# Patient Record
Sex: MALE | Race: WHITE | Employment: OTHER | URBAN - METROPOLITAN AREA
[De-identification: names, ages, dates, MRNs, and addresses within clinical notes are randomized per-mention and may not be internally consistent; named-entity substitution may affect disease eponyms.]

---

## 2017-11-01 ENCOUNTER — HOSPITAL ENCOUNTER (OUTPATIENT)
Age: 57
Setting detail: SPECIMEN
Discharge: HOME | End: 2017-11-01
Payer: MEDICARE

## 2017-11-01 ENCOUNTER — OFFICE VISIT (OUTPATIENT)
Dept: FAMILY MEDICINE CLINIC | Age: 57
End: 2017-11-01
Payer: MEDICARE

## 2017-11-01 VITALS
DIASTOLIC BLOOD PRESSURE: 62 MMHG | OXYGEN SATURATION: 98 % | TEMPERATURE: 97.6 F | HEIGHT: 70 IN | HEART RATE: 65 BPM | RESPIRATION RATE: 20 BRPM | WEIGHT: 206 LBS | SYSTOLIC BLOOD PRESSURE: 112 MMHG | BODY MASS INDEX: 29.49 KG/M2

## 2017-11-01 DIAGNOSIS — R73.03 PRE-DIABETES: ICD-10-CM

## 2017-11-01 DIAGNOSIS — Z76.89 ENCOUNTER TO ESTABLISH CARE: Primary | ICD-10-CM

## 2017-11-01 DIAGNOSIS — Z00.00 HEALTHCARE MAINTENANCE: ICD-10-CM

## 2017-11-01 DIAGNOSIS — G62.9 NEUROPATHY: ICD-10-CM

## 2017-11-01 LAB
ALBUMIN SERPL-MCNC: 3.3 G/DL (ref 3.5–5.2)
ALBUMIN/GLOBULIN RATIO: 1.1 (ref 1–2.5)
ALP BLD-CCNC: 113 U/L (ref 40–129)
ALT SERPL-CCNC: 68 U/L (ref 5–41)
ANION GAP SERPL CALCULATED.3IONS-SCNC: 15 MMOL/L (ref 9–17)
AST SERPL-CCNC: 117 U/L
BILIRUB SERPL-MCNC: 0.86 MG/DL (ref 0.3–1.2)
BUN BLDV-MCNC: 14 MG/DL (ref 6–20)
BUN/CREAT BLD: ABNORMAL (ref 9–20)
CALCIUM SERPL-MCNC: 8.4 MG/DL (ref 8.6–10.4)
CHLORIDE BLD-SCNC: 105 MMOL/L (ref 98–107)
CHOLESTEROL/HDL RATIO: 4.5
CHOLESTEROL: 174 MG/DL
CO2: 22 MMOL/L (ref 20–31)
CREAT SERPL-MCNC: 0.76 MG/DL (ref 0.7–1.2)
GFR AFRICAN AMERICAN: >60 ML/MIN
GFR NON-AFRICAN AMERICAN: >60 ML/MIN
GFR SERPL CREATININE-BSD FRML MDRD: ABNORMAL ML/MIN/{1.73_M2}
GFR SERPL CREATININE-BSD FRML MDRD: ABNORMAL ML/MIN/{1.73_M2}
GLUCOSE BLD-MCNC: 130 MG/DL (ref 70–99)
HCT VFR BLD CALC: 44.9 % (ref 40.7–50.3)
HDLC SERPL-MCNC: 39 MG/DL
HEMOGLOBIN: 14.3 G/DL (ref 13–17)
LDL CHOLESTEROL: 95 MG/DL (ref 0–130)
MCH RBC QN AUTO: 30.4 PG (ref 25.2–33.5)
MCHC RBC AUTO-ENTMCNC: 31.8 G/DL (ref 29.9–34.7)
MCV RBC AUTO: 95.5 FL (ref 82.6–102.9)
PDW BLD-RTO: 14.5 % (ref 11.8–14.4)
PLATELET # BLD: 72 K/UL (ref 138–453)
PMV BLD AUTO: 12.8 FL (ref 8.1–13.5)
POTASSIUM SERPL-SCNC: 4 MMOL/L (ref 3.7–5.3)
RBC # BLD: 4.7 M/UL (ref 4.21–5.77)
SODIUM BLD-SCNC: 142 MMOL/L (ref 135–144)
TOTAL PROTEIN: 6.3 G/DL (ref 6.4–8.3)
TRIGL SERPL-MCNC: 198 MG/DL
VLDLC SERPL CALC-MCNC: ABNORMAL MG/DL (ref 1–30)
WBC # BLD: 5 K/UL (ref 3.5–11.3)

## 2017-11-01 PROCEDURE — 3017F COLORECTAL CA SCREEN DOC REV: CPT | Performed by: NURSE PRACTITIONER

## 2017-11-01 PROCEDURE — G8419 CALC BMI OUT NRM PARAM NOF/U: HCPCS | Performed by: NURSE PRACTITIONER

## 2017-11-01 PROCEDURE — 99203 OFFICE O/P NEW LOW 30 MIN: CPT | Performed by: NURSE PRACTITIONER

## 2017-11-01 PROCEDURE — 4004F PT TOBACCO SCREEN RCVD TLK: CPT | Performed by: NURSE PRACTITIONER

## 2017-11-01 PROCEDURE — G8427 DOCREV CUR MEDS BY ELIG CLIN: HCPCS | Performed by: NURSE PRACTITIONER

## 2017-11-01 PROCEDURE — G8484 FLU IMMUNIZE NO ADMIN: HCPCS | Performed by: NURSE PRACTITIONER

## 2017-11-01 RX ORDER — GABAPENTIN 300 MG/1
300 CAPSULE ORAL 3 TIMES DAILY
Qty: 90 CAPSULE | Refills: 0 | Status: SHIPPED | OUTPATIENT
Start: 2017-11-01 | End: 2017-12-11 | Stop reason: SDUPTHER

## 2017-11-01 ASSESSMENT — PATIENT HEALTH QUESTIONNAIRE - PHQ9
SUM OF ALL RESPONSES TO PHQ QUESTIONS 1-9: 0
SUM OF ALL RESPONSES TO PHQ9 QUESTIONS 1 & 2: 0
2. FEELING DOWN, DEPRESSED OR HOPELESS: 0
1. LITTLE INTEREST OR PLEASURE IN DOING THINGS: 0

## 2017-11-01 ASSESSMENT — ENCOUNTER SYMPTOMS
CONSTIPATION: 0
BLOOD IN STOOL: 0
WHEEZING: 0
DIARRHEA: 0
SHORTNESS OF BREATH: 0

## 2017-11-01 NOTE — PROGRESS NOTES
GASTROINTESTINAL ENDOSCOPY  10/31/2015     Family History   Problem Relation Age of Onset    High Blood Pressure Mother     Other Father      neuropathy    Stroke Father      Social History   Substance Use Topics    Smoking status: Never Smoker    Smokeless tobacco: Current User     Types: Chew    Alcohol use 3.6 oz/week     6 Cans of beer per week      Comment: daily- reports occassionally      No Known Allergies    Subjective:      Review of Systems   Constitutional: Negative for chills and fever. Respiratory: Negative for shortness of breath and wheezing. Cardiovascular: Negative for chest pain, palpitations and leg swelling. Gastrointestinal: Negative for blood in stool, constipation and diarrhea. Genitourinary: Negative for hematuria (microscopic). Neurological: Positive for tremors and numbness (bilateral feet, bottoms ball of foot). Negative for weakness. Other pertinent ROS in HPI  Objective:     /62 (Site: Left Arm, Position: Sitting, Cuff Size: Medium Adult)   Pulse 65   Temp 97.6 °F (36.4 °C) (Oral)   Resp 20   Ht 5' 10\" (1.778 m)   Wt 206 lb (93.4 kg)   SpO2 98%   BMI 29.56 kg/m²    Physical Exam   Constitutional: He is oriented to person, place, and time. He appears well-developed and well-nourished. HENT:   Head: Normocephalic and atraumatic. Right Ear: Hearing, tympanic membrane, external ear and ear canal normal.   Left Ear: External ear normal. A middle ear effusion is present. Nose: Nose normal.   Mouth/Throat: Oropharynx is clear and moist.   Eyes: Conjunctivae and EOM are normal. Pupils are equal, round, and reactive to light. Neck: Normal range of motion. Cardiovascular: Normal rate, regular rhythm and normal heart sounds. Pulmonary/Chest: Effort normal and breath sounds normal.   Abdominal: Soft. Bowel sounds are normal.   Musculoskeletal: Normal range of motion. Pain free ROM     Neurological: He is alert and oriented to person, place, and time. Reviewed prior labs and health maintenance  5. Continue current medications, diet and exercise.     Completed Refills   Requested Prescriptions     Signed Prescriptions Disp Refills    gabapentin (NEURONTIN) 300 MG capsule 90 capsule 0     Sig: Take 1 capsule by mouth 3 times daily         Electronically signed by Silas Holland CNP on 11/1/2017 at 10:05 AM

## 2017-11-01 NOTE — PROGRESS NOTES
Have you seen any other physician or provider since your last visit yes - Urologist    Have you had any other diagnostic tests since your last visit? no    Have you changed or stopped any medications since your last visit including any over-the-counter medicines, vitamins, or herbal medicines? no     Are you taking all your prescribed medications? No  If NO, why? -  N/A           Patient Self-Management Goal for this visit. What is your goal for your visit today?  - New patient   Barriers to success: none   Plan for overcoming my barriers: N/A      Confidence: 10/10   Date goal set: 11/1/17   Date expected to reach goal: 2days    Medical history Review  Past Medical, Family, and Social History reviewed and does not contribute to the patient presenting condition    Health Maintenance Due   Topic Date Due    DTaP/Tdap/Td vaccine (1 - Tdap) 10/24/1980    Pneumococcal med risk (1 of 1 - PPSV23) 10/24/1980    Lipid screen  10/24/2001    Diabetes screen  10/24/2001    Flu vaccine (1) 09/01/2017

## 2017-11-02 LAB
ESTIMATED AVERAGE GLUCOSE: 114 MG/DL
HBA1C MFR BLD: 5.6 % (ref 4–6)

## 2017-11-27 ENCOUNTER — HOSPITAL ENCOUNTER (OUTPATIENT)
Age: 57
Setting detail: SPECIMEN
Discharge: HOME | End: 2017-11-27
Payer: MEDICARE

## 2017-11-27 ENCOUNTER — OFFICE VISIT (OUTPATIENT)
Dept: FAMILY MEDICINE CLINIC | Age: 57
End: 2017-11-27
Payer: MEDICARE

## 2017-11-27 VITALS
BODY MASS INDEX: 29.49 KG/M2 | RESPIRATION RATE: 20 BRPM | DIASTOLIC BLOOD PRESSURE: 84 MMHG | OXYGEN SATURATION: 97 % | HEART RATE: 63 BPM | WEIGHT: 206 LBS | TEMPERATURE: 97.1 F | HEIGHT: 70 IN | SYSTOLIC BLOOD PRESSURE: 132 MMHG

## 2017-11-27 DIAGNOSIS — D69.6 THROMBOCYTOPENIA (HCC): ICD-10-CM

## 2017-11-27 DIAGNOSIS — G25.81 RLS (RESTLESS LEGS SYNDROME): ICD-10-CM

## 2017-11-27 DIAGNOSIS — B18.2 HEP C W/O COMA, CHRONIC (HCC): Primary | ICD-10-CM

## 2017-11-27 DIAGNOSIS — B18.2 HEP C W/O COMA, CHRONIC (HCC): ICD-10-CM

## 2017-11-27 DIAGNOSIS — R73.03 PRE-DIABETES: ICD-10-CM

## 2017-11-27 LAB
HCT VFR BLD CALC: 46.3 % (ref 40.7–50.3)
HEMOGLOBIN: 15 G/DL (ref 13–17)
MCH RBC QN AUTO: 30.7 PG (ref 25.2–33.5)
MCHC RBC AUTO-ENTMCNC: 32.4 G/DL (ref 28.4–34.8)
MCV RBC AUTO: 94.9 FL (ref 82.6–102.9)
PDW BLD-RTO: 14.8 % (ref 11.8–14.4)
PLATELET # BLD: 80 K/UL (ref 138–453)
PMV BLD AUTO: 12.9 FL (ref 8.1–13.5)
RBC # BLD: 4.88 M/UL (ref 4.21–5.77)
WBC # BLD: 6.3 K/UL (ref 3.5–11.3)

## 2017-11-27 PROCEDURE — G8427 DOCREV CUR MEDS BY ELIG CLIN: HCPCS | Performed by: NURSE PRACTITIONER

## 2017-11-27 PROCEDURE — G8419 CALC BMI OUT NRM PARAM NOF/U: HCPCS | Performed by: NURSE PRACTITIONER

## 2017-11-27 PROCEDURE — 99214 OFFICE O/P EST MOD 30 MIN: CPT | Performed by: NURSE PRACTITIONER

## 2017-11-27 PROCEDURE — G8484 FLU IMMUNIZE NO ADMIN: HCPCS | Performed by: NURSE PRACTITIONER

## 2017-11-27 PROCEDURE — 3017F COLORECTAL CA SCREEN DOC REV: CPT | Performed by: NURSE PRACTITIONER

## 2017-11-27 PROCEDURE — 4004F PT TOBACCO SCREEN RCVD TLK: CPT | Performed by: NURSE PRACTITIONER

## 2017-11-27 RX ORDER — ROPINIROLE 0.5 MG/1
0.5 TABLET, FILM COATED ORAL NIGHTLY
Qty: 30 TABLET | Refills: 0 | Status: SHIPPED | OUTPATIENT
Start: 2017-11-27 | End: 2017-12-25 | Stop reason: SDUPTHER

## 2017-11-27 ASSESSMENT — ENCOUNTER SYMPTOMS
CONSTIPATION: 0
BLOOD IN STOOL: 0
SHORTNESS OF BREATH: 0
DIARRHEA: 0
WHEEZING: 0

## 2017-11-27 NOTE — PROGRESS NOTES
 BACK SURGERY      lumbar discectomy    HERNIA REPAIR      lt inguinal    UPPER GASTROINTESTINAL ENDOSCOPY  10/31/2015     Family History   Problem Relation Age of Onset    High Blood Pressure Mother     Other Father      neuropathy    Stroke Father      Social History   Substance Use Topics    Smoking status: Never Smoker    Smokeless tobacco: Current User     Types: Chew    Alcohol use 3.6 oz/week     6 Cans of beer per week      Comment: daily- reports occassionally      No Known Allergies    Subjective:      Review of Systems   Constitutional: Negative for chills and fever. Respiratory: Negative for shortness of breath and wheezing. Cardiovascular: Negative for chest pain, palpitations and leg swelling. Gastrointestinal: Negative for blood in stool, constipation and diarrhea. Genitourinary: Negative for hematuria (microscopic). Neurological: Positive for tremors and numbness (bilateral feet, bottoms ball of foot). Negative for weakness. Other pertinent ROS in HPI  Objective:     /84 (Site: Right Arm, Position: Sitting, Cuff Size: Large Adult)   Pulse 63   Temp 97.1 °F (36.2 °C) (Oral)   Resp 20   Ht 5' 10\" (1.778 m)   Wt 206 lb (93.4 kg)   SpO2 97%   BMI 29.56 kg/m²    Physical Exam   Constitutional: He is oriented to person, place, and time. He appears well-developed and well-nourished. HENT:   Head: Normocephalic and atraumatic. Right Ear: External ear normal.   Left Ear: External ear normal.   Nose: Nose normal.   Mouth/Throat: Oropharynx is clear and moist.   Eyes: Conjunctivae and EOM are normal. Pupils are equal, round, and reactive to light. Neck: Normal range of motion. Cardiovascular: Normal rate, regular rhythm and normal heart sounds. Pulmonary/Chest: Effort normal and breath sounds normal.   Abdominal: Soft. Bowel sounds are normal.   Musculoskeletal: Normal range of motion.    Pain free ROM     Neurological: He is alert and oriented to person, place, and time. Gait normal     Skin: Skin is warm and dry. No rash noted. Psychiatric: He has a normal mood and affect. His behavior is normal. Judgment and thought content normal.       Data Review  CBC:   Lab Results   Component Value Date    WBC 5.0 11/01/2017    RBC 4.70 11/01/2017     BMP:   Lab Results   Component Value Date    GLUCOSE 130 11/01/2017    CO2 22 11/01/2017    BUN 14 11/01/2017    CREATININE 0.76 11/01/2017    CALCIUM 8.4 11/01/2017      Assessment/PLAN     1. Hep C w/o coma, chronic (HCC)  Likely gi referral after completed  - US Liver; Future  - Hepatitis C RNA, Quantitative, PCR; Future    2. Thrombocytopenia ([address]    - CBC; Future    3. RLS (restless legs syndrome)  If this does not help will consider stopping neurontin and trying lyrica if we can get covered. - rOPINIRole (REQUIP) 0.5 MG tablet; Take 1 tablet by mouth nightly  Dispense: 30 tablet; Refill: 0    4. Pre-dm  Is going to make some dietary changes. is going to make dietary changes    RTO if symptoms worsen or fail to improve  Pt agreeable with plan      Patient given educational materials - see patient instructions. Discussed use, benefit, and side effects of prescribed medications. All patient questions answered. Pt voiced understanding. Reviewed health maintenance. Instructed to continue current medications, diet and exercise. 1.  Yogi received counseling on the following healthy behaviors: nutrition, exercise and medication adherence  2. Patient given educational materials when available - see patient instructions when applicable  3. Discussed use, benefit, and side effects of prescribed medications. Barriers to medication compliance addressed. All patient questions answered. Pt voiced understanding. 4.  Reviewed prior labs and health maintenance  5. Continue current medications, diet and exercise.     Completed Refills   Requested Prescriptions     Signed Prescriptions Disp Refills    rOPINIRole (REQUIP) 0.5 MG tablet 30 tablet 0     Sig: Take 1 tablet by mouth nightly         Electronically signed by Lyndsey Turner CNP on 11/27/2017 at 9:13 AM

## 2017-11-27 NOTE — PROGRESS NOTES
Have you seen any other physician or provider since your last visit no    Have you had any other diagnostic tests since your last visit? no    Have you changed or stopped any medications since your last visit including any over-the-counter medicines, vitamins, or herbal medicines? no     Are you taking all your prescribed medications? Yes  If NO, why? -  N/A           Patient Self-Management Goal for this visit. What is your goal for your visit today? - discuss results   Barriers to success: none   Plan for overcoming my barriers: N/A      Confidence: 10/10   Date goal set: 11/27/17   Date expected to reach goal: 2days    Medical history Review  Past Medical, Family, and Social History reviewed and does not contribute to the patient presenting condition    There are no preventive care reminders to display for this patient.

## 2017-11-29 LAB
DIRECT EXAM: ABNORMAL
Lab: ABNORMAL
SPECIMEN DESCRIPTION: ABNORMAL
STATUS: ABNORMAL

## 2017-12-05 DIAGNOSIS — D69.1 ABNORMAL PLATELETS (HCC): ICD-10-CM

## 2017-12-05 DIAGNOSIS — R76.8 POSITIVE HEPATITIS C ANTIBODY TEST: Primary | ICD-10-CM

## 2017-12-07 ENCOUNTER — HOSPITAL ENCOUNTER (OUTPATIENT)
Dept: ULTRASOUND IMAGING | Age: 57
Discharge: HOME | End: 2017-12-07
Payer: MEDICARE

## 2017-12-07 DIAGNOSIS — B18.2 HEP C W/O COMA, CHRONIC (HCC): ICD-10-CM

## 2017-12-07 PROCEDURE — 76705 ECHO EXAM OF ABDOMEN: CPT

## 2017-12-14 ENCOUNTER — OFFICE VISIT (OUTPATIENT)
Dept: FAMILY MEDICINE CLINIC | Age: 57
End: 2017-12-14
Payer: MEDICARE

## 2017-12-14 VITALS
WEIGHT: 207 LBS | HEIGHT: 70 IN | HEART RATE: 81 BPM | TEMPERATURE: 97.2 F | SYSTOLIC BLOOD PRESSURE: 158 MMHG | RESPIRATION RATE: 20 BRPM | OXYGEN SATURATION: 98 % | BODY MASS INDEX: 29.63 KG/M2 | DIASTOLIC BLOOD PRESSURE: 88 MMHG

## 2017-12-14 DIAGNOSIS — Z23 NEED FOR PROPHYLACTIC VACCINATION AGAINST STREPTOCOCCUS PNEUMONIAE (PNEUMOCOCCUS): ICD-10-CM

## 2017-12-14 DIAGNOSIS — I10 ESSENTIAL HYPERTENSION: Primary | ICD-10-CM

## 2017-12-14 DIAGNOSIS — R73.03 PRE-DIABETES: ICD-10-CM

## 2017-12-14 PROCEDURE — 4004F PT TOBACCO SCREEN RCVD TLK: CPT | Performed by: NURSE PRACTITIONER

## 2017-12-14 PROCEDURE — G8419 CALC BMI OUT NRM PARAM NOF/U: HCPCS | Performed by: NURSE PRACTITIONER

## 2017-12-14 PROCEDURE — G8427 DOCREV CUR MEDS BY ELIG CLIN: HCPCS | Performed by: NURSE PRACTITIONER

## 2017-12-14 PROCEDURE — G8484 FLU IMMUNIZE NO ADMIN: HCPCS | Performed by: NURSE PRACTITIONER

## 2017-12-14 PROCEDURE — 90471 IMMUNIZATION ADMIN: CPT | Performed by: NURSE PRACTITIONER

## 2017-12-14 PROCEDURE — 99213 OFFICE O/P EST LOW 20 MIN: CPT | Performed by: NURSE PRACTITIONER

## 2017-12-14 PROCEDURE — 3017F COLORECTAL CA SCREEN DOC REV: CPT | Performed by: NURSE PRACTITIONER

## 2017-12-14 PROCEDURE — 90732 PPSV23 VACC 2 YRS+ SUBQ/IM: CPT | Performed by: NURSE PRACTITIONER

## 2017-12-14 RX ORDER — LISINOPRIL 10 MG/1
10 TABLET ORAL DAILY
Qty: 30 TABLET | Refills: 3 | Status: SHIPPED | OUTPATIENT
Start: 2017-12-14 | End: 2018-03-12 | Stop reason: SDUPTHER

## 2017-12-14 ASSESSMENT — ENCOUNTER SYMPTOMS
COUGH: 0
SHORTNESS OF BREATH: 0

## 2017-12-14 NOTE — PROGRESS NOTES
Have you seen any other physician or provider since your last visit no    Have you had any other diagnostic tests since your last visit? no    Have you changed or stopped any medications since your last visit including any over-the-counter medicines, vitamins, or herbal medicines? no     Are you taking all your prescribed medications? Yes  If NO, why? -  N/A           Patient Self-Management Goal for this visit.    What is your goal for your visit today? - test results   Barriers to success: none   Plan for overcoming my barriers: N/A      Confidence: 10/10   Date goal set: 12/14/17   Date expected to reach goal: 2days    Medical history Review  Past Medical, Family, and Social History reviewed and does not contribute to the patient presenting condition    Health Maintenance Due   Topic Date Due    DTaP/Tdap/Td vaccine (1 - Tdap) 10/24/1980    Pneumococcal med risk (1 of 1 - PPSV23) 10/24/1980    Flu vaccine (1) 09/01/2017
Allergies    Subjective:      Review of Systems   Constitutional: Negative for chills and fever. Respiratory: Negative for cough and shortness of breath. Cardiovascular: Negative for chest pain, palpitations and leg swelling. Other pertinent ROS in HPI  Objective:     BP (!) 158/88   Pulse 81   Temp 97.2 °F (36.2 °C) (Oral)   Resp 20   Ht 5' 10\" (1.778 m)   Wt 207 lb (93.9 kg)   SpO2 98%   BMI 29.70 kg/m²    Physical Exam   Constitutional: He is oriented to person, place, and time. He appears well-developed and well-nourished. HENT:   Head: Normocephalic and atraumatic. Right Ear: External ear normal.   Left Ear: External ear normal.   Nose: Nose normal.   Mouth/Throat: Oropharynx is clear and moist.   Eyes: Conjunctivae and EOM are normal. Pupils are equal, round, and reactive to light. Neck: Normal range of motion. Cardiovascular: Normal rate, regular rhythm and normal heart sounds. Pulmonary/Chest: Effort normal and breath sounds normal.   Abdominal: Soft. Bowel sounds are normal.   Musculoskeletal: Normal range of motion. Pain free ROM     Neurological: He is alert and oriented to person, place, and time. Gait normal     Skin: Skin is warm and dry. No rash noted. Psychiatric: He has a normal mood and affect. His behavior is normal. Judgment and thought content normal.     Data Review  CBC:   Lab Results   Component Value Date    WBC 6.3 11/27/2017    RBC 4.88 11/27/2017     BMP:   Lab Results   Component Value Date    GLUCOSE 130 11/01/2017    CO2 22 11/01/2017    BUN 14 11/01/2017    CREATININE 0.76 11/01/2017    CALCIUM 8.4 11/01/2017      Assessment/PLAN     1. Essential hypertension  Add lisinopril  Diet and exercise discussed  - lisinopril (PRINIVIL;ZESTRIL) 10 MG tablet; Take 1 tablet by mouth daily  Dispense: 30 tablet; Refill: 3    2.  Need for prophylactic vaccination against Streptococcus pneumoniae (pneumococcus)    - Pneumococcal polysaccharide vaccine 23-valent >= 3yo

## 2017-12-14 NOTE — PATIENT INSTRUCTIONS
third trimester. It is not known whether lisinopril passes into breast milk or if it could harm a nursing baby. You should not breast-feed while using this medicine. How should I take lisinopril? Follow all directions on your prescription label. Your doctor may occasionally change your dose to make sure you get the best results. Do not take this medicine in larger or smaller amounts or for longer than recommended. Drink plenty of water each day while you are taking this medicine. Lisinopril can be taken with or without food. Measure liquid medicine with the dosing syringe provided, or with a special dose-measuring spoon or medicine cup. If you do not have a dose-measuring device, ask your pharmacist for one. Your blood pressure will need to be checked often, and you may need frequent blood tests. Call your doctor if you have ongoing vomiting or diarrhea, or if you are sweating more than usual. You can easily become dehydrated while taking this medicine. This can lead to very low blood pressure, electrolyte disorders, or kidney failure while you are taking lisinopril. If you need surgery, tell the surgeon ahead of time that you are using lisinopril. You may need to stop using the medicine for a short time. If you are being treated for high blood pressure, keep using this medicine even if you feel well. High blood pressure often has no symptoms. You may need to use blood pressure medicine for the rest of your life. Store at room temperature away from moisture and heat. Do not freeze the oral liquid. What happens if I miss a dose? Take the missed dose as soon as you remember. Skip the missed dose if it is almost time for your next scheduled dose. Do not take extra medicine to make up the missed dose. What happens if I overdose? Seek emergency medical attention or call the Poison Help line at 1-886.506.8893. What should I avoid while taking lisinopril?   Drinking alcohol can further lower your blood pressure and may increase certain side effects of lisinopril. Avoid becoming overheated or dehydrated during exercise, in hot weather, or by not drinking enough fluids. Lisinopril can decrease sweating and you may be more prone to heat stroke. Do not use salt substitutes or potassium supplements while taking lisinopril, unless your doctor has told you to. Avoid getting up too fast from a sitting or lying position, or you may feel dizzy. Get up slowly and steady yourself to prevent a fall. What are the possible side effects of lisinopril? Get emergency medical help if you have signs of an allergic reaction: hives; severe stomach pain, difficult breathing; swelling of your face, lips, tongue, or throat. Call your doctor at once if you have:  · a light-headed feeling, like you might pass out;  · little or no urinating;  · fever, sore throat;  · high potassium --nausea, slow or unusual heart rate, weakness, loss of movement;  · kidney problems --little or no urinating, painful or difficult urination, swelling in your feet or ankles, feeling tired or short of breath; or  · liver problems --nausea, upper stomach pain, itching, tired feeling, loss of appetite, dark urine, varun-colored stools, jaundice (yellowing of the skin or eyes). Common side effects may include:  · headache, dizziness;  · cough; or  · chest pain. This is not a complete list of side effects and others may occur. Call your doctor for medical advice about side effects. You may report side effects to FDA at 8-786-FDA-0741. What other drugs will affect lisinopril?   Tell your doctor about all your current medicines and any you start or stop using, especially:  · lithium;  · a diuretic or \"water pill\";  · gold injections to treat arthritis;  · insulin or oral diabetes medicine;  · a potassium supplement;  · medicine to prevent organ transplant rejection --everolimus, sirolimus, tacrolimus, temsirolimus; or  · NSAIDs (nonsteroidal anti-inflammatory drugs) --aspirin, ibuprofen (Advil, Motrin), naproxen (Aleve), celecoxib, diclofenac, indomethacin, meloxicam, and others. This list is not complete. Other drugs may interact with lisinopril, including prescription and over-the-counter medicines, vitamins, and herbal products. Not all possible interactions are listed in this medication guide. Where can I get more information? Your pharmacist can provide more information about lisinopril. Remember, keep this and all other medicines out of the reach of children, never share your medicines with others, and use this medication only for the indication prescribed. Every effort has been made to ensure that the information provided by [facility] is accurate, up-to-date, and complete, but no guarantee is made to that effect. Drug information contained herein may be time sensitive. [facility] information has been compiled for use by healthcare practitioners and consumers in the [place] and therefore [facility] does not warrant that uses outside of the [place] are appropriate, unless specifically indicated otherwise. [facility]'s drug information does not endorse drugs, diagnose patients or recommend therapy. [facility] drug information is an informational resource designed to assist licensed healthcare practitioners in caring for their patients and/or to serve consumers viewing this service as a supplement to, and not a substitute for, the expertise, skill, knowledge and judgment of healthcare practitioners. The absence of a warning for a given drug or drug combination in no way should be construed to indicate that the drug or drug combination is safe, effective or appropriate for any given patient. [facility] does not assume any responsibility for any aspect of healthcare administered with the aid of information [facility] provides.  The information contained herein is not intended to cover all possible uses, directions, precautions, warnings, drug interactions, allergic reactions, or adverse effects. If you have questions about the drugs you are taking, check with your doctor, nurse or pharmacist.  Copyright 2360-1270 [address]: 13.04. Revision date: 9/19/2016. Care instructions adapted under license by [facility] ([facility]). If you have questions about a medical condition or this instruction, always ask your healthcare professional. [place] [address] any warranty or liability for your use of this information.

## 2017-12-25 DIAGNOSIS — G25.81 RLS (RESTLESS LEGS SYNDROME): ICD-10-CM

## 2017-12-26 NOTE — TELEPHONE ENCOUNTER
Last visit 17  Next visit NONE    Next Visit Date:  Future Appointments  Date Time Provider Marino Sandi   1/3/2018 2:15 PM MD newton [facility]   2018 2:40 PM MD Hua Winston Maintenance   Topic Date Due    DTaP/Tdap/Td vaccine (1 - Tdap) 10/24/1980    Flu vaccine (1) 10/01/2018 (Originally 2017)    Colon Cancer Screen FIT/FOBT  2018    Diabetes screen  2020    Lipid screen  2022    Pneumococcal med risk  Completed    Hepatitis C screen  Completed    HIV screen  Completed       Hemoglobin A1C (%)   Date Value   2017 5.6             ( goal A1C is < 7)   No results found for: LABMICR  LDL Cholesterol (mg/dL)   Date Value   2017 95       (goal LDL is <100)   AST (U/L)   Date Value   2017 117 (H)     ALT (U/L)   Date Value   2017 68 (H)     BUN (mg/dL)   Date Value   2017 14     BP Readings from Last 3 Encounters:   17 (!) 158/88   17 132/84   17 112/62          (goal 120/80)    All Future Testing planned in [facility]              Patient Active Problem List:     GI bleed     Gastrointestinal hemorrhage     Iron deficiency anemia due to chronic blood loss     Abdominal pain, generalized     Diarrhea     Melena     New onset seizure (HCC)     Altered mental status     Elevated liver enzymes     Alcohol dependence with uncomplicated withdrawal (HCC)     Alcohol withdrawal (HCC)     Benign essential HTN     Somnolence     Gastric ulcer

## 2017-12-28 RX ORDER — ROPINIROLE 0.5 MG/1
TABLET, FILM COATED ORAL
Qty: 90 TABLET | Refills: 1 | Status: SHIPPED | OUTPATIENT
Start: 2017-12-28 | End: 2018-03-12 | Stop reason: SDUPTHER

## 2018-01-03 ENCOUNTER — OFFICE VISIT (OUTPATIENT)
Dept: GASTROENTEROLOGY | Age: 58
End: 2018-01-03
Payer: MEDICARE

## 2018-01-03 VITALS
HEIGHT: 70 IN | HEART RATE: 90 BPM | RESPIRATION RATE: 14 BRPM | OXYGEN SATURATION: 100 % | DIASTOLIC BLOOD PRESSURE: 86 MMHG | WEIGHT: 203.6 LBS | BODY MASS INDEX: 29.15 KG/M2 | SYSTOLIC BLOOD PRESSURE: 153 MMHG

## 2018-01-03 DIAGNOSIS — K25.4 CHRONIC GASTRIC ULCER WITH HEMORRHAGE: Primary | ICD-10-CM

## 2018-01-03 DIAGNOSIS — B18.2 CHRONIC HEPATITIS C WITHOUT HEPATIC COMA (HCC): ICD-10-CM

## 2018-01-03 PROBLEM — B17.10 ACUTE HEPATITIS C VIRUS INFECTION WITHOUT HEPATIC COMA: Status: ACTIVE | Noted: 2018-01-03

## 2018-01-03 PROCEDURE — G8419 CALC BMI OUT NRM PARAM NOF/U: HCPCS | Performed by: INTERNAL MEDICINE

## 2018-01-03 PROCEDURE — 99244 OFF/OP CNSLTJ NEW/EST MOD 40: CPT | Performed by: INTERNAL MEDICINE

## 2018-01-03 PROCEDURE — G8484 FLU IMMUNIZE NO ADMIN: HCPCS | Performed by: INTERNAL MEDICINE

## 2018-01-03 PROCEDURE — 3017F COLORECTAL CA SCREEN DOC REV: CPT | Performed by: INTERNAL MEDICINE

## 2018-01-03 PROCEDURE — G8427 DOCREV CUR MEDS BY ELIG CLIN: HCPCS | Performed by: INTERNAL MEDICINE

## 2018-01-03 RX ORDER — POLYETHYLENE GLYCOL 3350 17 G/17G
POWDER, FOR SOLUTION ORAL
Qty: 255 G | Refills: 0 | Status: SHIPPED | OUTPATIENT
Start: 2018-01-03 | End: 2018-02-02

## 2018-01-03 ASSESSMENT — ENCOUNTER SYMPTOMS
COUGH: 1
BLOOD IN STOOL: 0
TROUBLE SWALLOWING: 0
ABDOMINAL PAIN: 0
EYES NEGATIVE: 1
RECTAL PAIN: 0
GASTROINTESTINAL NEGATIVE: 1
DIARRHEA: 0
VOMITING: 0
ABDOMINAL DISTENTION: 0
BACK PAIN: 1
NAUSEA: 0
ANAL BLEEDING: 0
CONSTIPATION: 0
ALLERGIC/IMMUNOLOGIC NEGATIVE: 1
SINUS PRESSURE: 1

## 2018-01-03 NOTE — PROGRESS NOTES
Subjective:      Patient ID: Vesna Gooden is a 65 y.o. male. HPI   Dr. Enrique Colunga, CNP has requested that I see Vesna Gooden for a consult for   1. Chronic gastric ulcer with hemorrhage    2. [facility]     . This patient is seen in my office for the first time  He has hx of IVDA and marijuana use   He has not used any drugs since 90s  He drinks every day he says  He has hx of GI bleeding in 2015 and had large gastric ulcer  He never had EGD since then  He never had colonoscopy in the past  No fam hx of colon cancer  Pt is clinically feeling well GI wise  Has No significant abdominal pains, bloating or cramping  Has no sig GERD symptoms  Has no Dysphagia, No Nausea or any vomiting  Denies any rectal bleeding or any melena  Denies any sig constipation or any diarrhea symptoms  Weight is stable and appetite is generally good. Past Medical, Family, and Social History reviewed and does contribute to the patient presenting condition. patient\"s PMH/PSH,SH,PSYCH hx, MEDs, ALLERGIES, and ROS was all reviewed and updated ion the appropriate sections      Review of Systems   Constitutional: Positive for fatigue. HENT: Positive for postnasal drip and sinus pressure. Negative for trouble swallowing. Eyes: Negative. Respiratory: Positive for cough. Cardiovascular: Negative. Gastrointestinal: Negative. Negative for abdominal distention, abdominal pain, anal bleeding, blood in stool, constipation, diarrhea, nausea, rectal pain and vomiting. Denies   Endocrine: Negative. Genitourinary: Negative. Musculoskeletal: Positive for back pain. Skin: Negative. Allergic/Immunologic: Negative. Neurological: Positive for dizziness and tremors. Negative for weakness and light-headedness. Hematological: Negative. Psychiatric/Behavioral: Negative for sleep disturbance. The patient is nervous/anxious.       Reviewed and

## 2018-01-04 ENCOUNTER — TELEPHONE (OUTPATIENT)
Dept: GASTROENTEROLOGY | Age: 58
End: 2018-01-04

## 2018-01-04 DIAGNOSIS — B18.2 CHRONIC HEPATITIS C WITHOUT HEPATIC COMA (HCC): ICD-10-CM

## 2018-01-04 DIAGNOSIS — B17.10 ACUTE HEPATITIS C VIRUS INFECTION WITHOUT HEPATIC COMA: ICD-10-CM

## 2018-01-04 DIAGNOSIS — K70.30 ALCOHOLIC CIRRHOSIS OF LIVER WITHOUT ASCITES (HCC): Primary | ICD-10-CM

## 2018-01-08 ENCOUNTER — INITIAL CONSULT (OUTPATIENT)
Dept: ONCOLOGY | Age: 58
End: 2018-01-08
Payer: MEDICARE

## 2018-01-08 VITALS
HEART RATE: 98 BPM | TEMPERATURE: 97.5 F | BODY MASS INDEX: 28.45 KG/M2 | WEIGHT: 198.7 LBS | DIASTOLIC BLOOD PRESSURE: 79 MMHG | HEIGHT: 70 IN | SYSTOLIC BLOOD PRESSURE: 132 MMHG

## 2018-01-08 DIAGNOSIS — F10.230 ALCOHOL DEPENDENCE WITH UNCOMPLICATED WITHDRAWAL (HCC): ICD-10-CM

## 2018-01-08 DIAGNOSIS — D69.6 THROMBOCYTOPENIA (HCC): Primary | ICD-10-CM

## 2018-01-08 DIAGNOSIS — B17.10 ACUTE HEPATITIS C VIRUS INFECTION WITHOUT HEPATIC COMA: ICD-10-CM

## 2018-01-08 PROCEDURE — 99201 HC NEW PT, E/M LEVEL 1: CPT

## 2018-01-08 PROCEDURE — G8484 FLU IMMUNIZE NO ADMIN: HCPCS | Performed by: INTERNAL MEDICINE

## 2018-01-08 PROCEDURE — 3017F COLORECTAL CA SCREEN DOC REV: CPT | Performed by: INTERNAL MEDICINE

## 2018-01-08 PROCEDURE — G8419 CALC BMI OUT NRM PARAM NOF/U: HCPCS | Performed by: INTERNAL MEDICINE

## 2018-01-08 PROCEDURE — G8427 DOCREV CUR MEDS BY ELIG CLIN: HCPCS | Performed by: INTERNAL MEDICINE

## 2018-01-08 PROCEDURE — 99244 OFF/OP CNSLTJ NEW/EST MOD 40: CPT | Performed by: INTERNAL MEDICINE

## 2018-01-08 RX ORDER — BISACODYL 5 MG
TABLET, DELAYED RELEASE (ENTERIC COATED) ORAL
COMMUNITY
Start: 2018-01-03 | End: 2018-04-11 | Stop reason: ALTCHOICE

## 2018-03-12 ENCOUNTER — OFFICE VISIT (OUTPATIENT)
Dept: FAMILY MEDICINE CLINIC | Age: 58
End: 2018-03-12
Payer: MEDICARE

## 2018-03-12 VITALS
DIASTOLIC BLOOD PRESSURE: 100 MMHG | SYSTOLIC BLOOD PRESSURE: 172 MMHG | HEIGHT: 70 IN | TEMPERATURE: 98.3 F | HEART RATE: 117 BPM | WEIGHT: 198 LBS | RESPIRATION RATE: 20 BRPM | OXYGEN SATURATION: 98 % | BODY MASS INDEX: 28.35 KG/M2

## 2018-03-12 DIAGNOSIS — G62.9 NEUROPATHY: ICD-10-CM

## 2018-03-12 DIAGNOSIS — I10 ESSENTIAL HYPERTENSION: ICD-10-CM

## 2018-03-12 DIAGNOSIS — G25.81 RLS (RESTLESS LEGS SYNDROME): ICD-10-CM

## 2018-03-12 PROCEDURE — G8419 CALC BMI OUT NRM PARAM NOF/U: HCPCS | Performed by: NURSE PRACTITIONER

## 2018-03-12 PROCEDURE — G8484 FLU IMMUNIZE NO ADMIN: HCPCS | Performed by: NURSE PRACTITIONER

## 2018-03-12 PROCEDURE — 99214 OFFICE O/P EST MOD 30 MIN: CPT | Performed by: NURSE PRACTITIONER

## 2018-03-12 PROCEDURE — 4004F PT TOBACCO SCREEN RCVD TLK: CPT | Performed by: NURSE PRACTITIONER

## 2018-03-12 PROCEDURE — 3017F COLORECTAL CA SCREEN DOC REV: CPT | Performed by: NURSE PRACTITIONER

## 2018-03-12 PROCEDURE — G8427 DOCREV CUR MEDS BY ELIG CLIN: HCPCS | Performed by: NURSE PRACTITIONER

## 2018-03-12 RX ORDER — CLONIDINE HYDROCHLORIDE 0.2 MG/1
0.2 TABLET ORAL 2 TIMES DAILY
Qty: 60 TABLET | Refills: 3 | Status: SHIPPED | OUTPATIENT
Start: 2018-03-12 | End: 2018-07-08 | Stop reason: SDUPTHER

## 2018-03-12 RX ORDER — LISINOPRIL 10 MG/1
10 TABLET ORAL DAILY
Qty: 30 TABLET | Refills: 3 | Status: SHIPPED | OUTPATIENT
Start: 2018-03-12 | End: 2018-04-16 | Stop reason: DRUGHIGH

## 2018-03-12 RX ORDER — GABAPENTIN 300 MG/1
300 CAPSULE ORAL 3 TIMES DAILY
Qty: 90 CAPSULE | Refills: 1 | Status: SHIPPED | OUTPATIENT
Start: 2018-03-12 | End: 2018-04-16 | Stop reason: SDUPTHER

## 2018-03-12 RX ORDER — ROPINIROLE 0.5 MG/1
TABLET, FILM COATED ORAL
Qty: 30 TABLET | Refills: 5 | Status: SHIPPED | OUTPATIENT
Start: 2018-03-12 | End: 2018-07-31 | Stop reason: SDUPTHER

## 2018-03-12 ASSESSMENT — ENCOUNTER SYMPTOMS
SHORTNESS OF BREATH: 0
COUGH: 0

## 2018-03-13 ENCOUNTER — ANESTHESIA EVENT (OUTPATIENT)
Dept: OPERATING ROOM | Age: 58
End: 2018-03-13
Payer: MEDICARE

## 2018-03-14 ENCOUNTER — HOSPITAL ENCOUNTER (OUTPATIENT)
Age: 58
Setting detail: OUTPATIENT SURGERY
Discharge: HOME | End: 2018-03-14
Attending: INTERNAL MEDICINE | Admitting: INTERNAL MEDICINE
Payer: MEDICARE

## 2018-03-14 ENCOUNTER — ANESTHESIA (OUTPATIENT)
Dept: OPERATING ROOM | Age: 58
End: 2018-03-14
Payer: MEDICARE

## 2018-03-14 VITALS
SYSTOLIC BLOOD PRESSURE: 122 MMHG | RESPIRATION RATE: 14 BRPM | WEIGHT: 198 LBS | TEMPERATURE: 97.5 F | DIASTOLIC BLOOD PRESSURE: 77 MMHG | HEART RATE: 62 BPM | BODY MASS INDEX: 28.35 KG/M2 | HEIGHT: 70 IN | OXYGEN SATURATION: 97 %

## 2018-03-14 VITALS — SYSTOLIC BLOOD PRESSURE: 114 MMHG | DIASTOLIC BLOOD PRESSURE: 69 MMHG | OXYGEN SATURATION: 96 %

## 2018-03-14 PROCEDURE — 2500000003 HC RX 250 WO HCPCS: Performed by: NURSE ANESTHETIST, CERTIFIED REGISTERED

## 2018-03-14 PROCEDURE — 3700000001 HC ADD 15 MINUTES (ANESTHESIA): Performed by: INTERNAL MEDICINE

## 2018-03-14 PROCEDURE — 3700000000 HC ANESTHESIA ATTENDED CARE: Performed by: INTERNAL MEDICINE

## 2018-03-14 PROCEDURE — 6360000002 HC RX W HCPCS: Performed by: NURSE ANESTHETIST, CERTIFIED REGISTERED

## 2018-03-14 PROCEDURE — 88305 TISSUE EXAM BY PATHOLOGIST: CPT

## 2018-03-14 PROCEDURE — 45378 DIAGNOSTIC COLONOSCOPY: CPT | Performed by: INTERNAL MEDICINE

## 2018-03-14 PROCEDURE — 7100000011 HC PHASE II RECOVERY - ADDTL 15 MIN: Performed by: INTERNAL MEDICINE

## 2018-03-14 PROCEDURE — 43239 EGD BIOPSY SINGLE/MULTIPLE: CPT | Performed by: INTERNAL MEDICINE

## 2018-03-14 PROCEDURE — 2580000003 HC RX 258: Performed by: ANESTHESIOLOGY

## 2018-03-14 PROCEDURE — 3609027000 HC COLONOSCOPY: Performed by: INTERNAL MEDICINE

## 2018-03-14 PROCEDURE — 7100000010 HC PHASE II RECOVERY - FIRST 15 MIN: Performed by: INTERNAL MEDICINE

## 2018-03-14 PROCEDURE — 3609012400 HC EGD TRANSORAL BIOPSY SINGLE/MULTIPLE: Performed by: INTERNAL MEDICINE

## 2018-03-14 RX ORDER — SODIUM CHLORIDE 0.9 % (FLUSH) 0.9 %
10 SYRINGE (ML) INJECTION EVERY 12 HOURS SCHEDULED
Status: DISCONTINUED | OUTPATIENT
Start: 2018-03-14 | End: 2018-03-14 | Stop reason: HOSPADM

## 2018-03-14 RX ORDER — SODIUM CHLORIDE 0.9 % (FLUSH) 0.9 %
10 SYRINGE (ML) INJECTION PRN
Status: DISCONTINUED | OUTPATIENT
Start: 2018-03-14 | End: 2018-03-14 | Stop reason: HOSPADM

## 2018-03-14 RX ORDER — LIDOCAINE HYDROCHLORIDE 10 MG/ML
1 INJECTION, SOLUTION EPIDURAL; INFILTRATION; INTRACAUDAL; PERINEURAL
Status: DISCONTINUED | OUTPATIENT
Start: 2018-03-15 | End: 2018-03-14 | Stop reason: HOSPADM

## 2018-03-14 RX ORDER — SODIUM CHLORIDE, SODIUM LACTATE, POTASSIUM CHLORIDE, CALCIUM CHLORIDE 600; 310; 30; 20 MG/100ML; MG/100ML; MG/100ML; MG/100ML
INJECTION, SOLUTION INTRAVENOUS CONTINUOUS
Status: DISCONTINUED | OUTPATIENT
Start: 2018-03-15 | End: 2018-03-14 | Stop reason: HOSPADM

## 2018-03-14 RX ORDER — LIDOCAINE HYDROCHLORIDE 20 MG/ML
INJECTION, SOLUTION EPIDURAL; INFILTRATION; INTRACAUDAL; PERINEURAL PRN
Status: DISCONTINUED | OUTPATIENT
Start: 2018-03-14 | End: 2018-03-14 | Stop reason: SDUPTHER

## 2018-03-14 RX ORDER — MIDAZOLAM HYDROCHLORIDE 1 MG/ML
INJECTION INTRAMUSCULAR; INTRAVENOUS PRN
Status: DISCONTINUED | OUTPATIENT
Start: 2018-03-14 | End: 2018-03-14 | Stop reason: SDUPTHER

## 2018-03-14 RX ORDER — SODIUM CHLORIDE 9 MG/ML
INJECTION, SOLUTION INTRAVENOUS CONTINUOUS
Status: DISCONTINUED | OUTPATIENT
Start: 2018-03-15 | End: 2018-03-14

## 2018-03-14 RX ORDER — PROPOFOL 10 MG/ML
INJECTION, EMULSION INTRAVENOUS PRN
Status: DISCONTINUED | OUTPATIENT
Start: 2018-03-14 | End: 2018-03-14 | Stop reason: SDUPTHER

## 2018-03-14 RX ORDER — FENTANYL CITRATE 50 UG/ML
INJECTION, SOLUTION INTRAMUSCULAR; INTRAVENOUS PRN
Status: DISCONTINUED | OUTPATIENT
Start: 2018-03-14 | End: 2018-03-14 | Stop reason: SDUPTHER

## 2018-03-14 RX ADMIN — PROPOFOL 40 MG: 10 INJECTION, EMULSION INTRAVENOUS at 09:25

## 2018-03-14 RX ADMIN — FENTANYL CITRATE 25 MCG: 50 INJECTION, SOLUTION INTRAMUSCULAR; INTRAVENOUS at 09:01

## 2018-03-14 RX ADMIN — PROPOFOL 20 MG: 10 INJECTION, EMULSION INTRAVENOUS at 09:17

## 2018-03-14 RX ADMIN — PROPOFOL 30 MG: 10 INJECTION, EMULSION INTRAVENOUS at 09:01

## 2018-03-14 RX ADMIN — PROPOFOL 30 MG: 10 INJECTION, EMULSION INTRAVENOUS at 09:04

## 2018-03-14 RX ADMIN — FENTANYL CITRATE 25 MCG: 50 INJECTION, SOLUTION INTRAMUSCULAR; INTRAVENOUS at 09:12

## 2018-03-14 RX ADMIN — PROPOFOL 30 MG: 10 INJECTION, EMULSION INTRAVENOUS at 09:03

## 2018-03-14 RX ADMIN — PROPOFOL 30 MG: 10 INJECTION, EMULSION INTRAVENOUS at 09:16

## 2018-03-14 RX ADMIN — FENTANYL CITRATE 25 MCG: 50 INJECTION, SOLUTION INTRAMUSCULAR; INTRAVENOUS at 09:03

## 2018-03-14 RX ADMIN — PROPOFOL 10 MG: 10 INJECTION, EMULSION INTRAVENOUS at 09:15

## 2018-03-14 RX ADMIN — FENTANYL CITRATE 25 MCG: 50 INJECTION, SOLUTION INTRAMUSCULAR; INTRAVENOUS at 09:15

## 2018-03-14 RX ADMIN — MIDAZOLAM HYDROCHLORIDE 2 MG: 1 INJECTION, SOLUTION INTRAMUSCULAR; INTRAVENOUS at 08:58

## 2018-03-14 RX ADMIN — PROPOFOL 30 MG: 10 INJECTION, EMULSION INTRAVENOUS at 09:13

## 2018-03-14 RX ADMIN — SODIUM CHLORIDE, POTASSIUM CHLORIDE, SODIUM LACTATE AND CALCIUM CHLORIDE: 600; 310; 30; 20 INJECTION, SOLUTION INTRAVENOUS at 08:58

## 2018-03-14 RX ADMIN — PROPOFOL 30 MG: 10 INJECTION, EMULSION INTRAVENOUS at 09:11

## 2018-03-14 RX ADMIN — SODIUM CHLORIDE, POTASSIUM CHLORIDE, SODIUM LACTATE AND CALCIUM CHLORIDE: 600; 310; 30; 20 INJECTION, SOLUTION INTRAVENOUS at 07:46

## 2018-03-14 RX ADMIN — PROPOFOL 20 MG: 10 INJECTION, EMULSION INTRAVENOUS at 09:19

## 2018-03-14 RX ADMIN — LIDOCAINE HYDROCHLORIDE 100 MG: 20 INJECTION, SOLUTION EPIDURAL; INFILTRATION; INTRACAUDAL; PERINEURAL at 09:01

## 2018-03-14 RX ADMIN — PROPOFOL 30 MG: 10 INJECTION, EMULSION INTRAVENOUS at 09:02

## 2018-03-14 ASSESSMENT — PULMONARY FUNCTION TESTS
PIF_VALUE: 1

## 2018-03-14 ASSESSMENT — PAIN SCALES - GENERAL: PAINLEVEL_OUTOF10: 0

## 2018-03-14 ASSESSMENT — PAIN - FUNCTIONAL ASSESSMENT: PAIN_FUNCTIONAL_ASSESSMENT: 0-10

## 2018-03-14 NOTE — ANESTHESIA PRE PROCEDURE
15.0 11/27/2017    HCT 46.3 11/27/2017    MCV 94.9 11/27/2017    RDW 14.8 11/27/2017    PLT 80 11/27/2017       CMP:   Lab Results   Component Value Date     11/01/2017    K 4.0 11/01/2017     11/01/2017    CO2 22 11/01/2017    BUN 14 11/01/2017    CREATININE 0.76 11/01/2017    GFRAA >60 11/01/2017    LABGLOM >60 11/01/2017    GLUCOSE 130 11/01/2017    PROT 6.3 11/01/2017    CALCIUM 8.4 11/01/2017    BILITOT 0.86 11/01/2017    ALKPHOS 113 11/01/2017     11/01/2017    ALT 68 11/01/2017       POC Tests: No results for input(s): POCGLU, POCNA, POCK, POCCL, POCBUN, POCHEMO, POCHCT in the last 72 hours. Coags:   Lab Results   Component Value Date    PROTIME 13.9 02/20/2017    INR 1.3 02/20/2017    APTT 27.6 10/30/2015       HCG (If Applicable): No results found for: PREGTESTUR, PREGSERUM, HCG, HCGQUANT     ABGs: No results found for: PHART, PO2ART, ICV3CWO, HEA0JNN, BEART, [id number]     Type & Screen (If Applicable):  No results found for: LABABO, [address]    Anesthesia Evaluation  Patient summary reviewed and Nursing notes reviewed  Airway: Mallampati: II  TM distance: >3 FB   Neck ROM: full  Mouth opening: > = 3 FB Dental: normal exam         Pulmonary:normal exam  breath sounds clear to auscultation                             Cardiovascular:  Exercise tolerance: good (>4 METS),           Rhythm: regular  Rate: normal                    Neuro/Psych:               GI/Hepatic/Renal:             Endo/Other:                     Abdominal:       Abdomen: soft. Vascular:                                        Anesthesia Plan      general     ASA 2       Induction: intravenous. Anesthetic plan and risks discussed with patient. Use of blood products discussed with patient whom consented to blood products. Plan discussed with surgical team, attending and CRNA.     Attending anesthesiologist reviewed and agrees with Reece Nogueira MD   3/14/2018

## 2018-03-14 NOTE — ANESTHESIA POSTPROCEDURE EVALUATION
Department of Anesthesiology  Postprocedure Note    Patient: Bridget Christina  MRN: [id number]  YOB: 1960  Date of evaluation: 3/14/2018  Time:  6:17 PM     Procedure Summary     Date:  03/14/18 Room / Location:  [facility]    Anesthesia Start:  3669 Anesthesia Stop:  0935    Procedures:       EGD BIOPSY (N/A )      COLONOSCOPY (N/A ) Diagnosis:  (CHRONIC GASTRIC ULCER WITH HEMORRHAGE, CHRONIC HEPATITIS C)    Surgeon:  Sarah Beth Richmond MD Responsible Provider:  Ghazala Herndon MD    Anesthesia Type:  general ASA Status:  2          Anesthesia Type: general    Randolph Phase I:      Randolph Phase II: Randolph Score: 10    Last vitals: Reviewed and per EMR flowsheets.        Anesthesia Post Evaluation    Patient location during evaluation: PACU  Patient participation: complete - patient participated  Level of consciousness: awake and alert  Pain score: 0  Airway patency: patent  Nausea & Vomiting: no nausea and no vomiting  Complications: no  Cardiovascular status: blood pressure returned to baseline  Respiratory status: acceptable  Hydration status: euvolemic

## 2018-03-14 NOTE — H&P
History and Physical Update    Pt Name: Jayy Lucio  MRN: [id number]  YOB: 1960  Date of evaluation: 3/14/2018      [x] I have reviewed the   which meets the criteria for an Interval History and Physical note and is attached below. [x] I have examined  Jayy Lucio  There are no changes to the patient who is scheduled for a screening colonoscopy and EGD. This is his first colonoscopy. The patient denies health changes, fever, chills, productive cough, SOB, skin changes or chest pain. Vital signs: /82   Pulse 59   Temp 98.1 °F (36.7 °C) (Oral)   Resp 16   Ht 5' 10\" (1.778 m)   Wt 198 lb (89.8 kg)   SpO2 97%   BMI 28.41 kg/m²     Allergies:  Patient has no known allergies. Medications:    Prior to Admission medications    Medication Sig Start Date End Date Taking? Authorizing Provider   gabapentin (NEURONTIN) 300 MG capsule Take 1 capsule by mouth 3 times daily for 60 days. 3/12/18 5/11/18 Yes Jolie Level, CNP   lisinopril (PRINIVIL;ZESTRIL) 10 MG tablet Take 1 tablet by mouth daily 3/12/18  Yes Jolie Level, CNP   rOPINIRole (REQUIP) 0.5 MG tablet TAKE ONE TABLET BY MOUTH ONCE NIGHTLY 3/12/18  Yes Jolie Level, CNP   cloNIDine (CATAPRES) 0.2 MG tablet Take 1 tablet by mouth 2 times daily 3/12/18  Yes Jolie Level, CNP   BISACODYL 5 MG EC tablet  1/3/18  Yes Historical Provider, MD       This is a 65 y.o. male who is pleasant, cooperative, alert and oriented x3, in no acute distress. Heart: Heart sounds are normal.  HR regular rate and rhythm without murmur, gallop or rub. Lungs: Normal respiratory effort with good air exchange and clear to auscultation without wheezes or rales bilaterally   Abdomen: soft, nontender, nondistended with bowel sounds .        Haylee NEWELL ANP-BC  Electronically signed 3/14/2018 at 7:58 AM   Jolie Tirado CNP Nurse Practitioner Signed   Progress Notes Encounter Date: 3/12/2018  History of ETOH abuse      Seizures (HCC)           Past Surgical History         Past Surgical History:   Procedure Laterality Date    BACK SURGERY         lumbar discectomy    HERNIA REPAIR         lt inguinal    UPPER GASTROINTESTINAL ENDOSCOPY   10/31/2015     large gastric ulcer         Family History          Family History   Problem Relation Age of Onset    High Blood Pressure Mother      Other Father         neuropathy    Stroke Father                   Social History   Substance Use Topics    Smoking status: Never Smoker    Smokeless tobacco: Current User       Types: Chew    Alcohol use 3.6 oz/week        6 Cans of beer per week          Comment: daily- reports occassionally      No Known Allergies     Subjective:      Review of Systems   Constitutional: Negative for chills and fever. Respiratory: Negative for cough and shortness of breath. Cardiovascular: Negative for chest pain, palpitations and leg swelling. Psychiatric/Behavioral: The patient is nervous/anxious.       Other pertinent ROS in HPI  Objective:      BP (!) 172/100   Pulse 117   Temp 98.3 °F (36.8 °C) (Oral)   Resp 20   Ht 5' 10\" (1.778 m)   Wt 198 lb (89.8 kg)   SpO2 98%   BMI 28.41 kg/m²    Physical Exam   Constitutional: He is oriented to person, place, and time. He appears well-developed and well-nourished. HENT:   Head: Normocephalic and atraumatic. Right Ear: External ear normal.   Left Ear: External ear normal.   Nose: Nose normal.   Mouth/Throat: Oropharynx is clear and moist.   Eyes: Conjunctivae and EOM are normal. Pupils are equal, round, and reactive to light. Neck: Normal range of motion. Cardiovascular: Normal rate, regular rhythm and normal heart sounds. Pulmonary/Chest: Effort normal and breath sounds normal.   Abdominal: Soft. Bowel sounds are normal.   Musculoskeletal: Normal range of motion. Pain free ROM     Neurological: He is alert and oriented to person, place, and time.    Gait

## 2018-03-15 LAB — SURGICAL PATHOLOGY REPORT: NORMAL

## 2018-04-03 NOTE — TELEPHONE ENCOUNTER
Labs ordered in January by Dr Lida Brown not completed yet. Did call pt and left vm for him that he does need to complete those at [facility] or Adina Boyce in the next couple of days or he can call office back to r/s his apt for after the labs are completed. Also ordered labs to verify if pt needs vaccinations for Hep A or Hep B.

## 2018-04-09 ENCOUNTER — HOSPITAL ENCOUNTER (OUTPATIENT)
Age: 58
Discharge: HOME | End: 2018-04-09
Payer: MEDICARE

## 2018-04-09 DIAGNOSIS — B18.2 CHRONIC HEPATITIS C WITHOUT HEPATIC COMA (HCC): ICD-10-CM

## 2018-04-09 DIAGNOSIS — B17.10 ACUTE HEPATITIS C VIRUS INFECTION WITHOUT HEPATIC COMA: ICD-10-CM

## 2018-04-09 DIAGNOSIS — K25.4 CHRONIC GASTRIC ULCER WITH HEMORRHAGE: ICD-10-CM

## 2018-04-09 LAB
HAV AB SERPL IA-ACNC: NONREACTIVE
HBV SURFACE AB TITR SER: <3.5 MIU/ML

## 2018-04-09 PROCEDURE — 86317 IMMUNOASSAY INFECTIOUS AGENT: CPT

## 2018-04-09 PROCEDURE — 36415 COLL VENOUS BLD VENIPUNCTURE: CPT

## 2018-04-09 PROCEDURE — 86708 HEPATITIS A ANTIBODY: CPT

## 2018-04-11 ENCOUNTER — OFFICE VISIT (OUTPATIENT)
Dept: GASTROENTEROLOGY | Age: 58
End: 2018-04-11
Payer: MEDICARE

## 2018-04-11 VITALS
DIASTOLIC BLOOD PRESSURE: 88 MMHG | WEIGHT: 198 LBS | SYSTOLIC BLOOD PRESSURE: 149 MMHG | OXYGEN SATURATION: 96 % | BODY MASS INDEX: 28.35 KG/M2 | HEIGHT: 70 IN | HEART RATE: 101 BPM

## 2018-04-11 DIAGNOSIS — K76.6 PORTAL HYPERTENSIVE GASTROPATHY (HCC): ICD-10-CM

## 2018-04-11 DIAGNOSIS — B17.10 ACUTE HEPATITIS C VIRUS INFECTION WITHOUT HEPATIC COMA: Primary | ICD-10-CM

## 2018-04-11 DIAGNOSIS — D50.0 IRON DEFICIENCY ANEMIA DUE TO CHRONIC BLOOD LOSS: ICD-10-CM

## 2018-04-11 DIAGNOSIS — K31.89 PORTAL HYPERTENSIVE GASTROPATHY (HCC): ICD-10-CM

## 2018-04-11 PROCEDURE — 4004F PT TOBACCO SCREEN RCVD TLK: CPT | Performed by: INTERNAL MEDICINE

## 2018-04-11 PROCEDURE — G8427 DOCREV CUR MEDS BY ELIG CLIN: HCPCS | Performed by: INTERNAL MEDICINE

## 2018-04-11 PROCEDURE — 3017F COLORECTAL CA SCREEN DOC REV: CPT | Performed by: INTERNAL MEDICINE

## 2018-04-11 PROCEDURE — G8419 CALC BMI OUT NRM PARAM NOF/U: HCPCS | Performed by: INTERNAL MEDICINE

## 2018-04-11 PROCEDURE — 99214 OFFICE O/P EST MOD 30 MIN: CPT | Performed by: INTERNAL MEDICINE

## 2018-04-11 ASSESSMENT — ENCOUNTER SYMPTOMS
BLOOD IN STOOL: 0
ABDOMINAL PAIN: 0
NAUSEA: 0
VOMITING: 0
COUGH: 1
BACK PAIN: 1
ANAL BLEEDING: 0
RECTAL PAIN: 0
ALLERGIC/IMMUNOLOGIC NEGATIVE: 1
ABDOMINAL DISTENTION: 0
GASTROINTESTINAL NEGATIVE: 1
CONSTIPATION: 0
TROUBLE SWALLOWING: 0
DIARRHEA: 0
SINUS PRESSURE: 1
EYES NEGATIVE: 1

## 2018-04-16 ENCOUNTER — OFFICE VISIT (OUTPATIENT)
Dept: FAMILY MEDICINE CLINIC | Age: 58
End: 2018-04-16
Payer: MEDICARE

## 2018-04-16 VITALS
BODY MASS INDEX: 28.92 KG/M2 | WEIGHT: 202 LBS | SYSTOLIC BLOOD PRESSURE: 124 MMHG | RESPIRATION RATE: 20 BRPM | OXYGEN SATURATION: 97 % | HEIGHT: 70 IN | HEART RATE: 66 BPM | DIASTOLIC BLOOD PRESSURE: 78 MMHG | TEMPERATURE: 97.2 F

## 2018-04-16 DIAGNOSIS — Z23 NEED FOR DIPHTHERIA-TETANUS-PERTUSSIS (TDAP) VACCINE: ICD-10-CM

## 2018-04-16 DIAGNOSIS — G62.9 NEUROPATHY: ICD-10-CM

## 2018-04-16 DIAGNOSIS — I10 ESSENTIAL HYPERTENSION: Primary | ICD-10-CM

## 2018-04-16 PROCEDURE — 3017F COLORECTAL CA SCREEN DOC REV: CPT | Performed by: NURSE PRACTITIONER

## 2018-04-16 PROCEDURE — 4004F PT TOBACCO SCREEN RCVD TLK: CPT | Performed by: NURSE PRACTITIONER

## 2018-04-16 PROCEDURE — 90715 TDAP VACCINE 7 YRS/> IM: CPT | Performed by: NURSE PRACTITIONER

## 2018-04-16 PROCEDURE — G8427 DOCREV CUR MEDS BY ELIG CLIN: HCPCS | Performed by: NURSE PRACTITIONER

## 2018-04-16 PROCEDURE — 90471 IMMUNIZATION ADMIN: CPT | Performed by: NURSE PRACTITIONER

## 2018-04-16 PROCEDURE — G8419 CALC BMI OUT NRM PARAM NOF/U: HCPCS | Performed by: NURSE PRACTITIONER

## 2018-04-16 PROCEDURE — 99213 OFFICE O/P EST LOW 20 MIN: CPT | Performed by: NURSE PRACTITIONER

## 2018-04-16 RX ORDER — LISINOPRIL 20 MG/1
20 TABLET ORAL DAILY
Qty: 30 TABLET | Refills: 5 | Status: SHIPPED | OUTPATIENT
Start: 2018-04-16 | End: 2018-07-31 | Stop reason: SDUPTHER

## 2018-04-16 RX ORDER — GABAPENTIN 300 MG/1
300 CAPSULE ORAL 3 TIMES DAILY
Qty: 90 CAPSULE | Refills: 1 | Status: SHIPPED | OUTPATIENT
Start: 2018-04-16 | End: 2018-07-31 | Stop reason: DRUGHIGH

## 2018-04-16 ASSESSMENT — ENCOUNTER SYMPTOMS
SHORTNESS OF BREATH: 0
COUGH: 0

## 2018-06-11 ENCOUNTER — TELEPHONE (OUTPATIENT)
Dept: GASTROENTEROLOGY | Age: 58
End: 2018-06-11

## 2018-07-02 ENCOUNTER — HOSPITAL ENCOUNTER (OUTPATIENT)
Age: 58
Discharge: HOME | End: 2018-07-02
Payer: MEDICARE

## 2018-07-02 DIAGNOSIS — K25.4 CHRONIC GASTRIC ULCER WITH HEMORRHAGE: ICD-10-CM

## 2018-07-02 DIAGNOSIS — B18.2 CHRONIC HEPATITIS C WITHOUT HEPATIC COMA (HCC): ICD-10-CM

## 2018-07-02 DIAGNOSIS — D69.6 THROMBOCYTOPENIA (HCC): ICD-10-CM

## 2018-07-02 LAB
ABSOLUTE EOS #: 0.2 K/UL (ref 0–0.4)
ABSOLUTE IMMATURE GRANULOCYTE: ABNORMAL K/UL (ref 0–0.3)
ABSOLUTE LYMPH #: 1.7 K/UL (ref 1–4.8)
ABSOLUTE MONO #: 0.4 K/UL (ref 0.2–0.8)
AFP: 36.6 UG/L
ALBUMIN SERPL-MCNC: 3.6 G/DL (ref 3.5–5.2)
ALBUMIN/GLOBULIN RATIO: ABNORMAL (ref 1–2.5)
ALP BLD-CCNC: 117 U/L (ref 40–129)
ALPHA-1 ANTITRYPSIN: 148 MG/DL (ref 90–200)
ALT SERPL-CCNC: 79 U/L (ref 5–41)
AMMONIA: 48 UMOL/L (ref 16–60)
AST SERPL-CCNC: 129 U/L
BASOPHILS # BLD: 1 % (ref 0–2)
BASOPHILS ABSOLUTE: 0 K/UL (ref 0–0.2)
BILIRUB SERPL-MCNC: 1.63 MG/DL (ref 0.3–1.2)
BILIRUBIN DIRECT: 0.79 MG/DL
BILIRUBIN, INDIRECT: 0.84 MG/DL (ref 0–1)
CERULOPLASMIN: 27 MG/DL (ref 15–30)
DIFFERENTIAL TYPE: ABNORMAL
EOSINOPHILS RELATIVE PERCENT: 4 % (ref 1–4)
FERRITIN: 236 UG/L (ref 30–400)
GLOBULIN: ABNORMAL G/DL (ref 1.5–3.8)
HAV IGM SER IA-ACNC: NONREACTIVE
HCT VFR BLD CALC: 44.7 % (ref 41–53)
HEMOGLOBIN: 15.3 G/DL (ref 13.5–17.5)
HEPATITIS B CORE IGM ANTIBODY: NONREACTIVE
HEPATITIS B SURFACE ANTIGEN: NONREACTIVE
HEPATITIS C ANTIBODY: REACTIVE
IMMATURE GRANULOCYTES: ABNORMAL %
LYMPHOCYTES # BLD: 31 % (ref 24–44)
MCH RBC QN AUTO: 32.6 PG (ref 26–34)
MCHC RBC AUTO-ENTMCNC: 34.1 G/DL (ref 31–37)
MCV RBC AUTO: 95.7 FL (ref 80–100)
MONOCYTES # BLD: 8 % (ref 1–7)
NRBC AUTOMATED: ABNORMAL PER 100 WBC
PDW BLD-RTO: 15 % (ref 11.5–14.5)
PLATELET # BLD: 70 K/UL (ref 130–400)
PLATELET ESTIMATE: ABNORMAL
PMV BLD AUTO: 10.2 FL (ref 6–12)
RBC # BLD: 4.67 M/UL (ref 4.5–5.9)
RBC # BLD: ABNORMAL 10*6/UL
SEG NEUTROPHILS: 56 % (ref 36–66)
SEGMENTED NEUTROPHILS ABSOLUTE COUNT: 3.1 K/UL (ref 1.8–7.7)
TOTAL PROTEIN: 6.5 G/DL (ref 6.4–8.3)
TRANSFERRIN: 254 MG/DL (ref 200–360)
WBC # BLD: 5.5 K/UL (ref 3.5–11)
WBC # BLD: ABNORMAL 10*3/UL

## 2018-07-02 PROCEDURE — 82103 ALPHA-1-ANTITRYPSIN TOTAL: CPT

## 2018-07-02 PROCEDURE — 83516 IMMUNOASSAY NONANTIBODY: CPT

## 2018-07-02 PROCEDURE — 36415 COLL VENOUS BLD VENIPUNCTURE: CPT

## 2018-07-02 PROCEDURE — 80074 ACUTE HEPATITIS PANEL: CPT

## 2018-07-02 PROCEDURE — 80076 HEPATIC FUNCTION PANEL: CPT

## 2018-07-02 PROCEDURE — 82105 ALPHA-FETOPROTEIN SERUM: CPT

## 2018-07-02 PROCEDURE — 86256 FLUORESCENT ANTIBODY TITER: CPT

## 2018-07-02 PROCEDURE — 86038 ANTINUCLEAR ANTIBODIES: CPT

## 2018-07-02 PROCEDURE — 82390 ASSAY OF CERULOPLASMIN: CPT

## 2018-07-02 PROCEDURE — 82140 ASSAY OF AMMONIA: CPT

## 2018-07-02 PROCEDURE — 86255 FLUORESCENT ANTIBODY SCREEN: CPT

## 2018-07-02 PROCEDURE — 85025 COMPLETE CBC W/AUTO DIFF WBC: CPT

## 2018-07-02 PROCEDURE — 87522 HEPATITIS C REVRS TRNSCRPJ: CPT

## 2018-07-02 PROCEDURE — 82728 ASSAY OF FERRITIN: CPT

## 2018-07-02 PROCEDURE — 87902 NFCT AGT GNTYP ALYS HEP C: CPT

## 2018-07-02 PROCEDURE — 84466 ASSAY OF TRANSFERRIN: CPT

## 2018-07-03 LAB
ANTI-NUCLEAR ANTIBODY (ANA): POSITIVE
DIRECT EXAM: ABNORMAL
Lab: ABNORMAL
SPECIMEN DESCRIPTION: ABNORMAL
STATUS: ABNORMAL

## 2018-07-04 LAB — MITOCHONDRIAL ANTIBODY: 9.9 UNITS (ref 0–20)

## 2018-07-05 LAB — SMOOTH MUSCLE ANTIBODY: NORMAL

## 2018-07-07 LAB
HCV QUANTITATIVE: NORMAL
HEPATITIS C GENOTYPE: NORMAL

## 2018-07-08 DIAGNOSIS — I10 ESSENTIAL HYPERTENSION: ICD-10-CM

## 2018-07-09 RX ORDER — CLONIDINE HYDROCHLORIDE 0.2 MG/1
TABLET ORAL
Qty: 180 TABLET | Refills: 1 | Status: SHIPPED | OUTPATIENT
Start: 2018-07-09 | End: 2019-01-10 | Stop reason: SDUPTHER

## 2018-07-17 ENCOUNTER — HOSPITAL ENCOUNTER (EMERGENCY)
Age: 58
Discharge: HOME | End: 2018-07-17
Attending: EMERGENCY MEDICINE
Payer: MEDICARE

## 2018-07-17 VITALS
WEIGHT: 205 LBS | RESPIRATION RATE: 18 BRPM | SYSTOLIC BLOOD PRESSURE: 142 MMHG | TEMPERATURE: 98.3 F | DIASTOLIC BLOOD PRESSURE: 72 MMHG | HEIGHT: 70 IN | OXYGEN SATURATION: 97 % | HEART RATE: 78 BPM | BODY MASS INDEX: 29.35 KG/M2

## 2018-07-17 DIAGNOSIS — Z77.098 CHEMICAL EXPOSURE OF EYE: Primary | ICD-10-CM

## 2018-07-17 PROCEDURE — 99282 EMERGENCY DEPT VISIT SF MDM: CPT

## 2018-07-17 PROCEDURE — 6370000000 HC RX 637 (ALT 250 FOR IP): Performed by: EMERGENCY MEDICINE

## 2018-07-17 RX ORDER — TETRACAINE HYDROCHLORIDE 5 MG/ML
1 SOLUTION OPHTHALMIC ONCE
Status: COMPLETED | OUTPATIENT
Start: 2018-07-17 | End: 2018-07-17

## 2018-07-17 RX ADMIN — TETRACAINE HYDROCHLORIDE 1 DROP: 5 SOLUTION OPHTHALMIC at 16:15

## 2018-07-17 ASSESSMENT — PAIN DESCRIPTION - PAIN TYPE: TYPE: ACUTE PAIN

## 2018-07-17 ASSESSMENT — VISUAL ACUITY
OU: 20/40
OS: 20/70
OD: 20/70

## 2018-07-17 ASSESSMENT — PAIN DESCRIPTION - DESCRIPTORS: DESCRIPTORS: BURNING

## 2018-07-17 ASSESSMENT — PAIN DESCRIPTION - ORIENTATION: ORIENTATION: RIGHT

## 2018-07-17 ASSESSMENT — PAIN DESCRIPTION - LOCATION: LOCATION: EYE

## 2018-07-17 ASSESSMENT — PAIN SCALES - GENERAL: PAINLEVEL_OUTOF10: 10

## 2018-07-17 NOTE — ED PROVIDER NOTES
adenopathy. Neurological: He is alert and oriented to person, place, and time. Skin: Skin is warm and dry. No rash noted. He is not diaphoretic. No erythema. Psychiatric: He has a normal mood and affect. His behavior is normal.   Vitals reviewed. DIAGNOSTIC RESULTS     EKG: All EKG's are interpreted by the Emergency Department Physician who either signs or Co-signs this chart in the absence of a cardiologist.    Not indicated    RADIOLOGY:   Non-plain film images such as CT, Ultrasound and MRI are read by the radiologist. Plain radiographic images are visualized and preliminarily interpreted by the emergency physician with the below findings:    Not indicated    Interpretation per the Radiologist below, if available at the time of this note:        LABS:  Labs Reviewed - No data to display    All other labs were within normal range or not returned as of this dictation. EMERGENCY DEPARTMENT COURSE and DIFFERENTIAL DIAGNOSIS/MDM:   Vitals:    Vitals:    07/17/18 1525   BP: (!) 142/72   Pulse: 78   Resp: 18   Temp: 98.3 °F (36.8 °C)   TempSrc: Oral   SpO2: 97%   Weight: 205 lb (93 kg)   Height: 5' 10\" (1.778 m)       Orders Placed This Encounter   Medications    tetracaine (TETRAVISC) 0.5 % ophthalmic solution 1 drop       Medical Decision Making: My clinical impression is that he had a chemical ocular exposure. He feels much better after [facility] lens irrigation. Treatment diagnosis and follow-up were discussed with the patient     CONSULTS:  None    PROCEDURES:  None    FINAL IMPRESSION      1.  Chemical exposure of eye          DISPOSITION/PLAN   DISPOSITION        PATIENT REFERRED TO:   REECE Bearden - CNP  [id number] [facility] [address] [phone]      As needed    [facility]  [address] [facility]  [phone]    If symptoms worsen      DISCHARGE MEDICATIONS:     New Prescriptions    No medications on file         (Please note that portions of this

## 2018-07-31 ENCOUNTER — OFFICE VISIT (OUTPATIENT)
Dept: FAMILY MEDICINE CLINIC | Age: 58
End: 2018-07-31
Payer: MEDICARE

## 2018-07-31 VITALS
SYSTOLIC BLOOD PRESSURE: 110 MMHG | BODY MASS INDEX: 30.65 KG/M2 | RESPIRATION RATE: 16 BRPM | WEIGHT: 213.6 LBS | HEART RATE: 70 BPM | DIASTOLIC BLOOD PRESSURE: 80 MMHG | OXYGEN SATURATION: 97 % | TEMPERATURE: 97.5 F

## 2018-07-31 DIAGNOSIS — M79.671 CHRONIC FOOT PAIN, RIGHT: ICD-10-CM

## 2018-07-31 DIAGNOSIS — G89.29 CHRONIC FOOT PAIN, RIGHT: ICD-10-CM

## 2018-07-31 DIAGNOSIS — G62.9 NEUROPATHY: ICD-10-CM

## 2018-07-31 DIAGNOSIS — I10 ESSENTIAL HYPERTENSION: ICD-10-CM

## 2018-07-31 DIAGNOSIS — G25.81 RLS (RESTLESS LEGS SYNDROME): ICD-10-CM

## 2018-07-31 DIAGNOSIS — G89.29 CHRONIC PAIN IN LEFT FOOT: Primary | ICD-10-CM

## 2018-07-31 DIAGNOSIS — M79.672 CHRONIC PAIN IN LEFT FOOT: Primary | ICD-10-CM

## 2018-07-31 PROCEDURE — G8417 CALC BMI ABV UP PARAM F/U: HCPCS | Performed by: NURSE PRACTITIONER

## 2018-07-31 PROCEDURE — 3017F COLORECTAL CA SCREEN DOC REV: CPT | Performed by: NURSE PRACTITIONER

## 2018-07-31 PROCEDURE — G8427 DOCREV CUR MEDS BY ELIG CLIN: HCPCS | Performed by: NURSE PRACTITIONER

## 2018-07-31 PROCEDURE — 4004F PT TOBACCO SCREEN RCVD TLK: CPT | Performed by: NURSE PRACTITIONER

## 2018-07-31 PROCEDURE — 99213 OFFICE O/P EST LOW 20 MIN: CPT | Performed by: NURSE PRACTITIONER

## 2018-07-31 RX ORDER — GABAPENTIN 400 MG/1
400 CAPSULE ORAL 3 TIMES DAILY
Qty: 90 CAPSULE | Refills: 1 | Status: SHIPPED | OUTPATIENT
Start: 2018-07-31 | End: 2018-09-27 | Stop reason: SDUPTHER

## 2018-07-31 RX ORDER — ROPINIROLE 0.5 MG/1
TABLET, FILM COATED ORAL
Qty: 30 TABLET | Refills: 4 | Status: SHIPPED | OUTPATIENT
Start: 2018-07-31 | End: 2019-01-30 | Stop reason: SDUPTHER

## 2018-07-31 RX ORDER — LISINOPRIL 20 MG/1
20 TABLET ORAL DAILY
Qty: 30 TABLET | Refills: 4 | Status: SHIPPED | OUTPATIENT
Start: 2018-07-31 | End: 2018-10-17 | Stop reason: SDUPTHER

## 2018-07-31 RX ORDER — GABAPENTIN 400 MG/1
400 CAPSULE ORAL 3 TIMES DAILY
Qty: 90 CAPSULE | Refills: 1 | Status: SHIPPED | OUTPATIENT
Start: 2018-07-31 | End: 2018-07-31 | Stop reason: SDUPTHER

## 2018-07-31 ASSESSMENT — ENCOUNTER SYMPTOMS
COUGH: 0
SHORTNESS OF BREATH: 0

## 2018-07-31 NOTE — PROGRESS NOTES
[address] [place]  [phone]    Gabo Bledsoe is a 65 y.o. male who presents today for his  medical conditions/complaints as noted below. Gabo Bledsoe is c/o of Medication Refill  . HPI:     HPI   Pt having increased pain in feet would like an increase in gabapentin. Pt reports that if he can just \"laze around all day\" his feet are ok. If he is on his feet for 3-4 hrs they are so painful that he can barley walk anymore. He has difficulty with sitting all day also due to his back problems. I gave him a referral back in march, he did not go, he was concerned that they would want to give him a narcotic and that is not what he wants. He reports he has two different feelings with his feet, one is the constant feeling of his socks being balled under his toes, the other is pain. He is using requip for rls, this does help the rls but not the feet complaints. bp lower today in office. He just took his clonidine this am.  He denies dizziness, light headedness, cp, sob. He is taking his bp pills later in the day. He is finding that his numbers int he afternoons are better. No problems with meds. Nursing note reviewed and discussed with patient. Patient's medications, allergies, past medical, surgical, social and family histories were reviewed and updated as appropriate. Current Outpatient Prescriptions on File Prior to Visit   Medication Sig Dispense Refill    cloNIDine (CATAPRES) 0.2 MG tablet TAKE ONE TABLET BY MOUTH TWICE A  tablet 1     No current facility-administered medications on file prior to visit.       Past Medical History:   Diagnosis Date    Chronic back pain     present pain mgmnt    Gastric ulcer 10/31/2015    large 2-3 cm    Hematuria     Hep C w/o coma, chronic (HCC)     History of ETOH abuse     Hypertension     Portal hypertensive gastropathy     Seizures (HCC)       Past

## 2018-07-31 NOTE — PROGRESS NOTES
Patient is present for a refill on gabapentin. He states he is still having issues with his feet. Pt would like an increase on dose. Pt will be treated for hep C  No other concerns at this time. Visit Information    Have you changed or started any medications since your last visit including any over-the-counter medicines, vitamins, or herbal medicines? no   Have you stopped taking any of your medications? Is so, why? -  no  Are you having any side effects from any of your medications? - no    Have you seen any other physician or provider since your last visit? Yes Hilario Vega   Have you had any other diagnostic tests since your last visit? Yes blood work   Have you been seen in the emergency room and/or had an admission in a hospital since we last saw you? Yes 7/17/18  Have you had your routine dental cleaning in the past 6 months?  no     Do you have an active MyChart account? If no, what is the barrier?   No: declined     Patient Care Team:  REECE Alvarez CNP as PCP - General (Certified Nurse Practitioner)  Bernadette Roberts MD as Consulting Physician (Gastroenterology)    Medical History Review  Past Medical, Family, and Social History reviewed and does not contribute to the patient presenting condition    Health Maintenance   Topic Date Due    Flu vaccine (1) 10/01/2018 (Originally 9/1/2018)    Shingles Vaccine (1 of 2 - 2 Dose Series) 12/01/2018 (Originally 10/24/2011)    Potassium monitoring  11/01/2018    Creatinine monitoring  11/01/2018    Diabetes screen  11/01/2020    Lipid screen  11/01/2022    Colon cancer screen colonoscopy  03/14/2023    DTaP/Tdap/Td vaccine (2 - Td) 04/16/2028    Pneumococcal med risk  Completed    HIV screen  Completed

## 2018-08-09 ENCOUNTER — OFFICE VISIT (OUTPATIENT)
Dept: PODIATRY | Age: 58
End: 2018-08-09
Payer: MEDICARE

## 2018-08-09 VITALS — BODY MASS INDEX: 33.43 KG/M2 | HEIGHT: 67 IN | RESPIRATION RATE: 16 BRPM | WEIGHT: 213 LBS

## 2018-08-09 DIAGNOSIS — M72.2 PLANTAR FASCIAL FIBROMATOSIS: Primary | ICD-10-CM

## 2018-08-09 DIAGNOSIS — M79.671 PAIN IN BOTH FEET: ICD-10-CM

## 2018-08-09 DIAGNOSIS — M79.672 PAIN IN BOTH FEET: ICD-10-CM

## 2018-08-09 PROCEDURE — G8417 CALC BMI ABV UP PARAM F/U: HCPCS | Performed by: PODIATRIST

## 2018-08-09 PROCEDURE — 3017F COLORECTAL CA SCREEN DOC REV: CPT | Performed by: PODIATRIST

## 2018-08-09 PROCEDURE — 99203 OFFICE O/P NEW LOW 30 MIN: CPT | Performed by: PODIATRIST

## 2018-08-09 PROCEDURE — L3020 FOOT LONGITUD/METATARSAL SUP: HCPCS | Performed by: PODIATRIST

## 2018-08-09 PROCEDURE — G8427 DOCREV CUR MEDS BY ELIG CLIN: HCPCS | Performed by: PODIATRIST

## 2018-08-09 PROCEDURE — 4004F PT TOBACCO SCREEN RCVD TLK: CPT | Performed by: PODIATRIST

## 2018-08-09 NOTE — PROGRESS NOTES
 COLONOSCOPY  03/14/2018    mod diverticulosis; internal hemorrhoids; retained stools    ENDOSCOPY, COLON, DIAGNOSTIC      HERNIA REPAIR      lt inguinal    AL COLON CA SCRN NOT HI RSK IND N/A 3/14/2018    COLONOSCOPY performed by Primo Wallace MD at [address] [facility]  10/31/2015    large gastric ulcer    UPPER GASTROINTESTINAL ENDOSCOPY  03/14/2018    mod portal hypertensive gastrophathy    UPPER GASTROINTESTINAL ENDOSCOPY N/A 3/14/2018    EGD BIOPSY performed by Primo Wallace MD at [address] History   Problem Relation Age of Onset    High Blood Pressure Mother     Other Father         neuropathy    Stroke Father        Social History   Substance Use Topics    Smoking status: Never Smoker    Smokeless tobacco: Current User     Types: Chew    Alcohol use No      Comment: quit Jan 3rd       Review of Systems  Review of Systems - History obtained from chart review and the patient  General ROS: negative for - chills, fatigue, fever, night sweats or weight gain  Constitutional: Negative for chills, diaphoresis, fatigue, fever and unexpected weight change. Musculoskeletal: Positive for arthralgias, gait problem and joint swelling. Neurological ROS: negative for - behavioral changes, confusion, headaches or seizures. Negative for weakness and numbness. Dermatological ROS: negative for - mole changes, rash  Cardiovascular: Negative for leg swelling. Gastrointestinal: Negative for constipation, diarrhea, nausea and vomiting. Lower Extremity Physical Examination:     Vitals:   Vitals:    08/09/18 0837   Resp: 16     General: AAO x 3 in NAD. Dermatologic Exam:  Skin lesion/ulceration Absent . Skin No rashes or nodules noted. .       Musculoskeletal:     1st MPJ ROM decreased, Bilateral.  Muscle strength 5/5, Bilateral.  POP of the right and left plantar medial calcaneal tuberosity.   Pain increased with Dorsiflexion of the right and left lesser toes. Negative pain on compression of the calcaneus bilaterally  Medial longitudinal arch, Bilateral WNL. Ankle ROM WNL,Bilateral.    Dorsally contracted digits absent digits 1-5 Bilateral.     Vascular: DP and PT pulses palpable 2/4, Bilateral.  CFT <3 seconds, Bilateral.  Hair growth present to the level of the digits, Bilateral.  Edema absent, Bilateral.  Varicosities absent, Bilateral. Erythema absent, Bilateral    Neurological: Sensation intact to light touch to level of digits, Bilateral.  Protective sensation intact 10/10 sites via 5.07/10g [place]-Dimitri Monofilament, Bilateral.  negative Tinel's, Bilateral.  negative Valleix sign, Bilateral.      Integument: Warm, dry, supple, Bilateral.  Open lesion absent, Bilateral.  Interdigital maceration absent to web spaces 1-4, Bilateral.  Nails are normal in length, thickness and color 1-5 bilateral.  Fissures absent, Bilateral.     Asessment: Patient is a 65 y.o. male with:    Diagnosis Orders   1. Plantar fascial fibromatosis  [place] FOOT LONGITUD/METATARSAL SUP   2. Pain in both feet  [place] FOOT LONGITUD/METATARSAL SUP       Plan: Patient examined and evaluated. Current condition and treatment options discussed in detail. Discussed conservative and surgical options with the patient. Advised pt to his condtion. Patient was casted for custom molded orthotics today. I feel that these appliances are medically necessary for my patients well being and in my opinion are both reasonable and necessary to the accepted medical practice in the treatment of the above conditions. Custom molded orthotics prevent the over pronation which is leading to excessive tension of the plantar fascia. Abnormal foot/leg functions demands mechanical, functional protections and control. Without custom molded orthotics, the patient may develop chronic pain in her feet. Later on in life, the patient may develop ankle, shin, knee and hip pain as well.   In trial usage of felt pads with Lo-Dye ankle strapping to mimic the effects of the orthotics, the patient responded with reduced symptoms and better foot function. Description of the devices: These devices are Root biomechanical orthotic devices made of a plastic polymer with a leather or Spenco-type top cover. These appliances control biomechanical aberrations of the patients feet and accommodate painful areas. Orthotics are not intended to be worn in lieu of shoes, but are removable devices formed from casts of the patients feet and then sent to an independent laboratory for fabrication. Due to the nature of my patients condition, I feel that this therapy is necessary indefinitely, as this is a form of continuous therapy. This is NOT a convenience item. It is my opinion that these devices will prevent the need for costly hospital surgery, further pain and discomfort. The total fee has been itemized to include biomechanical examination, casting of the feet and actual fabrication by the outside laboratory. Patient Instructions: Plantar Fasciitis    Plantar Fasciitis is inflammation of the long fibrous band on the bottom of the foot (plantar fascia), especially in the area of its attachment to the heel bone (calcaneus). The plantar fascia is also intimately related to the Achilles tendon and therefore stretching of the calf muscles is also important for treatment. 1. Stretching: Perform 3-4 times daily, 2-3 minutes per side      -Before getting out of bed, place a towel around the ball of your foot and       pull back, holding for 30 seconds. Repeat with other foot.      -Place a tennis ball on the floor. Roll the tennis ball under your foot for      10-15 minutes. Repeat with other foot. -Perform calf stretches: stand facing a wall with your hands on the wall,      place one leg a step behind the other.   Keeping your back heel on the      floor, bend your front knee (lean into the wall), holding for

## 2018-08-30 ENCOUNTER — OFFICE VISIT (OUTPATIENT)
Dept: PODIATRY | Age: 58
End: 2018-08-30

## 2018-08-30 VITALS — HEIGHT: 67 IN | BODY MASS INDEX: 33.43 KG/M2 | WEIGHT: 213 LBS | RESPIRATION RATE: 16 BRPM

## 2018-08-30 DIAGNOSIS — M72.2 PLANTAR FASCIAL FIBROMATOSIS: Primary | ICD-10-CM

## 2018-08-30 DIAGNOSIS — M79.671 PAIN IN BOTH FEET: ICD-10-CM

## 2018-08-30 DIAGNOSIS — M79.672 PAIN IN BOTH FEET: ICD-10-CM

## 2018-08-30 PROCEDURE — 999111 MISCELLANEOUS CHARGE: Performed by: PODIATRIST

## 2018-09-04 NOTE — PROGRESS NOTES
Custom molded orthotics were dispensed today. Patient was given specific instructions, verbally and written, on the proper care and wear of the custom orthotics. The patient was instructed on the proper shoes in which to were the orthotics and will slowly increase the time in which they wear the orthotics. We discussed that wearing the orthotics can change their walking pattern and change their posture. The orthotics can cause knee, hip, or back pain during the break in process and will resolve over time. Patient will follow up in 2-4 weeks for an orthotic follow up.

## 2018-09-05 ENCOUNTER — OFFICE VISIT (OUTPATIENT)
Dept: GASTROENTEROLOGY | Age: 58
End: 2018-09-05
Payer: MEDICARE

## 2018-09-05 VITALS
WEIGHT: 217.4 LBS | BODY MASS INDEX: 31.12 KG/M2 | HEART RATE: 84 BPM | HEIGHT: 70 IN | SYSTOLIC BLOOD PRESSURE: 115 MMHG | DIASTOLIC BLOOD PRESSURE: 75 MMHG

## 2018-09-05 DIAGNOSIS — B17.10 ACUTE HEPATITIS C VIRUS INFECTION WITHOUT HEPATIC COMA: Primary | ICD-10-CM

## 2018-09-05 DIAGNOSIS — K62.5 RECTAL BLEEDING: ICD-10-CM

## 2018-09-05 PROCEDURE — 99214 OFFICE O/P EST MOD 30 MIN: CPT | Performed by: INTERNAL MEDICINE

## 2018-09-05 PROCEDURE — 3017F COLORECTAL CA SCREEN DOC REV: CPT | Performed by: INTERNAL MEDICINE

## 2018-09-05 PROCEDURE — 4004F PT TOBACCO SCREEN RCVD TLK: CPT | Performed by: INTERNAL MEDICINE

## 2018-09-05 PROCEDURE — G8417 CALC BMI ABV UP PARAM F/U: HCPCS | Performed by: INTERNAL MEDICINE

## 2018-09-05 PROCEDURE — G8427 DOCREV CUR MEDS BY ELIG CLIN: HCPCS | Performed by: INTERNAL MEDICINE

## 2018-09-05 ASSESSMENT — ENCOUNTER SYMPTOMS
CHEST TIGHTNESS: 0
EYE PAIN: 0
CONSTIPATION: 0
EYE ITCHING: 0
RECTAL PAIN: 0
COUGH: 0
NAUSEA: 0
BLOOD IN STOOL: 0
BACK PAIN: 0
SHORTNESS OF BREATH: 0
ABDOMINAL PAIN: 0
CHOKING: 0
ABDOMINAL DISTENTION: 0
TROUBLE SWALLOWING: 0
VOMITING: 0
WHEEZING: 0
ANAL BLEEDING: 1
COLOR CHANGE: 0
SINUS PAIN: 0
DIARRHEA: 0
SINUS PRESSURE: 0
SORE THROAT: 0

## 2018-09-05 NOTE — PROGRESS NOTES
Physical Exam   Constitutional: He is oriented to person, place, and time. He appears well-developed and well-nourished. HENT:   Head: Normocephalic and atraumatic. Eyes: Pupils are equal, round, and reactive to light. Conjunctivae and EOM are normal.   Neck: Normal range of motion. Neck supple. Cardiovascular: Normal rate and regular rhythm. Pulmonary/Chest: Effort normal and breath sounds normal.   Abdominal: Soft. Bowel sounds are normal.   NON TENDER, NON DISTENTED  LIVER SPLEEN AND HERNIAS ARE NOT  PALPABLE  BOWEL SOUNDS ARE POSITIVE      Genitourinary: Rectum normal.   Musculoskeletal: Normal range of motion. Neurological: He is alert and oriented to person, place, and time. He has normal reflexes. Skin: Skin is warm. Assessment:      As above      Plan:      Start Prior authorization    . Pt was advised in detail about some life style and dietary modifications. He was advised about avoidance of caffeine, nicotine and chocolate. Pt was also told to stay away from any kind of fast foods, soda pops. He was also advised to avoid lots of spices, grease and fried food etc.     Instructions were also given about trying to arrange the timing, quality and quantity of food. Instructions were given about using ample amount of fiber including dietary and supplemental fiber either metamucil, bennafiber or citrucell etc.  Pt was advised about drinking ample amount of water without any colors or chemicals. Stress was given about regular exercise. Pt has verbalized understanding and agreement to these modifications.     F/u labs           Roman Weldon MD

## 2018-09-27 DIAGNOSIS — G62.9 NEUROPATHY: ICD-10-CM

## 2018-09-27 NOTE — TELEPHONE ENCOUNTER
Last visit: 7/31/18  Last Med refill: 7/31/18 X1RF    Next Visit Date:  Future Appointments  Date Time Provider Marino Junior   10/24/2018 2:45 PM Cesar Garland MD grtlk exc [facility]   1/30/2019 8:15 AM Javanmynor Pratt, APRN - CNP [facility] [address] Maintenance   Topic Date Due    Flu vaccine (1) 10/01/2018 (Originally 9/1/2018)    Shingles Vaccine (1 of 2 - 2 Dose Series) 12/01/2018 (Originally 10/24/2011)    Potassium monitoring  11/01/2018    Creatinine monitoring  11/01/2018    Diabetes screen  11/01/2020    Lipid screen  11/01/2022    Colon cancer screen colonoscopy  03/14/2023    DTaP/Tdap/Td vaccine (2 - Td) 04/16/2028    Pneumococcal med risk  Completed    HIV screen  Completed       Hemoglobin A1C (%)   Date Value   11/01/2017 5.6             ( goal A1C is < 7)   No results found for: LABMICR  LDL Cholesterol (mg/dL)   Date Value   11/01/2017 95       (goal LDL is <100)   AST (U/L)   Date Value   07/02/2018 129 (H)     ALT (U/L)   Date Value   07/02/2018 79 (H)     BUN (mg/dL)   Date Value   11/01/2017 14     BP Readings from Last 3 Encounters:   09/05/18 115/75   07/31/18 110/80   07/17/18 (!) 142/72          (goal 120/80)    All Future Testing planned in [facility]  Lab Frequency Next Occurrence   Hepatic Function Panel Once 12/06/2018   AFP Tumor Marker Once 12/06/2018   Hepatitis C RNA, quantitative, PCR Once 09/19/2018   Liver Fibrosis, Chronic Viral Hepatitis Once 09/19/2018   CBC Auto Differential Once 09/19/2018   Comprehensive Metabolic Panel Once 28/18/9231               Patient Active Problem List:     GI bleed     Gastrointestinal hemorrhage     Iron deficiency anemia due to chronic blood loss     Abdominal pain, generalized     Diarrhea     Melena     New onset seizure (HCC)     Altered mental status     Elevated liver enzymes     Alcohol dependence with uncomplicated withdrawal (HCC)     Alcohol withdrawal (HCC)     Benign essential HTN     Somnolence

## 2018-09-28 RX ORDER — GABAPENTIN 400 MG/1
CAPSULE ORAL
Qty: 90 CAPSULE | Refills: 0 | Status: SHIPPED | OUTPATIENT
Start: 2018-09-28 | End: 2018-10-28 | Stop reason: SDUPTHER

## 2018-10-23 ENCOUNTER — TELEPHONE (OUTPATIENT)
Dept: GASTROENTEROLOGY | Age: 58
End: 2018-10-23

## 2018-10-26 NOTE — TELEPHONE ENCOUNTER
Called pt and and wife's number and left vm for both that pt does NOT need to come to office on 10/31/18 and instead, still needs to complete labs as discussed prior so that writer can send in [address] for medication treatment. Writer did let both pt and wife know this on vm and did cancel pt's upcoming office apt.

## 2018-10-27 ENCOUNTER — HOSPITAL ENCOUNTER (OUTPATIENT)
Age: 58
Discharge: HOME | End: 2018-10-27
Payer: MEDICARE

## 2018-10-27 DIAGNOSIS — K62.5 RECTAL BLEEDING: ICD-10-CM

## 2018-10-27 DIAGNOSIS — B18.2 CHRONIC HEPATITIS C WITHOUT HEPATIC COMA (HCC): ICD-10-CM

## 2018-10-27 DIAGNOSIS — B17.10 ACUTE HEPATITIS C VIRUS INFECTION WITHOUT HEPATIC COMA: ICD-10-CM

## 2018-10-27 LAB
ABSOLUTE EOS #: 0.2 K/UL (ref 0–0.4)
ABSOLUTE IMMATURE GRANULOCYTE: ABNORMAL K/UL (ref 0–0.3)
ABSOLUTE LYMPH #: 1.5 K/UL (ref 1–4.8)
ABSOLUTE MONO #: 0.6 K/UL (ref 0.2–0.8)
AFP: 36.1 UG/L
ALBUMIN SERPL-MCNC: 3.2 G/DL (ref 3.5–5.2)
ALBUMIN/GLOBULIN RATIO: 1 (ref 1–2.5)
ALP BLD-CCNC: 120 U/L (ref 40–129)
ALT SERPL-CCNC: 75 U/L (ref 5–41)
ANION GAP SERPL CALCULATED.3IONS-SCNC: 14 MMOL/L (ref 9–17)
AST SERPL-CCNC: 150 U/L
BASOPHILS # BLD: 1 % (ref 0–2)
BASOPHILS ABSOLUTE: 0 K/UL (ref 0–0.2)
BILIRUB SERPL-MCNC: 1.74 MG/DL (ref 0.3–1.2)
BUN BLDV-MCNC: 17 MG/DL (ref 6–20)
BUN/CREAT BLD: ABNORMAL (ref 9–20)
CALCIUM SERPL-MCNC: 8.4 MG/DL (ref 8.6–10.4)
CHLORIDE BLD-SCNC: 106 MMOL/L (ref 98–107)
CO2: 21 MMOL/L (ref 20–31)
CREAT SERPL-MCNC: 0.87 MG/DL (ref 0.7–1.2)
DIFFERENTIAL TYPE: ABNORMAL
EOSINOPHILS RELATIVE PERCENT: 5 % (ref 1–4)
GFR AFRICAN AMERICAN: >60 ML/MIN
GFR NON-AFRICAN AMERICAN: >60 ML/MIN
GFR SERPL CREATININE-BSD FRML MDRD: ABNORMAL ML/MIN/{1.73_M2}
GFR SERPL CREATININE-BSD FRML MDRD: ABNORMAL ML/MIN/{1.73_M2}
GLUCOSE BLD-MCNC: 111 MG/DL (ref 70–99)
HCT VFR BLD CALC: 41.2 % (ref 40.7–50.3)
HEMOGLOBIN: 13.7 G/DL (ref 13–17)
IMMATURE GRANULOCYTES: ABNORMAL %
LYMPHOCYTES # BLD: 32 % (ref 24–44)
MCH RBC QN AUTO: 32.5 PG (ref 25.2–33.5)
MCHC RBC AUTO-ENTMCNC: 33.3 G/DL (ref 28.4–34.8)
MCV RBC AUTO: 97.9 FL (ref 82.6–102.9)
MONOCYTES # BLD: 13 % (ref 1–7)
NRBC AUTOMATED: 0 PER 100 WBC
PDW BLD-RTO: 14.6 % (ref 11.8–14.4)
PLATELET # BLD: ABNORMAL K/UL (ref 138–453)
PLATELET ESTIMATE: ABNORMAL
PLATELET, FLUORESCENCE: 54 K/UL (ref 138–453)
PLATELET, IMMATURE FRACTION: 10.3 % (ref 1.1–10.3)
PMV BLD AUTO: ABNORMAL FL (ref 8.1–13.5)
POTASSIUM SERPL-SCNC: 4.2 MMOL/L (ref 3.7–5.3)
RBC # BLD: 4.21 M/UL (ref 4.21–5.77)
RBC # BLD: ABNORMAL 10*6/UL
SEG NEUTROPHILS: 49 % (ref 36–66)
SEGMENTED NEUTROPHILS ABSOLUTE COUNT: 2.3 K/UL (ref 1.8–7.7)
SODIUM BLD-SCNC: 141 MMOL/L (ref 135–144)
TOTAL PROTEIN: 6.3 G/DL (ref 6.4–8.3)
WBC # BLD: 4.3 K/UL (ref 3.5–11.3)
WBC # BLD: ABNORMAL 10*3/UL

## 2018-10-27 PROCEDURE — 85055 RETICULATED PLATELET ASSAY: CPT

## 2018-10-27 PROCEDURE — 80053 COMPREHEN METABOLIC PANEL: CPT

## 2018-10-27 PROCEDURE — 82105 ALPHA-FETOPROTEIN SERUM: CPT

## 2018-10-27 PROCEDURE — 85025 COMPLETE CBC W/AUTO DIFF WBC: CPT

## 2018-10-27 PROCEDURE — 84520 ASSAY OF UREA NITROGEN: CPT

## 2018-10-27 PROCEDURE — 87522 HEPATITIS C REVRS TRNSCRPJ: CPT

## 2018-10-27 PROCEDURE — 36415 COLL VENOUS BLD VENIPUNCTURE: CPT

## 2018-10-27 PROCEDURE — 84450 TRANSFERASE (AST) (SGOT): CPT

## 2018-10-27 PROCEDURE — 82977 ASSAY OF GGT: CPT

## 2018-10-27 PROCEDURE — 84460 ALANINE AMINO (ALT) (SGPT): CPT

## 2018-10-27 PROCEDURE — 83883 ASSAY NEPHELOMETRY NOT SPEC: CPT

## 2018-10-28 DIAGNOSIS — G62.9 NEUROPATHY: ICD-10-CM

## 2018-10-29 RX ORDER — GABAPENTIN 400 MG/1
CAPSULE ORAL
Qty: 90 CAPSULE | Refills: 0 | Status: SHIPPED | OUTPATIENT
Start: 2018-10-29 | End: 2018-11-27 | Stop reason: SDUPTHER

## 2018-10-30 LAB
ALANINE AMINOTRANSFERASE, FIBROMETER: 88 U/L (ref 5–50)
ALPHA-2-MACROGLOBULIN, FIBROMETER: 339 MG/DL (ref 131–293)
ASPARTATE AMINOTRANSFERASE, FIBROMETER: 153 U/L (ref 9–50)
CIRRHOMETER PATIENT SCORE: 0.94
EER FIBROMETER REPORT: ABNORMAL
FIBROMETER INTERPRETATION: ABNORMAL
FIBROMETER PATIENT SCORE: 0.99
FIBROMETER PLATELET COUNT: 53
FIBROMETER PROTHROMBIN INDEX: 65 % (ref 90–120)
FIBROSIS METAVIR CLASSIFICATION: ABNORMAL
GAMMA GLUTAMYL TRANSFERASE, FIBROMETER: 194 U/L (ref 7–51)
INFLAMETER METAVIR CLASSIFICATION: ABNORMAL
INFLAMETER PATIENT SCORE: 0.88
UREA NITROGEN, FIBROMETER: 17 MG/DL (ref 7–20)

## 2018-10-31 LAB
DIRECT EXAM: ABNORMAL
Lab: ABNORMAL
SPECIMEN DESCRIPTION: ABNORMAL
STATUS: ABNORMAL

## 2018-11-01 NOTE — TELEPHONE ENCOUNTER
Spoke to pt. All labs completed and PA faxed to [employer] for approval.  Did speak to pt and let him know. Also, pt is scheduled for Hep A and B vaccinations for Nov 8th.

## 2018-11-08 ENCOUNTER — NURSE ONLY (OUTPATIENT)
Dept: GASTROENTEROLOGY | Age: 58
End: 2018-11-08
Payer: MEDICARE

## 2018-11-08 VITALS
WEIGHT: 217.5 LBS | HEART RATE: 73 BPM | TEMPERATURE: 98.1 F | BODY MASS INDEX: 31.21 KG/M2 | DIASTOLIC BLOOD PRESSURE: 93 MMHG | SYSTOLIC BLOOD PRESSURE: 146 MMHG

## 2018-11-08 DIAGNOSIS — B17.10 ACUTE HEPATITIS C VIRUS INFECTION WITHOUT HEPATIC COMA: Primary | ICD-10-CM

## 2018-11-08 PROCEDURE — 99212 OFFICE O/P EST SF 10 MIN: CPT | Performed by: INTERNAL MEDICINE

## 2018-11-08 PROCEDURE — 90472 IMMUNIZATION ADMIN EACH ADD: CPT | Performed by: INTERNAL MEDICINE

## 2018-11-08 PROCEDURE — 90471 IMMUNIZATION ADMIN: CPT | Performed by: INTERNAL MEDICINE

## 2018-11-08 PROCEDURE — 90632 HEPA VACCINE ADULT IM: CPT | Performed by: INTERNAL MEDICINE

## 2018-11-08 PROCEDURE — 90746 HEPB VACCINE 3 DOSE ADULT IM: CPT | Performed by: INTERNAL MEDICINE

## 2018-11-27 DIAGNOSIS — G62.9 NEUROPATHY: ICD-10-CM

## 2018-11-29 RX ORDER — GABAPENTIN 400 MG/1
CAPSULE ORAL
Qty: 90 CAPSULE | Refills: 0 | Status: SHIPPED | OUTPATIENT
Start: 2018-11-29 | End: 2019-01-15 | Stop reason: SDUPTHER

## 2018-12-26 ENCOUNTER — HOSPITAL ENCOUNTER (OUTPATIENT)
Dept: GENERAL RADIOLOGY | Age: 58
Discharge: HOME | End: 2018-12-28
Payer: MEDICARE

## 2018-12-26 ENCOUNTER — OFFICE VISIT (OUTPATIENT)
Dept: FAMILY MEDICINE CLINIC | Age: 58
End: 2018-12-26
Payer: MEDICARE

## 2018-12-26 ENCOUNTER — HOSPITAL ENCOUNTER (OUTPATIENT)
Age: 58
Discharge: HOME | End: 2018-12-28
Payer: MEDICARE

## 2018-12-26 VITALS
DIASTOLIC BLOOD PRESSURE: 74 MMHG | SYSTOLIC BLOOD PRESSURE: 132 MMHG | WEIGHT: 232 LBS | HEIGHT: 70 IN | BODY MASS INDEX: 33.21 KG/M2 | TEMPERATURE: 97.8 F | HEART RATE: 91 BPM | OXYGEN SATURATION: 95 %

## 2018-12-26 DIAGNOSIS — J06.0 SORE THROAT AND LARYNGITIS: ICD-10-CM

## 2018-12-26 DIAGNOSIS — S80.01XA CONTUSION OF RIGHT KNEE, INITIAL ENCOUNTER: ICD-10-CM

## 2018-12-26 DIAGNOSIS — S80.01XA CONTUSION OF RIGHT KNEE, INITIAL ENCOUNTER: Primary | ICD-10-CM

## 2018-12-26 PROCEDURE — 99214 OFFICE O/P EST MOD 30 MIN: CPT | Performed by: FAMILY MEDICINE

## 2018-12-26 PROCEDURE — G8484 FLU IMMUNIZE NO ADMIN: HCPCS | Performed by: FAMILY MEDICINE

## 2018-12-26 PROCEDURE — G8417 CALC BMI ABV UP PARAM F/U: HCPCS | Performed by: FAMILY MEDICINE

## 2018-12-26 PROCEDURE — 3017F COLORECTAL CA SCREEN DOC REV: CPT | Performed by: FAMILY MEDICINE

## 2018-12-26 PROCEDURE — 4004F PT TOBACCO SCREEN RCVD TLK: CPT | Performed by: FAMILY MEDICINE

## 2018-12-26 PROCEDURE — G8427 DOCREV CUR MEDS BY ELIG CLIN: HCPCS | Performed by: FAMILY MEDICINE

## 2018-12-26 PROCEDURE — 73562 X-RAY EXAM OF KNEE 3: CPT

## 2018-12-26 RX ORDER — IBUPROFEN 800 MG/1
800 TABLET ORAL EVERY 6 HOURS PRN
Qty: 90 TABLET | Refills: 3 | Status: SHIPPED | OUTPATIENT
Start: 2018-12-26 | End: 2019-01-21

## 2018-12-26 RX ORDER — AMOXICILLIN AND CLAVULANATE POTASSIUM 875; 125 MG/1; MG/1
1 TABLET, FILM COATED ORAL 2 TIMES DAILY
Qty: 20 TABLET | Refills: 0 | Status: SHIPPED | OUTPATIENT
Start: 2018-12-26 | End: 2019-01-05

## 2018-12-26 RX ORDER — METHYLPREDNISOLONE 4 MG/1
TABLET ORAL
Qty: 1 KIT | Refills: 0 | Status: SHIPPED | OUTPATIENT
Start: 2018-12-26 | End: 2019-01-15 | Stop reason: ALTCHOICE

## 2018-12-26 RX ORDER — FLUTICASONE PROPIONATE 50 MCG
1 SPRAY, SUSPENSION (ML) NASAL 2 TIMES DAILY
Qty: 1 BOTTLE | Refills: 2 | Status: SHIPPED | OUTPATIENT
Start: 2018-12-26 | End: 2019-01-21 | Stop reason: ALTCHOICE

## 2018-12-26 RX ORDER — GUAIFENESIN 600 MG/1
600 TABLET, EXTENDED RELEASE ORAL 2 TIMES DAILY
Qty: 14 TABLET | Refills: 1 | Status: SHIPPED | OUTPATIENT
Start: 2018-12-26 | End: 2019-01-02

## 2018-12-26 ASSESSMENT — ENCOUNTER SYMPTOMS
RHINORRHEA: 1
GASTROINTESTINAL NEGATIVE: 1
SINUS PRESSURE: 1
SINUS PAIN: 1
COUGH: 1
SWOLLEN GLANDS: 1
EYES NEGATIVE: 1
ALLERGIC/IMMUNOLOGIC NEGATIVE: 1
SORE THROAT: 1

## 2018-12-26 ASSESSMENT — PATIENT HEALTH QUESTIONNAIRE - PHQ9
SUM OF ALL RESPONSES TO PHQ9 QUESTIONS 1 & 2: 0
2. FEELING DOWN, DEPRESSED OR HOPELESS: 0
SUM OF ALL RESPONSES TO PHQ QUESTIONS 1-9: 0
SUM OF ALL RESPONSES TO PHQ QUESTIONS 1-9: 0
1. LITTLE INTEREST OR PLEASURE IN DOING THINGS: 0
DEPRESSION UNABLE TO ASSESS: PT REFUSES

## 2018-12-27 NOTE — PROGRESS NOTES
[address] [facility]   [address] [facility]  [address] [facility] [address]  Dept: [phone]  Dept Fax: [phone]    Severa James is a 63 y.o. male who presents today for his medical conditions/complaintsas noted below. Severa James is c/o of   Chief Complaint   Patient presents with    Knee Injury     rt knee d/t jumping fence    Pharyngitis         HPI:     Patient is a 56-year-old white male with a past medical history significant for chronic back pain, hematuria, and hypertension. Patient presents today with cough, cold, and upper respiratory tract symptoms. Patient also presents with painful right left knee after jumping a fence because of his dog. Since with a slight limping gait 7 out of 10 achy pain. We will evaluate and treat. Pharyngitis   This is a recurrent problem. The current episode started in the past 7 days. The problem has been gradually worsening. Associated symptoms include congestion, coughing, joint swelling, a sore throat and swollen glands. The symptoms are aggravated by drinking and eating. He has tried nothing for the symptoms.        Hemoglobin A1C (%)   Date Value   11/01/2017 5.6             ( goal A1Cis < 7)   No results found for: LABMICR  LDL Cholesterol (mg/dL)   Date Value   11/01/2017 95       (goal LDL is <100)   AST (U/L)   Date Value   10/27/2018 150 (H)     ALT (U/L)   Date Value   10/27/2018 75 (H)     BUN (mg/dL)   Date Value   10/27/2018 17     BP Readings from Last 3 Encounters:   12/26/18 132/74   11/08/18 (!) 146/93   09/05/18 115/75          (goal 120/80)    Past Medical History:   Diagnosis Date    Chronic back pain     present pain mgmnt    Gastric ulcer 10/31/2015    large 2-3 cm    Hematuria     Hep C w/o coma, chronic (HCC)     History of ETOH abuse     Hypertension     Portal hypertensive gastropathy (HCC)     Seizures (HCC)       Past Surgical History:   Procedure Laterality Date   

## 2019-01-10 DIAGNOSIS — I10 ESSENTIAL HYPERTENSION: ICD-10-CM

## 2019-01-11 RX ORDER — CLONIDINE HYDROCHLORIDE 0.2 MG/1
TABLET ORAL
Qty: 60 TABLET | Refills: 0 | Status: SHIPPED | OUTPATIENT
Start: 2019-01-11 | End: 2019-02-11 | Stop reason: SDUPTHER

## 2019-01-14 DIAGNOSIS — G62.9 NEUROPATHY: ICD-10-CM

## 2019-01-14 RX ORDER — GABAPENTIN 400 MG/1
CAPSULE ORAL
Qty: 90 CAPSULE | Refills: 0 | OUTPATIENT
Start: 2019-01-14 | End: 2019-02-13

## 2019-01-15 ENCOUNTER — OFFICE VISIT (OUTPATIENT)
Dept: FAMILY MEDICINE CLINIC | Age: 59
End: 2019-01-15
Payer: MEDICARE

## 2019-01-15 VITALS
SYSTOLIC BLOOD PRESSURE: 116 MMHG | HEIGHT: 70 IN | WEIGHT: 231.2 LBS | TEMPERATURE: 97.1 F | OXYGEN SATURATION: 99 % | HEART RATE: 64 BPM | BODY MASS INDEX: 33.1 KG/M2 | DIASTOLIC BLOOD PRESSURE: 74 MMHG

## 2019-01-15 DIAGNOSIS — K76.6 PORTAL HYPERTENSIVE GASTROPATHY (HCC): ICD-10-CM

## 2019-01-15 DIAGNOSIS — G62.9 NEUROPATHY: ICD-10-CM

## 2019-01-15 DIAGNOSIS — K31.89 PORTAL HYPERTENSIVE GASTROPATHY (HCC): ICD-10-CM

## 2019-01-15 DIAGNOSIS — J06.9 VIRAL URI: Primary | ICD-10-CM

## 2019-01-15 PROCEDURE — G8417 CALC BMI ABV UP PARAM F/U: HCPCS | Performed by: INTERNAL MEDICINE

## 2019-01-15 PROCEDURE — G8484 FLU IMMUNIZE NO ADMIN: HCPCS | Performed by: INTERNAL MEDICINE

## 2019-01-15 PROCEDURE — 4004F PT TOBACCO SCREEN RCVD TLK: CPT | Performed by: INTERNAL MEDICINE

## 2019-01-15 PROCEDURE — G8427 DOCREV CUR MEDS BY ELIG CLIN: HCPCS | Performed by: INTERNAL MEDICINE

## 2019-01-15 PROCEDURE — 3017F COLORECTAL CA SCREEN DOC REV: CPT | Performed by: INTERNAL MEDICINE

## 2019-01-15 PROCEDURE — 99213 OFFICE O/P EST LOW 20 MIN: CPT | Performed by: INTERNAL MEDICINE

## 2019-01-15 RX ORDER — GUAIFENESIN 600 MG/1
600 TABLET, EXTENDED RELEASE ORAL 2 TIMES DAILY
Qty: 14 TABLET | Refills: 0 | Status: SHIPPED | OUTPATIENT
Start: 2019-01-15 | End: 2019-01-21

## 2019-01-15 RX ORDER — DEXTROMETHORPHAN POLISTIREX 30 MG/5ML
60 SUSPENSION ORAL 2 TIMES DAILY PRN
Qty: 200 ML | Refills: 0 | Status: SHIPPED | OUTPATIENT
Start: 2019-01-15 | End: 2019-01-21 | Stop reason: ALTCHOICE

## 2019-01-15 RX ORDER — GABAPENTIN 400 MG/1
CAPSULE ORAL
Qty: 90 CAPSULE | Refills: 0 | Status: SHIPPED | OUTPATIENT
Start: 2019-01-15 | End: 2019-01-30 | Stop reason: SDUPTHER

## 2019-01-15 ASSESSMENT — ENCOUNTER SYMPTOMS
SINUS PRESSURE: 1
SWOLLEN GLANDS: 0
HOARSE VOICE: 0
COUGH: 1
SINUS COMPLAINT: 1
SHORTNESS OF BREATH: 0
SORE THROAT: 1

## 2019-01-21 ENCOUNTER — HOSPITAL ENCOUNTER (INPATIENT)
Age: 59
LOS: 3 days | Discharge: HOME | DRG: 241 | End: 2019-01-24
Attending: EMERGENCY MEDICINE | Admitting: INTERNAL MEDICINE
Payer: MEDICARE

## 2019-01-21 DIAGNOSIS — D62 ACUTE BLOOD LOSS ANEMIA: ICD-10-CM

## 2019-01-21 DIAGNOSIS — K92.2 UGI BLEED: Primary | ICD-10-CM

## 2019-01-21 PROBLEM — B17.10 ACUTE HEPATITIS C VIRUS INFECTION WITHOUT HEPATIC COMA: Chronic | Status: ACTIVE | Noted: 2018-01-03

## 2019-01-21 LAB
ABO/RH: NORMAL
ABSOLUTE EOS #: 0.1 K/UL (ref 0–0.4)
ABSOLUTE IMMATURE GRANULOCYTE: ABNORMAL K/UL (ref 0–0.3)
ABSOLUTE LYMPH #: 2.1 K/UL (ref 1–4.8)
ABSOLUTE MONO #: 0.9 K/UL (ref 0.1–1.3)
ALBUMIN SERPL-MCNC: 2.7 G/DL (ref 3.5–5.2)
ALBUMIN/GLOBULIN RATIO: ABNORMAL (ref 1–2.5)
ALP BLD-CCNC: 88 U/L (ref 40–129)
ALT SERPL-CCNC: 55 U/L (ref 5–41)
ANION GAP SERPL CALCULATED.3IONS-SCNC: 12 MMOL/L (ref 9–17)
ANTIBODY SCREEN: NEGATIVE
ARM BAND NUMBER: NORMAL
AST SERPL-CCNC: 112 U/L
BASOPHILS # BLD: 1 % (ref 0–2)
BASOPHILS ABSOLUTE: 0.1 K/UL (ref 0–0.2)
BILIRUB SERPL-MCNC: 3.98 MG/DL (ref 0.3–1.2)
BLOOD BANK COMMENT: NORMAL
BUN BLDV-MCNC: 50 MG/DL (ref 6–20)
BUN/CREAT BLD: ABNORMAL (ref 9–20)
CALCIUM SERPL-MCNC: 9 MG/DL (ref 8.6–10.4)
CHLORIDE BLD-SCNC: 104 MMOL/L (ref 98–107)
CO2: 24 MMOL/L (ref 20–31)
CREAT SERPL-MCNC: 1.15 MG/DL (ref 0.7–1.2)
DIFFERENTIAL TYPE: ABNORMAL
EKG ATRIAL RATE: 94 BPM
EKG P AXIS: 61 DEGREES
EKG P-R INTERVAL: 118 MS
EKG Q-T INTERVAL: 406 MS
EKG QRS DURATION: 96 MS
EKG QTC CALCULATION (BAZETT): 507 MS
EKG R AXIS: 66 DEGREES
EKG T AXIS: 52 DEGREES
EKG VENTRICULAR RATE: 94 BPM
EOSINOPHILS RELATIVE PERCENT: 1 % (ref 0–4)
EXPIRATION DATE: NORMAL
FERRITIN: 172 UG/L (ref 30–400)
GFR AFRICAN AMERICAN: >60 ML/MIN
GFR NON-AFRICAN AMERICAN: >60 ML/MIN
GFR SERPL CREATININE-BSD FRML MDRD: ABNORMAL ML/MIN/{1.73_M2}
GFR SERPL CREATININE-BSD FRML MDRD: ABNORMAL ML/MIN/{1.73_M2}
GLUCOSE BLD-MCNC: 115 MG/DL (ref 70–99)
HCT VFR BLD CALC: 27.9 % (ref 41–53)
HCT VFR BLD CALC: 30.7 % (ref 41–53)
HCT VFR BLD CALC: 31.3 % (ref 41–53)
HEMOGLOBIN: 10.1 G/DL (ref 13.5–17.5)
HEMOGLOBIN: 9.3 G/DL (ref 13.5–17.5)
HEMOGLOBIN: 9.9 G/DL (ref 13.5–17.5)
IMMATURE GRANULOCYTES: ABNORMAL %
INR BLD: 1.7
IRON SATURATION: ABNORMAL % (ref 20–55)
IRON: 243 UG/DL (ref 59–158)
LYMPHOCYTES # BLD: 20 % (ref 24–44)
MCH RBC QN AUTO: 31.5 PG (ref 26–34)
MCHC RBC AUTO-ENTMCNC: 33 G/DL (ref 31–37)
MCV RBC AUTO: 95.6 FL (ref 80–100)
MONOCYTES # BLD: 8 % (ref 1–7)
NRBC AUTOMATED: ABNORMAL PER 100 WBC
PARTIAL THROMBOPLASTIN TIME: 29.5 SEC (ref 23–31)
PDW BLD-RTO: 19.2 % (ref 11.5–14.9)
PLATELET # BLD: 101 K/UL (ref 150–450)
PLATELET ESTIMATE: ABNORMAL
PMV BLD AUTO: 10.5 FL (ref 6–12)
POTASSIUM SERPL-SCNC: 3.9 MMOL/L (ref 3.7–5.3)
PROTHROMBIN TIME: 17.7 SEC (ref 9.7–12)
RBC # BLD: 3.21 M/UL (ref 4.5–5.9)
RBC # BLD: ABNORMAL 10*6/UL
SEG NEUTROPHILS: 70 % (ref 36–66)
SEGMENTED NEUTROPHILS ABSOLUTE COUNT: 7.4 K/UL (ref 1.3–9.1)
SODIUM BLD-SCNC: 140 MMOL/L (ref 135–144)
TOTAL IRON BINDING CAPACITY: ABNORMAL UG/DL (ref 250–450)
TOTAL PROTEIN: 5.5 G/DL (ref 6.4–8.3)
UNSATURATED IRON BINDING CAPACITY: <17 UG/DL (ref 112–347)
WBC # BLD: 10.6 K/UL (ref 3.5–11)
WBC # BLD: ABNORMAL 10*3/UL

## 2019-01-21 PROCEDURE — 85025 COMPLETE CBC W/AUTO DIFF WBC: CPT

## 2019-01-21 PROCEDURE — 86850 RBC ANTIBODY SCREEN: CPT

## 2019-01-21 PROCEDURE — 83540 ASSAY OF IRON: CPT

## 2019-01-21 PROCEDURE — 2580000003 HC RX 258: Performed by: EMERGENCY MEDICINE

## 2019-01-21 PROCEDURE — 2060000000 HC ICU INTERMEDIATE R&B

## 2019-01-21 PROCEDURE — 85610 PROTHROMBIN TIME: CPT

## 2019-01-21 PROCEDURE — 85018 HEMOGLOBIN: CPT

## 2019-01-21 PROCEDURE — 99254 IP/OBS CNSLTJ NEW/EST MOD 60: CPT | Performed by: INTERNAL MEDICINE

## 2019-01-21 PROCEDURE — 83550 IRON BINDING TEST: CPT

## 2019-01-21 PROCEDURE — 36415 COLL VENOUS BLD VENIPUNCTURE: CPT

## 2019-01-21 PROCEDURE — 82728 ASSAY OF FERRITIN: CPT

## 2019-01-21 PROCEDURE — 87324 CLOSTRIDIUM AG IA: CPT

## 2019-01-21 PROCEDURE — 6360000002 HC RX W HCPCS: Performed by: STUDENT IN AN ORGANIZED HEALTH CARE EDUCATION/TRAINING PROGRAM

## 2019-01-21 PROCEDURE — 96374 THER/PROPH/DIAG INJ IV PUSH: CPT

## 2019-01-21 PROCEDURE — 86900 BLOOD TYPING SEROLOGIC ABO: CPT

## 2019-01-21 PROCEDURE — 99223 1ST HOSP IP/OBS HIGH 75: CPT | Performed by: INTERNAL MEDICINE

## 2019-01-21 PROCEDURE — 2580000003 HC RX 258: Performed by: STUDENT IN AN ORGANIZED HEALTH CARE EDUCATION/TRAINING PROGRAM

## 2019-01-21 PROCEDURE — C9113 INJ PANTOPRAZOLE SODIUM, VIA: HCPCS | Performed by: STUDENT IN AN ORGANIZED HEALTH CARE EDUCATION/TRAINING PROGRAM

## 2019-01-21 PROCEDURE — 99285 EMERGENCY DEPT VISIT HI MDM: CPT

## 2019-01-21 PROCEDURE — 6370000000 HC RX 637 (ALT 250 FOR IP): Performed by: INTERNAL MEDICINE

## 2019-01-21 PROCEDURE — 80053 COMPREHEN METABOLIC PANEL: CPT

## 2019-01-21 PROCEDURE — 86901 BLOOD TYPING SEROLOGIC RH(D): CPT

## 2019-01-21 PROCEDURE — 6360000002 HC RX W HCPCS: Performed by: EMERGENCY MEDICINE

## 2019-01-21 PROCEDURE — 85730 THROMBOPLASTIN TIME PARTIAL: CPT

## 2019-01-21 PROCEDURE — 85014 HEMATOCRIT: CPT

## 2019-01-21 PROCEDURE — 87449 NOS EACH ORGANISM AG IA: CPT

## 2019-01-21 RX ORDER — SODIUM CHLORIDE 0.9 % (FLUSH) 0.9 %
10 SYRINGE (ML) INJECTION EVERY 12 HOURS SCHEDULED
Status: DISCONTINUED | OUTPATIENT
Start: 2019-01-21 | End: 2019-01-24 | Stop reason: HOSPADM

## 2019-01-21 RX ORDER — ACETAMINOPHEN 325 MG/1
650 TABLET ORAL EVERY 4 HOURS PRN
Status: DISCONTINUED | OUTPATIENT
Start: 2019-01-21 | End: 2019-01-24 | Stop reason: HOSPADM

## 2019-01-21 RX ORDER — ONDANSETRON 2 MG/ML
4 INJECTION INTRAMUSCULAR; INTRAVENOUS ONCE
Status: COMPLETED | OUTPATIENT
Start: 2019-01-21 | End: 2019-01-21

## 2019-01-21 RX ORDER — 0.9 % SODIUM CHLORIDE 0.9 %
1000 INTRAVENOUS SOLUTION INTRAVENOUS ONCE
Status: COMPLETED | OUTPATIENT
Start: 2019-01-21 | End: 2019-01-21

## 2019-01-21 RX ORDER — CLONIDINE HYDROCHLORIDE 0.1 MG/1
0.2 TABLET ORAL 2 TIMES DAILY
Status: DISCONTINUED | OUTPATIENT
Start: 2019-01-21 | End: 2019-01-24 | Stop reason: HOSPADM

## 2019-01-21 RX ORDER — M-VIT,TX,IRON,MINS/CALC/FOLIC 27MG-0.4MG
1 TABLET ORAL DAILY
COMMUNITY

## 2019-01-21 RX ORDER — SODIUM CHLORIDE 0.9 % (FLUSH) 0.9 %
10 SYRINGE (ML) INJECTION PRN
Status: DISCONTINUED | OUTPATIENT
Start: 2019-01-21 | End: 2019-01-24 | Stop reason: HOSPADM

## 2019-01-21 RX ORDER — ROPINIROLE 0.5 MG/1
0.5 TABLET, FILM COATED ORAL NIGHTLY
Status: DISCONTINUED | OUTPATIENT
Start: 2019-01-21 | End: 2019-01-24 | Stop reason: HOSPADM

## 2019-01-21 RX ORDER — ONDANSETRON 2 MG/ML
4 INJECTION INTRAMUSCULAR; INTRAVENOUS EVERY 6 HOURS PRN
Status: DISCONTINUED | OUTPATIENT
Start: 2019-01-21 | End: 2019-01-24 | Stop reason: HOSPADM

## 2019-01-21 RX ORDER — SODIUM CHLORIDE 9 MG/ML
INJECTION, SOLUTION INTRAVENOUS CONTINUOUS
Status: DISCONTINUED | OUTPATIENT
Start: 2019-01-21 | End: 2019-01-23

## 2019-01-21 RX ADMIN — SODIUM CHLORIDE: 9 INJECTION, SOLUTION INTRAVENOUS at 16:23

## 2019-01-21 RX ADMIN — ONDANSETRON 4 MG: 2 INJECTION INTRAMUSCULAR; INTRAVENOUS at 16:23

## 2019-01-21 RX ADMIN — SODIUM CHLORIDE 1000 ML: 9 INJECTION, SOLUTION INTRAVENOUS at 11:58

## 2019-01-21 RX ADMIN — SODIUM CHLORIDE: 9 INJECTION, SOLUTION INTRAVENOUS at 23:01

## 2019-01-21 RX ADMIN — SODIUM CHLORIDE 8 MG/HR: 9 INJECTION, SOLUTION INTRAVENOUS at 17:15

## 2019-01-21 RX ADMIN — ONDANSETRON 4 MG: 2 INJECTION INTRAMUSCULAR; INTRAVENOUS at 11:22

## 2019-01-21 RX ADMIN — SODIUM CHLORIDE 80 MG: 9 INJECTION, SOLUTION INTRAVENOUS at 16:49

## 2019-01-21 RX ADMIN — ROPINIROLE HYDROCHLORIDE 0.5 MG: 0.5 TABLET, FILM COATED ORAL at 20:57

## 2019-01-21 RX ADMIN — SODIUM CHLORIDE 8 MG/HR: 9 INJECTION, SOLUTION INTRAVENOUS at 20:48

## 2019-01-21 RX ADMIN — PROMETHAZINE HYDROCHLORIDE 12.5 MG: 25 INJECTION INTRAMUSCULAR; INTRAVENOUS at 13:18

## 2019-01-21 RX ADMIN — CLONIDINE HYDROCHLORIDE 0.2 MG: 0.1 TABLET ORAL at 20:57

## 2019-01-21 ASSESSMENT — ENCOUNTER SYMPTOMS
CONSTIPATION: 0
VOICE CHANGE: 0
SHORTNESS OF BREATH: 0
COUGH: 0
BLOOD IN STOOL: 1
NAUSEA: 1
DIARRHEA: 0
CHOKING: 0
WHEEZING: 0
TROUBLE SWALLOWING: 0
SORE THROAT: 0
CHEST TIGHTNESS: 0
NAUSEA: 0
BACK PAIN: 1
ABDOMINAL PAIN: 0
ABDOMINAL DISTENTION: 0
VOMITING: 0
APNEA: 0

## 2019-01-21 ASSESSMENT — PAIN DESCRIPTION - PAIN TYPE: TYPE: ACUTE PAIN

## 2019-01-21 ASSESSMENT — PAIN DESCRIPTION - LOCATION: LOCATION: ABDOMEN

## 2019-01-21 ASSESSMENT — PAIN DESCRIPTION - DESCRIPTORS: DESCRIPTORS: SHARP

## 2019-01-21 ASSESSMENT — PAIN SCALES - GENERAL
PAINLEVEL_OUTOF10: 0
PAINLEVEL_OUTOF10: 8
PAINLEVEL_OUTOF10: 0

## 2019-01-21 ASSESSMENT — PAIN DESCRIPTION - ORIENTATION: ORIENTATION: LOWER

## 2019-01-21 ASSESSMENT — PAIN DESCRIPTION - FREQUENCY: FREQUENCY: INTERMITTENT

## 2019-01-21 ASSESSMENT — PAIN DESCRIPTION - ONSET: ONSET: GRADUAL

## 2019-01-22 ENCOUNTER — ANESTHESIA (OUTPATIENT)
Dept: OPERATING ROOM | Age: 59
DRG: 241 | End: 2019-01-22
Payer: MEDICARE

## 2019-01-22 ENCOUNTER — ANESTHESIA EVENT (OUTPATIENT)
Dept: OPERATING ROOM | Age: 59
DRG: 241 | End: 2019-01-22
Payer: MEDICARE

## 2019-01-22 VITALS
DIASTOLIC BLOOD PRESSURE: 98 MMHG | OXYGEN SATURATION: 99 % | SYSTOLIC BLOOD PRESSURE: 129 MMHG | RESPIRATION RATE: 1 BRPM

## 2019-01-22 LAB
ANION GAP SERPL CALCULATED.3IONS-SCNC: 10 MMOL/L (ref 9–17)
BUN BLDV-MCNC: 32 MG/DL (ref 6–20)
BUN/CREAT BLD: ABNORMAL (ref 9–20)
C DIFF AG + TOXIN: NORMAL
CALCIUM SERPL-MCNC: 8.4 MG/DL (ref 8.6–10.4)
CHLORIDE BLD-SCNC: 110 MMOL/L (ref 98–107)
CO2: 24 MMOL/L (ref 20–31)
CREAT SERPL-MCNC: 0.86 MG/DL (ref 0.7–1.2)
GFR AFRICAN AMERICAN: >60 ML/MIN
GFR NON-AFRICAN AMERICAN: >60 ML/MIN
GFR SERPL CREATININE-BSD FRML MDRD: ABNORMAL ML/MIN/{1.73_M2}
GFR SERPL CREATININE-BSD FRML MDRD: ABNORMAL ML/MIN/{1.73_M2}
GLUCOSE BLD-MCNC: 135 MG/DL (ref 70–99)
HCT VFR BLD CALC: 25.6 % (ref 41–53)
HCT VFR BLD CALC: 27.7 % (ref 41–53)
HCT VFR BLD CALC: 30.1 % (ref 41–53)
HEMOGLOBIN: 8.3 G/DL (ref 13.5–17.5)
HEMOGLOBIN: 9 G/DL (ref 13.5–17.5)
HEMOGLOBIN: 9.6 G/DL (ref 13.5–17.5)
INR BLD: 1.6
MCH RBC QN AUTO: 31.1 PG (ref 26–34)
MCHC RBC AUTO-ENTMCNC: 31.8 G/DL (ref 31–37)
MCV RBC AUTO: 97.7 FL (ref 80–100)
NRBC AUTOMATED: ABNORMAL PER 100 WBC
PDW BLD-RTO: 19 % (ref 11.5–14.9)
PLATELET # BLD: 115 K/UL (ref 150–450)
PMV BLD AUTO: 10.1 FL (ref 6–12)
POTASSIUM SERPL-SCNC: 4.3 MMOL/L (ref 3.7–5.3)
PROTHROMBIN TIME: 16 SEC (ref 9.7–12)
RBC # BLD: 3.08 M/UL (ref 4.5–5.9)
SODIUM BLD-SCNC: 144 MMOL/L (ref 135–144)
SPECIMEN DESCRIPTION: NORMAL
WBC # BLD: 12.9 K/UL (ref 3.5–11)

## 2019-01-22 PROCEDURE — 2580000003 HC RX 258: Performed by: STUDENT IN AN ORGANIZED HEALTH CARE EDUCATION/TRAINING PROGRAM

## 2019-01-22 PROCEDURE — 85014 HEMATOCRIT: CPT

## 2019-01-22 PROCEDURE — 99233 SBSQ HOSP IP/OBS HIGH 50: CPT | Performed by: INTERNAL MEDICINE

## 2019-01-22 PROCEDURE — 2500000003 HC RX 250 WO HCPCS: Performed by: STUDENT IN AN ORGANIZED HEALTH CARE EDUCATION/TRAINING PROGRAM

## 2019-01-22 PROCEDURE — 36415 COLL VENOUS BLD VENIPUNCTURE: CPT

## 2019-01-22 PROCEDURE — 3700000000 HC ANESTHESIA ATTENDED CARE: Performed by: INTERNAL MEDICINE

## 2019-01-22 PROCEDURE — 6370000000 HC RX 637 (ALT 250 FOR IP): Performed by: INTERNAL MEDICINE

## 2019-01-22 PROCEDURE — 2500000003 HC RX 250 WO HCPCS: Performed by: NURSE ANESTHETIST, CERTIFIED REGISTERED

## 2019-01-22 PROCEDURE — 6370000000 HC RX 637 (ALT 250 FOR IP): Performed by: HOSPITALIST

## 2019-01-22 PROCEDURE — 85018 HEMOGLOBIN: CPT

## 2019-01-22 PROCEDURE — 87077 CULTURE AEROBIC IDENTIFY: CPT

## 2019-01-22 PROCEDURE — 88342 IMHCHEM/IMCYTCHM 1ST ANTB: CPT

## 2019-01-22 PROCEDURE — 3609012400 HC EGD TRANSORAL BIOPSY SINGLE/MULTIPLE: Performed by: INTERNAL MEDICINE

## 2019-01-22 PROCEDURE — 2060000000 HC ICU INTERMEDIATE R&B

## 2019-01-22 PROCEDURE — 80048 BASIC METABOLIC PNL TOTAL CA: CPT

## 2019-01-22 PROCEDURE — 6360000002 HC RX W HCPCS: Performed by: STUDENT IN AN ORGANIZED HEALTH CARE EDUCATION/TRAINING PROGRAM

## 2019-01-22 PROCEDURE — 7100000000 HC PACU RECOVERY - FIRST 15 MIN: Performed by: INTERNAL MEDICINE

## 2019-01-22 PROCEDURE — 85027 COMPLETE CBC AUTOMATED: CPT

## 2019-01-22 PROCEDURE — C9113 INJ PANTOPRAZOLE SODIUM, VIA: HCPCS | Performed by: STUDENT IN AN ORGANIZED HEALTH CARE EDUCATION/TRAINING PROGRAM

## 2019-01-22 PROCEDURE — 0DB78ZX EXCISION OF STOMACH, PYLORUS, VIA NATURAL OR ARTIFICIAL OPENING ENDOSCOPIC, DIAGNOSTIC: ICD-10-PCS | Performed by: INTERNAL MEDICINE

## 2019-01-22 PROCEDURE — 3700000001 HC ADD 15 MINUTES (ANESTHESIA): Performed by: INTERNAL MEDICINE

## 2019-01-22 PROCEDURE — 7100000001 HC PACU RECOVERY - ADDTL 15 MIN: Performed by: INTERNAL MEDICINE

## 2019-01-22 PROCEDURE — 2709999900 HC NON-CHARGEABLE SUPPLY: Performed by: INTERNAL MEDICINE

## 2019-01-22 PROCEDURE — 43239 EGD BIOPSY SINGLE/MULTIPLE: CPT | Performed by: INTERNAL MEDICINE

## 2019-01-22 PROCEDURE — 88305 TISSUE EXAM BY PATHOLOGIST: CPT

## 2019-01-22 PROCEDURE — 6360000002 HC RX W HCPCS: Performed by: NURSE ANESTHETIST, CERTIFIED REGISTERED

## 2019-01-22 PROCEDURE — 85610 PROTHROMBIN TIME: CPT

## 2019-01-22 RX ORDER — DIPHENHYDRAMINE HYDROCHLORIDE 50 MG/ML
12.5 INJECTION INTRAMUSCULAR; INTRAVENOUS
Status: DISCONTINUED | OUTPATIENT
Start: 2019-01-22 | End: 2019-01-22 | Stop reason: HOSPADM

## 2019-01-22 RX ORDER — OXYCODONE HYDROCHLORIDE AND ACETAMINOPHEN 5; 325 MG/1; MG/1
1 TABLET ORAL PRN
Status: DISCONTINUED | OUTPATIENT
Start: 2019-01-22 | End: 2019-01-22 | Stop reason: HOSPADM

## 2019-01-22 RX ORDER — MEPERIDINE HYDROCHLORIDE 50 MG/ML
12.5 INJECTION INTRAMUSCULAR; INTRAVENOUS; SUBCUTANEOUS EVERY 5 MIN PRN
Status: DISCONTINUED | OUTPATIENT
Start: 2019-01-22 | End: 2019-01-22 | Stop reason: HOSPADM

## 2019-01-22 RX ORDER — LISINOPRIL 20 MG/1
20 TABLET ORAL DAILY
Status: DISCONTINUED | OUTPATIENT
Start: 2019-01-22 | End: 2019-01-24 | Stop reason: HOSPADM

## 2019-01-22 RX ORDER — METOPROLOL TARTRATE 5 MG/5ML
2.5 INJECTION INTRAVENOUS ONCE
Status: COMPLETED | OUTPATIENT
Start: 2019-01-22 | End: 2019-01-22

## 2019-01-22 RX ORDER — METOPROLOL TARTRATE 5 MG/5ML
INJECTION INTRAVENOUS
Status: DISPENSED
Start: 2019-01-22 | End: 2019-01-22

## 2019-01-22 RX ORDER — OXYCODONE HYDROCHLORIDE AND ACETAMINOPHEN 5; 325 MG/1; MG/1
2 TABLET ORAL PRN
Status: DISCONTINUED | OUTPATIENT
Start: 2019-01-22 | End: 2019-01-22 | Stop reason: HOSPADM

## 2019-01-22 RX ORDER — FENTANYL CITRATE 50 UG/ML
25 INJECTION, SOLUTION INTRAMUSCULAR; INTRAVENOUS EVERY 5 MIN PRN
Status: DISCONTINUED | OUTPATIENT
Start: 2019-01-22 | End: 2019-01-22 | Stop reason: HOSPADM

## 2019-01-22 RX ORDER — ONDANSETRON 2 MG/ML
4 INJECTION INTRAMUSCULAR; INTRAVENOUS
Status: DISCONTINUED | OUTPATIENT
Start: 2019-01-22 | End: 2019-01-22 | Stop reason: HOSPADM

## 2019-01-22 RX ORDER — METOPROLOL TARTRATE 5 MG/5ML
2.5 INJECTION INTRAVENOUS EVERY 6 HOURS
Status: DISCONTINUED | OUTPATIENT
Start: 2019-01-22 | End: 2019-01-23 | Stop reason: DRUGHIGH

## 2019-01-22 RX ORDER — MORPHINE SULFATE 2 MG/ML
1 INJECTION, SOLUTION INTRAMUSCULAR; INTRAVENOUS EVERY 5 MIN PRN
Status: DISCONTINUED | OUTPATIENT
Start: 2019-01-22 | End: 2019-01-22 | Stop reason: HOSPADM

## 2019-01-22 RX ORDER — PROPOFOL 10 MG/ML
INJECTION, EMULSION INTRAVENOUS PRN
Status: DISCONTINUED | OUTPATIENT
Start: 2019-01-22 | End: 2019-01-22 | Stop reason: SDUPTHER

## 2019-01-22 RX ORDER — LIDOCAINE HYDROCHLORIDE 10 MG/ML
INJECTION, SOLUTION EPIDURAL; INFILTRATION; INTRACAUDAL; PERINEURAL PRN
Status: DISCONTINUED | OUTPATIENT
Start: 2019-01-22 | End: 2019-01-22 | Stop reason: SDUPTHER

## 2019-01-22 RX ADMIN — PROPOFOL 40 MG: 10 INJECTION, EMULSION INTRAVENOUS at 13:45

## 2019-01-22 RX ADMIN — PROPOFOL 40 MG: 10 INJECTION, EMULSION INTRAVENOUS at 13:47

## 2019-01-22 RX ADMIN — ROPINIROLE HYDROCHLORIDE 0.5 MG: 0.5 TABLET, FILM COATED ORAL at 20:19

## 2019-01-22 RX ADMIN — CLONIDINE HYDROCHLORIDE 0.2 MG: 0.1 TABLET ORAL at 07:57

## 2019-01-22 RX ADMIN — PROPOFOL 40 MG: 10 INJECTION, EMULSION INTRAVENOUS at 13:52

## 2019-01-22 RX ADMIN — PROPOFOL 40 MG: 10 INJECTION, EMULSION INTRAVENOUS at 13:55

## 2019-01-22 RX ADMIN — SODIUM CHLORIDE 8 MG/HR: 9 INJECTION, SOLUTION INTRAVENOUS at 09:06

## 2019-01-22 RX ADMIN — LIDOCAINE HYDROCHLORIDE 100 MG: 10 INJECTION, SOLUTION EPIDURAL; INFILTRATION; INTRACAUDAL; PERINEURAL at 13:43

## 2019-01-22 RX ADMIN — PROPOFOL 100 MG: 10 INJECTION, EMULSION INTRAVENOUS at 13:43

## 2019-01-22 RX ADMIN — METOPROLOL TARTRATE 2.5 MG: 1 INJECTION, SOLUTION INTRAVENOUS at 20:20

## 2019-01-22 RX ADMIN — SODIUM CHLORIDE: 9 INJECTION, SOLUTION INTRAVENOUS at 15:45

## 2019-01-22 RX ADMIN — SODIUM CHLORIDE 8 MG/HR: 9 INJECTION, SOLUTION INTRAVENOUS at 19:15

## 2019-01-22 RX ADMIN — METOPROLOL TARTRATE 2.5 MG: 1 INJECTION, SOLUTION INTRAVENOUS at 15:00

## 2019-01-22 RX ADMIN — METOPROLOL TARTRATE 2.5 MG: 1 INJECTION, SOLUTION INTRAVENOUS at 16:09

## 2019-01-22 RX ADMIN — Medication 10 ML: at 20:20

## 2019-01-22 RX ADMIN — CLONIDINE HYDROCHLORIDE 0.2 MG: 0.1 TABLET ORAL at 20:19

## 2019-01-22 RX ADMIN — SODIUM CHLORIDE: 9 INJECTION, SOLUTION INTRAVENOUS at 05:53

## 2019-01-22 RX ADMIN — LISINOPRIL 20 MG: 20 TABLET ORAL at 16:12

## 2019-01-22 RX ADMIN — SODIUM CHLORIDE: 9 INJECTION, SOLUTION INTRAVENOUS at 12:31

## 2019-01-22 RX ADMIN — METOPROLOL TARTRATE 2.5 MG: 1 INJECTION, SOLUTION INTRAVENOUS at 08:29

## 2019-01-22 RX ADMIN — SODIUM CHLORIDE: 9 INJECTION, SOLUTION INTRAVENOUS at 22:02

## 2019-01-22 RX ADMIN — PROPOFOL 40 MG: 10 INJECTION, EMULSION INTRAVENOUS at 13:49

## 2019-01-22 ASSESSMENT — PULMONARY FUNCTION TESTS
PIF_VALUE: 0
PIF_VALUE: 0
PIF_VALUE: 1
PIF_VALUE: 0
PIF_VALUE: 1
PIF_VALUE: 1
PIF_VALUE: 0
PIF_VALUE: 1
PIF_VALUE: 0
PIF_VALUE: 1

## 2019-01-22 ASSESSMENT — ENCOUNTER SYMPTOMS
VOMITING: 0
SORE THROAT: 0
CHEST TIGHTNESS: 0
NAUSEA: 0
BLOOD IN STOOL: 1
CONSTIPATION: 0
ABDOMINAL PAIN: 0
SHORTNESS OF BREATH: 0
COUGH: 0
DIARRHEA: 0

## 2019-01-22 ASSESSMENT — PAIN SCALES - GENERAL: PAINLEVEL_OUTOF10: 0

## 2019-01-23 ENCOUNTER — TELEPHONE (OUTPATIENT)
Dept: ONCOLOGY | Age: 59
End: 2019-01-23

## 2019-01-23 LAB
ANION GAP SERPL CALCULATED.3IONS-SCNC: 7 MMOL/L (ref 9–17)
BUN BLDV-MCNC: 17 MG/DL (ref 6–20)
BUN/CREAT BLD: ABNORMAL (ref 9–20)
CALCIUM SERPL-MCNC: 7.5 MG/DL (ref 8.6–10.4)
CHLORIDE BLD-SCNC: 108 MMOL/L (ref 98–107)
CO2: 24 MMOL/L (ref 20–31)
CREAT SERPL-MCNC: 0.66 MG/DL (ref 0.7–1.2)
GFR AFRICAN AMERICAN: >60 ML/MIN
GFR NON-AFRICAN AMERICAN: >60 ML/MIN
GFR SERPL CREATININE-BSD FRML MDRD: ABNORMAL ML/MIN/{1.73_M2}
GFR SERPL CREATININE-BSD FRML MDRD: ABNORMAL ML/MIN/{1.73_M2}
GLUCOSE BLD-MCNC: 143 MG/DL (ref 70–99)
HCT VFR BLD CALC: 23.7 % (ref 41–53)
HCT VFR BLD CALC: 24 % (ref 41–53)
HCT VFR BLD CALC: 24.3 % (ref 41–53)
HCT VFR BLD CALC: 24.6 % (ref 41–53)
HEMOGLOBIN: 7.7 G/DL (ref 13.5–17.5)
HEMOGLOBIN: 7.8 G/DL (ref 13.5–17.5)
HEMOGLOBIN: 7.9 G/DL (ref 13.5–17.5)
HEMOGLOBIN: 8.1 G/DL (ref 13.5–17.5)
MCH RBC QN AUTO: 31 PG (ref 26–34)
MCHC RBC AUTO-ENTMCNC: 32 G/DL (ref 31–37)
MCV RBC AUTO: 97 FL (ref 80–100)
NRBC AUTOMATED: ABNORMAL PER 100 WBC
PDW BLD-RTO: 19.4 % (ref 11.5–14.9)
PLATELET # BLD: 91 K/UL (ref 150–450)
PMV BLD AUTO: 9.7 FL (ref 6–12)
POTASSIUM SERPL-SCNC: 3.6 MMOL/L (ref 3.7–5.3)
RBC # BLD: 2.47 M/UL (ref 4.5–5.9)
SODIUM BLD-SCNC: 139 MMOL/L (ref 135–144)
WBC # BLD: 8 K/UL (ref 3.5–11)

## 2019-01-23 PROCEDURE — 93005 ELECTROCARDIOGRAM TRACING: CPT

## 2019-01-23 PROCEDURE — 36415 COLL VENOUS BLD VENIPUNCTURE: CPT

## 2019-01-23 PROCEDURE — 6370000000 HC RX 637 (ALT 250 FOR IP): Performed by: HOSPITALIST

## 2019-01-23 PROCEDURE — C9113 INJ PANTOPRAZOLE SODIUM, VIA: HCPCS | Performed by: STUDENT IN AN ORGANIZED HEALTH CARE EDUCATION/TRAINING PROGRAM

## 2019-01-23 PROCEDURE — 85027 COMPLETE CBC AUTOMATED: CPT

## 2019-01-23 PROCEDURE — 2500000003 HC RX 250 WO HCPCS: Performed by: STUDENT IN AN ORGANIZED HEALTH CARE EDUCATION/TRAINING PROGRAM

## 2019-01-23 PROCEDURE — 2060000000 HC ICU INTERMEDIATE R&B

## 2019-01-23 PROCEDURE — 85018 HEMOGLOBIN: CPT

## 2019-01-23 PROCEDURE — 82941 ASSAY OF GASTRIN: CPT

## 2019-01-23 PROCEDURE — 80048 BASIC METABOLIC PNL TOTAL CA: CPT

## 2019-01-23 PROCEDURE — 2580000003 HC RX 258: Performed by: STUDENT IN AN ORGANIZED HEALTH CARE EDUCATION/TRAINING PROGRAM

## 2019-01-23 PROCEDURE — 6370000000 HC RX 637 (ALT 250 FOR IP): Performed by: INTERNAL MEDICINE

## 2019-01-23 PROCEDURE — 6360000002 HC RX W HCPCS: Performed by: STUDENT IN AN ORGANIZED HEALTH CARE EDUCATION/TRAINING PROGRAM

## 2019-01-23 PROCEDURE — 6370000000 HC RX 637 (ALT 250 FOR IP): Performed by: STUDENT IN AN ORGANIZED HEALTH CARE EDUCATION/TRAINING PROGRAM

## 2019-01-23 PROCEDURE — 99232 SBSQ HOSP IP/OBS MODERATE 35: CPT | Performed by: INTERNAL MEDICINE

## 2019-01-23 PROCEDURE — 2500000003 HC RX 250 WO HCPCS: Performed by: INTERNAL MEDICINE

## 2019-01-23 PROCEDURE — 85014 HEMATOCRIT: CPT

## 2019-01-23 RX ORDER — METOPROLOL TARTRATE 5 MG/5ML
2.5 INJECTION INTRAVENOUS EVERY 6 HOURS
Status: DISCONTINUED | OUTPATIENT
Start: 2019-01-23 | End: 2019-01-24 | Stop reason: HOSPADM

## 2019-01-23 RX ORDER — HYDROCORTISONE 0.5 %
CREAM (GRAM) TOPICAL 3 TIMES DAILY PRN
Status: DISCONTINUED | OUTPATIENT
Start: 2019-01-23 | End: 2019-01-24 | Stop reason: HOSPADM

## 2019-01-23 RX ADMIN — METOPROLOL TARTRATE 2.5 MG: 1 INJECTION, SOLUTION INTRAVENOUS at 01:36

## 2019-01-23 RX ADMIN — METOPROLOL TARTRATE 2.5 MG: 1 INJECTION, SOLUTION INTRAVENOUS at 13:49

## 2019-01-23 RX ADMIN — ROPINIROLE HYDROCHLORIDE 0.5 MG: 0.5 TABLET, FILM COATED ORAL at 20:30

## 2019-01-23 RX ADMIN — SODIUM CHLORIDE: 9 INJECTION, SOLUTION INTRAVENOUS at 04:30

## 2019-01-23 RX ADMIN — CLONIDINE HYDROCHLORIDE 0.2 MG: 0.1 TABLET ORAL at 20:30

## 2019-01-23 RX ADMIN — CLONIDINE HYDROCHLORIDE 0.2 MG: 0.1 TABLET ORAL at 08:08

## 2019-01-23 RX ADMIN — SODIUM CHLORIDE 8 MG/HR: 9 INJECTION, SOLUTION INTRAVENOUS at 15:54

## 2019-01-23 RX ADMIN — LISINOPRIL 20 MG: 20 TABLET ORAL at 08:08

## 2019-01-23 RX ADMIN — SODIUM CHLORIDE 8 MG/HR: 9 INJECTION, SOLUTION INTRAVENOUS at 04:30

## 2019-01-23 RX ADMIN — METOPROLOL TARTRATE 2.5 MG: 1 INJECTION, SOLUTION INTRAVENOUS at 20:43

## 2019-01-23 RX ADMIN — Medication 10 ML: at 20:31

## 2019-01-23 RX ADMIN — ACETAMINOPHEN 650 MG: 325 TABLET, FILM COATED ORAL at 20:30

## 2019-01-23 ASSESSMENT — ENCOUNTER SYMPTOMS
CHEST TIGHTNESS: 0
SORE THROAT: 0
COUGH: 0
SHORTNESS OF BREATH: 0
CONSTIPATION: 0
ABDOMINAL PAIN: 0
NAUSEA: 0
DIARRHEA: 0
VOMITING: 0
BLOOD IN STOOL: 1

## 2019-01-23 ASSESSMENT — PAIN SCALES - GENERAL
PAINLEVEL_OUTOF10: 0
PAINLEVEL_OUTOF10: 8

## 2019-01-24 VITALS
DIASTOLIC BLOOD PRESSURE: 79 MMHG | RESPIRATION RATE: 16 BRPM | HEART RATE: 105 BPM | BODY MASS INDEX: 31.53 KG/M2 | WEIGHT: 220.24 LBS | OXYGEN SATURATION: 97 % | SYSTOLIC BLOOD PRESSURE: 143 MMHG | TEMPERATURE: 98 F | HEIGHT: 70 IN

## 2019-01-24 LAB
ANION GAP SERPL CALCULATED.3IONS-SCNC: 7 MMOL/L (ref 9–17)
BUN BLDV-MCNC: 17 MG/DL (ref 6–20)
BUN/CREAT BLD: ABNORMAL (ref 9–20)
CALCIUM SERPL-MCNC: 7.4 MG/DL (ref 8.6–10.4)
CHLORIDE BLD-SCNC: 108 MMOL/L (ref 98–107)
CO2: 24 MMOL/L (ref 20–31)
CREAT SERPL-MCNC: 0.62 MG/DL (ref 0.7–1.2)
GFR AFRICAN AMERICAN: >60 ML/MIN
GFR NON-AFRICAN AMERICAN: >60 ML/MIN
GFR SERPL CREATININE-BSD FRML MDRD: ABNORMAL ML/MIN/{1.73_M2}
GFR SERPL CREATININE-BSD FRML MDRD: ABNORMAL ML/MIN/{1.73_M2}
GLUCOSE BLD-MCNC: 122 MG/DL (ref 70–99)
HCT VFR BLD CALC: 22.5 % (ref 41–53)
HCT VFR BLD CALC: 22.9 % (ref 41–53)
HEMOGLOBIN: 7.3 G/DL (ref 13.5–17.5)
HEMOGLOBIN: 7.5 G/DL (ref 13.5–17.5)
MCH RBC QN AUTO: 32.2 PG (ref 26–34)
MCHC RBC AUTO-ENTMCNC: 32.7 G/DL (ref 31–37)
MCV RBC AUTO: 98.5 FL (ref 80–100)
NRBC AUTOMATED: ABNORMAL PER 100 WBC
PDW BLD-RTO: 19.5 % (ref 11.5–14.9)
PLATELET # BLD: 99 K/UL (ref 150–450)
PMV BLD AUTO: 10.3 FL (ref 6–12)
POTASSIUM SERPL-SCNC: 3.7 MMOL/L (ref 3.7–5.3)
RBC # BLD: 2.32 M/UL (ref 4.5–5.9)
SODIUM BLD-SCNC: 139 MMOL/L (ref 135–144)
SURGICAL PATHOLOGY REPORT: NORMAL
WBC # BLD: 13.3 K/UL (ref 3.5–11)

## 2019-01-24 PROCEDURE — 2500000003 HC RX 250 WO HCPCS: Performed by: INTERNAL MEDICINE

## 2019-01-24 PROCEDURE — 2580000003 HC RX 258: Performed by: STUDENT IN AN ORGANIZED HEALTH CARE EDUCATION/TRAINING PROGRAM

## 2019-01-24 PROCEDURE — 85027 COMPLETE CBC AUTOMATED: CPT

## 2019-01-24 PROCEDURE — C9113 INJ PANTOPRAZOLE SODIUM, VIA: HCPCS | Performed by: STUDENT IN AN ORGANIZED HEALTH CARE EDUCATION/TRAINING PROGRAM

## 2019-01-24 PROCEDURE — 99232 SBSQ HOSP IP/OBS MODERATE 35: CPT | Performed by: INTERNAL MEDICINE

## 2019-01-24 PROCEDURE — 6360000002 HC RX W HCPCS: Performed by: STUDENT IN AN ORGANIZED HEALTH CARE EDUCATION/TRAINING PROGRAM

## 2019-01-24 PROCEDURE — 36415 COLL VENOUS BLD VENIPUNCTURE: CPT

## 2019-01-24 PROCEDURE — 6370000000 HC RX 637 (ALT 250 FOR IP): Performed by: STUDENT IN AN ORGANIZED HEALTH CARE EDUCATION/TRAINING PROGRAM

## 2019-01-24 PROCEDURE — 80048 BASIC METABOLIC PNL TOTAL CA: CPT

## 2019-01-24 PROCEDURE — 6370000000 HC RX 637 (ALT 250 FOR IP): Performed by: INTERNAL MEDICINE

## 2019-01-24 PROCEDURE — 6370000000 HC RX 637 (ALT 250 FOR IP): Performed by: HOSPITALIST

## 2019-01-24 PROCEDURE — 99239 HOSP IP/OBS DSCHRG MGMT >30: CPT | Performed by: INTERNAL MEDICINE

## 2019-01-24 RX ORDER — PANTOPRAZOLE SODIUM 40 MG/1
40 TABLET, DELAYED RELEASE ORAL
Qty: 112 TABLET | Refills: 0 | Status: SHIPPED | OUTPATIENT
Start: 2019-01-24 | End: 2019-03-04 | Stop reason: ALTCHOICE

## 2019-01-24 RX ADMIN — LISINOPRIL 20 MG: 20 TABLET ORAL at 08:39

## 2019-01-24 RX ADMIN — METOPROLOL TARTRATE 2.5 MG: 1 INJECTION, SOLUTION INTRAVENOUS at 02:34

## 2019-01-24 RX ADMIN — CLONIDINE HYDROCHLORIDE 0.2 MG: 0.1 TABLET ORAL at 08:39

## 2019-01-24 RX ADMIN — METOPROLOL TARTRATE 2.5 MG: 1 INJECTION, SOLUTION INTRAVENOUS at 08:39

## 2019-01-24 RX ADMIN — SODIUM CHLORIDE 8 MG/HR: 9 INJECTION, SOLUTION INTRAVENOUS at 06:33

## 2019-01-24 RX ADMIN — ACETAMINOPHEN 650 MG: 325 TABLET, FILM COATED ORAL at 08:41

## 2019-01-24 RX ADMIN — Medication 10 ML: at 08:40

## 2019-01-24 RX ADMIN — SODIUM CHLORIDE 8 MG/HR: 9 INJECTION, SOLUTION INTRAVENOUS at 00:13

## 2019-01-24 ASSESSMENT — ENCOUNTER SYMPTOMS
DIARRHEA: 0
BLOOD IN STOOL: 0
SORE THROAT: 0
VOMITING: 0
CHEST TIGHTNESS: 0
SHORTNESS OF BREATH: 0
CONSTIPATION: 0
COUGH: 0
NAUSEA: 0
ABDOMINAL PAIN: 0

## 2019-01-24 ASSESSMENT — PAIN SCALES - GENERAL
PAINLEVEL_OUTOF10: 8
PAINLEVEL_OUTOF10: 0

## 2019-01-25 LAB — GASTRIN: 63 PG/ML (ref 0–100)

## 2019-01-28 ENCOUNTER — TELEPHONE (OUTPATIENT)
Dept: FAMILY MEDICINE CLINIC | Age: 59
End: 2019-01-28

## 2019-01-30 ENCOUNTER — HOSPITAL ENCOUNTER (INPATIENT)
Age: 59
LOS: 3 days | Discharge: HOME | DRG: 253 | End: 2019-02-02
Attending: EMERGENCY MEDICINE | Admitting: INTERNAL MEDICINE
Payer: MEDICARE

## 2019-01-30 ENCOUNTER — TELEPHONE (OUTPATIENT)
Dept: FAMILY MEDICINE CLINIC | Age: 59
End: 2019-01-30

## 2019-01-30 ENCOUNTER — OFFICE VISIT (OUTPATIENT)
Dept: FAMILY MEDICINE CLINIC | Age: 59
End: 2019-01-30
Payer: MEDICARE

## 2019-01-30 ENCOUNTER — HOSPITAL ENCOUNTER (OUTPATIENT)
Age: 59
Setting detail: SPECIMEN
Discharge: HOME | End: 2019-01-30
Payer: MEDICARE

## 2019-01-30 VITALS
HEART RATE: 74 BPM | SYSTOLIC BLOOD PRESSURE: 110 MMHG | BODY MASS INDEX: 31.57 KG/M2 | TEMPERATURE: 97.4 F | DIASTOLIC BLOOD PRESSURE: 60 MMHG | OXYGEN SATURATION: 98 % | WEIGHT: 220 LBS

## 2019-01-30 DIAGNOSIS — Z09 HOSPITAL DISCHARGE FOLLOW-UP: Primary | ICD-10-CM

## 2019-01-30 DIAGNOSIS — H61.22 EXCESSIVE CERUMEN IN LEFT EAR CANAL: ICD-10-CM

## 2019-01-30 DIAGNOSIS — D50.0 IRON DEFICIENCY ANEMIA DUE TO CHRONIC BLOOD LOSS: ICD-10-CM

## 2019-01-30 DIAGNOSIS — G25.81 RLS (RESTLESS LEGS SYNDROME): ICD-10-CM

## 2019-01-30 DIAGNOSIS — G62.9 NEUROPATHY: ICD-10-CM

## 2019-01-30 DIAGNOSIS — K26.4 GASTROINTESTINAL HEMORRHAGE ASSOCIATED WITH DUODENAL ULCER: Chronic | ICD-10-CM

## 2019-01-30 DIAGNOSIS — D64.9 ANEMIA, UNSPECIFIED TYPE: Primary | ICD-10-CM

## 2019-01-30 DIAGNOSIS — R42 DIZZINESS ON STANDING: ICD-10-CM

## 2019-01-30 DIAGNOSIS — J01.11 ACUTE RECURRENT FRONTAL SINUSITIS: ICD-10-CM

## 2019-01-30 DIAGNOSIS — R55 NEAR SYNCOPE: ICD-10-CM

## 2019-01-30 PROBLEM — R74.8 ELEVATED LIVER ENZYMES: Status: ACTIVE | Noted: 2019-01-30

## 2019-01-30 PROBLEM — I10 ESSENTIAL HYPERTENSION: Status: ACTIVE | Noted: 2019-01-30

## 2019-01-30 PROBLEM — B19.20 UNSPECIFIED VIRAL HEPATITIS C WITHOUT HEPATIC COMA: Status: ACTIVE | Noted: 2019-01-30

## 2019-01-30 LAB
ALBUMIN SERPL-MCNC: 2.4 G/DL (ref 3.5–5.2)
ALBUMIN/GLOBULIN RATIO: ABNORMAL (ref 1–2.5)
ALP BLD-CCNC: 122 U/L (ref 40–129)
ALT SERPL-CCNC: 55 U/L (ref 5–41)
ANION GAP SERPL CALCULATED.3IONS-SCNC: 9 MMOL/L (ref 9–17)
AST SERPL-CCNC: 110 U/L
BILIRUB SERPL-MCNC: 2.56 MG/DL (ref 0.3–1.2)
BILIRUBIN DIRECT: 1.69 MG/DL
BILIRUBIN, INDIRECT: 0.87 MG/DL (ref 0–1)
BUN BLDV-MCNC: 26 MG/DL (ref 6–20)
BUN/CREAT BLD: 23 (ref 9–20)
CALCIUM SERPL-MCNC: 7.7 MG/DL (ref 8.6–10.4)
CHLORIDE BLD-SCNC: 99 MMOL/L (ref 98–107)
CO2: 22 MMOL/L (ref 20–31)
CREAT SERPL-MCNC: 1.14 MG/DL (ref 0.7–1.2)
DATE, STOOL #1: NORMAL
DATE, STOOL #2: NORMAL
DATE, STOOL #3: NORMAL
GFR AFRICAN AMERICAN: >60 ML/MIN
GFR NON-AFRICAN AMERICAN: >60 ML/MIN
GFR SERPL CREATININE-BSD FRML MDRD: ABNORMAL ML/MIN/{1.73_M2}
GFR SERPL CREATININE-BSD FRML MDRD: ABNORMAL ML/MIN/{1.73_M2}
GLOBULIN: ABNORMAL G/DL (ref 1.5–3.8)
GLUCOSE BLD-MCNC: 113 MG/DL (ref 70–99)
HCT VFR BLD CALC: 20.5 % (ref 40.7–50.3)
HCT VFR BLD CALC: 21.5 % (ref 41–53)
HEMOCCULT SP1 STL QL: NEGATIVE
HEMOCCULT SP2 STL QL: NORMAL
HEMOCCULT SP3 STL QL: NORMAL
HEMOGLOBIN: 6.3 G/DL (ref 13–17)
HEMOGLOBIN: 6.9 G/DL (ref 13.5–17.5)
INR BLD: 1.5
LACTIC ACID: 2.2 MMOL/L (ref 0.5–2.2)
MCH RBC QN AUTO: 30.7 PG (ref 25.2–33.5)
MCH RBC QN AUTO: 30.9 PG (ref 26–34)
MCHC RBC AUTO-ENTMCNC: 30.7 G/DL (ref 28.4–34.8)
MCHC RBC AUTO-ENTMCNC: 32.3 G/DL (ref 31–37)
MCV RBC AUTO: 100 FL (ref 82.6–102.9)
MCV RBC AUTO: 95.7 FL (ref 80–100)
NRBC AUTOMATED: 0.3 PER 100 WBC
NRBC AUTOMATED: ABNORMAL PER 100 WBC
PARTIAL THROMBOPLASTIN TIME: 31.1 SEC (ref 23–31)
PDW BLD-RTO: 19.5 % (ref 11.5–14.5)
PDW BLD-RTO: 20 % (ref 11.8–14.4)
PLATELET # BLD: 133 K/UL (ref 138–453)
PLATELET # BLD: 170 K/UL (ref 130–400)
PMV BLD AUTO: 12.4 FL (ref 8.1–13.5)
PMV BLD AUTO: ABNORMAL FL (ref 6–12)
POTASSIUM SERPL-SCNC: 4.4 MMOL/L (ref 3.7–5.3)
PROTHROMBIN TIME: 15.2 SEC (ref 9.7–11.6)
RBC # BLD: 2.05 M/UL (ref 4.21–5.77)
RBC # BLD: 2.25 M/UL (ref 4.5–5.9)
SODIUM BLD-SCNC: 130 MMOL/L (ref 135–144)
TIME, STOOL #1: 1730
TIME, STOOL #2: NORMAL
TIME, STOOL #3: NORMAL
TOTAL PROTEIN: 5 G/DL (ref 6.4–8.3)
WBC # BLD: 10 K/UL (ref 3.5–11.3)
WBC # BLD: 12.1 K/UL (ref 3.5–11)

## 2019-01-30 PROCEDURE — 6370000000 HC RX 637 (ALT 250 FOR IP): Performed by: INTERNAL MEDICINE

## 2019-01-30 PROCEDURE — 80048 BASIC METABOLIC PNL TOTAL CA: CPT

## 2019-01-30 PROCEDURE — 85730 THROMBOPLASTIN TIME PARTIAL: CPT

## 2019-01-30 PROCEDURE — 99285 EMERGENCY DEPT VISIT HI MDM: CPT

## 2019-01-30 PROCEDURE — 86850 RBC ANTIBODY SCREEN: CPT

## 2019-01-30 PROCEDURE — 2580000003 HC RX 258: Performed by: INTERNAL MEDICINE

## 2019-01-30 PROCEDURE — 85610 PROTHROMBIN TIME: CPT

## 2019-01-30 PROCEDURE — 86901 BLOOD TYPING SEROLOGIC RH(D): CPT

## 2019-01-30 PROCEDURE — 85027 COMPLETE CBC AUTOMATED: CPT

## 2019-01-30 PROCEDURE — 99222 1ST HOSP IP/OBS MODERATE 55: CPT | Performed by: NURSE PRACTITIONER

## 2019-01-30 PROCEDURE — 80076 HEPATIC FUNCTION PANEL: CPT

## 2019-01-30 PROCEDURE — 86920 COMPATIBILITY TEST SPIN: CPT

## 2019-01-30 PROCEDURE — 99215 OFFICE O/P EST HI 40 MIN: CPT | Performed by: NURSE PRACTITIONER

## 2019-01-30 PROCEDURE — P9016 RBC LEUKOCYTES REDUCED: HCPCS

## 2019-01-30 PROCEDURE — 36430 TRANSFUSION BLD/BLD COMPNT: CPT

## 2019-01-30 PROCEDURE — 83605 ASSAY OF LACTIC ACID: CPT

## 2019-01-30 PROCEDURE — 86900 BLOOD TYPING SEROLOGIC ABO: CPT

## 2019-01-30 PROCEDURE — G0328 FECAL BLOOD SCRN IMMUNOASSAY: HCPCS

## 2019-01-30 PROCEDURE — 93005 ELECTROCARDIOGRAM TRACING: CPT

## 2019-01-30 PROCEDURE — 2580000003 HC RX 258: Performed by: NURSE PRACTITIONER

## 2019-01-30 PROCEDURE — 1200000000 HC SEMI PRIVATE

## 2019-01-30 RX ORDER — FLUTICASONE PROPIONATE 50 MCG
2 SPRAY, SUSPENSION (ML) NASAL DAILY
Qty: 1 BOTTLE | Refills: 11 | Status: SHIPPED | OUTPATIENT
Start: 2019-01-30 | End: 2019-02-14

## 2019-01-30 RX ORDER — ONDANSETRON 2 MG/ML
4 INJECTION INTRAMUSCULAR; INTRAVENOUS EVERY 6 HOURS PRN
Status: DISCONTINUED | OUTPATIENT
Start: 2019-01-30 | End: 2019-01-30

## 2019-01-30 RX ORDER — ONDANSETRON 2 MG/ML
4 INJECTION INTRAMUSCULAR; INTRAVENOUS EVERY 6 HOURS PRN
Status: DISCONTINUED | OUTPATIENT
Start: 2019-01-30 | End: 2019-02-02 | Stop reason: HOSPADM

## 2019-01-30 RX ORDER — MORPHINE SULFATE 4 MG/ML
4 INJECTION, SOLUTION INTRAMUSCULAR; INTRAVENOUS
Status: DISCONTINUED | OUTPATIENT
Start: 2019-01-30 | End: 2019-02-02 | Stop reason: HOSPADM

## 2019-01-30 RX ORDER — MORPHINE SULFATE 2 MG/ML
2 INJECTION, SOLUTION INTRAMUSCULAR; INTRAVENOUS
Status: DISCONTINUED | OUTPATIENT
Start: 2019-01-30 | End: 2019-02-02 | Stop reason: HOSPADM

## 2019-01-30 RX ORDER — GABAPENTIN 400 MG/1
CAPSULE ORAL
Qty: 90 CAPSULE | Refills: 2 | Status: SHIPPED | OUTPATIENT
Start: 2019-01-30 | End: 2019-05-11 | Stop reason: SDUPTHER

## 2019-01-30 RX ORDER — SODIUM CHLORIDE 0.9 % (FLUSH) 0.9 %
10 SYRINGE (ML) INJECTION PRN
Status: DISCONTINUED | OUTPATIENT
Start: 2019-01-30 | End: 2019-02-02 | Stop reason: HOSPADM

## 2019-01-30 RX ORDER — ROPINIROLE 0.5 MG/1
TABLET, FILM COATED ORAL
Qty: 30 TABLET | Refills: 5 | Status: SHIPPED | OUTPATIENT
Start: 2019-01-30 | End: 2019-09-13 | Stop reason: SDUPTHER

## 2019-01-30 RX ORDER — SODIUM CHLORIDE 0.9 % (FLUSH) 0.9 %
10 SYRINGE (ML) INJECTION PRN
Status: DISCONTINUED | OUTPATIENT
Start: 2019-01-30 | End: 2019-01-30

## 2019-01-30 RX ORDER — LEVOFLOXACIN 500 MG/1
500 TABLET, FILM COATED ORAL DAILY
Qty: 7 TABLET | Refills: 0 | Status: ON HOLD | OUTPATIENT
Start: 2019-01-30 | End: 2019-02-02 | Stop reason: HOSPADM

## 2019-01-30 RX ORDER — SODIUM CHLORIDE 0.9 % (FLUSH) 0.9 %
10 SYRINGE (ML) INJECTION EVERY 12 HOURS SCHEDULED
Status: DISCONTINUED | OUTPATIENT
Start: 2019-01-30 | End: 2019-01-30

## 2019-01-30 RX ORDER — ROPINIROLE 1 MG/1
0.5 TABLET, FILM COATED ORAL 3 TIMES DAILY
Status: DISCONTINUED | OUTPATIENT
Start: 2019-01-30 | End: 2019-02-02 | Stop reason: HOSPADM

## 2019-01-30 RX ORDER — M-VIT,TX,IRON,MINS/CALC/FOLIC 27MG-0.4MG
1 TABLET ORAL DAILY
Status: DISCONTINUED | OUTPATIENT
Start: 2019-01-30 | End: 2019-02-02 | Stop reason: HOSPADM

## 2019-01-30 RX ORDER — SODIUM CHLORIDE 0.9 % (FLUSH) 0.9 %
10 SYRINGE (ML) INJECTION EVERY 12 HOURS
Status: DISCONTINUED | OUTPATIENT
Start: 2019-01-30 | End: 2019-02-02 | Stop reason: HOSPADM

## 2019-01-30 RX ORDER — FLUTICASONE PROPIONATE 50 MCG
2 SPRAY, SUSPENSION (ML) NASAL DAILY
Status: DISCONTINUED | OUTPATIENT
Start: 2019-01-30 | End: 2019-02-02 | Stop reason: HOSPADM

## 2019-01-30 RX ORDER — CLONIDINE HYDROCHLORIDE 0.2 MG/1
0.2 TABLET ORAL 2 TIMES DAILY
Status: DISCONTINUED | OUTPATIENT
Start: 2019-01-30 | End: 2019-02-02 | Stop reason: HOSPADM

## 2019-01-30 RX ORDER — HYDROCODONE BITARTRATE AND ACETAMINOPHEN 5; 325 MG/1; MG/1
2 TABLET ORAL EVERY 4 HOURS PRN
Status: DISCONTINUED | OUTPATIENT
Start: 2019-01-30 | End: 2019-02-02 | Stop reason: HOSPADM

## 2019-01-30 RX ORDER — LISINOPRIL 20 MG/1
20 TABLET ORAL DAILY
Status: DISCONTINUED | OUTPATIENT
Start: 2019-01-30 | End: 2019-02-02 | Stop reason: HOSPADM

## 2019-01-30 RX ORDER — LEVOFLOXACIN 500 MG/1
500 TABLET, FILM COATED ORAL DAILY
Status: DISCONTINUED | OUTPATIENT
Start: 2019-01-30 | End: 2019-02-02 | Stop reason: HOSPADM

## 2019-01-30 RX ORDER — GABAPENTIN 400 MG/1
400 CAPSULE ORAL 3 TIMES DAILY
Status: DISCONTINUED | OUTPATIENT
Start: 2019-01-30 | End: 2019-02-02 | Stop reason: HOSPADM

## 2019-01-30 RX ORDER — 0.9 % SODIUM CHLORIDE 0.9 %
250 INTRAVENOUS SOLUTION INTRAVENOUS ONCE
Status: DISCONTINUED | OUTPATIENT
Start: 2019-01-30 | End: 2019-02-02 | Stop reason: HOSPADM

## 2019-01-30 RX ORDER — HYDROCODONE BITARTRATE AND ACETAMINOPHEN 5; 325 MG/1; MG/1
1 TABLET ORAL EVERY 4 HOURS PRN
Status: DISCONTINUED | OUTPATIENT
Start: 2019-01-30 | End: 2019-02-02 | Stop reason: HOSPADM

## 2019-01-30 RX ORDER — SODIUM CHLORIDE 9 MG/ML
INJECTION, SOLUTION INTRAVENOUS CONTINUOUS
Status: DISCONTINUED | OUTPATIENT
Start: 2019-01-30 | End: 2019-02-02

## 2019-01-30 RX ORDER — ACETAMINOPHEN 325 MG/1
650 TABLET ORAL EVERY 4 HOURS PRN
Status: DISCONTINUED | OUTPATIENT
Start: 2019-01-30 | End: 2019-02-02 | Stop reason: HOSPADM

## 2019-01-30 RX ORDER — ONDANSETRON 4 MG/1
4 TABLET, ORALLY DISINTEGRATING ORAL EVERY 6 HOURS PRN
Status: DISCONTINUED | OUTPATIENT
Start: 2019-01-30 | End: 2019-02-02 | Stop reason: HOSPADM

## 2019-01-30 RX ORDER — 0.9 % SODIUM CHLORIDE 0.9 %
500 INTRAVENOUS SOLUTION INTRAVENOUS ONCE
Status: COMPLETED | OUTPATIENT
Start: 2019-01-30 | End: 2019-01-30

## 2019-01-30 RX ADMIN — MULTIPLE VITAMINS W/ MINERALS TAB 1 TABLET: TAB at 22:56

## 2019-01-30 RX ADMIN — LEVOFLOXACIN 500 MG: 500 TABLET, FILM COATED ORAL at 22:56

## 2019-01-30 RX ADMIN — ROPINIROLE HYDROCHLORIDE 0.5 MG: 1 TABLET, FILM COATED ORAL at 22:55

## 2019-01-30 RX ADMIN — GABAPENTIN 400 MG: 400 CAPSULE ORAL at 22:55

## 2019-01-30 RX ADMIN — SODIUM CHLORIDE 500 ML: 0.9 INJECTION, SOLUTION INTRAVENOUS at 17:31

## 2019-01-30 RX ADMIN — LISINOPRIL 20 MG: 20 TABLET ORAL at 22:55

## 2019-01-30 RX ADMIN — SODIUM CHLORIDE: 9 INJECTION, SOLUTION INTRAVENOUS at 20:08

## 2019-01-30 RX ADMIN — FLUTICASONE PROPIONATE 2 SPRAY: 50 SPRAY, METERED NASAL at 22:56

## 2019-01-30 RX ADMIN — CLONIDINE HYDROCHLORIDE 0.2 MG: 0.2 TABLET ORAL at 22:55

## 2019-01-30 ASSESSMENT — ENCOUNTER SYMPTOMS
NAUSEA: 0
CONSTIPATION: 0
COUGH: 0
DIARRHEA: 0
ABDOMINAL PAIN: 0
CHEST TIGHTNESS: 0
SHORTNESS OF BREATH: 0

## 2019-01-31 PROBLEM — K62.5 RECTAL BLEEDING: Status: RESOLVED | Noted: 2018-09-05 | Resolved: 2019-01-31

## 2019-01-31 LAB
ABO/RH: NORMAL
ANION GAP SERPL CALCULATED.3IONS-SCNC: 9 MMOL/L (ref 9–17)
ANTIBODY SCREEN: NEGATIVE
ARM BAND NUMBER: NORMAL
BLD PROD TYP BPU: NORMAL
BLD PROD TYP BPU: NORMAL
BUN BLDV-MCNC: 20 MG/DL (ref 6–20)
BUN/CREAT BLD: 27 (ref 9–20)
CALCIUM SERPL-MCNC: 7.2 MG/DL (ref 8.6–10.4)
CHLORIDE BLD-SCNC: 102 MMOL/L (ref 98–107)
CO2: 22 MMOL/L (ref 20–31)
CREAT SERPL-MCNC: 0.73 MG/DL (ref 0.7–1.2)
CROSSMATCH RESULT: NORMAL
CROSSMATCH RESULT: NORMAL
DISPENSE STATUS BLOOD BANK: NORMAL
DISPENSE STATUS BLOOD BANK: NORMAL
EKG ATRIAL RATE: 83 BPM
EKG P AXIS: 60 DEGREES
EKG P-R INTERVAL: 134 MS
EKG Q-T INTERVAL: 408 MS
EKG QRS DURATION: 106 MS
EKG QTC CALCULATION (BAZETT): 479 MS
EKG R AXIS: 59 DEGREES
EKG T AXIS: 32 DEGREES
EKG VENTRICULAR RATE: 83 BPM
EXPIRATION DATE: NORMAL
GFR AFRICAN AMERICAN: >60 ML/MIN
GFR NON-AFRICAN AMERICAN: >60 ML/MIN
GFR SERPL CREATININE-BSD FRML MDRD: ABNORMAL ML/MIN/{1.73_M2}
GFR SERPL CREATININE-BSD FRML MDRD: ABNORMAL ML/MIN/{1.73_M2}
GLUCOSE BLD-MCNC: 99 MG/DL (ref 70–99)
HCT VFR BLD CALC: 24.1 % (ref 41–53)
HCT VFR BLD CALC: 24.6 % (ref 41–53)
HCT VFR BLD CALC: 25.1 % (ref 41–53)
HCT VFR BLD CALC: 25.2 % (ref 41–53)
HEMOGLOBIN: 8.1 G/DL (ref 13.5–17.5)
HEMOGLOBIN: 8.1 G/DL (ref 13.5–17.5)
HEMOGLOBIN: 8.3 G/DL (ref 13.5–17.5)
HEMOGLOBIN: 8.4 G/DL (ref 13.5–17.5)
INR BLD: 1.5
MCH RBC QN AUTO: 30.9 PG (ref 26–34)
MCHC RBC AUTO-ENTMCNC: 33.5 G/DL (ref 31–37)
MCV RBC AUTO: 92.4 FL (ref 80–100)
NRBC AUTOMATED: ABNORMAL PER 100 WBC
PDW BLD-RTO: 20.2 % (ref 11.5–14.5)
PLATELET # BLD: 134 K/UL (ref 130–400)
PMV BLD AUTO: 8.9 FL (ref 6–12)
POTASSIUM SERPL-SCNC: 4.5 MMOL/L (ref 3.7–5.3)
PROTHROMBIN TIME: 15.1 SEC (ref 9.7–11.6)
RBC # BLD: 2.73 M/UL (ref 4.5–5.9)
SODIUM BLD-SCNC: 133 MMOL/L (ref 135–144)
TRANSFUSION STATUS: NORMAL
TRANSFUSION STATUS: NORMAL
UNIT DIVISION: 0
UNIT DIVISION: 0
UNIT NUMBER: NORMAL
UNIT NUMBER: NORMAL
WBC # BLD: 8.6 K/UL (ref 3.5–11)

## 2019-01-31 PROCEDURE — 6370000000 HC RX 637 (ALT 250 FOR IP): Performed by: INTERNAL MEDICINE

## 2019-01-31 PROCEDURE — 85014 HEMATOCRIT: CPT

## 2019-01-31 PROCEDURE — 36415 COLL VENOUS BLD VENIPUNCTURE: CPT

## 2019-01-31 PROCEDURE — 6360000002 HC RX W HCPCS: Performed by: INTERNAL MEDICINE

## 2019-01-31 PROCEDURE — 99254 IP/OBS CNSLTJ NEW/EST MOD 60: CPT | Performed by: INTERNAL MEDICINE

## 2019-01-31 PROCEDURE — 6360000002 HC RX W HCPCS: Performed by: NURSE PRACTITIONER

## 2019-01-31 PROCEDURE — 85018 HEMOGLOBIN: CPT

## 2019-01-31 PROCEDURE — C9113 INJ PANTOPRAZOLE SODIUM, VIA: HCPCS | Performed by: INTERNAL MEDICINE

## 2019-01-31 PROCEDURE — 85027 COMPLETE CBC AUTOMATED: CPT

## 2019-01-31 PROCEDURE — 1200000000 HC SEMI PRIVATE

## 2019-01-31 PROCEDURE — 99232 SBSQ HOSP IP/OBS MODERATE 35: CPT | Performed by: INTERNAL MEDICINE

## 2019-01-31 PROCEDURE — 2580000003 HC RX 258: Performed by: INTERNAL MEDICINE

## 2019-01-31 PROCEDURE — 85610 PROTHROMBIN TIME: CPT

## 2019-01-31 PROCEDURE — 2580000003 HC RX 258: Performed by: NURSE PRACTITIONER

## 2019-01-31 PROCEDURE — 80048 BASIC METABOLIC PNL TOTAL CA: CPT

## 2019-01-31 PROCEDURE — APPNB30 APP NON BILLABLE TIME 0-30 MINS: Performed by: NURSE PRACTITIONER

## 2019-01-31 RX ORDER — NICOTINE 21 MG/24HR
1 PATCH, TRANSDERMAL 24 HOURS TRANSDERMAL DAILY
Status: DISCONTINUED | OUTPATIENT
Start: 2019-01-31 | End: 2019-02-02 | Stop reason: HOSPADM

## 2019-01-31 RX ADMIN — SODIUM CHLORIDE: 9 INJECTION, SOLUTION INTRAVENOUS at 14:27

## 2019-01-31 RX ADMIN — GABAPENTIN 400 MG: 400 CAPSULE ORAL at 10:11

## 2019-01-31 RX ADMIN — SODIUM CHLORIDE 8 MG/HR: 9 INJECTION, SOLUTION INTRAVENOUS at 10:49

## 2019-01-31 RX ADMIN — LEVOFLOXACIN 500 MG: 500 TABLET, FILM COATED ORAL at 10:11

## 2019-01-31 RX ADMIN — MULTIPLE VITAMINS W/ MINERALS TAB 1 TABLET: TAB at 10:11

## 2019-01-31 RX ADMIN — CEFTRIAXONE SODIUM 1 G: 1 INJECTION, POWDER, FOR SOLUTION INTRAMUSCULAR; INTRAVENOUS at 17:52

## 2019-01-31 RX ADMIN — ROPINIROLE HYDROCHLORIDE 0.5 MG: 1 TABLET, FILM COATED ORAL at 21:39

## 2019-01-31 RX ADMIN — OCTREOTIDE ACETATE 50 MCG/HR: 200 INJECTION, SOLUTION INTRAVENOUS; SUBCUTANEOUS at 18:20

## 2019-01-31 RX ADMIN — SODIUM CHLORIDE 8 MG/HR: 9 INJECTION, SOLUTION INTRAVENOUS at 22:06

## 2019-01-31 RX ADMIN — ROPINIROLE HYDROCHLORIDE 0.5 MG: 1 TABLET, FILM COATED ORAL at 14:25

## 2019-01-31 RX ADMIN — CLONIDINE HYDROCHLORIDE 0.2 MG: 0.2 TABLET ORAL at 21:40

## 2019-01-31 RX ADMIN — ROPINIROLE HYDROCHLORIDE 0.5 MG: 1 TABLET, FILM COATED ORAL at 10:10

## 2019-01-31 RX ADMIN — CLONIDINE HYDROCHLORIDE 0.2 MG: 0.2 TABLET ORAL at 10:11

## 2019-01-31 RX ADMIN — SODIUM CHLORIDE 8 MG/HR: 9 INJECTION, SOLUTION INTRAVENOUS at 02:55

## 2019-01-31 RX ADMIN — GABAPENTIN 400 MG: 400 CAPSULE ORAL at 21:40

## 2019-01-31 RX ADMIN — GABAPENTIN 400 MG: 400 CAPSULE ORAL at 14:25

## 2019-01-31 RX ADMIN — FLUTICASONE PROPIONATE 2 SPRAY: 50 SPRAY, METERED NASAL at 10:12

## 2019-01-31 RX ADMIN — SODIUM CHLORIDE: 9 INJECTION, SOLUTION INTRAVENOUS at 10:15

## 2019-01-31 RX ADMIN — LISINOPRIL 20 MG: 20 TABLET ORAL at 10:12

## 2019-01-31 ASSESSMENT — PAIN SCALES - GENERAL: PAINLEVEL_OUTOF10: 0

## 2019-02-01 ENCOUNTER — ANESTHESIA EVENT (OUTPATIENT)
Dept: OPERATING ROOM | Age: 59
DRG: 253 | End: 2019-02-01
Payer: MEDICARE

## 2019-02-01 ENCOUNTER — ANESTHESIA (OUTPATIENT)
Dept: OPERATING ROOM | Age: 59
DRG: 253 | End: 2019-02-01
Payer: MEDICARE

## 2019-02-01 VITALS
SYSTOLIC BLOOD PRESSURE: 159 MMHG | RESPIRATION RATE: 12 BRPM | DIASTOLIC BLOOD PRESSURE: 75 MMHG | OXYGEN SATURATION: 98 %

## 2019-02-01 LAB
HCT VFR BLD CALC: 26.1 % (ref 41–53)
HEMOGLOBIN: 8.6 G/DL (ref 13.5–17.5)

## 2019-02-01 PROCEDURE — 99232 SBSQ HOSP IP/OBS MODERATE 35: CPT | Performed by: INTERNAL MEDICINE

## 2019-02-01 PROCEDURE — 85014 HEMATOCRIT: CPT

## 2019-02-01 PROCEDURE — 88305 TISSUE EXAM BY PATHOLOGIST: CPT

## 2019-02-01 PROCEDURE — 1200000000 HC SEMI PRIVATE

## 2019-02-01 PROCEDURE — C9113 INJ PANTOPRAZOLE SODIUM, VIA: HCPCS | Performed by: INTERNAL MEDICINE

## 2019-02-01 PROCEDURE — 36415 COLL VENOUS BLD VENIPUNCTURE: CPT

## 2019-02-01 PROCEDURE — 6370000000 HC RX 637 (ALT 250 FOR IP): Performed by: INTERNAL MEDICINE

## 2019-02-01 PROCEDURE — 7100000010 HC PHASE II RECOVERY - FIRST 15 MIN: Performed by: INTERNAL MEDICINE

## 2019-02-01 PROCEDURE — 6360000002 HC RX W HCPCS: Performed by: ANESTHESIOLOGY

## 2019-02-01 PROCEDURE — 6360000002 HC RX W HCPCS: Performed by: NURSE PRACTITIONER

## 2019-02-01 PROCEDURE — 7100000011 HC PHASE II RECOVERY - ADDTL 15 MIN: Performed by: INTERNAL MEDICINE

## 2019-02-01 PROCEDURE — 2580000003 HC RX 258: Performed by: NURSE PRACTITIONER

## 2019-02-01 PROCEDURE — 2709999900 HC NON-CHARGEABLE SUPPLY: Performed by: INTERNAL MEDICINE

## 2019-02-01 PROCEDURE — 2580000003 HC RX 258: Performed by: INTERNAL MEDICINE

## 2019-02-01 PROCEDURE — 43239 EGD BIOPSY SINGLE/MULTIPLE: CPT | Performed by: INTERNAL MEDICINE

## 2019-02-01 PROCEDURE — 2500000003 HC RX 250 WO HCPCS: Performed by: ANESTHESIOLOGY

## 2019-02-01 PROCEDURE — 6360000002 HC RX W HCPCS: Performed by: INTERNAL MEDICINE

## 2019-02-01 PROCEDURE — 3700000000 HC ANESTHESIA ATTENDED CARE: Performed by: INTERNAL MEDICINE

## 2019-02-01 PROCEDURE — 3700000001 HC ADD 15 MINUTES (ANESTHESIA): Performed by: INTERNAL MEDICINE

## 2019-02-01 PROCEDURE — 0DB78ZX EXCISION OF STOMACH, PYLORUS, VIA NATURAL OR ARTIFICIAL OPENING ENDOSCOPIC, DIAGNOSTIC: ICD-10-PCS | Performed by: INTERNAL MEDICINE

## 2019-02-01 PROCEDURE — 3609012400 HC EGD TRANSORAL BIOPSY SINGLE/MULTIPLE: Performed by: INTERNAL MEDICINE

## 2019-02-01 PROCEDURE — 85018 HEMOGLOBIN: CPT

## 2019-02-01 PROCEDURE — 2580000003 HC RX 258: Performed by: ANESTHESIOLOGY

## 2019-02-01 RX ORDER — PROPOFOL 10 MG/ML
INJECTION, EMULSION INTRAVENOUS PRN
Status: DISCONTINUED | OUTPATIENT
Start: 2019-02-01 | End: 2019-02-01 | Stop reason: SDUPTHER

## 2019-02-01 RX ORDER — SODIUM CHLORIDE 9 MG/ML
INJECTION, SOLUTION INTRAVENOUS CONTINUOUS PRN
Status: DISCONTINUED | OUTPATIENT
Start: 2019-02-01 | End: 2019-02-01 | Stop reason: SDUPTHER

## 2019-02-01 RX ORDER — LIDOCAINE HYDROCHLORIDE 20 MG/ML
INJECTION, SOLUTION EPIDURAL; INFILTRATION; INTRACAUDAL; PERINEURAL PRN
Status: DISCONTINUED | OUTPATIENT
Start: 2019-02-01 | End: 2019-02-01 | Stop reason: SDUPTHER

## 2019-02-01 RX ADMIN — GABAPENTIN 400 MG: 400 CAPSULE ORAL at 20:31

## 2019-02-01 RX ADMIN — PROPOFOL 30 MG: 10 INJECTION, EMULSION INTRAVENOUS at 14:42

## 2019-02-01 RX ADMIN — FLUTICASONE PROPIONATE 2 SPRAY: 50 SPRAY, METERED NASAL at 10:02

## 2019-02-01 RX ADMIN — MULTIPLE VITAMINS W/ MINERALS TAB 1 TABLET: TAB at 10:00

## 2019-02-01 RX ADMIN — LIDOCAINE HYDROCHLORIDE 80 MG: 20 INJECTION, SOLUTION EPIDURAL; INFILTRATION; INTRACAUDAL; PERINEURAL at 14:40

## 2019-02-01 RX ADMIN — SODIUM CHLORIDE: 9 INJECTION, SOLUTION INTRAVENOUS at 14:36

## 2019-02-01 RX ADMIN — CLONIDINE HYDROCHLORIDE 0.2 MG: 0.2 TABLET ORAL at 20:31

## 2019-02-01 RX ADMIN — LISINOPRIL 20 MG: 20 TABLET ORAL at 10:01

## 2019-02-01 RX ADMIN — LEVOFLOXACIN 500 MG: 500 TABLET, FILM COATED ORAL at 10:01

## 2019-02-01 RX ADMIN — SODIUM CHLORIDE: 9 INJECTION, SOLUTION INTRAVENOUS at 10:27

## 2019-02-01 RX ADMIN — PROPOFOL 30 MG: 10 INJECTION, EMULSION INTRAVENOUS at 14:44

## 2019-02-01 RX ADMIN — SODIUM CHLORIDE 8 MG/HR: 9 INJECTION, SOLUTION INTRAVENOUS at 10:26

## 2019-02-01 RX ADMIN — OCTREOTIDE ACETATE 50 MCG/HR: 200 INJECTION, SOLUTION INTRAVENOUS; SUBCUTANEOUS at 14:26

## 2019-02-01 RX ADMIN — ROPINIROLE HYDROCHLORIDE 0.5 MG: 1 TABLET, FILM COATED ORAL at 10:01

## 2019-02-01 RX ADMIN — PROPOFOL 30 MG: 10 INJECTION, EMULSION INTRAVENOUS at 14:43

## 2019-02-01 RX ADMIN — GABAPENTIN 400 MG: 400 CAPSULE ORAL at 10:01

## 2019-02-01 RX ADMIN — PROPOFOL 20 MG: 10 INJECTION, EMULSION INTRAVENOUS at 14:46

## 2019-02-01 RX ADMIN — SODIUM CHLORIDE 8 MG/HR: 9 INJECTION, SOLUTION INTRAVENOUS at 20:31

## 2019-02-01 RX ADMIN — CEFTRIAXONE SODIUM 1 G: 1 INJECTION, POWDER, FOR SOLUTION INTRAMUSCULAR; INTRAVENOUS at 16:56

## 2019-02-01 RX ADMIN — OCTREOTIDE ACETATE 50 MCG/HR: 200 INJECTION, SOLUTION INTRAVENOUS; SUBCUTANEOUS at 03:44

## 2019-02-01 RX ADMIN — PROPOFOL 20 MG: 10 INJECTION, EMULSION INTRAVENOUS at 14:41

## 2019-02-01 RX ADMIN — ROPINIROLE HYDROCHLORIDE 0.5 MG: 1 TABLET, FILM COATED ORAL at 20:31

## 2019-02-01 RX ADMIN — PROPOFOL 50 MG: 10 INJECTION, EMULSION INTRAVENOUS at 14:40

## 2019-02-01 RX ADMIN — SODIUM CHLORIDE: 9 INJECTION, SOLUTION INTRAVENOUS at 20:31

## 2019-02-01 RX ADMIN — PROPOFOL 40 MG: 10 INJECTION, EMULSION INTRAVENOUS at 14:45

## 2019-02-01 RX ADMIN — CLONIDINE HYDROCHLORIDE 0.2 MG: 0.2 TABLET ORAL at 10:01

## 2019-02-01 ASSESSMENT — PULMONARY FUNCTION TESTS
PIF_VALUE: 1

## 2019-02-01 ASSESSMENT — PAIN SCALES - GENERAL
PAINLEVEL_OUTOF10: 0

## 2019-02-02 VITALS
OXYGEN SATURATION: 94 % | SYSTOLIC BLOOD PRESSURE: 144 MMHG | WEIGHT: 254.5 LBS | HEIGHT: 70 IN | TEMPERATURE: 98.6 F | BODY MASS INDEX: 36.43 KG/M2 | RESPIRATION RATE: 16 BRPM | DIASTOLIC BLOOD PRESSURE: 80 MMHG | HEART RATE: 81 BPM

## 2019-02-02 PROBLEM — K26.9 DUODENAL ULCER: Status: ACTIVE | Noted: 2019-02-02

## 2019-02-02 LAB
ABSOLUTE EOS #: 0.34 K/UL (ref 0–0.4)
ABSOLUTE IMMATURE GRANULOCYTE: ABNORMAL K/UL (ref 0–0.3)
ABSOLUTE LYMPH #: 1.22 K/UL (ref 1–4.8)
ABSOLUTE MONO #: 0.75 K/UL (ref 0.2–0.8)
ANION GAP SERPL CALCULATED.3IONS-SCNC: 9 MMOL/L (ref 9–17)
BASOPHILS # BLD: 1 % (ref 0–2)
BASOPHILS ABSOLUTE: 0.07 K/UL (ref 0–0.2)
BUN BLDV-MCNC: 6 MG/DL (ref 6–20)
BUN/CREAT BLD: 8 (ref 9–20)
CALCIUM SERPL-MCNC: 7.4 MG/DL (ref 8.6–10.4)
CHLORIDE BLD-SCNC: 105 MMOL/L (ref 98–107)
CO2: 23 MMOL/L (ref 20–31)
CREAT SERPL-MCNC: 0.75 MG/DL (ref 0.7–1.2)
DIFFERENTIAL TYPE: ABNORMAL
EOSINOPHILS RELATIVE PERCENT: 5 % (ref 1–4)
GFR AFRICAN AMERICAN: >60 ML/MIN
GFR NON-AFRICAN AMERICAN: >60 ML/MIN
GFR SERPL CREATININE-BSD FRML MDRD: ABNORMAL ML/MIN/{1.73_M2}
GFR SERPL CREATININE-BSD FRML MDRD: ABNORMAL ML/MIN/{1.73_M2}
GLUCOSE BLD-MCNC: 101 MG/DL (ref 70–99)
HCT VFR BLD CALC: 25.5 % (ref 41–53)
HEMOGLOBIN: 8.3 G/DL (ref 13.5–17.5)
IMMATURE GRANULOCYTES: ABNORMAL %
LYMPHOCYTES # BLD: 18 % (ref 24–44)
MAGNESIUM: 1.5 MG/DL (ref 1.6–2.6)
MCH RBC QN AUTO: 30.3 PG (ref 26–34)
MCHC RBC AUTO-ENTMCNC: 32.6 G/DL (ref 31–37)
MCV RBC AUTO: 93.1 FL (ref 80–100)
MONOCYTES # BLD: 11 % (ref 1–7)
MORPHOLOGY: ABNORMAL
NRBC AUTOMATED: ABNORMAL PER 100 WBC
PDW BLD-RTO: 20 % (ref 11.5–14.5)
PLATELET # BLD: 116 K/UL (ref 130–400)
PLATELET ESTIMATE: ABNORMAL
PMV BLD AUTO: 8.5 FL (ref 6–12)
POTASSIUM SERPL-SCNC: 4.3 MMOL/L (ref 3.7–5.3)
RBC # BLD: 2.74 M/UL (ref 4.5–5.9)
RBC # BLD: ABNORMAL 10*6/UL
SEG NEUTROPHILS: 65 % (ref 36–66)
SEGMENTED NEUTROPHILS ABSOLUTE COUNT: 4.42 K/UL (ref 1.8–7.7)
SODIUM BLD-SCNC: 137 MMOL/L (ref 135–144)
WBC # BLD: 6.8 K/UL (ref 3.5–11)
WBC # BLD: ABNORMAL 10*3/UL

## 2019-02-02 PROCEDURE — 83735 ASSAY OF MAGNESIUM: CPT

## 2019-02-02 PROCEDURE — 85025 COMPLETE CBC W/AUTO DIFF WBC: CPT

## 2019-02-02 PROCEDURE — 6370000000 HC RX 637 (ALT 250 FOR IP): Performed by: INTERNAL MEDICINE

## 2019-02-02 PROCEDURE — 99232 SBSQ HOSP IP/OBS MODERATE 35: CPT | Performed by: INTERNAL MEDICINE

## 2019-02-02 PROCEDURE — 6360000002 HC RX W HCPCS: Performed by: INTERNAL MEDICINE

## 2019-02-02 PROCEDURE — 80048 BASIC METABOLIC PNL TOTAL CA: CPT

## 2019-02-02 PROCEDURE — C9113 INJ PANTOPRAZOLE SODIUM, VIA: HCPCS | Performed by: INTERNAL MEDICINE

## 2019-02-02 PROCEDURE — 99239 HOSP IP/OBS DSCHRG MGMT >30: CPT | Performed by: INTERNAL MEDICINE

## 2019-02-02 PROCEDURE — 2580000003 HC RX 258: Performed by: INTERNAL MEDICINE

## 2019-02-02 PROCEDURE — 36415 COLL VENOUS BLD VENIPUNCTURE: CPT

## 2019-02-02 RX ORDER — PANTOPRAZOLE SODIUM 40 MG/1
40 TABLET, DELAYED RELEASE ORAL
Status: DISCONTINUED | OUTPATIENT
Start: 2019-02-02 | End: 2019-02-02 | Stop reason: HOSPADM

## 2019-02-02 RX ADMIN — PANTOPRAZOLE SODIUM 40 MG: 40 TABLET, DELAYED RELEASE ORAL at 14:00

## 2019-02-02 RX ADMIN — LISINOPRIL 20 MG: 20 TABLET ORAL at 09:11

## 2019-02-02 RX ADMIN — GABAPENTIN 400 MG: 400 CAPSULE ORAL at 09:11

## 2019-02-02 RX ADMIN — ROPINIROLE HYDROCHLORIDE 0.5 MG: 1 TABLET, FILM COATED ORAL at 14:00

## 2019-02-02 RX ADMIN — LEVOFLOXACIN 500 MG: 500 TABLET, FILM COATED ORAL at 09:11

## 2019-02-02 RX ADMIN — FLUTICASONE PROPIONATE 2 SPRAY: 50 SPRAY, METERED NASAL at 09:10

## 2019-02-02 RX ADMIN — MULTIPLE VITAMINS W/ MINERALS TAB 1 TABLET: TAB at 09:11

## 2019-02-02 RX ADMIN — GABAPENTIN 400 MG: 400 CAPSULE ORAL at 14:00

## 2019-02-02 RX ADMIN — CLONIDINE HYDROCHLORIDE 0.2 MG: 0.2 TABLET ORAL at 09:11

## 2019-02-02 RX ADMIN — ROPINIROLE HYDROCHLORIDE 0.5 MG: 1 TABLET, FILM COATED ORAL at 09:10

## 2019-02-02 RX ADMIN — SODIUM CHLORIDE 8 MG/HR: 9 INJECTION, SOLUTION INTRAVENOUS at 02:53

## 2019-02-02 ASSESSMENT — PAIN SCALES - GENERAL: PAINLEVEL_OUTOF10: 0

## 2019-02-04 LAB — COMMENT: NORMAL

## 2019-02-05 LAB — SURGICAL PATHOLOGY REPORT: NORMAL

## 2019-02-12 ENCOUNTER — HOSPITAL ENCOUNTER (OUTPATIENT)
Age: 59
Setting detail: SPECIMEN
Discharge: HOME | End: 2019-02-12
Payer: MEDICARE

## 2019-02-12 ENCOUNTER — OFFICE VISIT (OUTPATIENT)
Dept: FAMILY MEDICINE CLINIC | Age: 59
End: 2019-02-12
Payer: MEDICARE

## 2019-02-12 VITALS
HEART RATE: 84 BPM | TEMPERATURE: 98.7 F | WEIGHT: 235 LBS | SYSTOLIC BLOOD PRESSURE: 98 MMHG | OXYGEN SATURATION: 97 % | DIASTOLIC BLOOD PRESSURE: 58 MMHG | BODY MASS INDEX: 33.72 KG/M2

## 2019-02-12 DIAGNOSIS — D50.0 IRON DEFICIENCY ANEMIA DUE TO CHRONIC BLOOD LOSS: ICD-10-CM

## 2019-02-12 DIAGNOSIS — I95.2 HYPOTENSION DUE TO DRUGS: ICD-10-CM

## 2019-02-12 DIAGNOSIS — M25.561 ACUTE PAIN OF RIGHT KNEE: ICD-10-CM

## 2019-02-12 DIAGNOSIS — Z09 HOSPITAL DISCHARGE FOLLOW-UP: Primary | ICD-10-CM

## 2019-02-12 LAB
HCT VFR BLD CALC: 27.6 % (ref 40.7–50.3)
HEMOGLOBIN: 8.5 G/DL (ref 13–17)
IRON SATURATION: 7 % (ref 20–55)
IRON: 20 UG/DL (ref 59–158)
MCH RBC QN AUTO: 29 PG (ref 25.2–33.5)
MCHC RBC AUTO-ENTMCNC: 30.8 G/DL (ref 28.4–34.8)
MCV RBC AUTO: 94.2 FL (ref 82.6–102.9)
NRBC AUTOMATED: 0 PER 100 WBC
PDW BLD-RTO: 17.2 % (ref 11.8–14.4)
PLATELET # BLD: ABNORMAL K/UL (ref 138–453)
PLATELET, FLUORESCENCE: 66 K/UL (ref 138–453)
PLATELET, IMMATURE FRACTION: 10.5 % (ref 1.1–10.3)
PMV BLD AUTO: ABNORMAL FL (ref 8.1–13.5)
RBC # BLD: 2.93 M/UL (ref 4.21–5.77)
TOTAL IRON BINDING CAPACITY: 299 UG/DL (ref 250–450)
UNSATURATED IRON BINDING CAPACITY: 279 UG/DL (ref 112–347)
WBC # BLD: 5.5 K/UL (ref 3.5–11.3)

## 2019-02-12 PROCEDURE — 99214 OFFICE O/P EST MOD 30 MIN: CPT | Performed by: NURSE PRACTITIONER

## 2019-02-12 PROCEDURE — 3017F COLORECTAL CA SCREEN DOC REV: CPT | Performed by: NURSE PRACTITIONER

## 2019-02-12 PROCEDURE — G8417 CALC BMI ABV UP PARAM F/U: HCPCS | Performed by: NURSE PRACTITIONER

## 2019-02-12 PROCEDURE — 4004F PT TOBACCO SCREEN RCVD TLK: CPT | Performed by: NURSE PRACTITIONER

## 2019-02-12 PROCEDURE — G8484 FLU IMMUNIZE NO ADMIN: HCPCS | Performed by: NURSE PRACTITIONER

## 2019-02-12 PROCEDURE — G8427 DOCREV CUR MEDS BY ELIG CLIN: HCPCS | Performed by: NURSE PRACTITIONER

## 2019-02-12 ASSESSMENT — ENCOUNTER SYMPTOMS
ANAL BLEEDING: 0
CONSTIPATION: 0
CHEST TIGHTNESS: 0
COUGH: 0
DIARRHEA: 0
BLOOD IN STOOL: 0
ABDOMINAL PAIN: 0
NAUSEA: 0
SHORTNESS OF BREATH: 0

## 2019-02-12 ASSESSMENT — PATIENT HEALTH QUESTIONNAIRE - PHQ9
SUM OF ALL RESPONSES TO PHQ9 QUESTIONS 1 & 2: 0
SUM OF ALL RESPONSES TO PHQ QUESTIONS 1-9: 0
1. LITTLE INTEREST OR PLEASURE IN DOING THINGS: 0
SUM OF ALL RESPONSES TO PHQ QUESTIONS 1-9: 0
2. FEELING DOWN, DEPRESSED OR HOPELESS: 0

## 2019-02-14 ENCOUNTER — OFFICE VISIT (OUTPATIENT)
Dept: GASTROENTEROLOGY | Age: 59
End: 2019-02-14
Payer: MEDICARE

## 2019-02-14 VITALS
DIASTOLIC BLOOD PRESSURE: 67 MMHG | SYSTOLIC BLOOD PRESSURE: 112 MMHG | HEART RATE: 89 BPM | WEIGHT: 226.2 LBS | BODY MASS INDEX: 32.46 KG/M2

## 2019-02-14 DIAGNOSIS — K70.30 ALCOHOLIC CIRRHOSIS OF LIVER WITHOUT ASCITES (HCC): ICD-10-CM

## 2019-02-14 DIAGNOSIS — K25.4 CHRONIC GASTRIC ULCER WITH HEMORRHAGE: Primary | Chronic | ICD-10-CM

## 2019-02-14 DIAGNOSIS — K27.9 PUD (PEPTIC ULCER DISEASE): ICD-10-CM

## 2019-02-14 PROCEDURE — 3017F COLORECTAL CA SCREEN DOC REV: CPT | Performed by: INTERNAL MEDICINE

## 2019-02-14 PROCEDURE — 4004F PT TOBACCO SCREEN RCVD TLK: CPT | Performed by: INTERNAL MEDICINE

## 2019-02-14 PROCEDURE — G8417 CALC BMI ABV UP PARAM F/U: HCPCS | Performed by: INTERNAL MEDICINE

## 2019-02-14 PROCEDURE — G8484 FLU IMMUNIZE NO ADMIN: HCPCS | Performed by: INTERNAL MEDICINE

## 2019-02-14 PROCEDURE — G8427 DOCREV CUR MEDS BY ELIG CLIN: HCPCS | Performed by: INTERNAL MEDICINE

## 2019-02-14 PROCEDURE — 99214 OFFICE O/P EST MOD 30 MIN: CPT | Performed by: INTERNAL MEDICINE

## 2019-02-14 ASSESSMENT — ENCOUNTER SYMPTOMS
SINUS PRESSURE: 0
ANAL BLEEDING: 0
DIARRHEA: 0
COUGH: 0
ABDOMINAL DISTENTION: 0
NAUSEA: 0
ALLERGIC/IMMUNOLOGIC NEGATIVE: 1
WHEEZING: 0
SINUS PAIN: 0
VOMITING: 0
SORE THROAT: 0
CONSTIPATION: 0
SHORTNESS OF BREATH: 0
BLOOD IN STOOL: 0
GASTROINTESTINAL NEGATIVE: 1
BACK PAIN: 0
TROUBLE SWALLOWING: 0
RESPIRATORY NEGATIVE: 1
CHEST TIGHTNESS: 0
ABDOMINAL PAIN: 0
EYE ITCHING: 0
EYE PAIN: 0
RECTAL PAIN: 0
CHOKING: 0
COLOR CHANGE: 0

## 2019-02-21 DIAGNOSIS — D50.9 IRON DEFICIENCY ANEMIA, UNSPECIFIED IRON DEFICIENCY ANEMIA TYPE: Primary | ICD-10-CM

## 2019-02-21 RX ORDER — FERROUS SULFATE 325(65) MG
325 TABLET ORAL
Qty: 90 TABLET | Refills: 1 | Status: SHIPPED | OUTPATIENT
Start: 2019-02-21 | End: 2019-08-14 | Stop reason: SDUPTHER

## 2019-02-25 ENCOUNTER — TELEPHONE (OUTPATIENT)
Dept: GASTROENTEROLOGY | Age: 59
End: 2019-02-25

## 2019-02-27 PROBLEM — D62 ACUTE BLOOD LOSS ANEMIA: Status: ACTIVE | Noted: 2019-02-27

## 2019-03-04 ENCOUNTER — HOSPITAL ENCOUNTER (OUTPATIENT)
Age: 59
Setting detail: SPECIMEN
Discharge: HOME | End: 2019-03-04
Payer: MEDICARE

## 2019-03-04 ENCOUNTER — OFFICE VISIT (OUTPATIENT)
Dept: FAMILY MEDICINE CLINIC | Age: 59
End: 2019-03-04
Payer: MEDICARE

## 2019-03-04 VITALS
OXYGEN SATURATION: 98 % | TEMPERATURE: 98.3 F | HEART RATE: 74 BPM | WEIGHT: 218.6 LBS | SYSTOLIC BLOOD PRESSURE: 114 MMHG | DIASTOLIC BLOOD PRESSURE: 78 MMHG | BODY MASS INDEX: 31.37 KG/M2

## 2019-03-04 DIAGNOSIS — K25.4 CHRONIC GASTRIC ULCER WITH HEMORRHAGE: Chronic | ICD-10-CM

## 2019-03-04 DIAGNOSIS — D50.0 IRON DEFICIENCY ANEMIA DUE TO CHRONIC BLOOD LOSS: Primary | ICD-10-CM

## 2019-03-04 DIAGNOSIS — K70.30 ALCOHOLIC CIRRHOSIS OF LIVER WITHOUT ASCITES (HCC): ICD-10-CM

## 2019-03-04 DIAGNOSIS — I10 ESSENTIAL HYPERTENSION: ICD-10-CM

## 2019-03-04 LAB
ABSOLUTE EOS #: 0.29 K/UL (ref 0–0.44)
ABSOLUTE IMMATURE GRANULOCYTE: <0.03 K/UL (ref 0–0.3)
ABSOLUTE LYMPH #: 1.89 K/UL (ref 1.1–3.7)
ABSOLUTE MONO #: 0.59 K/UL (ref 0.1–1.2)
ALBUMIN SERPL-MCNC: 2.8 G/DL (ref 3.5–5.2)
ALBUMIN/GLOBULIN RATIO: 0.8 (ref 1–2.5)
ALP BLD-CCNC: 103 U/L (ref 40–129)
ALT SERPL-CCNC: 86 U/L (ref 5–41)
AST SERPL-CCNC: 203 U/L
BASOPHILS # BLD: 1 % (ref 0–2)
BASOPHILS ABSOLUTE: 0.08 K/UL (ref 0–0.2)
BILIRUB SERPL-MCNC: 1.46 MG/DL (ref 0.3–1.2)
BILIRUBIN DIRECT: 0.92 MG/DL
BILIRUBIN, INDIRECT: 0.54 MG/DL (ref 0–1)
DIFFERENTIAL TYPE: ABNORMAL
EOSINOPHILS RELATIVE PERCENT: 4 % (ref 1–4)
GLOBULIN: ABNORMAL G/DL (ref 1.5–3.8)
HCT VFR BLD CALC: 32 % (ref 40.7–50.3)
HEMOGLOBIN: 9.6 G/DL (ref 13–17)
IMMATURE GRANULOCYTES: 0 %
LYMPHOCYTES # BLD: 29 % (ref 24–43)
MCH RBC QN AUTO: 26.7 PG (ref 25.2–33.5)
MCHC RBC AUTO-ENTMCNC: 30 G/DL (ref 28.4–34.8)
MCV RBC AUTO: 88.9 FL (ref 82.6–102.9)
MONOCYTES # BLD: 9 % (ref 3–12)
NRBC AUTOMATED: 0 PER 100 WBC
PDW BLD-RTO: 18 % (ref 11.8–14.4)
PLATELET # BLD: ABNORMAL K/UL (ref 138–453)
PLATELET ESTIMATE: ABNORMAL
PLATELET, FLUORESCENCE: 69 K/UL (ref 138–453)
PLATELET, IMMATURE FRACTION: 10 % (ref 1.1–10.3)
PMV BLD AUTO: ABNORMAL FL (ref 8.1–13.5)
RBC # BLD: 3.6 M/UL (ref 4.21–5.77)
RBC # BLD: ABNORMAL 10*6/UL
SEG NEUTROPHILS: 56 % (ref 36–65)
SEGMENTED NEUTROPHILS ABSOLUTE COUNT: 3.7 K/UL (ref 1.5–8.1)
TOTAL PROTEIN: 6.1 G/DL (ref 6.4–8.3)
WBC # BLD: 6.6 K/UL (ref 3.5–11.3)
WBC # BLD: ABNORMAL 10*3/UL

## 2019-03-04 PROCEDURE — G8417 CALC BMI ABV UP PARAM F/U: HCPCS | Performed by: NURSE PRACTITIONER

## 2019-03-04 PROCEDURE — 3017F COLORECTAL CA SCREEN DOC REV: CPT | Performed by: NURSE PRACTITIONER

## 2019-03-04 PROCEDURE — G8484 FLU IMMUNIZE NO ADMIN: HCPCS | Performed by: NURSE PRACTITIONER

## 2019-03-04 PROCEDURE — G8427 DOCREV CUR MEDS BY ELIG CLIN: HCPCS | Performed by: NURSE PRACTITIONER

## 2019-03-04 PROCEDURE — 4004F PT TOBACCO SCREEN RCVD TLK: CPT | Performed by: NURSE PRACTITIONER

## 2019-03-04 PROCEDURE — 99213 OFFICE O/P EST LOW 20 MIN: CPT | Performed by: NURSE PRACTITIONER

## 2019-03-04 RX ORDER — LISINOPRIL 20 MG/1
TABLET ORAL
Qty: 30 TABLET | Refills: 5 | Status: SHIPPED | OUTPATIENT
Start: 2019-03-04 | End: 2019-09-25 | Stop reason: SDUPTHER

## 2019-03-04 ASSESSMENT — ENCOUNTER SYMPTOMS
COUGH: 0
SHORTNESS OF BREATH: 0

## 2019-03-19 ENCOUNTER — TELEPHONE (OUTPATIENT)
Dept: FAMILY MEDICINE CLINIC | Age: 59
End: 2019-03-19

## 2019-03-29 ENCOUNTER — OFFICE VISIT (OUTPATIENT)
Dept: FAMILY MEDICINE CLINIC | Age: 59
End: 2019-03-29
Payer: MEDICARE

## 2019-03-29 ENCOUNTER — HOSPITAL ENCOUNTER (OUTPATIENT)
Age: 59
Setting detail: SPECIMEN
Discharge: HOME | End: 2019-03-29
Payer: MEDICARE

## 2019-03-29 VITALS
HEART RATE: 66 BPM | BODY MASS INDEX: 33.23 KG/M2 | SYSTOLIC BLOOD PRESSURE: 126 MMHG | DIASTOLIC BLOOD PRESSURE: 80 MMHG | WEIGHT: 231.6 LBS | OXYGEN SATURATION: 96 % | TEMPERATURE: 98 F

## 2019-03-29 DIAGNOSIS — K70.30 ALCOHOLIC CIRRHOSIS OF LIVER WITHOUT ASCITES (HCC): ICD-10-CM

## 2019-03-29 DIAGNOSIS — I10 ESSENTIAL HYPERTENSION: ICD-10-CM

## 2019-03-29 DIAGNOSIS — D69.6 THROMBOCYTOPENIA (HCC): ICD-10-CM

## 2019-03-29 DIAGNOSIS — I10 ESSENTIAL HYPERTENSION: Primary | ICD-10-CM

## 2019-03-29 DIAGNOSIS — R60.9 PERIPHERAL EDEMA: ICD-10-CM

## 2019-03-29 DIAGNOSIS — M54.50 CHRONIC MIDLINE LOW BACK PAIN WITHOUT SCIATICA: ICD-10-CM

## 2019-03-29 DIAGNOSIS — G89.29 CHRONIC MIDLINE LOW BACK PAIN WITHOUT SCIATICA: ICD-10-CM

## 2019-03-29 LAB
ABSOLUTE EOS #: 0.26 K/UL (ref 0–0.4)
ABSOLUTE IMMATURE GRANULOCYTE: 0 K/UL (ref 0–0.3)
ABSOLUTE LYMPH #: 1.46 K/UL (ref 1–4.8)
ABSOLUTE MONO #: 0.52 K/UL (ref 0.1–0.8)
ALBUMIN SERPL-MCNC: 2.9 G/DL (ref 3.5–5.2)
ALBUMIN/GLOBULIN RATIO: 0.8 (ref 1–2.5)
ALP BLD-CCNC: 134 U/L (ref 40–129)
ALT SERPL-CCNC: 100 U/L (ref 5–41)
ANION GAP SERPL CALCULATED.3IONS-SCNC: 14 MMOL/L (ref 9–17)
AST SERPL-CCNC: 249 U/L
BASOPHILS # BLD: 1 % (ref 0–2)
BASOPHILS ABSOLUTE: 0.04 K/UL (ref 0–0.2)
BILIRUB SERPL-MCNC: 1.86 MG/DL (ref 0.3–1.2)
BUN BLDV-MCNC: 8 MG/DL (ref 6–20)
BUN/CREAT BLD: ABNORMAL (ref 9–20)
CALCIUM SERPL-MCNC: 8.4 MG/DL (ref 8.6–10.4)
CHLORIDE BLD-SCNC: 110 MMOL/L (ref 98–107)
CO2: 20 MMOL/L (ref 20–31)
CREAT SERPL-MCNC: 0.64 MG/DL (ref 0.7–1.2)
DIFFERENTIAL TYPE: ABNORMAL
EOSINOPHILS RELATIVE PERCENT: 6 % (ref 1–4)
GFR AFRICAN AMERICAN: >60 ML/MIN
GFR NON-AFRICAN AMERICAN: >60 ML/MIN
GFR SERPL CREATININE-BSD FRML MDRD: ABNORMAL ML/MIN/{1.73_M2}
GFR SERPL CREATININE-BSD FRML MDRD: ABNORMAL ML/MIN/{1.73_M2}
GLUCOSE BLD-MCNC: 117 MG/DL (ref 70–99)
HCT VFR BLD CALC: 34.3 % (ref 40.7–50.3)
HEMOGLOBIN: 10.7 G/DL (ref 13–17)
IMMATURE GRANULOCYTES: 0 %
LYMPHOCYTES # BLD: 34 % (ref 24–44)
MCH RBC QN AUTO: 28.2 PG (ref 25.2–33.5)
MCHC RBC AUTO-ENTMCNC: 31.2 G/DL (ref 28.4–34.8)
MCV RBC AUTO: 90.3 FL (ref 82.6–102.9)
MONOCYTES # BLD: 12 % (ref 1–7)
MORPHOLOGY: ABNORMAL
MORPHOLOGY: ABNORMAL
NRBC AUTOMATED: 0 PER 100 WBC
PDW BLD-RTO: 24.6 % (ref 11.8–14.4)
PLATELET # BLD: ABNORMAL K/UL (ref 138–453)
PLATELET ESTIMATE: ABNORMAL
PLATELET, FLUORESCENCE: 61 K/UL (ref 138–453)
PLATELET, IMMATURE FRACTION: 7 % (ref 1.1–10.3)
PMV BLD AUTO: ABNORMAL FL (ref 8.1–13.5)
POTASSIUM SERPL-SCNC: 4 MMOL/L (ref 3.7–5.3)
RBC # BLD: 3.8 M/UL (ref 4.21–5.77)
RBC # BLD: ABNORMAL 10*6/UL
SEG NEUTROPHILS: 47 % (ref 36–66)
SEGMENTED NEUTROPHILS ABSOLUTE COUNT: 2.02 K/UL (ref 1.8–7.7)
SODIUM BLD-SCNC: 144 MMOL/L (ref 135–144)
TOTAL PROTEIN: 6.4 G/DL (ref 6.4–8.3)
WBC # BLD: 4.3 K/UL (ref 3.5–11.3)
WBC # BLD: ABNORMAL 10*3/UL

## 2019-03-29 PROCEDURE — 4004F PT TOBACCO SCREEN RCVD TLK: CPT | Performed by: INTERNAL MEDICINE

## 2019-03-29 PROCEDURE — G8417 CALC BMI ABV UP PARAM F/U: HCPCS | Performed by: INTERNAL MEDICINE

## 2019-03-29 PROCEDURE — G8427 DOCREV CUR MEDS BY ELIG CLIN: HCPCS | Performed by: INTERNAL MEDICINE

## 2019-03-29 PROCEDURE — G8484 FLU IMMUNIZE NO ADMIN: HCPCS | Performed by: INTERNAL MEDICINE

## 2019-03-29 PROCEDURE — 3017F COLORECTAL CA SCREEN DOC REV: CPT | Performed by: INTERNAL MEDICINE

## 2019-03-29 PROCEDURE — 99214 OFFICE O/P EST MOD 30 MIN: CPT | Performed by: INTERNAL MEDICINE

## 2019-03-29 RX ORDER — CLONIDINE HYDROCHLORIDE 0.2 MG/1
0.2 TABLET ORAL 2 TIMES DAILY
COMMUNITY
End: 2019-04-29 | Stop reason: ALTCHOICE

## 2019-03-29 RX ORDER — HYDROCHLOROTHIAZIDE 25 MG/1
25 TABLET ORAL EVERY MORNING
Qty: 30 TABLET | Refills: 0 | Status: SHIPPED | OUTPATIENT
Start: 2019-03-29 | End: 2019-05-17

## 2019-04-09 ENCOUNTER — OFFICE VISIT (OUTPATIENT)
Dept: FAMILY MEDICINE CLINIC | Age: 59
End: 2019-04-09
Payer: MEDICARE

## 2019-04-09 VITALS
HEART RATE: 96 BPM | TEMPERATURE: 98 F | WEIGHT: 224.2 LBS | OXYGEN SATURATION: 97 % | DIASTOLIC BLOOD PRESSURE: 70 MMHG | SYSTOLIC BLOOD PRESSURE: 150 MMHG | BODY MASS INDEX: 32.17 KG/M2

## 2019-04-09 DIAGNOSIS — G25.0 ESSENTIAL TREMOR: Primary | ICD-10-CM

## 2019-04-09 PROCEDURE — 4004F PT TOBACCO SCREEN RCVD TLK: CPT | Performed by: NURSE PRACTITIONER

## 2019-04-09 PROCEDURE — 3017F COLORECTAL CA SCREEN DOC REV: CPT | Performed by: NURSE PRACTITIONER

## 2019-04-09 PROCEDURE — G8427 DOCREV CUR MEDS BY ELIG CLIN: HCPCS | Performed by: NURSE PRACTITIONER

## 2019-04-09 PROCEDURE — 99213 OFFICE O/P EST LOW 20 MIN: CPT | Performed by: NURSE PRACTITIONER

## 2019-04-09 PROCEDURE — G8417 CALC BMI ABV UP PARAM F/U: HCPCS | Performed by: NURSE PRACTITIONER

## 2019-04-09 RX ORDER — PROPRANOLOL HCL 60 MG
60 CAPSULE, EXTENDED RELEASE 24HR ORAL DAILY
Qty: 30 CAPSULE | Refills: 3 | Status: SHIPPED | OUTPATIENT
Start: 2019-04-09 | End: 2019-04-29 | Stop reason: SDUPTHER

## 2019-04-09 ASSESSMENT — ENCOUNTER SYMPTOMS
SHORTNESS OF BREATH: 0
COUGH: 0

## 2019-04-09 NOTE — PROGRESS NOTES
Patient is present to discuss referral  Patient would like a new referral to neurologist  Patient states he has been shakey  Patient states he was on methadone in the patient and states since he stopped taking it 2 years ago he has been having shakes  Patient states it is hard to do his job with this    Pharmacy and medication reviewed with patient    Visit Information    Have you changed or started any medications since your last visit including any over-the-counter medicines, vitamins, or herbal medicines? no   Have you stopped taking any of your medications? Is so, why? -  no  Are you having any side effects from any of your medications? - no    Have you seen any other physician or provider since your last visit?  no   Have you had any other diagnostic tests since your last visit?  no   Have you been seen in the emergency room and/or had an admission in a hospital since we last saw you?  no   Have you had your routine dental cleaning in the past 6 months?  no     Do you have an active MyChart account? If no, what is the barrier?   Yes    Patient Care Team:  REECE Lozano CNP as PCP - General (Certified Nurse Practitioner)  REECE Lozano CNP as PCP - S Attributed Provider  Geri Alvarez MD as Consulting Physician (Gastroenterology)  Marcelo Wang DPM as Physician (Podiatry)    Medical History Review  Past Medical, Family, and Social History reviewed and does contribute to the patient presenting condition    Health Maintenance   Topic Date Due    Shingles Vaccine (1 of 2) 10/24/2011    Hepatitis B Vaccine (2 of 3 - Risk 3-dose series) 12/06/2018    Hepatitis A vaccine (2 of 2 - Risk 2-dose series) 05/08/2019    Potassium monitoring  03/29/2020    Creatinine monitoring  03/29/2020    Diabetes screen  11/01/2020    Lipid screen  11/01/2022    Colon cancer screen colonoscopy  03/14/2023    DTaP/Tdap/Td vaccine (2 - Td) 04/16/2028    Flu vaccine  Completed    Pneumococcal 0-64 years Vaccine  Completed    HIV screen  Completed

## 2019-04-09 NOTE — PROGRESS NOTES
[address] [place]  [phone]    Mackenzie Tena is a 63 y.o. male who presents today for his  medical conditions/complaints as noted below. Mackenzie Tena is c/o of Shaking (discuss referral to neuro)    HPI:     HPI   Pt would like a new referral to neuro. He was previously given a referral for neuropathy, he did not go. Pt reports being on methadone but off for two years- he feels his tremor started after his stop of methadone. He is working now and he is shaky, this makes it difficult to do his job. Pt does report that the shakiness has not improved in two years. He states the shakiness comes and goes. Shakiness is bilateral in arms. He does not notice shakiness in feet or legs. He does not have a head tremor. He denies that he is drinking any etoh. He did not take his bp med yet today- bp is high. 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 Nursing note reviewed and discussed with patient. Patient's medications, allergies, past medical, surgical, social and family histories were reviewed and updated asappropriate.     Current Outpatient Medications   Medication Sig Dispense Refill    propranolol (INDERAL LA) 60 MG extended release capsule Take 1 capsule by mouth daily 30 capsule 3    cloNIDine (CATAPRES) 0.2 MG tablet Take 0.2 mg by mouth 2 times daily Indications: Patient is taking one tablet a day      Elastic Bandages & Supports (LUMBAR BACK BRACE/SUPPORT PAD) MISC 1 each by Does not apply route daily as needed (back pain) 1 each 0    hydrochlorothiazide (HYDRODIURIL) 25 MG tablet Take 1 tablet by mouth every morning 30 tablet 0    lisinopril (PRINIVIL;ZESTRIL) 20 MG tablet TAKE ONE TABLET BY MOUTH DAILY 30 tablet 5    ferrous sulfate 325 (65 Fe) MG tablet Take 1 tablet by mouth daily (with breakfast) 90 tablet 1    rOPINIRole (REQUIP) 0.5 MG tablet TAKE ONE TABLET BY MOUTH ONCE NIGHTLY 30 tablet 5    gabapentin (NEURONTIN) 400 MG capsule TAKE ONE CAPSULE BY MOUTH THREE TIMES A DAY. 90 capsule 2    Multiple Vitamins-Minerals (THERAPEUTIC MULTIVITAMIN-MINERALS) tablet Take 1 tablet by mouth daily       No current facility-administered medications for this visit. Past Medical History:   Diagnosis Date    Chronic back pain     present pain mgmnt    Gastric ulcer 10/31/2015    large 2-3 cm    Hematuria     Hep C w/o coma, chronic (HCC)     History of ETOH abuse     Hypertension     Portal hypertensive gastropathy (HCC)     Seizures (HCC)       Past Surgical History:   Procedure Laterality Date    BACK SURGERY      lumbar discectomy    COLONOSCOPY  03/14/2018    mod diverticulosis; internal hemorrhoids; retained stools    ENDOSCOPY, COLON, DIAGNOSTIC      HERNIA REPAIR      lt inguinal    AZ COLON CA SCRN NOT HI RSK IND N/A 3/14/2018    COLONOSCOPY performed by Alexis Lopez MD at [facility]  10/31/2015    large gastric ulcer    UPPER GASTROINTESTINAL ENDOSCOPY  03/14/2018    mod portal hypertensive gastrophathy    UPPER GASTROINTESTINAL ENDOSCOPY N/A 3/14/2018    EGD BIOPSY performed by Alexis Lopez MD at [address] [place] [address] N/A 1/22/2019    EGD BIOPSY performed by Alex Pina MD at [address] [place] [address]  02/01/2019    BIOPSY    UPPER GASTROINTESTINAL ENDOSCOPY N/A 2/1/2019    EGD BIOPSY performed by Dennys Ohara MD at [address] [facility] History   Problem Relation Age of Onset    High Blood Pressure Mother     Other Father         neuropathy    Stroke Father      Social History     Tobacco Use    Smoking status: Never Smoker    Smokeless tobacco: Current User     Types: Chew   Substance Use Topics    Alcohol use: No     Comment: quit Jan 3rd      No Known Allergies    Subjective:      Review of Systems   Constitutional: Negative for chills and fever. Respiratory: Negative for cough and shortness of breath. Cardiovascular: Negative for chest pain and leg swelling. Neurological: Positive for tremors. Other pertinent ROS in HPI  Objective:     BP (!) 150/70   Pulse 96   Temp 98 °F (36.7 °C) (Oral)   Wt 224 lb 3.2 oz (101.7 kg)   SpO2 97%   BMI 32.17 kg/m²    Physical Exam   Constitutional: He is oriented to person, place, and time. He appears well-developed and well-nourished. No distress. HENT:   Head: Normocephalic. Right Ear: External ear normal.   Left Ear: External ear normal.   Eyes: EOM are normal.   Cardiovascular: Normal rate, regular rhythm and normal heart sounds. Pulmonary/Chest: Effort normal and breath sounds normal.   Musculoskeletal: Normal range of motion. Neurological: He is alert and oriented to person, place, and time. II: Normal tracking, no evidence of hemianopia or other visual field defect. III, IV, & VI: EOM intact, no ptosis, no nystagmus seen. Pupils are equal and reactive to light. V: Facial sensation is intact to light touch/temperature. VII: no facial asymmetry, facial movement intact. VIII: hearing is normal to finger rub. IX-X: Palate elevates in the midline. XI: Normal trapezius strength and/or movement. XII: Tongue movement is normal, position is midline and no fasciculations are observed. Skin: Skin is warm and dry. Psychiatric: He has a normal mood and affect. Assessment/PLAN     1. Essential tremor  Pt agreeable with trying propanolol for tremor, if no improvement will give referral to neuro. rto in 1 month. - propranolol (INDERAL LA) 60 MG extended release capsule; Take 1 capsule by mouth daily  Dispense: 30 capsule; Refill: 3      RTO if symptoms worsen or fail to improve  Pt agreeable with plan      Patient given educational materials - see patientinstructions. Discussed use, benefit, and side effects of prescribed medications. All patient questions answered.   Pt voiced understanding. Reviewed health maintenance. Instructed to continue current medications, diet andexercise. 1.  Yogi received counseling on the following healthy behaviors: nutrition, exercise and medication adherence  2. Patient given educationalmaterials when available - see patient instructions when applicable  3. Discussed use, benefit, and side effects of prescribed medications. Barriersto medication compliance addressed. All patient questions answered. Pt voiced understanding. 4.  Reviewed prior labs and health maintenance  5. Continue current medications, diet and exercise.     CompletedRefills   Requested Prescriptions     Signed Prescriptions Disp Refills    propranolol (INDERAL LA) 60 MG extended release capsule 30 capsule 3     Sig: Take 1 capsule by mouth daily         Electronically signed by Alivia Terrazas CNP on 4/10/2019 at 8:06 AM

## 2019-04-29 ENCOUNTER — OFFICE VISIT (OUTPATIENT)
Dept: FAMILY MEDICINE CLINIC | Age: 59
End: 2019-04-29
Payer: MEDICARE

## 2019-04-29 VITALS
HEART RATE: 72 BPM | TEMPERATURE: 97.8 F | WEIGHT: 220.8 LBS | BODY MASS INDEX: 31.68 KG/M2 | DIASTOLIC BLOOD PRESSURE: 86 MMHG | OXYGEN SATURATION: 96 % | SYSTOLIC BLOOD PRESSURE: 122 MMHG

## 2019-04-29 DIAGNOSIS — G25.0 ESSENTIAL TREMOR: ICD-10-CM

## 2019-04-29 DIAGNOSIS — K70.30 ALCOHOLIC CIRRHOSIS OF LIVER WITHOUT ASCITES (HCC): Primary | ICD-10-CM

## 2019-04-29 PROCEDURE — G8427 DOCREV CUR MEDS BY ELIG CLIN: HCPCS | Performed by: NURSE PRACTITIONER

## 2019-04-29 PROCEDURE — 4004F PT TOBACCO SCREEN RCVD TLK: CPT | Performed by: NURSE PRACTITIONER

## 2019-04-29 PROCEDURE — G8417 CALC BMI ABV UP PARAM F/U: HCPCS | Performed by: NURSE PRACTITIONER

## 2019-04-29 PROCEDURE — 99214 OFFICE O/P EST MOD 30 MIN: CPT | Performed by: NURSE PRACTITIONER

## 2019-04-29 PROCEDURE — 3017F COLORECTAL CA SCREEN DOC REV: CPT | Performed by: NURSE PRACTITIONER

## 2019-04-29 RX ORDER — PROPRANOLOL HCL 60 MG
60 CAPSULE, EXTENDED RELEASE 24HR ORAL 2 TIMES DAILY
Qty: 60 CAPSULE | Refills: 3 | Status: ON HOLD | OUTPATIENT
Start: 2019-04-29 | End: 2019-07-31 | Stop reason: ALTCHOICE

## 2019-04-29 ASSESSMENT — ENCOUNTER SYMPTOMS
COUGH: 0
SHORTNESS OF BREATH: 0

## 2019-04-29 NOTE — PROGRESS NOTES
Patient Is here for 2 f/u   Patient states that tremors get better when he first takes medication, says it wears off quickly and they come back quickly. Pharmacy and medication reviewed. Visit Information    Have you changed or started any medications since your last visit including any over-the-counter medicines, vitamins, or herbal medicines? no   Have you stopped taking any of your medications? Is so, why? -  no  Are you having any side effects from any of your medications? - no    Have you seen any other physician or provider since your last visit?  no   Have you had any other diagnostic tests since your last visit?  no   Have you been seen in the emergency room and/or had an admission in a hospital since we last saw you?  no   Have you had your routine dental cleaning in the past 6 months?  no     Do you have an active MyChart account? If no, what is the barrier?   Yes    Patient Care Team:  REECE Bahena CNP as PCP - General (Certified Nurse Practitioner)  REECE Bahena CNP as PCP - S Attributed Provider  Houston Brasher MD as Consulting Physician (Gastroenterology)  Alex Acosta DPM as Physician (Podiatry)    Medical History Review  Past Medical, Family, and Social History reviewed and does contribute to the patient presenting condition    Health Maintenance   Topic Date Due    Hepatitis B Vaccine (2 of 3 - Risk 3-dose series) 12/06/2018    Shingles Vaccine (1 of 2) 04/09/2020 (Originally 10/24/2011)    Hepatitis A vaccine (2 of 2 - Risk 2-dose series) 05/08/2019    Potassium monitoring  03/29/2020    Creatinine monitoring  03/29/2020    Diabetes screen  11/01/2020    Lipid screen  11/01/2022    Colon cancer screen colonoscopy  03/14/2023    DTaP/Tdap/Td vaccine (2 - Td) 04/16/2028    Flu vaccine  Completed    Pneumococcal 0-64 years Vaccine  Completed    HIV screen  Completed

## 2019-04-29 NOTE — PROGRESS NOTES
[address], [address] [place]  [phone]    Talya Levin is a 63 y.o. male who presents today for his  medical conditions/complaints as noted below. Talya Levin is c/o of Medication Check (tremors)    HPI:     HPI   Here for a recheck of his tremors. He has been taking the propanolol bid, this has kept the tremors away. His feet are still swelling. He is not wearing the compression stockings at work. He feels that they squeeze his toes and this makes his neuropathy worse. He would be more inclined to wear them if they did not hurt his feet. He is working and on his feet now. His weight is down 4 lbs. Wt Readings from Last 3 Encounters:   04/29/19 220 lb 12.8 oz (100.2 kg)   04/09/19 224 lb 3.2 oz (101.7 kg)   03/29/19 231 lb 9.6 oz (105.1 kg)       Nursing note reviewed and discussed with patient. Patient's medications, allergies, past medical, surgical, social and family histories were reviewed and updated asappropriate. Current Outpatient Medications   Medication Sig Dispense Refill    propranolol (INDERAL LA) 60 MG extended release capsule Take 1 capsule by mouth 2 times daily 60 capsule 3    Elastic Bandages & Supports (LUMBAR BACK BRACE/SUPPORT PAD) MISC 1 each by Does not apply route daily as needed (back pain) 1 each 0    hydrochlorothiazide (HYDRODIURIL) 25 MG tablet Take 1 tablet by mouth every morning 30 tablet 0    lisinopril (PRINIVIL;ZESTRIL) 20 MG tablet TAKE ONE TABLET BY MOUTH DAILY 30 tablet 5    ferrous sulfate 325 (65 Fe) MG tablet Take 1 tablet by mouth daily (with breakfast) 90 tablet 1    rOPINIRole (REQUIP) 0.5 MG tablet TAKE ONE TABLET BY MOUTH ONCE NIGHTLY 30 tablet 5    gabapentin (NEURONTIN) 400 MG capsule TAKE ONE CAPSULE BY MOUTH THREE TIMES A DAY.  90 capsule 2    Multiple Vitamins-Minerals (THERAPEUTIC MULTIVITAMIN-MINERALS) tablet Take 1 tablet by mouth daily       No current Position: Sitting, Cuff Size: Medium Adult)   Pulse 72   Temp 97.8 °F (36.6 °C) (Oral)   Wt 220 lb 12.8 oz (100.2 kg)   SpO2 96%   BMI 31.68 kg/m²    Physical Exam   Constitutional: He is oriented to person, place, and time. He appears well-developed and well-nourished. No distress. HENT:   Head: Normocephalic. Right Ear: External ear normal.   Left Ear: External ear normal.   Eyes: EOM are normal.   Cardiovascular: Normal rate, regular rhythm and normal heart sounds. Pulmonary/Chest: Effort normal and breath sounds normal.   Musculoskeletal: Normal range of motion. Neurological: He is alert and oriented to person, place, and time. Skin: Skin is warm and dry. Psychiatric: He has a normal mood and affect. Assessment/PLAN     1. Essential tremor  Continue med   - propranolol (INDERAL LA) 60 MG extended release capsule; Take 1 capsule by mouth 2 times daily  Dispense: 60 capsule; Refill: 3    2. Alcoholic cirrhosis of liver without ascites (HCC)  Ordered open toe stockings. Pt will notify me of change or no improvement. - Compression Stocking      RTO if symptoms worsen or fail to improve  Pt agreeable with plan      Patient given educational materials - see patientinstructions. Discussed use, benefit, and side effects of prescribed medications. All patient questions answered. Pt voiced understanding. Reviewed health maintenance. Instructed to continue current medications, diet andexercise. 1.  Yogi received counseling on the following healthy behaviors: nutrition, exercise and medication adherence  2. Patient given educationalmaterials when available - see patient instructions when applicable  3. Discussed use, benefit, and side effects of prescribed medications. Barriersto medication compliance addressed. All patient questions answered. Pt voiced understanding. 4.  Reviewed prior labs and health maintenance  5.   Continue current medications, diet and exercise.     CompletedRefills   Requested Prescriptions     Signed Prescriptions Disp Refills    propranolol (INDERAL LA) 60 MG extended release capsule 60 capsule 3     Sig: Take 1 capsule by mouth 2 times daily         Electronically signed by Shonda Baker CNP on 4/29/2019 at 1:56 PM

## 2019-05-02 ENCOUNTER — HOSPITAL ENCOUNTER (OUTPATIENT)
Dept: ULTRASOUND IMAGING | Age: 59
Discharge: HOME | End: 2019-05-04
Payer: MEDICARE

## 2019-05-02 DIAGNOSIS — K70.30 ALCOHOLIC CIRRHOSIS OF LIVER WITHOUT ASCITES (HCC): ICD-10-CM

## 2019-05-02 DIAGNOSIS — K25.4 CHRONIC GASTRIC ULCER WITH HEMORRHAGE: Chronic | ICD-10-CM

## 2019-05-02 PROCEDURE — 76705 ECHO EXAM OF ABDOMEN: CPT

## 2019-05-07 ENCOUNTER — TELEPHONE (OUTPATIENT)
Dept: FAMILY MEDICINE CLINIC | Age: 59
End: 2019-05-07

## 2019-05-07 DIAGNOSIS — R76.8 POSITIVE HEPATITIS C ANTIBODY TEST: Primary | ICD-10-CM

## 2019-05-07 NOTE — TELEPHONE ENCOUNTER
pt is asking for new referral to a different GI; he is not happy with the current one    He looked in his insurance book and would like to go see Dr. Gaurav Luna

## 2019-05-11 DIAGNOSIS — G62.9 NEUROPATHY: ICD-10-CM

## 2019-05-13 NOTE — TELEPHONE ENCOUNTER
Last visit: 4/29/19  Last Med refill: 1/30/19  Does patient have enough medication for 72 hours: No:     Next Visit Date:  Future Appointments   Date Time Provider Marino Junior   5/17/2019 11:45 AM Dipika Ahuja MD [facility]   7/29/2019  1:00 PM Spencer Nunez, APRN - CNP [facility] [address] Maintenance   Topic Date Due    Hepatitis B Vaccine (2 of 3 - Risk 3-dose series) 12/06/2018    Hepatitis A vaccine (2 of 2 - Risk 2-dose series) 05/08/2019    Shingles Vaccine (1 of 2) 04/09/2020 (Originally 10/24/2011)    Potassium monitoring  03/29/2020    Creatinine monitoring  03/29/2020    Diabetes screen  11/01/2020    Lipid screen  11/01/2022    Colon cancer screen colonoscopy  03/14/2023    DTaP/Tdap/Td vaccine (2 - Td) 04/16/2028    Flu vaccine  Completed    Pneumococcal 0-64 years Vaccine  Completed    HIV screen  Completed       Hemoglobin A1C (%)   Date Value   11/01/2017 5.6             ( goal A1C is < 7)   No results found for: LABMICR  LDL Cholesterol (mg/dL)   Date Value   11/01/2017 95       (goal LDL is <100)   AST (U/L)   Date Value   03/29/2019 249 (H)     ALT (U/L)   Date Value   03/29/2019 100 (H)     BUN (mg/dL)   Date Value   03/29/2019 8     BP Readings from Last 3 Encounters:   04/29/19 122/86   04/09/19 (!) 150/70   03/29/19 126/80          (goal 120/80)    All Future Testing planned in [facility]  Lab Frequency Next Occurrence   CBC Auto Differential Once 02/13/2019               Patient Active Problem List:     GI bleed     Gastrointestinal hemorrhage     Iron deficiency anemia due to chronic blood loss     Diarrhea     New onset seizure (HCC)     Elevated liver enzymes     Essential hypertension     Gastric ulcer     Unspecified viral hepatitis C without hepatic coma     Portal hypertensive gastropathy (HCC)     Anemia     Cirrhosis of liver without ascites (HCC)     Duodenal ulcer     Acute blood loss anemia     Thrombocytopenia (HCC)     Alcoholic cirrhosis of liver without ascites (HCC)     Chronic midline low back pain without sciatica

## 2019-05-14 RX ORDER — GABAPENTIN 400 MG/1
CAPSULE ORAL
Qty: 90 CAPSULE | Refills: 2 | Status: SHIPPED | OUTPATIENT
Start: 2019-05-14 | End: 2019-09-03 | Stop reason: SDUPTHER

## 2019-05-17 ENCOUNTER — OFFICE VISIT (OUTPATIENT)
Dept: GASTROENTEROLOGY | Age: 59
End: 2019-05-17
Payer: MEDICARE

## 2019-05-17 VITALS
WEIGHT: 219.5 LBS | HEART RATE: 57 BPM | SYSTOLIC BLOOD PRESSURE: 126 MMHG | DIASTOLIC BLOOD PRESSURE: 70 MMHG | BODY MASS INDEX: 31.49 KG/M2

## 2019-05-17 DIAGNOSIS — K70.30 ALCOHOLIC CIRRHOSIS OF LIVER WITHOUT ASCITES (HCC): Primary | ICD-10-CM

## 2019-05-17 DIAGNOSIS — B18.2 CHRONIC HEPATITIS C WITHOUT HEPATIC COMA (HCC): ICD-10-CM

## 2019-05-17 PROCEDURE — 3017F COLORECTAL CA SCREEN DOC REV: CPT | Performed by: INTERNAL MEDICINE

## 2019-05-17 PROCEDURE — G8427 DOCREV CUR MEDS BY ELIG CLIN: HCPCS | Performed by: INTERNAL MEDICINE

## 2019-05-17 PROCEDURE — 99214 OFFICE O/P EST MOD 30 MIN: CPT | Performed by: INTERNAL MEDICINE

## 2019-05-17 PROCEDURE — 4004F PT TOBACCO SCREEN RCVD TLK: CPT | Performed by: INTERNAL MEDICINE

## 2019-05-17 PROCEDURE — G8417 CALC BMI ABV UP PARAM F/U: HCPCS | Performed by: INTERNAL MEDICINE

## 2019-05-17 ASSESSMENT — ENCOUNTER SYMPTOMS
VOMITING: 0
RECTAL PAIN: 0
SINUS PRESSURE: 0
EYE PAIN: 0
TROUBLE SWALLOWING: 0
ALLERGIC/IMMUNOLOGIC NEGATIVE: 1
RESPIRATORY NEGATIVE: 1
SHORTNESS OF BREATH: 0
ANAL BLEEDING: 0
NAUSEA: 0
COLOR CHANGE: 0
ABDOMINAL DISTENTION: 0
CHEST TIGHTNESS: 0
BACK PAIN: 0
CONSTIPATION: 0
SORE THROAT: 0
ABDOMINAL PAIN: 0
WHEEZING: 0
SINUS PAIN: 0
GASTROINTESTINAL NEGATIVE: 1
COUGH: 0
BLOOD IN STOOL: 0
DIARRHEA: 0
CHOKING: 0
EYE ITCHING: 0

## 2019-05-17 NOTE — PROGRESS NOTES
Subjective:      Patient ID: Jaja Pope is a 63 y.o. male. HPI    Dr. Tammy Verdugo, APRN - CNP our mutual patient Jaja Pope was seen  for   1. Alcoholic cirrhosis of liver without ascites ([facility] [address]    2. Chronic hepatitis C without hepatic coma ([facility] [address]     . Here for f/u   Being w.u for hep C  Appealing the insurance for approval  No bleeding  Has quit ETOH   Still has elevated LFTs  US shows Cirrhosis but no focal lesions  No melena  No sig nausea or any vomiting  No smoking  No fam hx of colon cancer  Past Medical, Family, and Social History reviewed and does contribute to the patient presenting condition. patient\"s PMH/PSH,SH,PSYCH hx, MEDs, ALLERGIES, and ROS was all reviewed and updated ion the appropriate sections    Review of Systems   Constitutional: Positive for fatigue. Negative for activity change and unexpected weight change. HENT: Negative. Negative for congestion, sinus pressure, sinus pain, sore throat and trouble swallowing. Eyes: Positive for visual disturbance. Negative for pain and itching. Respiratory: Negative. Negative for cough, choking, chest tightness, shortness of breath and wheezing. Cardiovascular: Negative. Negative for chest pain, palpitations and leg swelling. Gastrointestinal: Negative. Negative for abdominal distention, abdominal pain, anal bleeding, blood in stool, constipation, diarrhea, nausea, rectal pain and vomiting. Endocrine: Negative. Negative for cold intolerance and heat intolerance. Genitourinary: Negative. Negative for decreased urine volume, difficulty urinating, frequency and urgency. Musculoskeletal: Negative. Negative for back pain, neck pain and neck stiffness. Skin: Negative. Negative for color change and pallor. Allergic/Immunologic: Negative. Neurological: Positive for numbness (bilateral feet ). Negative for dizziness, weakness, light-headedness and headaches.    Hematological: Bruises/bleeds were given about using ample amount of fiber including dietary and supplemental fiber either metamucil, bennafiber or citrucell etc.  Pt was advised about drinking ample amount of water without any colors or chemicals. Stress was given about regular exercise. Pt has verbalized understanding and agreement to these modifications.       F/u labs    More than half of patient's clinic visit time was spent in counseling about lifestyle and dietary modifications  Patient's  questions were answered in this regard as well  The patient has verbalized understanding and agreement

## 2019-05-17 NOTE — TELEPHONE ENCOUNTER
Pt at office today for appointment. Did request that [employer] send paperwork to Monse Bravo now for pt to fill out while here.

## 2019-05-30 ENCOUNTER — OFFICE VISIT (OUTPATIENT)
Dept: FAMILY MEDICINE CLINIC | Age: 59
End: 2019-05-30
Payer: MEDICARE

## 2019-05-30 VITALS
BODY MASS INDEX: 30.94 KG/M2 | SYSTOLIC BLOOD PRESSURE: 120 MMHG | OXYGEN SATURATION: 96 % | HEART RATE: 61 BPM | WEIGHT: 215.6 LBS | TEMPERATURE: 98.3 F | DIASTOLIC BLOOD PRESSURE: 60 MMHG

## 2019-05-30 DIAGNOSIS — G25.0 ESSENTIAL TREMOR: ICD-10-CM

## 2019-05-30 DIAGNOSIS — M25.561 ACUTE PAIN OF RIGHT KNEE: Primary | ICD-10-CM

## 2019-05-30 PROCEDURE — G8427 DOCREV CUR MEDS BY ELIG CLIN: HCPCS | Performed by: NURSE PRACTITIONER

## 2019-05-30 PROCEDURE — 4004F PT TOBACCO SCREEN RCVD TLK: CPT | Performed by: NURSE PRACTITIONER

## 2019-05-30 PROCEDURE — G8417 CALC BMI ABV UP PARAM F/U: HCPCS | Performed by: NURSE PRACTITIONER

## 2019-05-30 PROCEDURE — 3017F COLORECTAL CA SCREEN DOC REV: CPT | Performed by: NURSE PRACTITIONER

## 2019-05-30 PROCEDURE — 99214 OFFICE O/P EST MOD 30 MIN: CPT | Performed by: NURSE PRACTITIONER

## 2019-05-30 ASSESSMENT — ENCOUNTER SYMPTOMS
SHORTNESS OF BREATH: 0
COUGH: 0

## 2019-05-30 NOTE — PROGRESS NOTES
Patient is present to discuss getting MRI for right knee  Patient states he has been doing PT but states it made the pain worse    Patient would like a referral to neurologist for tremors    Pharmacy and medication reviewed with patient    Visit Information    Have you changed or started any medications since your last visit including any over-the-counter medicines, vitamins, or herbal medicines? no   Have you stopped taking any of your medications? Is so, why? -  no  Are you having any side effects from any of your medications? - no    Have you seen any other physician or provider since your last visit?  no   Have you had any other diagnostic tests since your last visit?  no   Have you been seen in the emergency room and/or had an admission in a hospital since we last saw you?  no   Have you had your routine dental cleaning in the past 6 months?  no     Do you have an active MyChart account? If no, what is the barrier?   Yes    Patient Care Team:  REECE Yang CNP as PCP - General (Certified Nurse Practitioner)  REECE Yang CNP as PCP - S Attributed Provider  Ludwin Meneses MD as Consulting Physician (Gastroenterology)  Meena Ty DPM as Physician (Podiatry)    Medical History Review  Past Medical, Family, and Social History reviewed and does contribute to the patient presenting condition    Health Maintenance   Topic Date Due    Hepatitis B Vaccine (2 of 3 - Risk 3-dose series) 12/06/2018    Hepatitis A vaccine (2 of 2 - Risk 2-dose series) 05/08/2019    Shingles Vaccine (1 of 2) 04/09/2020 (Originally 10/24/2011)    Potassium monitoring  03/29/2020    Creatinine monitoring  03/29/2020    Diabetes screen  11/01/2020    Lipid screen  11/01/2022    Colon cancer screen colonoscopy  03/14/2023    DTaP/Tdap/Td vaccine (2 - Td) 04/16/2028    Flu vaccine  Completed    Pneumococcal 0-64 years Vaccine  Completed    HIV screen  Completed
person, place, and time. He appears well-developed and well-nourished. No distress. HENT:   Head: Normocephalic. Right Ear: External ear normal.   Left Ear: External ear normal.   Eyes: EOM are normal.   Cardiovascular: Normal rate, regular rhythm and normal heart sounds. Pulmonary/Chest: Effort normal and breath sounds normal.   Musculoskeletal: Normal range of motion. Neurological: He is alert and oriented to person, place, and time. Skin: Skin is warm and dry. Psychiatric: He has a normal mood and affect. Assessment/PLAN     1. Acute pain of right knee    - MRI Knee Right WO Contrast; Future    2. Essential tremor    - Jessi Easton MD, Neurology, [place]      RTO if symptoms worsen or fail to improve  Pt agreeable with plan      Patient given educational materials - see patientinstructions. Discussed use, benefit, and side effects of prescribed medications. All patient questions answered. Pt voiced understanding. Reviewed health maintenance. Instructed to continue current medications, diet andexercise. 1.  Yogi received counseling on the following healthy behaviors: nutrition, exercise and medication adherence  2. Patient given educationalmaterials when available - see patient instructions when applicable  3. Discussed use, benefit, and side effects of prescribed medications. Barriersto medication compliance addressed. All patient questions answered. Pt voiced understanding. 4.  Reviewed prior labs and health maintenance  5. Continue current medications, diet and exercise.     CompletedRefills   Requested Prescriptions      No prescriptions requested or ordered in this encounter         Electronically signed by Tammy Verdugo CNP on 5/30/2019 at 3:53 PM

## 2019-06-19 ENCOUNTER — OFFICE VISIT (OUTPATIENT)
Dept: NEUROLOGY | Age: 59
End: 2019-06-19
Payer: MEDICARE

## 2019-06-19 VITALS
SYSTOLIC BLOOD PRESSURE: 115 MMHG | BODY MASS INDEX: 30.78 KG/M2 | HEIGHT: 70 IN | DIASTOLIC BLOOD PRESSURE: 75 MMHG | HEART RATE: 54 BPM | WEIGHT: 215 LBS

## 2019-06-19 DIAGNOSIS — G25.0 ESSENTIAL TREMOR: Primary | ICD-10-CM

## 2019-06-19 DIAGNOSIS — G62.9 SENSORY NEUROPATHY: ICD-10-CM

## 2019-06-19 PROCEDURE — 3017F COLORECTAL CA SCREEN DOC REV: CPT | Performed by: PSYCHIATRY & NEUROLOGY

## 2019-06-19 PROCEDURE — 99204 OFFICE O/P NEW MOD 45 MIN: CPT | Performed by: PSYCHIATRY & NEUROLOGY

## 2019-06-19 PROCEDURE — 4004F PT TOBACCO SCREEN RCVD TLK: CPT | Performed by: PSYCHIATRY & NEUROLOGY

## 2019-06-19 PROCEDURE — G8427 DOCREV CUR MEDS BY ELIG CLIN: HCPCS | Performed by: PSYCHIATRY & NEUROLOGY

## 2019-06-19 PROCEDURE — G8417 CALC BMI ABV UP PARAM F/U: HCPCS | Performed by: PSYCHIATRY & NEUROLOGY

## 2019-06-19 RX ORDER — PRIMIDONE 50 MG/1
TABLET ORAL
Qty: 30 TABLET | Refills: 3 | Status: SHIPPED | OUTPATIENT
Start: 2019-06-19 | End: 2019-10-12 | Stop reason: SDUPTHER

## 2019-06-19 ASSESSMENT — ENCOUNTER SYMPTOMS
EYES NEGATIVE: 1
ALLERGIC/IMMUNOLOGIC NEGATIVE: 1
RESPIRATORY NEGATIVE: 1
GASTROINTESTINAL NEGATIVE: 1

## 2019-06-19 NOTE — PROGRESS NOTES
Subjective:      Patient ID: Ernie Mayr is a 63 y.o. male. HPI  The condition is he has been having tremor for the past 2 years . This will affect his arms and head also feeling an inner body tremor . He has history of low back pain having had lumbar disc disease having been on methadone 5 mg po qid in he past reporting that tremor occured after methadone wean . Low back pain has improved . Tremor will be action or postural in type when he hold his hands out . 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 This will also affect eating dropping thingsn along with writing . Legs will not be affected . Tremor will get worse when anxious or tired  . There is no history of tremor in his family . He was tried on propnanolol LA 60 mg po bid which was not effective . There is numbness from base of foot going forward to toes affecting all toes . Numbness has kendrick there for the past 10 years. There is pain in with weight bearing getting orthotics that help pain having seen podiatrist . Orthotics ai helped foot pain by 75 % percent with foot pain being moreso on weight bearing although distal foot numbness is no diferetnt . His father has a neuropathy. He reports that he has had alcohol up to 2 years ago on daily basis up to 3 beers per day. He does not have diabetes  . There is no back pain at this time . There is no bowel or bladder  disturbance  .  Testing MRI of Head normal , February 2017      Past Medical History:   Diagnosis Date    Chronic back pain     present pain mgmnt    Gastric ulcer 10/31/2015    large 2-3 cm    Hematuria     Hep C w/o coma, chronic (HCC)     History of ETOH abuse     Hypertension     Portal hypertensive gastropathy (HCC)     Seizures (HCC)        Past Surgical History:   Procedure Laterality Date    BACK SURGERY      lumbar discectomy    COLONOSCOPY  03/14/2018    mod diverticulosis; internal hemorrhoids; retained stools    ENDOSCOPY, COLON, DIAGNOSTIC      HERNIA REPAIR      lt inguinal    ME COLON CA SCRN NOT HI RSK IND N/A 3/14/2018    COLONOSCOPY performed by Omar Longoria MD at [address] [facility]  10/31/2015    large gastric ulcer    UPPER GASTROINTESTINAL ENDOSCOPY  03/14/2018    mod portal hypertensive gastrophathy    UPPER GASTROINTESTINAL ENDOSCOPY N/A 3/14/2018    EGD BIOPSY performed by Omar Longoria MD at [facility] [facility] N/A 1/22/2019    EGD BIOPSY performed by Rosaura Sanchez MD at [address] [place] [address]  02/01/2019    BIOPSY    UPPER GASTROINTESTINAL ENDOSCOPY N/A 2/1/2019    EGD BIOPSY performed by Alexandra Loya MD at [address]  History   Problem Relation Age of Onset    High Blood Pressure Mother     Other Father         neuropathy    Stroke Father        Social History     Socioeconomic History    Marital status:      Spouse name: None    Number of children: 3    Years of education: None    Highest education level: None   Occupational History    None   Social Needs    Financial resource strain: None    Food insecurity:     Worry: None     Inability: None    Transportation needs:     Medical: None     Non-medical: None   Tobacco Use    Smoking status: Never Smoker    Smokeless tobacco: Current User     Types: Chew   Substance and Sexual Activity    Alcohol use: Yes     Comment: ocassionally    Drug use: No    Sexual activity: None   Lifestyle    Physical activity:     Days per week: None     Minutes per session: None    Stress: None   Relationships    Social connections:     Talks on phone: None     Gets together: None     Attends Latter day service: None     Active member of club or organization: None     Attends meetings of clubs or organizations: None     Relationship status: None    Intimate partner violence:     Fear of current or ex partner: None     Emotionally abused: None     Physically abused: None     Forced sexual activity: None   Other Topics Concern    None   Social History Narrative    None       Current Outpatient Medications   Medication Sig Dispense Refill    gabapentin (NEURONTIN) 400 MG capsule TAKE ONE CAPSULE BY MOUTH THREE TIMES A DAY 90 capsule 2    propranolol (INDERAL LA) 60 MG extended release capsule Take 1 capsule by mouth 2 times daily 60 capsule 3    Elastic Bandages & Supports (LUMBAR BACK BRACE/SUPPORT PAD) MISC 1 each by Does not apply route daily as needed (back pain) 1 each 0    lisinopril (PRINIVIL;ZESTRIL) 20 MG tablet TAKE ONE TABLET BY MOUTH DAILY 30 tablet 5    ferrous sulfate 325 (65 Fe) MG tablet Take 1 tablet by mouth daily (with breakfast) 90 tablet 1    rOPINIRole (REQUIP) 0.5 MG tablet TAKE ONE TABLET BY MOUTH ONCE NIGHTLY 30 tablet 5    Multiple Vitamins-Minerals (THERAPEUTIC MULTIVITAMIN-MINERALS) tablet Take 1 tablet by mouth daily       No current facility-administered medications for this visit. No Known Allergies    Review of Systems   Constitutional: Negative. HENT: Positive for tinnitus. Eyes: Negative. Respiratory: Negative. Cardiovascular: Negative. Gastrointestinal: Negative. Endocrine: Negative. Musculoskeletal: Negative. Allergic/Immunologic: Negative. Neurological: Positive for tremors. Hematological: Negative. Psychiatric/Behavioral: Negative. Objective:   Physical Exam  Vitals:    06/19/19 1554   BP: 115/75   Pulse: 54     weight: 215 lb (97.5 kg)    Neurological Examination  Constitutional .General exam well groomed   Head/Ears /Nose/Throat: external ear . Normal exam  Neck and thyroid . Normal size. No bruits  Respiratory . Breathsounds clear bilaterally  Cardiovascular:  Auscultation of heart with regular rate and rhythm  Musculoskeletal. Muscle bulk and tone normal                                                           Muscle Standing Status:   Future     Standing Expiration Date:   6/18/2020    TSH without Reflex     Standing Status:   Future     Standing Expiration Date:   6/18/2020    T4, Free     Standing Status:   Future     Standing Expiration Date:   6/18/2020    T3, Free     Standing Status:   Future     Standing Expiration Date:   6/18/2020    Electrophoresis Protein, Serum without Reflex to Immunofixation     Standing Status:   Future     Standing Expiration Date:   6/18/2020    SRIRAM     Standing Status:   Future     Standing Expiration Date:   6/18/2020    Primidone level     Standing Status:   Future     Standing Expiration Date:   6/18/2020    AST     Standing Status:   Future     Standing Expiration Date:   6/18/2020    ALT     Standing Status:   Future     Standing Expiration Date:   6/18/2020    CBC With Auto Differential     Standing Status:   Future     Standing Expiration Date:   6/19/2020    EMG     Standing Status:   Future     Standing Expiration Date:   8/18/2019     Order Specific Question:   Which body part?      Answer:   lower extremities           Ina Ram MD

## 2019-06-19 NOTE — LETTER
[facility]  [place] [address] [address] [address]  Phone: [phone]  Fax: [phone]    Marshal Noah        June 21, 2019       Patient: Brandt Camacho   MR Number: [id number]   YOB: 1960   Date of Visit: 6/19/2019       Dear Dr. Liz Barragan:    Thank you for the request for consultation for Brandt Camacho. Below are the relevant portions of my assessment and plan of care. Subjective:      Patient ID: Brandt Camacho is a 63 y.o. male. HPI  The condition is he has been having tremor for the past 2 years . This will affect his arms and head also feeling an inner body tremor . He has history of low back pain having had lumbar disc disease having been on methadone 5 mg po qid in he past reporting that tremor occured after methadone wean . Low back pain has improved . Tremor will be action or postural in type when he hold his hands out . 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 This will also affect eating dropping thingsn along with writing . Legs will not be affected . Tremor will get worse when anxious or tired  . There is no history of tremor in his family . He was tried on propnanolol LA 60 mg po bid which was not effective . There is numbness from base of foot going forward to toes affecting all toes . Numbness has kendrick there for the past 10 years. There is pain in with weight bearing getting orthotics that help pain having seen podiatrist . Orthotics ai helped foot pain by 75 % percent with foot pain being moreso on weight bearing although distal foot numbness is no diferetnt . His father has a neuropathy. He reports that he has had alcohol up to 2 years ago on daily basis up to 3 beers per day. He does not have diabetes  . There is no back pain at this time .  There is no bowel or bladder disturbance  .  Testing MRI of Head normal , February 2017      Past Medical History:   Diagnosis Date    Chronic back pain     present pain mgmnt    Gastric ulcer 10/31/2015    large 2-3 cm    Hematuria     Hep C w/o coma, chronic (HCC)     History of ETOH abuse     Hypertension     Portal hypertensive gastropathy (HCC)     Seizures (HCC)        Past Surgical History:   Procedure Laterality Date    BACK SURGERY      lumbar discectomy    COLONOSCOPY  03/14/2018    mod diverticulosis; internal hemorrhoids; retained stools    ENDOSCOPY, COLON, DIAGNOSTIC      HERNIA REPAIR      lt inguinal    HI COLON CA SCRN NOT HI RSK IND N/A 3/14/2018    COLONOSCOPY performed by Manolo Atkinson MD at [address] [facility]  10/31/2015    large gastric ulcer    UPPER GASTROINTESTINAL ENDOSCOPY  03/14/2018    mod portal hypertensive gastrophathy    UPPER GASTROINTESTINAL ENDOSCOPY N/A 3/14/2018    EGD BIOPSY performed by Manolo Atkinson MD at [address] [place] [address] N/A 1/22/2019    EGD BIOPSY performed by Harry Leung MD at [address] [place] [address]  02/01/2019    BIOPSY    UPPER GASTROINTESTINAL ENDOSCOPY N/A 2/1/2019    EGD BIOPSY performed by Tayo Rashid MD at [address] History   Problem Relation Age of Onset    High Blood Pressure Mother     Other Father         neuropathy    Stroke Father        Social History     Socioeconomic History    Marital status:      Spouse name: None    Number of children: 3    Years of education: None    Highest education level: None   Occupational History    None   Social Needs    Financial resource strain: None    Food insecurity:     Worry: None     Inability: None    Transportation needs:     Medical: None     Non-medical: None   Tobacco Use    Smoking status: Never Smoker    Smokeless tobacco: Current User     Types: Chew   Substance and Sexual Activity Psychiatric/Behavioral: Negative. Objective:   Physical Exam  Vitals:    06/19/19 1554   BP: 115/75   Pulse: 54     weight: 215 lb (97.5 kg)    Neurological Examination  Constitutional .General exam well groomed   Head/Ears /Nose/Throat: external ear . Normal exam  Neck and thyroid . Normal size. No bruits  Respiratory . Breathsounds clear bilaterally  Cardiovascular: Auscultation of heart with regular rate and rhythm  Musculoskeletal. Muscle bulk and tone normal                                                           Muscle strength 5/5 strength throughout                                                                                No dysmetria or dysdiadokinesis  Postural tremor with hands outstretched   Normal fine motor  Gait normal   Orientation Alert and oriented x 3   Attention and concentration normal  Short term memory normal  Language process and speech normal . No aphasia   Cranial nerve 2 normal acuety and visual fields  Cranial nerve 3, 4 and 6 . Extraocular muscles are intact . Pupils are equal and reactive   Cranial nerve 5 . Normal strength of masseter and temporalis . Intact corneal reflex. Normal facial sensation  Cranial nerve 7 normal exam   Cranial nerve 8. Grossly intact hearing   Cranial nerve 9 and 10. Symmetric palate elevation   Cranial nerve 11 , 5 out of 5 strength   Cranial Nerve 12 midline tongue . No atrophy  Sensation . Decreased distal DIP to pin prick and light touch   Deep Tendon Reflexes hypoactive anjke jerk   Plantar response flexor bilaterally    Assessment:       Diagnosis Orders   1. Essential tremor  CT Head WO Contrast    TSH without Reflex    T4, Free    T3, Free   2.  Sensory neuropathy  EMG    Vitamin B12    Sedimentation rate, automated    Folate    Hemoglobin A1C    Electrophoresis Protein, Serum without Reflex to Immunofixation    SRIRAM    Primidone level    AST    ALT    CBC With Auto Differential Patient has essential tremor to go on regimen of mysoline 50 mg po qhs . There is sensory neuropathy to undergo EMG along with neuropathic bloodwork       Plan:      Orders Placed This Encounter   Procedures    CT Head WO Contrast     Standing Status:   Future     Standing Expiration Date:   6/18/2020    Vitamin B12     Standing Status:   Future     Standing Expiration Date:   6/18/2020    Sedimentation rate, automated     Standing Status:   Future     Standing Expiration Date:   6/18/2020    Folate     Standing Status:   Future     Standing Expiration Date:   6/18/2020    Hemoglobin A1C     Standing Status:   Future     Standing Expiration Date:   6/18/2020    TSH without Reflex     Standing Status:   Future     Standing Expiration Date:   6/18/2020    T4, Free     Standing Status:   Future     Standing Expiration Date:   6/18/2020    T3, Free     Standing Status:   Future     Standing Expiration Date:   6/18/2020    Electrophoresis Protein, Serum without Reflex to Immunofixation     Standing Status:   Future     Standing Expiration Date:   6/18/2020    SRIRAM     Standing Status:   Future     Standing Expiration Date:   6/18/2020    Primidone level     Standing Status:   Future     Standing Expiration Date:   6/18/2020    AST     Standing Status:   Future     Standing Expiration Date:   6/18/2020    ALT     Standing Status:   Future     Standing Expiration Date:   6/18/2020    CBC With Auto Differential     Standing Status:   Future     Standing Expiration Date:   6/19/2020    EMG     Standing Status:   Future     Standing Expiration Date:   8/18/2019     Order Specific Question:   Which body part? Answer:   lower extremities           Arelis Roldan MD    If you have questions, please do not hesitate to call me. I look forward to following Vince Mixon along with you.     Sincerely,        Arelis Roldan MD    CC providers:  REECE Gerard - CNP  [address]  [address] [place] [id number]  VIA In Basket

## 2019-06-21 NOTE — COMMUNICATION BODY
Subjective:      Patient ID: Deanna Patrick is a 63 y.o. male. HPI  The condition is he has been having tremor for the past 2 years . This will affect his arms and head also feeling an inner body tremor . He has history of low back pain having had lumbar disc disease having been on methadone 5 mg po qid in he past reporting that tremor occured after methadone wean . Low back pain has improved . Tremor will be action or postural in type when he hold his hands out . 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 This will also affect eating dropping thingsn along with writing . Legs will not be affected . Tremor will get worse when anxious or tired  . There is no history of tremor in his family . He was tried on propnanolol LA 60 mg po bid which was not effective . There is numbness from base of foot going forward to toes affecting all toes . Numbness has kendrick there for the past 10 years. There is pain in with weight bearing getting orthotics that help pain having seen podiatrist . Orthotics ai helped foot pain by 75 % percent with foot pain being moreso on weight bearing although distal foot numbness is no diferetnt . His father has a neuropathy. He reports that he has had alcohol up to 2 years ago on daily basis up to 3 beers per day. He does not have diabetes  . There is no back pain at this time . There is no bowel or bladder  disturbance  .  Testing MRI of Head normal , February 2017      Past Medical History:   Diagnosis Date    Chronic back pain     present pain mgmnt    Gastric ulcer 10/31/2015    large 2-3 cm    Hematuria     Hep C w/o coma, chronic (HCC)     History of ETOH abuse     Hypertension     Portal hypertensive gastropathy (HCC)     Seizures (HCC)        Past Surgical History:   Procedure Laterality Date    BACK SURGERY      lumbar discectomy    COLONOSCOPY  03/14/2018    mod diverticulosis; internal hemorrhoids; retained stools    ENDOSCOPY, COLON, DIAGNOSTIC      HERNIA REPAIR      lt inguinal    MS COLON CA SCRN NOT HI RSK IND N/A 3/14/2018    COLONOSCOPY performed [facility] Neil [facility]illo MD at [facility]  10/31/2015    large gastric ulcer    UPPER GASTROINTESTINAL ENDOSCOPY  03/14/2018    mod portal hypertensive gastrophathy    UPPER GASTROINTESTINAL ENDOSCOPY N/A 3/14/2018    EGD BIOPSY performed by Neil Castillo MD at [facility] N/A 1/22/2019    EGD BIOPSY performed by Maria Del Rosario Pena MD at [facility]  02/01/2019    BIOPSY    UPPER GASTROINTESTINAL ENDOSCOPY N/A 2/1/2019    EGD BIOPSY performed by Shaggy Ceballos MD at [address] History   Problem Relation Age of Onset    High Blood Pressure Mother     Other Father         neuropathy    Stroke Father        Social History     Socioeconomic History    Marital status:      Spouse name: None    Number of children: 3    Years of education: None    Highest education level: None   Occupational History    None   Social Needs    Financial resource strain: None    Food insecurity:     Worry: None     Inability: None    Transportation needs:     Medical: None     Non-medical: None   Tobacco Use    Smoking status: Never Smoker    Smokeless tobacco: Current User     Types: Chew   Substance and Sexual Activity    Alcohol use: Yes     Comment: ocassionally    Drug use: No    Sexual activity: None   Lifestyle    Physical activity:     Days per week: None     Minutes per session: None    Stress: None   Relationships    Social connections:     Talks on phone: None     Gets together: None     Attends Zoroastrian service: None     Active member of club or organization: None     Attends meetings of clubs or organizations: None     Relationship status: None    Intimate partner violence:     Fear of current or ex partner: None     Emotionally abused: None     Physically abused: None     Forced sexual activity: None   Other Topics Concern    None   Social History Narrative    None       Current Outpatient Medications   Medication Sig Dispense Refill    gabapentin (NEURONTIN) 400 MG capsule TAKE ONE CAPSULE BY MOUTH THREE TIMES A DAY 90 capsule 2    propranolol (INDERAL LA) 60 MG extended release capsule Take 1 capsule by mouth 2 times daily 60 capsule 3    Elastic Bandages & Supports (LUMBAR BACK BRACE/SUPPORT PAD) MISC 1 each by Does not apply route daily as needed (back pain) 1 each 0    lisinopril (PRINIVIL;ZESTRIL) 20 MG tablet TAKE ONE TABLET BY MOUTH DAILY 30 tablet 5    ferrous sulfate 325 (65 Fe) MG tablet Take 1 tablet by mouth daily (with breakfast) 90 tablet 1    rOPINIRole (REQUIP) 0.5 MG tablet TAKE ONE TABLET BY MOUTH ONCE NIGHTLY 30 tablet 5    Multiple Vitamins-Minerals (THERAPEUTIC MULTIVITAMIN-MINERALS) tablet Take 1 tablet by mouth daily       No current facility-administered medications for this visit. No Known Allergies    Review of Systems   Constitutional: Negative. HENT: Positive for tinnitus. Eyes: Negative. Respiratory: Negative. Cardiovascular: Negative. Gastrointestinal: Negative. Endocrine: Negative. Musculoskeletal: Negative. Allergic/Immunologic: Negative. Neurological: Positive for tremors. Hematological: Negative. Psychiatric/Behavioral: Negative. Objective:   Physical Exam  Vitals:    06/19/19 1554   BP: 115/75   Pulse: 54     weight: 215 lb (97.5 kg)    Neurological Examination  Constitutional .General exam well groomed   Head/Ears /Nose/Throat: external ear . Normal exam  Neck and thyroid . Normal size. No bruits  Respiratory . Breathsounds clear bilaterally  Cardiovascular:  Auscultation of heart with regular rate and rhythm  Musculoskeletal. Muscle bulk and tone normal                                                           Muscle

## 2019-06-26 NOTE — TELEPHONE ENCOUNTER
Per [facility], pt needs updated Viral Load, urine/drug screen and Hep A antibody lab. Orders placed. Did call pt to discuss need for additional labs and to complete vaccinations. Left vm on pt's line to return call to writer.

## 2019-07-16 ENCOUNTER — HOSPITAL ENCOUNTER (OUTPATIENT)
Dept: MRI IMAGING | Age: 59
Discharge: HOME | End: 2019-07-18
Payer: MEDICARE

## 2019-07-16 ENCOUNTER — HOSPITAL ENCOUNTER (OUTPATIENT)
Dept: CT IMAGING | Age: 59
Discharge: HOME | End: 2019-07-18
Payer: MEDICARE

## 2019-07-16 DIAGNOSIS — M25.561 ACUTE PAIN OF RIGHT KNEE: ICD-10-CM

## 2019-07-16 DIAGNOSIS — G25.0 ESSENTIAL TREMOR: ICD-10-CM

## 2019-07-16 PROCEDURE — 70450 CT HEAD/BRAIN W/O DYE: CPT

## 2019-07-16 PROCEDURE — 73721 MRI JNT OF LWR EXTRE W/O DYE: CPT

## 2019-07-17 ENCOUNTER — TELEPHONE (OUTPATIENT)
Dept: FAMILY MEDICINE CLINIC | Age: 59
End: 2019-07-17

## 2019-07-17 DIAGNOSIS — M25.561 ACUTE PAIN OF RIGHT KNEE: Primary | ICD-10-CM

## 2019-07-30 ENCOUNTER — HOSPITAL ENCOUNTER (INPATIENT)
Age: 59
LOS: 4 days | Discharge: HOME | DRG: 241 | End: 2019-08-03
Attending: EMERGENCY MEDICINE | Admitting: INTERNAL MEDICINE
Payer: MEDICARE

## 2019-07-30 DIAGNOSIS — K92.2 GASTROINTESTINAL HEMORRHAGE, UNSPECIFIED GASTROINTESTINAL HEMORRHAGE TYPE: ICD-10-CM

## 2019-07-30 DIAGNOSIS — R10.9 ABDOMINAL PAIN, UNSPECIFIED ABDOMINAL LOCATION: Primary | ICD-10-CM

## 2019-07-30 LAB
ABSOLUTE EOS #: <0.03 K/UL (ref 0–0.44)
ABSOLUTE IMMATURE GRANULOCYTE: 0.03 K/UL (ref 0–0.3)
ABSOLUTE LYMPH #: 1.05 K/UL (ref 1.1–3.7)
ABSOLUTE MONO #: 0.46 K/UL (ref 0.1–1.2)
ALBUMIN SERPL-MCNC: 3.4 G/DL (ref 3.5–5.2)
ALBUMIN/GLOBULIN RATIO: ABNORMAL (ref 1–2.5)
ALP BLD-CCNC: 101 U/L (ref 40–129)
ALT SERPL-CCNC: 107 U/L (ref 5–41)
AMMONIA: 55 UMOL/L (ref 16–60)
ANION GAP SERPL CALCULATED.3IONS-SCNC: 13 MMOL/L (ref 9–17)
AST SERPL-CCNC: 250 U/L
BASOPHILS # BLD: 1 % (ref 0–2)
BASOPHILS ABSOLUTE: 0.04 K/UL (ref 0–0.2)
BILIRUB SERPL-MCNC: 4.55 MG/DL (ref 0.3–1.2)
BILIRUBIN DIRECT: 2.41 MG/DL
BILIRUBIN, INDIRECT: 2.14 MG/DL (ref 0–1)
BUN BLDV-MCNC: 10 MG/DL (ref 6–20)
BUN/CREAT BLD: 18 (ref 9–20)
CALCIUM SERPL-MCNC: 8.6 MG/DL (ref 8.6–10.4)
CHLORIDE BLD-SCNC: 98 MMOL/L (ref 98–107)
CO2: 24 MMOL/L (ref 20–31)
CREAT SERPL-MCNC: 0.55 MG/DL (ref 0.7–1.2)
DATE, STOOL #1: ABNORMAL
DATE, STOOL #2: ABNORMAL
DATE, STOOL #3: ABNORMAL
DIFFERENTIAL TYPE: ABNORMAL
EOSINOPHILS RELATIVE PERCENT: 0 % (ref 1–4)
GFR AFRICAN AMERICAN: >60 ML/MIN
GFR NON-AFRICAN AMERICAN: >60 ML/MIN
GFR SERPL CREATININE-BSD FRML MDRD: ABNORMAL ML/MIN/{1.73_M2}
GFR SERPL CREATININE-BSD FRML MDRD: ABNORMAL ML/MIN/{1.73_M2}
GLOBULIN: ABNORMAL G/DL (ref 1.5–3.8)
GLUCOSE BLD-MCNC: 148 MG/DL (ref 70–99)
HCT VFR BLD CALC: 41.3 % (ref 40.7–50.3)
HEMOCCULT SP1 STL QL: POSITIVE
HEMOCCULT SP2 STL QL: ABNORMAL
HEMOCCULT SP3 STL QL: ABNORMAL
HEMOGLOBIN: 13.9 G/DL (ref 13–17)
IMMATURE GRANULOCYTES: 1 %
LYMPHOCYTES # BLD: 16 % (ref 24–43)
MCH RBC QN AUTO: 32.3 PG (ref 25.2–33.5)
MCHC RBC AUTO-ENTMCNC: 33.7 G/DL (ref 28.4–34.8)
MCV RBC AUTO: 95.8 FL (ref 82.6–102.9)
MONOCYTES # BLD: 7 % (ref 3–12)
NRBC AUTOMATED: 0 PER 100 WBC
PDW BLD-RTO: 14.9 % (ref 11.8–14.4)
PLATELET # BLD: 54 K/UL (ref 138–453)
PLATELET ESTIMATE: ABNORMAL
PMV BLD AUTO: 10.7 FL (ref 8.1–13.5)
POTASSIUM SERPL-SCNC: 3.8 MMOL/L (ref 3.7–5.3)
RBC # BLD: 4.31 M/UL (ref 4.21–5.77)
RBC # BLD: ABNORMAL 10*6/UL
SEG NEUTROPHILS: 75 % (ref 36–65)
SEGMENTED NEUTROPHILS ABSOLUTE COUNT: 5.04 K/UL (ref 1.5–8.1)
SODIUM BLD-SCNC: 135 MMOL/L (ref 135–144)
TIME, STOOL #1: 2110
TIME, STOOL #2: ABNORMAL
TIME, STOOL #3: ABNORMAL
TOTAL PROTEIN: 6.5 G/DL (ref 6.4–8.3)
WBC # BLD: 6.6 K/UL (ref 3.5–11.3)
WBC # BLD: ABNORMAL 10*3/UL

## 2019-07-30 PROCEDURE — 96361 HYDRATE IV INFUSION ADD-ON: CPT

## 2019-07-30 PROCEDURE — 6360000002 HC RX W HCPCS: Performed by: NURSE PRACTITIONER

## 2019-07-30 PROCEDURE — 80048 BASIC METABOLIC PNL TOTAL CA: CPT

## 2019-07-30 PROCEDURE — 82140 ASSAY OF AMMONIA: CPT

## 2019-07-30 PROCEDURE — 99284 EMERGENCY DEPT VISIT MOD MDM: CPT

## 2019-07-30 PROCEDURE — 80076 HEPATIC FUNCTION PANEL: CPT

## 2019-07-30 PROCEDURE — G0328 FECAL BLOOD SCRN IMMUNOASSAY: HCPCS

## 2019-07-30 PROCEDURE — 1200000000 HC SEMI PRIVATE

## 2019-07-30 PROCEDURE — C9113 INJ PANTOPRAZOLE SODIUM, VIA: HCPCS | Performed by: NURSE PRACTITIONER

## 2019-07-30 PROCEDURE — 96375 TX/PRO/DX INJ NEW DRUG ADDON: CPT

## 2019-07-30 PROCEDURE — 2580000003 HC RX 258: Performed by: NURSE PRACTITIONER

## 2019-07-30 PROCEDURE — 85025 COMPLETE CBC W/AUTO DIFF WBC: CPT

## 2019-07-30 PROCEDURE — 96365 THER/PROPH/DIAG IV INF INIT: CPT

## 2019-07-30 RX ORDER — MORPHINE SULFATE 2 MG/ML
2 INJECTION, SOLUTION INTRAMUSCULAR; INTRAVENOUS
Status: DISCONTINUED | OUTPATIENT
Start: 2019-07-30 | End: 2019-08-03 | Stop reason: HOSPADM

## 2019-07-30 RX ORDER — 0.9 % SODIUM CHLORIDE 0.9 %
1000 INTRAVENOUS SOLUTION INTRAVENOUS ONCE
Status: COMPLETED | OUTPATIENT
Start: 2019-07-30 | End: 2019-07-30

## 2019-07-30 RX ORDER — PROMETHAZINE HYDROCHLORIDE 25 MG/ML
12.5 INJECTION, SOLUTION INTRAMUSCULAR; INTRAVENOUS ONCE
Status: COMPLETED | OUTPATIENT
Start: 2019-07-30 | End: 2019-07-30

## 2019-07-30 RX ORDER — ACETAMINOPHEN 325 MG/1
650 TABLET ORAL EVERY 4 HOURS PRN
Status: DISCONTINUED | OUTPATIENT
Start: 2019-07-30 | End: 2019-08-03 | Stop reason: HOSPADM

## 2019-07-30 RX ORDER — ONDANSETRON 2 MG/ML
4 INJECTION INTRAMUSCULAR; INTRAVENOUS ONCE
Status: COMPLETED | OUTPATIENT
Start: 2019-07-30 | End: 2019-07-30

## 2019-07-30 RX ORDER — SODIUM CHLORIDE 9 MG/ML
INJECTION, SOLUTION INTRAVENOUS CONTINUOUS
Status: DISCONTINUED | OUTPATIENT
Start: 2019-07-31 | End: 2019-08-03 | Stop reason: HOSPADM

## 2019-07-30 RX ORDER — HYDROCODONE BITARTRATE AND ACETAMINOPHEN 5; 325 MG/1; MG/1
1 TABLET ORAL EVERY 4 HOURS PRN
Status: DISCONTINUED | OUTPATIENT
Start: 2019-07-30 | End: 2019-08-03 | Stop reason: HOSPADM

## 2019-07-30 RX ORDER — PANTOPRAZOLE SODIUM 40 MG/10ML
40 INJECTION, POWDER, LYOPHILIZED, FOR SOLUTION INTRAVENOUS DAILY
Status: DISCONTINUED | OUTPATIENT
Start: 2019-08-03 | End: 2019-08-02

## 2019-07-30 RX ORDER — PRIMIDONE 50 MG/1
50 TABLET ORAL NIGHTLY
Status: DISCONTINUED | OUTPATIENT
Start: 2019-07-31 | End: 2019-08-03 | Stop reason: HOSPADM

## 2019-07-30 RX ORDER — SODIUM CHLORIDE 0.9 % (FLUSH) 0.9 %
10 SYRINGE (ML) INJECTION EVERY 12 HOURS SCHEDULED
Status: DISCONTINUED | OUTPATIENT
Start: 2019-07-31 | End: 2019-08-03 | Stop reason: HOSPADM

## 2019-07-30 RX ORDER — GABAPENTIN 400 MG/1
400 CAPSULE ORAL 3 TIMES DAILY
Status: DISCONTINUED | OUTPATIENT
Start: 2019-07-31 | End: 2019-08-03 | Stop reason: HOSPADM

## 2019-07-30 RX ORDER — HYDROCODONE BITARTRATE AND ACETAMINOPHEN 5; 325 MG/1; MG/1
2 TABLET ORAL EVERY 4 HOURS PRN
Status: DISCONTINUED | OUTPATIENT
Start: 2019-07-30 | End: 2019-08-03 | Stop reason: HOSPADM

## 2019-07-30 RX ORDER — ROPINIROLE 0.25 MG/1
0.5 TABLET, FILM COATED ORAL NIGHTLY
Status: DISCONTINUED | OUTPATIENT
Start: 2019-07-31 | End: 2019-08-03 | Stop reason: HOSPADM

## 2019-07-30 RX ORDER — LANOLIN ALCOHOL/MO/W.PET/CERES
325 CREAM (GRAM) TOPICAL
Status: DISCONTINUED | OUTPATIENT
Start: 2019-07-31 | End: 2019-08-03 | Stop reason: HOSPADM

## 2019-07-30 RX ORDER — 0.9 % SODIUM CHLORIDE 0.9 %
10 VIAL (ML) INJECTION DAILY
Status: DISCONTINUED | OUTPATIENT
Start: 2019-08-03 | End: 2019-08-02

## 2019-07-30 RX ORDER — ONDANSETRON 2 MG/ML
4 INJECTION INTRAMUSCULAR; INTRAVENOUS EVERY 6 HOURS PRN
Status: DISCONTINUED | OUTPATIENT
Start: 2019-07-30 | End: 2019-07-31 | Stop reason: SDUPTHER

## 2019-07-30 RX ORDER — LISINOPRIL 20 MG/1
20 TABLET ORAL DAILY
Status: DISCONTINUED | OUTPATIENT
Start: 2019-07-31 | End: 2019-08-03 | Stop reason: HOSPADM

## 2019-07-30 RX ORDER — SODIUM CHLORIDE 0.9 % (FLUSH) 0.9 %
10 SYRINGE (ML) INJECTION PRN
Status: DISCONTINUED | OUTPATIENT
Start: 2019-07-30 | End: 2019-08-03 | Stop reason: HOSPADM

## 2019-07-30 RX ORDER — MORPHINE SULFATE 4 MG/ML
4 INJECTION, SOLUTION INTRAMUSCULAR; INTRAVENOUS
Status: DISCONTINUED | OUTPATIENT
Start: 2019-07-30 | End: 2019-08-03 | Stop reason: HOSPADM

## 2019-07-30 RX ADMIN — SODIUM CHLORIDE 80 MG: 9 INJECTION, SOLUTION INTRAVENOUS at 22:42

## 2019-07-30 RX ADMIN — ONDANSETRON 4 MG: 2 INJECTION INTRAMUSCULAR; INTRAVENOUS at 21:01

## 2019-07-30 RX ADMIN — PROMETHAZINE HYDROCHLORIDE 12.5 MG: 25 INJECTION INTRAMUSCULAR; INTRAVENOUS at 21:32

## 2019-07-30 RX ADMIN — SODIUM CHLORIDE 1000 ML: 9 INJECTION, SOLUTION INTRAVENOUS at 21:01

## 2019-07-30 ASSESSMENT — ENCOUNTER SYMPTOMS
BLOOD IN STOOL: 1
ABDOMINAL PAIN: 1
DIARRHEA: 0
COUGH: 0
BACK PAIN: 0
CONSTIPATION: 0
SHORTNESS OF BREATH: 0
NAUSEA: 1
VOMITING: 0

## 2019-07-30 ASSESSMENT — PAIN SCALES - GENERAL: PAINLEVEL_OUTOF10: 0

## 2019-07-31 ENCOUNTER — ANESTHESIA EVENT (OUTPATIENT)
Dept: OPERATING ROOM | Age: 59
DRG: 241 | End: 2019-07-31
Payer: MEDICARE

## 2019-07-31 ENCOUNTER — APPOINTMENT (OUTPATIENT)
Dept: MRI IMAGING | Age: 59
DRG: 241 | End: 2019-07-31
Payer: MEDICARE

## 2019-07-31 LAB
AFP: 14.2 UG/L
ANION GAP SERPL CALCULATED.3IONS-SCNC: 11 MMOL/L (ref 9–17)
BUN BLDV-MCNC: 9 MG/DL (ref 6–20)
BUN/CREAT BLD: 16 (ref 9–20)
CALCIUM SERPL-MCNC: 8.2 MG/DL (ref 8.6–10.4)
CHLORIDE BLD-SCNC: 107 MMOL/L (ref 98–107)
CO2: 23 MMOL/L (ref 20–31)
CREAT SERPL-MCNC: 0.58 MG/DL (ref 0.7–1.2)
GFR AFRICAN AMERICAN: >60 ML/MIN
GFR NON-AFRICAN AMERICAN: >60 ML/MIN
GFR SERPL CREATININE-BSD FRML MDRD: ABNORMAL ML/MIN/{1.73_M2}
GFR SERPL CREATININE-BSD FRML MDRD: ABNORMAL ML/MIN/{1.73_M2}
GLUCOSE BLD-MCNC: 115 MG/DL (ref 70–99)
HCT VFR BLD CALC: 38.3 % (ref 40.7–50.3)
HEMOGLOBIN: 12.9 G/DL (ref 13–17)
HEMOGLOBIN: 12.9 G/DL (ref 13–17)
HEMOGLOBIN: 13.2 G/DL (ref 13–17)
HEMOGLOBIN: 13.7 G/DL (ref 13–17)
INR BLD: 1.5
MCH RBC QN AUTO: 32.5 PG (ref 25.2–33.5)
MCHC RBC AUTO-ENTMCNC: 33.7 G/DL (ref 28.4–34.8)
MCV RBC AUTO: 96.5 FL (ref 82.6–102.9)
NRBC AUTOMATED: 0 PER 100 WBC
PDW BLD-RTO: 15 % (ref 11.8–14.4)
PLATELET # BLD: ABNORMAL K/UL (ref 138–453)
PLATELET, FLUORESCENCE: 45 K/UL (ref 138–453)
PLATELET, IMMATURE FRACTION: 13.5 % (ref 1.1–10.3)
PMV BLD AUTO: ABNORMAL FL (ref 8.1–13.5)
POTASSIUM SERPL-SCNC: 3.8 MMOL/L (ref 3.7–5.3)
PROTHROMBIN TIME: 15.2 SEC (ref 9.7–11.6)
RBC # BLD: 3.97 M/UL (ref 4.21–5.77)
SODIUM BLD-SCNC: 141 MMOL/L (ref 135–144)
WBC # BLD: 5.8 K/UL (ref 3.5–11.3)

## 2019-07-31 PROCEDURE — 6360000002 HC RX W HCPCS: Performed by: NURSE PRACTITIONER

## 2019-07-31 PROCEDURE — 2580000003 HC RX 258: Performed by: NURSE PRACTITIONER

## 2019-07-31 PROCEDURE — 36415 COLL VENOUS BLD VENIPUNCTURE: CPT

## 2019-07-31 PROCEDURE — 82105 ALPHA-FETOPROTEIN SERUM: CPT

## 2019-07-31 PROCEDURE — 99223 1ST HOSP IP/OBS HIGH 75: CPT | Performed by: INTERNAL MEDICINE

## 2019-07-31 PROCEDURE — 85610 PROTHROMBIN TIME: CPT

## 2019-07-31 PROCEDURE — APPNB30 APP NON BILLABLE TIME 0-30 MINS: Performed by: NURSE PRACTITIONER

## 2019-07-31 PROCEDURE — 1200000000 HC SEMI PRIVATE

## 2019-07-31 PROCEDURE — C9113 INJ PANTOPRAZOLE SODIUM, VIA: HCPCS | Performed by: NURSE PRACTITIONER

## 2019-07-31 PROCEDURE — 6370000000 HC RX 637 (ALT 250 FOR IP): Performed by: NURSE PRACTITIONER

## 2019-07-31 PROCEDURE — 99255 IP/OBS CONSLTJ NEW/EST HI 80: CPT | Performed by: INTERNAL MEDICINE

## 2019-07-31 PROCEDURE — 80048 BASIC METABOLIC PNL TOTAL CA: CPT

## 2019-07-31 PROCEDURE — 85055 RETICULATED PLATELET ASSAY: CPT

## 2019-07-31 PROCEDURE — 85018 HEMOGLOBIN: CPT

## 2019-07-31 PROCEDURE — 85027 COMPLETE CBC AUTOMATED: CPT

## 2019-07-31 PROCEDURE — APPSS45 APP SPLIT SHARED TIME 31-45 MINUTES: Performed by: NURSE PRACTITIONER

## 2019-07-31 RX ORDER — ONDANSETRON 2 MG/ML
4 INJECTION INTRAMUSCULAR; INTRAVENOUS EVERY 6 HOURS PRN
Status: DISCONTINUED | OUTPATIENT
Start: 2019-07-31 | End: 2019-08-03 | Stop reason: HOSPADM

## 2019-07-31 RX ORDER — ONDANSETRON 4 MG/1
4 TABLET, ORALLY DISINTEGRATING ORAL EVERY 6 HOURS PRN
Status: DISCONTINUED | OUTPATIENT
Start: 2019-07-31 | End: 2019-08-03 | Stop reason: HOSPADM

## 2019-07-31 RX ADMIN — GABAPENTIN 400 MG: 400 CAPSULE ORAL at 12:38

## 2019-07-31 RX ADMIN — SODIUM CHLORIDE 8 MG/HR: 9 INJECTION, SOLUTION INTRAVENOUS at 18:05

## 2019-07-31 RX ADMIN — SODIUM CHLORIDE 8 MG/HR: 9 INJECTION, SOLUTION INTRAVENOUS at 00:34

## 2019-07-31 RX ADMIN — ONDANSETRON 4 MG: 2 INJECTION INTRAMUSCULAR; INTRAVENOUS at 11:22

## 2019-07-31 RX ADMIN — LISINOPRIL 20 MG: 20 TABLET ORAL at 09:32

## 2019-07-31 RX ADMIN — SODIUM CHLORIDE 8 MG/HR: 9 INJECTION, SOLUTION INTRAVENOUS at 08:54

## 2019-07-31 RX ADMIN — GABAPENTIN 400 MG: 400 CAPSULE ORAL at 09:32

## 2019-07-31 RX ADMIN — GABAPENTIN 400 MG: 400 CAPSULE ORAL at 22:11

## 2019-07-31 RX ADMIN — ROPINIROLE HYDROCHLORIDE 0.5 MG: 0.25 TABLET, FILM COATED ORAL at 22:12

## 2019-07-31 RX ADMIN — FERROUS SULFATE TAB EC 325 MG (65 MG FE EQUIVALENT) 325 MG: 325 (65 FE) TABLET DELAYED RESPONSE at 09:32

## 2019-07-31 RX ADMIN — ACETAMINOPHEN 650 MG: 325 TABLET ORAL at 12:38

## 2019-07-31 RX ADMIN — PRIMIDONE 50 MG: 50 TABLET ORAL at 22:11

## 2019-07-31 RX ADMIN — CEFTRIAXONE SODIUM 1 G: 1 INJECTION, POWDER, FOR SOLUTION INTRAMUSCULAR; INTRAVENOUS at 18:08

## 2019-07-31 RX ADMIN — SODIUM CHLORIDE: 9 INJECTION, SOLUTION INTRAVENOUS at 00:34

## 2019-07-31 RX ADMIN — PRIMIDONE 50 MG: 50 TABLET ORAL at 00:34

## 2019-07-31 RX ADMIN — OCTREOTIDE ACETATE 50 MCG/HR: 200 INJECTION, SOLUTION INTRAVENOUS; SUBCUTANEOUS at 13:15

## 2019-07-31 RX ADMIN — SODIUM CHLORIDE: 9 INJECTION, SOLUTION INTRAVENOUS at 06:42

## 2019-07-31 RX ADMIN — ROPINIROLE HYDROCHLORIDE 0.5 MG: 0.25 TABLET, FILM COATED ORAL at 00:34

## 2019-07-31 ASSESSMENT — PAIN SCALES - GENERAL
PAINLEVEL_OUTOF10: 6
PAINLEVEL_OUTOF10: 0

## 2019-07-31 NOTE — H&P
managed with conservative measures and patient's primary complaint at this time is nausea. Gastroenterology has been consulted and we will await their direction on management. H&H is stable we will continue to monitor. Past Medical History:     Past Medical History:   Diagnosis Date    Chronic back pain     present pain mgmnt    Gastric ulcer 10/31/2015    large 2-3 cm    Hematuria     Hep C w/o coma, chronic (HCC)     History of blood transfusion     History of ETOH abuse     Hypertension     Portal hypertensive gastropathy (HCC)     Seizures ([facility] [address]     only one in 2016        Past Surgical History:     Past Surgical History:   Procedure Laterality Date    BACK SURGERY      lumbar discectomy    COLONOSCOPY  03/14/2018    mod diverticulosis; internal hemorrhoids; retained stools    ENDOSCOPY, COLON, DIAGNOSTIC      HERNIA REPAIR      lt inguinal    CA COLON CA SCRN NOT HI RSK IND N/A 3/14/2018    COLONOSCOPY performed by Virginia Hernandez MD at [address] [facility]  10/31/2015    large gastric ulcer    UPPER GASTROINTESTINAL ENDOSCOPY  03/14/2018    mod portal hypertensive gastrophathy    UPPER GASTROINTESTINAL ENDOSCOPY N/A 3/14/2018    EGD BIOPSY performed by Virginia Hernandez MD at [facility] 1/22/2019    EGD BIOPSY performed by Art Borrego MD at [facility]  02/01/2019    BIOPSY    UPPER GASTROINTESTINAL ENDOSCOPY N/A 2/1/2019    EGD BIOPSY performed by Sal Kelsey MD at [facility]        Medications Prior to Admission:     Prior to Admission medications    Medication Sig Start Date End Date Taking?  Authorizing Provider   primidone (MYSOLINE) 50 MG tablet Take 1 po qhs 6/19/19  Yes Stuart Ortiz MD   gabapentin (NEURONTIN) 400 MG capsule TAKE ONE CAPSULE BY MOUTH THREE TIMES A DAY 5/14/19 8/14/19 Yes REECE Alcaraz - ALAN   lisinopril Imaging/Diagnostics:    No results found. Assessment :      Primary Problem  GI bleed    Active Hospital Problems    Diagnosis Date Noted    Hypertension [I10]     Hep C w/o coma, chronic (HCC) [B18.2]     Chronic back pain [M54.9, G89.29]     History of ETOH abuse [F05.514]     Alcoholic cirrhosis of liver without ascites ([facility] [address] [K70.30] 03/29/2019    Acute blood loss anemia [D62] 02/27/2019    Anemia [D64.9]     GI bleed [K92.2] 10/30/2015    Gastrointestinal hemorrhage [K92.2]        Plan:     Patient status Admit as inpatient in the  Med/Surge    1-IV hydration  2-gastroenterology consultation  3-IV Protonix  4-n.p.o.  5-monitor H&H  6-pain and nausea control  7-mechanical DVT prophylaxis and encourage ambulation. Withhold chemical prophylaxis due to known bleeding  8-await direction on further treatment from gastroenterology    Consultations:   IP CONSULT TO INTERNAL MEDICINE  IP CONSULT TO GI  IP CONSULT TO GI    Patient is admitted as inpatient status because of co-morbidities listed above, severity of signs and symptoms as outlined, requirement for current medical therapies and most importantly because of direct risk to patient if care not provided in a hospital setting.     REECE Huber NP  7/31/2019  10:03 AM    Copy sent to REECE Guzman - ALAN

## 2019-07-31 NOTE — ED PROVIDER NOTES
[address] [place] [address] ED  EMERGENCY DEPARTMENT ENCOUNTER      Pt Name: Nini Montez  MRN: [id number]  [place] 1960  Date of evaluation: 7/30/2019  Provider: REECE Arias CNP    CHIEF COMPLAINT       Chief Complaint   Patient presents with    Abdominal Pain    Melena     hx of ulcer         HISTORY OF PRESENT ILLNESS  (Location/Symptom, Timing/Onset, Context/Setting, Quality, Duration, Modifying Factors, Severity.)   Niin Montez is a 63 y.o. male who presents to the emergency department with concern for GI bleed. One bowel movement today that was black in color and tarry in consistency. Similar stool in the past and was positive for GI bleed. He has diffuse abdominal cramping at times which he currently denies. He has nausea but no vomiting. Only one bowel movement today. History includes alcoholic cirrhosis and hepatitis C. Not take any blood thinners. No hematuria or abnormal bruising. He had colonoscopy in March 2018 and EGDs in March 2018, January 2019 and February 2019. Nursing Notes were reviewed. ALLERGIES     Patient has no known allergies.     CURRENT MEDICATIONS       Previous Medications    ELASTIC BANDAGES & SUPPORTS (LUMBAR BACK BRACE/SUPPORT PAD) MISC    1 each by Does not apply route daily as needed (back pain)    FERROUS SULFATE 325 (65 FE) MG TABLET    Take 1 tablet by mouth daily (with breakfast)    GABAPENTIN (NEURONTIN) 400 MG CAPSULE    TAKE ONE CAPSULE BY MOUTH THREE TIMES A DAY    LISINOPRIL (PRINIVIL;ZESTRIL) 20 MG TABLET    TAKE ONE TABLET BY MOUTH DAILY    MULTIPLE VITAMINS-MINERALS (THERAPEUTIC MULTIVITAMIN-MINERALS) TABLET    Take 1 tablet by mouth daily    PRIMIDONE (MYSOLINE) 50 MG TABLET    Take 1 po qhs    PROPRANOLOL (INDERAL LA) 60 MG EXTENDED RELEASE CAPSULE    Take 1 capsule by mouth 2 times daily    ROPINIROLE (REQUIP) 0.5 MG TABLET    TAKE ONE TABLET BY MOUTH ONCE NIGHTLY       PAST MEDICAL HISTORY         Diagnosis Date    Chronic back orders to display         LABS:  Labs Reviewed   CBC WITH AUTO DIFFERENTIAL - Abnormal; Notable for the following components:       Result Value    RDW 14.9 (*)     Platelets 54 (*)     Seg Neutrophils 75 (*)     Lymphocytes 16 (*)     Eosinophils % 0 (*)     Immature Granulocytes 1 (*)     Absolute Lymph # 1.05 (*)     All other components within normal limits   BASIC METABOLIC PANEL - Abnormal; Notable for the following components:    Glucose 148 (*)     CREATININE 0.55 (*)     All other components within normal limits   HEPATIC FUNCTION PANEL - Abnormal; Notable for the following components:    Alb 3.4 (*)      (*)      (*)     Total Bilirubin 4.55 (*)     Bilirubin, Direct 2.41 (*)     Bilirubin, Indirect 2.14 (*)     All other components within normal limits   OCCULT BLOOD SCREEN - Abnormal; Notable for the following components:    Occult Blood, Stool #1 POSITIVE (*)     All other components within normal limits   AMMONIA       All other labs were within normal range or not returned as of this dictation. EMERGENCY DEPARTMENT COURSE and DIFFERENTIAL DIAGNOSIS/MDM:   Vitals:    Vitals:    19   BP: (!) 121/56   Pulse: 86   Resp: 16   Temp: 98 °F (36.7 °C)   TempSrc: Oral   SpO2: 98%   Weight: 215 lb (97.5 kg)   Height: 5' 10\" (1.778 m)       Medical Decision Makin-year-old male with black stools at home and history of GI bleed. He had dark green stool which was guaiac positive here today. Hemoglobin and hematocrit are stable at 13.9 and 41.3. He has abnormalities in his LFTs as above. He received IV hydration, Zofran, Phenergan and Protonix. He will be admitted for further evaluation and treatment. CONSULTS:  IP CONSULT TO INTERNAL MEDICINE  IP CONSULT TO GI  IP CONSULT TO GI    PROCEDURES:  None    FINAL IMPRESSION      1. Abdominal pain, unspecified abdominal location    2.  Gastrointestinal hemorrhage, unspecified gastrointestinal hemorrhage type          DISPOSITION/PLAN DISPOSITION Decision To Admit 07/30/2019 11:13:43 PM      PATIENT REFERRED TO:   REECE Beltran CNP  [address] [place] [address]  [phone]            DISCHARGE MEDICATIONS:     New Prescriptions    No medications on file       (Please note that portions of this note were completed with a voice recognition program.  Efforts were made to edit the dictations but occasionally words are mis-transcribed. )    REECE OVALLE CNP, APRN - CNP  07/30/19 0022

## 2019-07-31 NOTE — PLAN OF CARE
Problem: Pain:  Goal: Pain level will decrease  Description  Pain level will decrease  7/31/2019 0859 by Beatrice Barbour RN  Outcome: Ongoing  7/31/2019 0322 by Minna Cabrales RN  Outcome: Ongoing  Goal: Control of acute pain  Description  Control of acute pain  7/31/2019 0859 by Beatrice Barbour RN  Outcome: Ongoing  7/31/2019 0322 by Minna Cabrales RN  Outcome: Ongoing  Goal: Control of chronic pain  Description  Control of chronic pain  7/31/2019 0859 by Beatrice Barbour RN  Outcome: Ongoing  7/31/2019 0322 by Minna Cabrales RN  Outcome: Ongoing     Problem: Discharge Planning:  Goal: Discharged to appropriate level of care  Description  Discharged to appropriate level of care  Outcome: Ongoing     Problem:  Bowel Function - Altered:  Goal: Bowel elimination is within specified parameters  Description  Bowel elimination is within specified parameters  Outcome: Ongoing     Problem: Fluid Volume - Imbalance:  Goal: Will show no signs and symptoms of excessive bleeding  Description  Will show no signs and symptoms of excessive bleeding  Outcome: Ongoing  Goal: Absence of imbalanced fluid volume signs and symptoms  Description  Absence of imbalanced fluid volume signs and symptoms  Outcome: Ongoing     Problem: Nausea/Vomiting:  Goal: Absence of nausea/vomiting  Description  Absence of nausea/vomiting  Outcome: Ongoing  Goal: Able to drink  Description  Able to drink  Outcome: Ongoing  Goal: Able to eat  Description  Able to eat  Outcome: Ongoing  Goal: Ability to achieve adequate nutritional intake will improve  Description  Ability to achieve adequate nutritional intake will improve  Outcome: Ongoing     Problem: SAFETY  Goal: LTG - patient will adhere to hip precautions during ADL's and transfers  Outcome: Ongoing  Goal: LTG - Patient will demonstrate safety requirements appropriate to situation/environment  Outcome: Ongoing  Goal: LTG - patient will utilize safety techniques  Outcome: Ongoing  Goal: STG - patient locks brakes on wheelchair  Outcome: Ongoing  Goal: STG - Patient uses call light consistently to request assistance with transfers  Outcome: Ongoing  Goal: STG - patient uses gait belt during all transfers  Outcome: Ongoing     Problem: Falls - Risk of:  Goal: Will remain free from falls  Description  Will remain free from falls  Outcome: Ongoing  Goal: Absence of physical injury  Description  Absence of physical injury  Outcome: Ongoing

## 2019-07-31 NOTE — CONSULTS
 Other Father         neuropathy    Stroke Father     Prostate Cancer Father       No family history of colon cancer, Crohn's disease, or ulcerative colitis. Review of Systems  Constitutional: negative  Eyes: negative  Ears, nose, mouth, throat, and face: negative  Respiratory: negative  Cardiovascular: negative  Gastrointestinal: negative  Genitourinary:negative  Integument/breast: negative  Hematologic/lymphatic: negative  Musculoskeletal:negative  Endocrine: negative           Physical Exam  Blood pressure (!) 141/70, pulse 52, temperature 99.1 °F (37.3 °C), temperature source Oral, resp. rate 17, height 5' 10\" (1.778 m), weight 210 lb 8 oz (95.5 kg), SpO2 98 %.          General Appearance: alert and oriented to person, place and time, well-developed and well-nourished, in no acute distress  Skin: warm and dry, no rash or erythema gross jaundice  Head: normocephalic and atraumatic  Eyes: pupils equal, round, and reactive to light, extraocular eye movements intact, conjunctivae icteric  ENT: hearing grossly normal bilaterally  Neck: neck supple and non tender without mass, no thyromegaly or thyroid nodules, no cervical lymphadenopathy   Pulmonary/Chest: clear to auscultation bilaterally- no wheezes, rales or rhonchi, normal air movement, no respiratory distress  Cardiovascular: normal rate, regular rhythm, normal S1 and S2, no murmurs, rubs, clicks or gallops, distal pulses intact, no carotid bruits  Abdomen: soft, non-tender, non-distended, normal bowel sounds, no masses or organomegaly  Extremities: no cyanosis, clubbing or edema  Musculoskeletal: normal range of motion, no joint swelling, deformity or tenderness  Neurologic: no cranial nerve deficit and muscle strength normal    Data Review:    Recent Labs     07/30/19 2056 07/31/19  0018 07/31/19  0502 07/31/19  1145   WBC 6.6  --  5.8  --    HGB 13.9 12.9* 12.9* 13.2   HCT 41.3  --  38.3*  --    MCV 95.8  --  96.5  --    PLT 54*  --  See Reflexed IPF

## 2019-07-31 NOTE — ED PROVIDER NOTES
eMERGENCY dEPARTMENT eNCOUnter   Attending Attestation     Pt Name: Atul Cohn  MRN: [id number]  [place] 1960  Date of evaluation: 7/30/19   Atul Cohn is a 63 y.o. male with CC: Abdominal Pain and Melena (hx of ulcer)      RADIOLOGY:All plain film, CT, MRI, and formal ultrasound images (except ED bedside ultrasound) are read by the radiologist, see reports below, unless otherwise noted in MDM or here. MRI ABDOMEN W WO CONTRAST MRCP    (Results Pending)     LABS: All lab results were reviewed by myself, and all abnormals are listed below.   Labs Reviewed   CBC WITH AUTO DIFFERENTIAL - Abnormal; Notable for the following components:       Result Value    RDW 14.9 (*)     Platelets 54 (*)     Seg Neutrophils 75 (*)     Lymphocytes 16 (*)     Eosinophils % 0 (*)     Immature Granulocytes 1 (*)     Absolute Lymph # 1.05 (*)     All other components within normal limits   BASIC METABOLIC PANEL - Abnormal; Notable for the following components:    Glucose 148 (*)     CREATININE 0.55 (*)     All other components within normal limits   HEPATIC FUNCTION PANEL - Abnormal; Notable for the following components:    Alb 3.4 (*)      (*)      (*)     Total Bilirubin 4.55 (*)     Bilirubin, Direct 2.41 (*)     Bilirubin, Indirect 2.14 (*)     All other components within normal limits   OCCULT BLOOD SCREEN - Abnormal; Notable for the following components:    Occult Blood, Stool #1 POSITIVE (*)     All other components within normal limits   BASIC METABOLIC PANEL W/ REFLEX TO MG FOR LOW K - Abnormal; Notable for the following components:    Glucose 115 (*)     CREATININE 0.58 (*)     Calcium 8.2 (*)     All other components within normal limits   CBC - Abnormal; Notable for the following components:    RBC 3.97 (*)     Hemoglobin 12.9 (*)     Hematocrit 38.3 (*)     RDW 15.0 (*)     All other components within normal limits   PROTIME-INR - Abnormal; Notable for the following components: Protime 15.2 (*)     All other components within normal limits   HEMOGLOBIN - Abnormal; Notable for the following components:    Hemoglobin 12.9 (*)     All other components within normal limits   IMMATURE PLATELET FRACTION - Abnormal; Notable for the following components:    Platelet, Immature Fraction 13.5 (*)     Platelet, Fluorescence 45 (*)     All other components within normal limits   AFP TUMOR MARKER - Abnormal; Notable for the following components:    AFP (Alpha Fetoprotein) 14.2 (*)     All other components within normal limits   AMMONIA   HEMOGLOBIN   HEMOGLOBIN   HEMOGLOBIN   BASIC METABOLIC PANEL   HEPATIC FUNCTION PANEL   PROTIME-INR           I personally evaluated and examined the patient in conjunction with the APC and agree with the assessment, treatment plan, and disposition of the patient as recorded by the APC.    Carmelita Krishnamurthy MD  Attending Emergency Physician          Gerald Wood MD  08/01/19 1277

## 2019-08-01 ENCOUNTER — ANESTHESIA (OUTPATIENT)
Dept: OPERATING ROOM | Age: 59
DRG: 241 | End: 2019-08-01
Payer: MEDICARE

## 2019-08-01 ENCOUNTER — APPOINTMENT (OUTPATIENT)
Dept: MRI IMAGING | Age: 59
DRG: 241 | End: 2019-08-01
Payer: MEDICARE

## 2019-08-01 VITALS
OXYGEN SATURATION: 94 % | DIASTOLIC BLOOD PRESSURE: 69 MMHG | RESPIRATION RATE: 18 BRPM | SYSTOLIC BLOOD PRESSURE: 151 MMHG

## 2019-08-01 LAB
ALBUMIN SERPL-MCNC: 3.2 G/DL (ref 3.5–5.2)
ALBUMIN/GLOBULIN RATIO: ABNORMAL (ref 1–2.5)
ALP BLD-CCNC: 101 U/L (ref 40–129)
ALT SERPL-CCNC: 88 U/L (ref 5–41)
ANION GAP SERPL CALCULATED.3IONS-SCNC: 11 MMOL/L (ref 9–17)
AST SERPL-CCNC: 158 U/L
BILIRUB SERPL-MCNC: 3.68 MG/DL (ref 0.3–1.2)
BILIRUBIN DIRECT: 2.01 MG/DL
BILIRUBIN, INDIRECT: 1.67 MG/DL (ref 0–1)
BUN BLDV-MCNC: 8 MG/DL (ref 6–20)
BUN/CREAT BLD: 11 (ref 9–20)
CALCIUM SERPL-MCNC: 8.3 MG/DL (ref 8.6–10.4)
CHLORIDE BLD-SCNC: 103 MMOL/L (ref 98–107)
CO2: 24 MMOL/L (ref 20–31)
CREAT SERPL-MCNC: 0.76 MG/DL (ref 0.7–1.2)
GFR AFRICAN AMERICAN: >60 ML/MIN
GFR NON-AFRICAN AMERICAN: >60 ML/MIN
GFR SERPL CREATININE-BSD FRML MDRD: ABNORMAL ML/MIN/{1.73_M2}
GFR SERPL CREATININE-BSD FRML MDRD: ABNORMAL ML/MIN/{1.73_M2}
GLOBULIN: ABNORMAL G/DL (ref 1.5–3.8)
GLUCOSE BLD-MCNC: 111 MG/DL (ref 75–110)
GLUCOSE BLD-MCNC: 99 MG/DL (ref 70–99)
HEMOGLOBIN: 14.1 G/DL (ref 13–17)
HEMOGLOBIN: 14.6 G/DL (ref 13–17)
INR BLD: 1.5
POTASSIUM SERPL-SCNC: 4.1 MMOL/L (ref 3.7–5.3)
PROTHROMBIN TIME: 14.8 SEC (ref 9.7–11.6)
SODIUM BLD-SCNC: 138 MMOL/L (ref 135–144)
TOTAL PROTEIN: 6.3 G/DL (ref 6.4–8.3)

## 2019-08-01 PROCEDURE — 85018 HEMOGLOBIN: CPT

## 2019-08-01 PROCEDURE — 85610 PROTHROMBIN TIME: CPT

## 2019-08-01 PROCEDURE — 80048 BASIC METABOLIC PNL TOTAL CA: CPT

## 2019-08-01 PROCEDURE — 6360000002 HC RX W HCPCS: Performed by: ANESTHESIOLOGY

## 2019-08-01 PROCEDURE — A9579 GAD-BASE MR CONTRAST NOS,1ML: HCPCS | Performed by: INTERNAL MEDICINE

## 2019-08-01 PROCEDURE — 6360000004 HC RX CONTRAST MEDICATION: Performed by: INTERNAL MEDICINE

## 2019-08-01 PROCEDURE — 2580000003 HC RX 258: Performed by: NURSE PRACTITIONER

## 2019-08-01 PROCEDURE — 43235 EGD DIAGNOSTIC BRUSH WASH: CPT | Performed by: INTERNAL MEDICINE

## 2019-08-01 PROCEDURE — 3609017100 HC EGD: Performed by: INTERNAL MEDICINE

## 2019-08-01 PROCEDURE — 7100000010 HC PHASE II RECOVERY - FIRST 15 MIN: Performed by: INTERNAL MEDICINE

## 2019-08-01 PROCEDURE — 80076 HEPATIC FUNCTION PANEL: CPT

## 2019-08-01 PROCEDURE — 6360000002 HC RX W HCPCS: Performed by: NURSE PRACTITIONER

## 2019-08-01 PROCEDURE — 6370000000 HC RX 637 (ALT 250 FOR IP): Performed by: INTERNAL MEDICINE

## 2019-08-01 PROCEDURE — 2700000000 HC OXYGEN THERAPY PER DAY

## 2019-08-01 PROCEDURE — 36415 COLL VENOUS BLD VENIPUNCTURE: CPT

## 2019-08-01 PROCEDURE — 2580000003 HC RX 258: Performed by: INTERNAL MEDICINE

## 2019-08-01 PROCEDURE — 3700000000 HC ANESTHESIA ATTENDED CARE: Performed by: INTERNAL MEDICINE

## 2019-08-01 PROCEDURE — 6370000000 HC RX 637 (ALT 250 FOR IP): Performed by: NURSE PRACTITIONER

## 2019-08-01 PROCEDURE — 99232 SBSQ HOSP IP/OBS MODERATE 35: CPT | Performed by: INTERNAL MEDICINE

## 2019-08-01 PROCEDURE — 7100000011 HC PHASE II RECOVERY - ADDTL 15 MIN: Performed by: INTERNAL MEDICINE

## 2019-08-01 PROCEDURE — 74183 MRI ABD W/O CNTR FLWD CNTR: CPT

## 2019-08-01 PROCEDURE — 0DD68ZX EXTRACTION OF STOMACH, VIA NATURAL OR ARTIFICIAL OPENING ENDOSCOPIC, DIAGNOSTIC: ICD-10-PCS | Performed by: INTERNAL MEDICINE

## 2019-08-01 PROCEDURE — 82947 ASSAY GLUCOSE BLOOD QUANT: CPT

## 2019-08-01 PROCEDURE — C9113 INJ PANTOPRAZOLE SODIUM, VIA: HCPCS | Performed by: NURSE PRACTITIONER

## 2019-08-01 PROCEDURE — 1200000000 HC SEMI PRIVATE

## 2019-08-01 RX ORDER — HYDRALAZINE HYDROCHLORIDE 20 MG/ML
20 INJECTION INTRAMUSCULAR; INTRAVENOUS EVERY 6 HOURS PRN
Status: DISCONTINUED | OUTPATIENT
Start: 2019-08-01 | End: 2019-08-03 | Stop reason: HOSPADM

## 2019-08-01 RX ORDER — 0.9 % SODIUM CHLORIDE 0.9 %
20 INTRAVENOUS SOLUTION INTRAVENOUS
Status: DISCONTINUED | OUTPATIENT
Start: 2019-08-01 | End: 2019-08-03 | Stop reason: HOSPADM

## 2019-08-01 RX ORDER — ONDANSETRON 2 MG/ML
4 INJECTION INTRAMUSCULAR; INTRAVENOUS
Status: DISCONTINUED | OUTPATIENT
Start: 2019-08-01 | End: 2019-08-01 | Stop reason: HOSPADM

## 2019-08-01 RX ORDER — FENTANYL CITRATE 50 UG/ML
50 INJECTION, SOLUTION INTRAMUSCULAR; INTRAVENOUS EVERY 5 MIN PRN
Status: DISCONTINUED | OUTPATIENT
Start: 2019-08-01 | End: 2019-08-01 | Stop reason: HOSPADM

## 2019-08-01 RX ORDER — HYDRALAZINE HYDROCHLORIDE 20 MG/ML
10 INJECTION INTRAMUSCULAR; INTRAVENOUS EVERY 6 HOURS PRN
Status: DISCONTINUED | OUTPATIENT
Start: 2019-08-01 | End: 2019-08-03 | Stop reason: HOSPADM

## 2019-08-01 RX ORDER — SODIUM CHLORIDE 0.9 % (FLUSH) 0.9 %
10 SYRINGE (ML) INJECTION
Status: COMPLETED | OUTPATIENT
Start: 2019-08-01 | End: 2019-08-01

## 2019-08-01 RX ORDER — PROPOFOL 10 MG/ML
INJECTION, EMULSION INTRAVENOUS PRN
Status: DISCONTINUED | OUTPATIENT
Start: 2019-08-01 | End: 2019-08-01 | Stop reason: SDUPTHER

## 2019-08-01 RX ORDER — FENTANYL CITRATE 50 UG/ML
25 INJECTION, SOLUTION INTRAMUSCULAR; INTRAVENOUS EVERY 5 MIN PRN
Status: DISCONTINUED | OUTPATIENT
Start: 2019-08-01 | End: 2019-08-01 | Stop reason: HOSPADM

## 2019-08-01 RX ADMIN — GABAPENTIN 400 MG: 400 CAPSULE ORAL at 14:48

## 2019-08-01 RX ADMIN — PROPOFOL 100 MG: 10 INJECTION, EMULSION INTRAVENOUS at 18:24

## 2019-08-01 RX ADMIN — SODIUM CHLORIDE 8 MG/HR: 9 INJECTION, SOLUTION INTRAVENOUS at 04:05

## 2019-08-01 RX ADMIN — SODIUM CHLORIDE, PRESERVATIVE FREE 10 ML: 5 INJECTION INTRAVENOUS at 07:40

## 2019-08-01 RX ADMIN — GABAPENTIN 400 MG: 400 CAPSULE ORAL at 21:32

## 2019-08-01 RX ADMIN — ROPINIROLE HYDROCHLORIDE 0.5 MG: 0.25 TABLET, FILM COATED ORAL at 21:32

## 2019-08-01 RX ADMIN — FERROUS SULFATE TAB EC 325 MG (65 MG FE EQUIVALENT) 325 MG: 325 (65 FE) TABLET DELAYED RESPONSE at 08:21

## 2019-08-01 RX ADMIN — SODIUM CHLORIDE 8 MG/HR: 9 INJECTION, SOLUTION INTRAVENOUS at 15:02

## 2019-08-01 RX ADMIN — PROPOFOL 80 MG: 10 INJECTION, EMULSION INTRAVENOUS at 18:26

## 2019-08-01 RX ADMIN — GADOTERIDOL 20 ML: 279.3 INJECTION, SOLUTION INTRAVENOUS at 07:40

## 2019-08-01 RX ADMIN — ONDANSETRON 4 MG: 2 INJECTION INTRAMUSCULAR; INTRAVENOUS at 09:12

## 2019-08-01 RX ADMIN — Medication 10 ML: at 20:46

## 2019-08-01 RX ADMIN — GABAPENTIN 400 MG: 400 CAPSULE ORAL at 08:21

## 2019-08-01 RX ADMIN — PRIMIDONE 50 MG: 50 TABLET ORAL at 21:32

## 2019-08-01 RX ADMIN — ACETAMINOPHEN 650 MG: 325 TABLET ORAL at 11:26

## 2019-08-01 RX ADMIN — LISINOPRIL 20 MG: 20 TABLET ORAL at 08:21

## 2019-08-01 RX ADMIN — CEFTRIAXONE SODIUM 1 G: 1 INJECTION, POWDER, FOR SOLUTION INTRAMUSCULAR; INTRAVENOUS at 14:48

## 2019-08-01 RX ADMIN — OCTREOTIDE ACETATE 50 MCG/HR: 200 INJECTION, SOLUTION INTRAVENOUS; SUBCUTANEOUS at 00:43

## 2019-08-01 RX ADMIN — HYDRALAZINE HYDROCHLORIDE 10 MG: 20 INJECTION INTRAMUSCULAR; INTRAVENOUS at 20:45

## 2019-08-01 RX ADMIN — OCTREOTIDE ACETATE 50 MCG/HR: 200 INJECTION, SOLUTION INTRAVENOUS; SUBCUTANEOUS at 15:02

## 2019-08-01 ASSESSMENT — PULMONARY FUNCTION TESTS
PIF_VALUE: 1

## 2019-08-01 ASSESSMENT — PAIN SCALES - GENERAL
PAINLEVEL_OUTOF10: 9
PAINLEVEL_OUTOF10: 8

## 2019-08-01 ASSESSMENT — PAIN DESCRIPTION - LOCATION: LOCATION: ABDOMEN

## 2019-08-01 ASSESSMENT — PAIN DESCRIPTION - PAIN TYPE: TYPE: CHRONIC PAIN

## 2019-08-01 NOTE — PROGRESS NOTES
dyspnea. He denies any dysuria or frequency. He denies any significant myalgias or arthralgias. He denies any blurred vision, double vision or difficulty with hearing. The remainder the review of systems is unremarkable    Medications:      Allergies: No Known Allergies    Current Meds:    cefTRIAXone (ROCEPHIN) IV  1 g Intravenous Q24H    ferrous sulfate  325 mg Oral Daily with breakfast    gabapentin  400 mg Oral TID    lisinopril  20 mg Oral Daily    primidone  50 mg Oral Nightly    rOPINIRole  0.5 mg Oral Nightly    sodium chloride flush  10 mL Intravenous 2 times per day    pantoprazole (PROTONIX) bolus  80 mg Intravenous Once    [START ON 8/3/2019] pantoprazole  40 mg Intravenous Daily    And    [START ON 8/3/2019] sodium chloride (PF)  10 mL Intravenous Daily     PRN Meds:   sodium chloride 20 mL Intravenous As Directed RT PRN   ondansetron 4 mg Intravenous Once PRN   ondansetron 4 mg Oral Q6H PRN   Or      ondansetron 4 mg Intravenous Q6H PRN   sodium chloride flush 10 mL Intravenous PRN   acetaminophen 650 mg Oral Q4H PRN   HYDROcodone 5 mg - acetaminophen 1 tablet Oral Q4H PRN   Or      HYDROcodone 5 mg - acetaminophen 2 tablet Oral Q4H PRN   morphine 2 mg Intravenous Q2H PRN   Or      morphine 4 mg Intravenous Q2H PRN       Data:     Code Status:  Full Code    Labs:    Hematology:  Recent Labs     07/30/19 2056  07/31/19  0502 07/31/19  1145 07/31/19  1924 08/01/19  0808   WBC 6.6  --  5.8  --   --   --    RBC 4.31  --  3.97*  --   --   --    HGB 13.9   < > 12.9* 13.2 13.7 14.6   HCT 41.3  --  38.3*  --   --   --    MCV 95.8  --  96.5  --   --   --    MCH 32.3  --  32.5  --   --   --    MCHC 33.7  --  33.7  --   --   --    RDW 14.9*  --  15.0*  --   --   --    PLT 54*  --  See Reflexed IPF Result  --   --   --    PLTIMMFRAC  --   --  13.5*  --   --   --    PLTFLUORE  --   --  45*  --   --   --    MPV 10.7  --  NOT REPORTED  --   --   --    SEGS 75*  --   --   --   --   --    LYMPHOPCT 16* --   --   --   --   --    MONOPCT 7  --   --   --   --   --    EOSRELPCT 0*  --   --   --   --   --    BASOPCT 1  --   --   --   --   --    PROTIME  --   --  15.2*  --   --  14.8*   INR  --   --  1.5  --   --  1.5    < > = values in this interval not displayed.      Chemistry:  Recent Labs     07/30/19 2056 07/31/19  0502 08/01/19  0808    141 138   K 3.8 3.8 4.1   CL 98 107 103   CO2 24 23 24   GLUCOSE 148* 115* 99   BUN 10 9 8   CREATININE 0.55* 0.58* 0.76   ANIONGAP 13 11 11   LABGLOM >60 >60 >60   GFRAA >60 >60 >60   CALCIUM 8.6 8.2* 8.3*     Recent Labs     07/30/19 2056 08/01/19  0808   PROT 6.5 6.3*   LABALBU 3.4* 3.2*   * 158*   * 88*   ALKPHOS 101 101   BILITOT 4.55* 3.68*   BILIDIR 2.41* 2.01*   AMMONIA 55  --      Glucose:  Recent Labs     08/01/19  1615   POCGLU 111*       Physical Examination:    BP (!) 152/89   Pulse (!) 49   Temp 99.3 °F (37.4 °C) (Oral)   Resp 16   Ht 5' 10\" (1.778 m)   Wt 206 lb 6.4 oz (93.6 kg)   SpO2 97%   BMI 29.62 kg/m²     Intake/Output Summary (Last 24 hours) at 8/1/2019 1730  Last data filed at 8/1/2019 2799  Gross per 24 hour   Intake 1982 ml   Output --   Net 1982 ml       General Appearance:    Alert, cooperative, no distress, appears stated age   Head:    Normocephalic, without obvious abnormality, atraumatic   Eyes:    PERRL, conjunctiva/corneas clear, EOM's intact        Ears:    Normal external ear canals, both ears   Nose:   Nares normal, septum midline, mucosa normal, no drainage    or sinus tenderness   Throat:   Lips, mucosa, and tongue normal; teeth and gums normal   Neck:   Supple, symmetrical, trachea midline, no adenopathy;        thyroid:  No enlargement/tenderness/nodules; no carotid    bruit or JVD   Back:     Symmetric, no curvature, ROM normal, no CVA tenderness   Lungs:     Clear to auscultation bilaterally, respirations unlabored   Chest wall:    No tenderness or deformity   Heart:    Regular rate and rhythm, S1 and S2 normal,

## 2019-08-01 NOTE — FLOWSHEET NOTE
Patient resting in bed; states he thought he had \"bleeding ulcers again\" but that MDs do not think he is bleeding; states he is undergoing testing for abdominal pain; states he had an MRI this morning and is waiting to speak with the MD.    Patient states he is [place]; states his [employer] has been notified; states he and his wife are very involved in their [employer]; states he has a very good support network. Patient expresses no spiritual/emotional needs at this time; thanks writer for visiting. Writer provides listening presence and supportive conversation. Spiritual Care will follow as needed. 08/01/19 1146   Encounter Summary   Services provided to: Patient   [facility]   Support System Spouse; Family members; Yazidism/yaima community   Continue Visiting   (8/1/19)   Complexity of Encounter Moderate   Length of Encounter 15 minutes   Spiritual Assessment Completed Yes   Routine   Type Initial   Assessment Calm; Approachable   Intervention Active listening;Explored feelings, thoughts, concerns   Outcome Expressed gratitude;Expressed feelings/needs/concerns

## 2019-08-02 LAB
ANION GAP SERPL CALCULATED.3IONS-SCNC: 11 MMOL/L (ref 9–17)
BUN BLDV-MCNC: 6 MG/DL (ref 6–20)
BUN/CREAT BLD: 9 (ref 9–20)
CALCIUM SERPL-MCNC: 8.3 MG/DL (ref 8.6–10.4)
CHLORIDE BLD-SCNC: 103 MMOL/L (ref 98–107)
CO2: 24 MMOL/L (ref 20–31)
CREAT SERPL-MCNC: 0.66 MG/DL (ref 0.7–1.2)
GFR AFRICAN AMERICAN: >60 ML/MIN
GFR NON-AFRICAN AMERICAN: >60 ML/MIN
GFR SERPL CREATININE-BSD FRML MDRD: ABNORMAL ML/MIN/{1.73_M2}
GFR SERPL CREATININE-BSD FRML MDRD: ABNORMAL ML/MIN/{1.73_M2}
GLUCOSE BLD-MCNC: 138 MG/DL (ref 70–99)
HEMOGLOBIN: 13.7 G/DL (ref 13–17)
MAGNESIUM: 1.4 MG/DL (ref 1.6–2.6)
MAGNESIUM: 1.7 MG/DL (ref 1.6–2.6)
POTASSIUM SERPL-SCNC: 3.7 MMOL/L (ref 3.7–5.3)
SODIUM BLD-SCNC: 138 MMOL/L (ref 135–144)

## 2019-08-02 PROCEDURE — 87338 HPYLORI STOOL AG IA: CPT

## 2019-08-02 PROCEDURE — 6370000000 HC RX 637 (ALT 250 FOR IP): Performed by: INTERNAL MEDICINE

## 2019-08-02 PROCEDURE — 6360000002 HC RX W HCPCS: Performed by: INTERNAL MEDICINE

## 2019-08-02 PROCEDURE — 2580000003 HC RX 258: Performed by: NURSE PRACTITIONER

## 2019-08-02 PROCEDURE — 83735 ASSAY OF MAGNESIUM: CPT

## 2019-08-02 PROCEDURE — APPNB30 APP NON BILLABLE TIME 0-30 MINS: Performed by: NURSE PRACTITIONER

## 2019-08-02 PROCEDURE — 6370000000 HC RX 637 (ALT 250 FOR IP): Performed by: NURSE PRACTITIONER

## 2019-08-02 PROCEDURE — 1200000000 HC SEMI PRIVATE

## 2019-08-02 PROCEDURE — 36415 COLL VENOUS BLD VENIPUNCTURE: CPT

## 2019-08-02 PROCEDURE — 2580000003 HC RX 258: Performed by: INTERNAL MEDICINE

## 2019-08-02 PROCEDURE — 80048 BASIC METABOLIC PNL TOTAL CA: CPT

## 2019-08-02 PROCEDURE — 6360000002 HC RX W HCPCS: Performed by: NURSE PRACTITIONER

## 2019-08-02 PROCEDURE — C9113 INJ PANTOPRAZOLE SODIUM, VIA: HCPCS | Performed by: NURSE PRACTITIONER

## 2019-08-02 PROCEDURE — 99232 SBSQ HOSP IP/OBS MODERATE 35: CPT | Performed by: INTERNAL MEDICINE

## 2019-08-02 PROCEDURE — C9113 INJ PANTOPRAZOLE SODIUM, VIA: HCPCS | Performed by: INTERNAL MEDICINE

## 2019-08-02 PROCEDURE — 85018 HEMOGLOBIN: CPT

## 2019-08-02 RX ORDER — 0.9 % SODIUM CHLORIDE 0.9 %
10 VIAL (ML) INJECTION 2 TIMES DAILY
Status: DISCONTINUED | OUTPATIENT
Start: 2019-08-02 | End: 2019-08-03 | Stop reason: HOSPADM

## 2019-08-02 RX ORDER — POTASSIUM CHLORIDE 20 MEQ/1
40 TABLET, EXTENDED RELEASE ORAL PRN
Status: DISCONTINUED | OUTPATIENT
Start: 2019-08-02 | End: 2019-08-03 | Stop reason: HOSPADM

## 2019-08-02 RX ORDER — MAGNESIUM SULFATE 1 G/100ML
1 INJECTION INTRAVENOUS PRN
Status: DISCONTINUED | OUTPATIENT
Start: 2019-08-02 | End: 2019-08-03 | Stop reason: HOSPADM

## 2019-08-02 RX ORDER — SUCRALFATE 1 G/1
1 TABLET ORAL
Status: DISCONTINUED | OUTPATIENT
Start: 2019-08-02 | End: 2019-08-03 | Stop reason: HOSPADM

## 2019-08-02 RX ORDER — POTASSIUM CHLORIDE 7.45 MG/ML
10 INJECTION INTRAVENOUS PRN
Status: DISCONTINUED | OUTPATIENT
Start: 2019-08-02 | End: 2019-08-03 | Stop reason: HOSPADM

## 2019-08-02 RX ORDER — BISACODYL 10 MG
10 SUPPOSITORY, RECTAL RECTAL ONCE
Status: COMPLETED | OUTPATIENT
Start: 2019-08-02 | End: 2019-08-02

## 2019-08-02 RX ORDER — PANTOPRAZOLE SODIUM 40 MG/10ML
40 INJECTION, POWDER, LYOPHILIZED, FOR SOLUTION INTRAVENOUS 2 TIMES DAILY
Status: DISCONTINUED | OUTPATIENT
Start: 2019-08-02 | End: 2019-08-03 | Stop reason: HOSPADM

## 2019-08-02 RX ORDER — MAGNESIUM SULFATE 1 G/100ML
1 INJECTION INTRAVENOUS
Status: COMPLETED | OUTPATIENT
Start: 2019-08-02 | End: 2019-08-02

## 2019-08-02 RX ADMIN — PRIMIDONE 50 MG: 50 TABLET ORAL at 20:42

## 2019-08-02 RX ADMIN — OCTREOTIDE ACETATE 50 MCG/HR: 200 INJECTION, SOLUTION INTRAVENOUS; SUBCUTANEOUS at 12:04

## 2019-08-02 RX ADMIN — GABAPENTIN 400 MG: 400 CAPSULE ORAL at 07:44

## 2019-08-02 RX ADMIN — ROPINIROLE HYDROCHLORIDE 0.5 MG: 0.25 TABLET, FILM COATED ORAL at 20:42

## 2019-08-02 RX ADMIN — MAGNESIUM SULFATE HEPTAHYDRATE 1 G: 1 INJECTION, SOLUTION INTRAVENOUS at 16:26

## 2019-08-02 RX ADMIN — PANTOPRAZOLE SODIUM 40 MG: 40 INJECTION, POWDER, FOR SOLUTION INTRAVENOUS at 15:21

## 2019-08-02 RX ADMIN — SODIUM CHLORIDE 8 MG/HR: 9 INJECTION, SOLUTION INTRAVENOUS at 12:04

## 2019-08-02 RX ADMIN — MAGNESIUM SULFATE HEPTAHYDRATE 1 G: 1 INJECTION, SOLUTION INTRAVENOUS at 18:34

## 2019-08-02 RX ADMIN — PANTOPRAZOLE SODIUM 40 MG: 40 INJECTION, POWDER, FOR SOLUTION INTRAVENOUS at 20:42

## 2019-08-02 RX ADMIN — SUCRALFATE 1 G: 1 TABLET ORAL at 20:42

## 2019-08-02 RX ADMIN — Medication 10 ML: at 20:42

## 2019-08-02 RX ADMIN — MAGNESIUM SULFATE HEPTAHYDRATE 1 G: 1 INJECTION, SOLUTION INTRAVENOUS at 17:23

## 2019-08-02 RX ADMIN — FERROUS SULFATE TAB EC 325 MG (65 MG FE EQUIVALENT) 325 MG: 325 (65 FE) TABLET DELAYED RESPONSE at 07:44

## 2019-08-02 RX ADMIN — LISINOPRIL 20 MG: 20 TABLET ORAL at 07:44

## 2019-08-02 RX ADMIN — OCTREOTIDE ACETATE 50 MCG/HR: 200 INJECTION, SOLUTION INTRAVENOUS; SUBCUTANEOUS at 03:01

## 2019-08-02 RX ADMIN — SODIUM CHLORIDE 8 MG/HR: 9 INJECTION, SOLUTION INTRAVENOUS at 03:01

## 2019-08-02 RX ADMIN — HYDROCODONE BITARTRATE AND ACETAMINOPHEN 2 TABLET: 5; 325 TABLET ORAL at 12:04

## 2019-08-02 RX ADMIN — GABAPENTIN 400 MG: 400 CAPSULE ORAL at 12:04

## 2019-08-02 RX ADMIN — BISACODYL 10 MG: 10 SUPPOSITORY RECTAL at 16:32

## 2019-08-02 RX ADMIN — GABAPENTIN 400 MG: 400 CAPSULE ORAL at 20:42

## 2019-08-02 RX ADMIN — ONDANSETRON 4 MG: 2 INJECTION INTRAMUSCULAR; INTRAVENOUS at 08:56

## 2019-08-02 ASSESSMENT — PAIN SCALES - GENERAL
PAINLEVEL_OUTOF10: 10
PAINLEVEL_OUTOF10: 10

## 2019-08-02 NOTE — PROGRESS NOTES
gastric varices  Portal vein hypertension gastropathy     Duodenum:     Severe ulceration with multiple ulcers in the duodenum large ones in the duodenal bulb but also extend to the second portion  Superficial there is no stigmata of recent bleed  Ulcer or not biopsied because of the INR and the platelets        ASSESSMENT/PLAN:  1. Melena with duodenal ulcers and esophageal varices seen on EGD yesterday  -melena resolved and hgb stable  -trend the h/h  -continue the ppi bid will stop the antibiotic and sandostatin drip  -await results of stool h pylori  -dulcolax suppository ordered for constipation    2. Cirrhosis secondary to etoh abuse  -2 gm low salt diet  -will need follow up as outpt with Dr Farshad Renee   -elevated AFP but mri shows no lesions, f/u as outpatient  -trend lft    3. Hepatitis C untreated- managed outpatient        This plan was formulated in collaboration with Dr. Dany Cast . Electronically signed by: REECE Chi - CNP on 8/2/2019 at 12:44 PM     Attending Physician Statement  I have discussed the care of Murtaza Woody and   I have examined the patient myselft independently, and taken ros and hpi , including pertinent history and exam findings,  with the author of this note . I have reviewed the key elements of all parts of the encounter with the nurse practitioner/resident.     I agree with the assessment, plan and orders as documented by the above health care provider       Electronically signed by Dara Finch MD

## 2019-08-02 NOTE — PLAN OF CARE
consistently to request assistance with transfers  Outcome: Ongoing  Goal: STG - patient uses gait belt during all transfers  Outcome: Ongoing     Problem: Falls - Risk of:  Goal: Will remain free from falls  Description  Will remain free from falls  8/2/2019 1121 by Sofia Martinez RN  Outcome: Ongoing  8/2/2019 0421 by Alicia Cannon RN  Outcome: Ongoing  Goal: Absence of physical injury  Description  Absence of physical injury  Outcome: Ongoing

## 2019-08-03 VITALS
OXYGEN SATURATION: 95 % | TEMPERATURE: 98.6 F | HEIGHT: 70 IN | BODY MASS INDEX: 29.58 KG/M2 | RESPIRATION RATE: 16 BRPM | WEIGHT: 206.6 LBS | SYSTOLIC BLOOD PRESSURE: 161 MMHG | DIASTOLIC BLOOD PRESSURE: 77 MMHG | HEART RATE: 53 BPM

## 2019-08-03 LAB
ABSOLUTE EOS #: 0.26 K/UL (ref 0–0.44)
ABSOLUTE IMMATURE GRANULOCYTE: 0.03 K/UL (ref 0–0.3)
ABSOLUTE LYMPH #: 1.34 K/UL (ref 1.1–3.7)
ABSOLUTE MONO #: 0.66 K/UL (ref 0.1–1.2)
ANION GAP SERPL CALCULATED.3IONS-SCNC: 9 MMOL/L (ref 9–17)
BASOPHILS # BLD: 1 % (ref 0–2)
BASOPHILS ABSOLUTE: 0.05 K/UL (ref 0–0.2)
BUN BLDV-MCNC: 6 MG/DL (ref 6–20)
BUN/CREAT BLD: 9 (ref 9–20)
CALCIUM SERPL-MCNC: 8 MG/DL (ref 8.6–10.4)
CHLORIDE BLD-SCNC: 102 MMOL/L (ref 98–107)
CO2: 27 MMOL/L (ref 20–31)
CREAT SERPL-MCNC: 0.68 MG/DL (ref 0.7–1.2)
DIFFERENTIAL TYPE: ABNORMAL
DIRECT EXAM: NEGATIVE
EOSINOPHILS RELATIVE PERCENT: 4 % (ref 1–4)
GFR AFRICAN AMERICAN: >60 ML/MIN
GFR NON-AFRICAN AMERICAN: >60 ML/MIN
GFR SERPL CREATININE-BSD FRML MDRD: ABNORMAL ML/MIN/{1.73_M2}
GFR SERPL CREATININE-BSD FRML MDRD: ABNORMAL ML/MIN/{1.73_M2}
GLUCOSE BLD-MCNC: 121 MG/DL (ref 70–99)
HCT VFR BLD CALC: 41 % (ref 40.7–50.3)
HEMOGLOBIN: 13.9 G/DL (ref 13–17)
IMMATURE GRANULOCYTES: 1 %
LYMPHOCYTES # BLD: 20 % (ref 24–43)
Lab: NORMAL
MAGNESIUM: 1.7 MG/DL (ref 1.6–2.6)
MCH RBC QN AUTO: 32.8 PG (ref 25.2–33.5)
MCHC RBC AUTO-ENTMCNC: 33.9 G/DL (ref 28.4–34.8)
MCV RBC AUTO: 96.7 FL (ref 82.6–102.9)
MONOCYTES # BLD: 10 % (ref 3–12)
NRBC AUTOMATED: 0 PER 100 WBC
PDW BLD-RTO: 14.9 % (ref 11.8–14.4)
PLATELET # BLD: 59 K/UL (ref 138–453)
PLATELET ESTIMATE: ABNORMAL
PMV BLD AUTO: 12.6 FL (ref 8.1–13.5)
POTASSIUM SERPL-SCNC: 3.8 MMOL/L (ref 3.7–5.3)
RBC # BLD: 4.24 M/UL (ref 4.21–5.77)
RBC # BLD: ABNORMAL 10*6/UL
SEG NEUTROPHILS: 64 % (ref 36–65)
SEGMENTED NEUTROPHILS ABSOLUTE COUNT: 4.27 K/UL (ref 1.5–8.1)
SODIUM BLD-SCNC: 138 MMOL/L (ref 135–144)
SPECIMEN DESCRIPTION: NORMAL
WBC # BLD: 6.6 K/UL (ref 3.5–11.3)
WBC # BLD: ABNORMAL 10*3/UL

## 2019-08-03 PROCEDURE — 2580000003 HC RX 258: Performed by: NURSE PRACTITIONER

## 2019-08-03 PROCEDURE — C9113 INJ PANTOPRAZOLE SODIUM, VIA: HCPCS | Performed by: NURSE PRACTITIONER

## 2019-08-03 PROCEDURE — 6360000002 HC RX W HCPCS: Performed by: NURSE PRACTITIONER

## 2019-08-03 PROCEDURE — 36415 COLL VENOUS BLD VENIPUNCTURE: CPT

## 2019-08-03 PROCEDURE — 2580000003 HC RX 258: Performed by: INTERNAL MEDICINE

## 2019-08-03 PROCEDURE — 6370000000 HC RX 637 (ALT 250 FOR IP): Performed by: INTERNAL MEDICINE

## 2019-08-03 PROCEDURE — 80048 BASIC METABOLIC PNL TOTAL CA: CPT

## 2019-08-03 PROCEDURE — 85025 COMPLETE CBC W/AUTO DIFF WBC: CPT

## 2019-08-03 PROCEDURE — 99239 HOSP IP/OBS DSCHRG MGMT >30: CPT | Performed by: FAMILY MEDICINE

## 2019-08-03 PROCEDURE — 83735 ASSAY OF MAGNESIUM: CPT

## 2019-08-03 RX ORDER — SUCRALFATE 1 G/1
1 TABLET ORAL
Qty: 120 TABLET | Refills: 3 | Status: SHIPPED | OUTPATIENT
Start: 2019-08-03 | End: 2019-09-25

## 2019-08-03 RX ORDER — PANTOPRAZOLE SODIUM 40 MG/1
40 TABLET, DELAYED RELEASE ORAL
Qty: 60 TABLET | Refills: 0 | Status: SHIPPED | OUTPATIENT
Start: 2019-08-03 | End: 2019-09-25

## 2019-08-03 RX ADMIN — SUCRALFATE 1 G: 1 TABLET ORAL at 10:52

## 2019-08-03 RX ADMIN — SUCRALFATE 1 G: 1 TABLET ORAL at 06:21

## 2019-08-03 RX ADMIN — Medication 10 ML: at 08:34

## 2019-08-03 RX ADMIN — LISINOPRIL 20 MG: 20 TABLET ORAL at 08:31

## 2019-08-03 RX ADMIN — Medication 10 ML: at 08:32

## 2019-08-03 RX ADMIN — GABAPENTIN 400 MG: 400 CAPSULE ORAL at 08:31

## 2019-08-03 RX ADMIN — PANTOPRAZOLE SODIUM 40 MG: 40 INJECTION, POWDER, FOR SOLUTION INTRAVENOUS at 08:31

## 2019-08-03 RX ADMIN — FERROUS SULFATE TAB EC 325 MG (65 MG FE EQUIVALENT) 325 MG: 325 (65 FE) TABLET DELAYED RESPONSE at 08:31

## 2019-08-03 ASSESSMENT — ENCOUNTER SYMPTOMS
ABDOMINAL PAIN: 0
SHORTNESS OF BREATH: 0
COUGH: 0
CONSTIPATION: 0
BLOOD IN STOOL: 0
DIARRHEA: 0

## 2019-08-03 NOTE — DISCHARGE SUMMARY
[facility]    Discharge Summary     Patient ID: Vianey Tam  :  1960   MRN: [id number]     ACCOUNT:  [de-identified]   Patient's PCP: REECE Crawford CNP  Admit Date: 2019   Discharge Date: 8/3/2019    Length of Stay: 4  Code Status:  Prior  Admitting Physician: Jose Montelongo DO  Discharge Physician: Robbi Lesch, MD     Active Discharge Diagnoses:     Hospital Problem Lists:  Principal Problem:    GI bleed  Active Problems:    Gastrointestinal hemorrhage    Melena    Anemia    Acute blood loss anemia    Alcoholic cirrhosis of liver without ascites (HCC)    Hypertension    Hep C w/o coma, chronic (HCC)    Chronic back pain    History of ETOH abuse    Abdominal pain    Elevated alpha fetoprotein  Resolved Problems:    * No resolved hospital problems. *      Admission Condition:  poor     Discharged Condition: good    Hospital Stay:     Hospital Course:  Vianey Tam is a 63 y.o. male who was admitted for the management of  GI bleed , presented to ER withAbdominal Pain and Melena (hx of ulcer)  He has a known history of gastric ulcer disease and managed by Dr. Rosendo Roberts (gastroenterology). He was admitted due to his melena and + occult blood stool. Placed on a Protonix drip and GI was consulted. He is also started on a Sandostatin drip. AFP was ordered which was 14.2 (nl <8.4), however follow up MRI showed no lesions. Can follow up cirrhosis as outpatient. With regards to the melena, EGD was done and patient was noted to have grade 1 esophageal varices, portal vein hypertension gastropathy, and multiple ulcers in the duodenum. H. pylori was checked and negative. The ulcer was not biopsied due to INR and platelet. Sandostatin and antibiotic were stopped. Continued on PPI and Carafate. On day of discharge, patient felt better. Had a bowel movement and denies any melena at this time.         Significant

## 2019-08-05 ENCOUNTER — OFFICE VISIT (OUTPATIENT)
Dept: ORTHOPEDIC SURGERY | Age: 59
End: 2019-08-05
Payer: MEDICARE

## 2019-08-05 VITALS — HEIGHT: 70 IN | WEIGHT: 206 LBS | BODY MASS INDEX: 29.49 KG/M2

## 2019-08-05 DIAGNOSIS — Z01.818 PRE-OP TESTING: Primary | ICD-10-CM

## 2019-08-05 PROCEDURE — 99203 OFFICE O/P NEW LOW 30 MIN: CPT | Performed by: STUDENT IN AN ORGANIZED HEALTH CARE EDUCATION/TRAINING PROGRAM

## 2019-08-06 ENCOUNTER — TELEPHONE (OUTPATIENT)
Dept: FAMILY MEDICINE CLINIC | Age: 59
End: 2019-08-06

## 2019-08-06 NOTE — PROGRESS NOTES
gastric ulcer    UPPER GASTROINTESTINAL ENDOSCOPY  03/14/2018    mod portal hypertensive gastrophathy    UPPER GASTROINTESTINAL ENDOSCOPY N/A 3/14/2018    EGD BIOPSY performed by Char Kuhn MD at [address] [place] [address] N/A 1/22/2019    EGD BIOPSY performed by Ace Maharaj MD at [address] [place] [address]  02/01/2019    BIOPSY    UPPER GASTROINTESTINAL ENDOSCOPY N/A 2/1/2019    EGD BIOPSY performed by Sherman Nuñez MD at [address] [place] [address]  08/01/2019    UPPER GASTROINTESTINAL ENDOSCOPY  08/01/2019    UPPER GASTROINTESTINAL ENDOSCOPY N/A 8/1/2019    EGD ESOPHAGOGASTRODUODENOSCOPY performed by Wei Jaime MD at [address] [place] [address]     Current Medications:   Current Outpatient Medications   Medication Sig Dispense Refill    sucralfate (CARAFATE) 1 GM tablet Take 1 tablet by mouth 4 times daily (before meals and nightly) 120 tablet 3    pantoprazole (PROTONIX) 40 MG tablet Take 1 tablet by mouth 2 times daily (before meals) 60 tablet 0    primidone (MYSOLINE) 50 MG tablet Take 1 po qhs 30 tablet 3    gabapentin (NEURONTIN) 400 MG capsule TAKE ONE CAPSULE BY MOUTH THREE TIMES A DAY 90 capsule 2    Elastic Bandages & Supports (LUMBAR BACK BRACE/SUPPORT PAD) MISC 1 each by Does not apply route daily as needed (back pain) 1 each 0    lisinopril (PRINIVIL;ZESTRIL) 20 MG tablet TAKE ONE TABLET BY MOUTH DAILY 30 tablet 5    ferrous sulfate 325 (65 Fe) MG tablet Take 1 tablet by mouth daily (with breakfast) 90 tablet 1    rOPINIRole (REQUIP) 0.5 MG tablet TAKE ONE TABLET BY MOUTH ONCE NIGHTLY 30 tablet 5    Multiple Vitamins-Minerals (THERAPEUTIC MULTIVITAMIN-MINERALS) tablet Take 1 tablet by mouth daily       No current facility-administered medications for this visit. Allergies:    Patient has no known allergies.   Social History:   Social History     Socioeconomic History    Marital status:      Spouse name: Not on file    Number

## 2019-08-08 ENCOUNTER — TELEPHONE (OUTPATIENT)
Dept: ORTHOPEDIC SURGERY | Age: 59
End: 2019-08-08

## 2019-08-08 NOTE — TELEPHONE ENCOUNTER
PATIENT NEEDS TO CANCEL SURGERY    Right knee arthroscopy with menisectomy scheduled for 8/21/19 with Dr. Eulailo [place]. Patient called stating that he will not be able to have surgery or he will lose his job. He would like to start with non operative treatment instead of surgery. I scheduled him an appointment for Monday 8/12/19 for possible injection. Patient is requesting a note to return to work with NO restrictions.     Please advise

## 2019-08-12 ENCOUNTER — OFFICE VISIT (OUTPATIENT)
Dept: ORTHOPEDIC SURGERY | Age: 59
End: 2019-08-12
Payer: COMMERCIAL

## 2019-08-12 VITALS — WEIGHT: 205.91 LBS | HEIGHT: 70 IN | BODY MASS INDEX: 29.48 KG/M2

## 2019-08-12 DIAGNOSIS — M25.561 ACUTE PAIN OF RIGHT KNEE: ICD-10-CM

## 2019-08-12 DIAGNOSIS — S83.241D TEAR OF MEDIAL MENISCUS OF RIGHT KNEE, CURRENT, UNSPECIFIED TEAR TYPE, SUBSEQUENT ENCOUNTER: Primary | ICD-10-CM

## 2019-08-12 PROCEDURE — 99212 OFFICE O/P EST SF 10 MIN: CPT | Performed by: ORTHOPAEDIC SURGERY

## 2019-08-12 PROCEDURE — 20610 DRAIN/INJ JOINT/BURSA W/O US: CPT | Performed by: ORTHOPAEDIC SURGERY

## 2019-08-12 RX ORDER — BUPIVACAINE HYDROCHLORIDE 2.5 MG/ML
2 INJECTION, SOLUTION INFILTRATION; PERINEURAL ONCE
Status: COMPLETED | OUTPATIENT
Start: 2019-08-12 | End: 2019-08-13

## 2019-08-12 RX ORDER — METHYLPREDNISOLONE ACETATE 80 MG/ML
80 INJECTION, SUSPENSION INTRA-ARTICULAR; INTRALESIONAL; INTRAMUSCULAR; SOFT TISSUE ONCE
Status: COMPLETED | OUTPATIENT
Start: 2019-08-12 | End: 2019-08-13

## 2019-08-12 NOTE — PROGRESS NOTES
emergency room after business hours. Follow up:Return if symptoms worsen or fail to improve.     Orders Placed This Encounter   Medications    bupivacaine (MARCAINE) 0.25 % injection 5 mg    methylPREDNISolone acetate (DEPO-MEDROL) injection 80 mg          Orders Placed This Encounter   Procedures    CA ARTHROCENTESIS ASPIR&/INJ MAJOR JT/BURSA W/O US Jazzmine Noel DO  Orthopedic Surgery Resident, PGY-1  [facility]

## 2019-08-13 RX ADMIN — METHYLPREDNISOLONE ACETATE 80 MG: 80 INJECTION, SUSPENSION INTRA-ARTICULAR; INTRALESIONAL; INTRAMUSCULAR; SOFT TISSUE at 08:12

## 2019-08-13 RX ADMIN — BUPIVACAINE HYDROCHLORIDE 5 MG: 2.5 INJECTION, SOLUTION INFILTRATION; PERINEURAL at 08:12

## 2019-08-14 DIAGNOSIS — D50.9 IRON DEFICIENCY ANEMIA, UNSPECIFIED IRON DEFICIENCY ANEMIA TYPE: ICD-10-CM

## 2019-08-14 RX ORDER — FERROUS SULFATE 325(65) MG
TABLET ORAL
Qty: 90 TABLET | Refills: 1 | Status: SHIPPED | OUTPATIENT
Start: 2019-08-14 | End: 2020-02-10

## 2019-08-27 ENCOUNTER — APPOINTMENT (OUTPATIENT)
Dept: GENERAL RADIOLOGY | Age: 59
End: 2019-08-27
Payer: COMMERCIAL

## 2019-08-27 ENCOUNTER — HOSPITAL ENCOUNTER (EMERGENCY)
Age: 59
Discharge: HOME | End: 2019-08-27
Attending: EMERGENCY MEDICINE
Payer: COMMERCIAL

## 2019-08-27 VITALS
WEIGHT: 211.1 LBS | TEMPERATURE: 98.8 F | RESPIRATION RATE: 18 BRPM | BODY MASS INDEX: 30.22 KG/M2 | DIASTOLIC BLOOD PRESSURE: 68 MMHG | OXYGEN SATURATION: 96 % | HEART RATE: 73 BPM | SYSTOLIC BLOOD PRESSURE: 134 MMHG | HEIGHT: 70 IN

## 2019-08-27 DIAGNOSIS — S50.02XA CONTUSION OF LEFT ELBOW, INITIAL ENCOUNTER: Primary | ICD-10-CM

## 2019-08-27 PROCEDURE — 73080 X-RAY EXAM OF ELBOW: CPT

## 2019-08-27 PROCEDURE — 99283 EMERGENCY DEPT VISIT LOW MDM: CPT

## 2019-08-27 PROCEDURE — 6370000000 HC RX 637 (ALT 250 FOR IP): Performed by: NURSE PRACTITIONER

## 2019-08-27 RX ORDER — HYDROCODONE BITARTRATE AND ACETAMINOPHEN 5; 325 MG/1; MG/1
1 TABLET ORAL ONCE
Status: COMPLETED | OUTPATIENT
Start: 2019-08-27 | End: 2019-08-27

## 2019-08-27 RX ORDER — HYDROCODONE BITARTRATE AND ACETAMINOPHEN 5; 325 MG/1; MG/1
1 TABLET ORAL EVERY 6 HOURS PRN
Qty: 20 TABLET | Refills: 0 | Status: SHIPPED | OUTPATIENT
Start: 2019-08-27 | End: 2019-09-01

## 2019-08-27 RX ADMIN — HYDROCODONE BITARTRATE AND ACETAMINOPHEN 1 TABLET: 5; 325 TABLET ORAL at 16:13

## 2019-08-27 ASSESSMENT — PAIN SCALES - GENERAL
PAINLEVEL_OUTOF10: 10
PAINLEVEL_OUTOF10: 10

## 2019-08-27 ASSESSMENT — PAIN DESCRIPTION - LOCATION: LOCATION: ELBOW

## 2019-08-27 ASSESSMENT — PAIN DESCRIPTION - PAIN TYPE: TYPE: ACUTE PAIN

## 2019-08-27 ASSESSMENT — PAIN DESCRIPTION - DESCRIPTORS: DESCRIPTORS: CONSTANT;DULL

## 2019-08-27 ASSESSMENT — PAIN DESCRIPTION - ORIENTATION: ORIENTATION: LEFT

## 2019-08-27 ASSESSMENT — PAIN DESCRIPTION - PROGRESSION: CLINICAL_PROGRESSION: NOT CHANGED

## 2019-08-27 ASSESSMENT — PAIN DESCRIPTION - FREQUENCY: FREQUENCY: CONTINUOUS

## 2019-08-27 ASSESSMENT — PAIN DESCRIPTION - ONSET: ONSET: ON-GOING

## 2019-09-04 ENCOUNTER — OFFICE VISIT (OUTPATIENT)
Dept: ORTHOPEDIC SURGERY | Age: 59
End: 2019-09-04
Payer: MEDICARE

## 2019-09-04 VITALS — WEIGHT: 211 LBS | BODY MASS INDEX: 30.21 KG/M2 | HEIGHT: 70 IN

## 2019-09-04 DIAGNOSIS — S83.241D TEAR OF MEDIAL MENISCUS OF RIGHT KNEE, CURRENT, UNSPECIFIED TEAR TYPE, SUBSEQUENT ENCOUNTER: Primary | ICD-10-CM

## 2019-09-04 PROCEDURE — G8417 CALC BMI ABV UP PARAM F/U: HCPCS | Performed by: STUDENT IN AN ORGANIZED HEALTH CARE EDUCATION/TRAINING PROGRAM

## 2019-09-04 PROCEDURE — 99212 OFFICE O/P EST SF 10 MIN: CPT | Performed by: STUDENT IN AN ORGANIZED HEALTH CARE EDUCATION/TRAINING PROGRAM

## 2019-09-04 PROCEDURE — G8427 DOCREV CUR MEDS BY ELIG CLIN: HCPCS | Performed by: STUDENT IN AN ORGANIZED HEALTH CARE EDUCATION/TRAINING PROGRAM

## 2019-09-04 PROCEDURE — 4004F PT TOBACCO SCREEN RCVD TLK: CPT | Performed by: STUDENT IN AN ORGANIZED HEALTH CARE EDUCATION/TRAINING PROGRAM

## 2019-09-04 PROCEDURE — 3017F COLORECTAL CA SCREEN DOC REV: CPT | Performed by: STUDENT IN AN ORGANIZED HEALTH CARE EDUCATION/TRAINING PROGRAM

## 2019-09-04 RX ORDER — ASPIRIN 81 MG/1
81 TABLET, CHEWABLE ORAL DAILY
Status: ON HOLD | COMMUNITY
End: 2020-10-28 | Stop reason: HOSPADM

## 2019-09-13 DIAGNOSIS — G25.81 RLS (RESTLESS LEGS SYNDROME): ICD-10-CM

## 2019-09-13 RX ORDER — ROPINIROLE 0.5 MG/1
TABLET, FILM COATED ORAL
Qty: 30 TABLET | Refills: 4 | Status: SHIPPED | OUTPATIENT
Start: 2019-09-13 | End: 2020-02-10

## 2019-09-23 ENCOUNTER — OFFICE VISIT (OUTPATIENT)
Dept: ORTHOPEDIC SURGERY | Age: 59
End: 2019-09-23
Payer: MEDICARE

## 2019-09-23 ENCOUNTER — HOSPITAL ENCOUNTER (OUTPATIENT)
Age: 59
Setting detail: SPECIMEN
Discharge: HOME | End: 2019-09-23
Payer: MEDICARE

## 2019-09-23 VITALS — HEIGHT: 70 IN | WEIGHT: 211 LBS | BODY MASS INDEX: 30.21 KG/M2

## 2019-09-23 DIAGNOSIS — M71.122 INFECTION OF LEFT OLECRANON BURSA: ICD-10-CM

## 2019-09-23 DIAGNOSIS — M71.122 INFECTION OF LEFT OLECRANON BURSA: Primary | ICD-10-CM

## 2019-09-23 PROCEDURE — 4004F PT TOBACCO SCREEN RCVD TLK: CPT | Performed by: ORTHOPAEDIC SURGERY

## 2019-09-23 PROCEDURE — G8417 CALC BMI ABV UP PARAM F/U: HCPCS | Performed by: ORTHOPAEDIC SURGERY

## 2019-09-23 PROCEDURE — 99213 OFFICE O/P EST LOW 20 MIN: CPT | Performed by: ORTHOPAEDIC SURGERY

## 2019-09-23 PROCEDURE — 3017F COLORECTAL CA SCREEN DOC REV: CPT | Performed by: ORTHOPAEDIC SURGERY

## 2019-09-23 PROCEDURE — 20605 DRAIN/INJ JOINT/BURSA W/O US: CPT | Performed by: ORTHOPAEDIC SURGERY

## 2019-09-23 PROCEDURE — G8427 DOCREV CUR MEDS BY ELIG CLIN: HCPCS | Performed by: ORTHOPAEDIC SURGERY

## 2019-09-23 RX ORDER — SULFAMETHOXAZOLE AND TRIMETHOPRIM 800; 160 MG/1; MG/1
1 TABLET ORAL 2 TIMES DAILY
Qty: 20 TABLET | Refills: 0 | Status: SHIPPED | OUTPATIENT
Start: 2019-09-23 | End: 2019-09-30

## 2019-09-25 ENCOUNTER — OFFICE VISIT (OUTPATIENT)
Dept: FAMILY MEDICINE CLINIC | Age: 59
End: 2019-09-25
Payer: MEDICARE

## 2019-09-25 VITALS
TEMPERATURE: 97.5 F | SYSTOLIC BLOOD PRESSURE: 102 MMHG | BODY MASS INDEX: 31.08 KG/M2 | HEART RATE: 68 BPM | WEIGHT: 216.6 LBS | DIASTOLIC BLOOD PRESSURE: 52 MMHG | OXYGEN SATURATION: 98 %

## 2019-09-25 DIAGNOSIS — Z23 NEED FOR INFLUENZA VACCINATION: ICD-10-CM

## 2019-09-25 DIAGNOSIS — I10 ESSENTIAL HYPERTENSION: ICD-10-CM

## 2019-09-25 DIAGNOSIS — G62.9 NEUROPATHY: Primary | ICD-10-CM

## 2019-09-25 LAB
CULTURE: ABNORMAL
CULTURE: ABNORMAL
DIRECT EXAM: ABNORMAL
DIRECT EXAM: ABNORMAL
Lab: ABNORMAL
SPECIMEN DESCRIPTION: ABNORMAL

## 2019-09-25 PROCEDURE — 90688 IIV4 VACCINE SPLT 0.5 ML IM: CPT | Performed by: NURSE PRACTITIONER

## 2019-09-25 PROCEDURE — 1036F TOBACCO NON-USER: CPT | Performed by: NURSE PRACTITIONER

## 2019-09-25 PROCEDURE — 99214 OFFICE O/P EST MOD 30 MIN: CPT | Performed by: NURSE PRACTITIONER

## 2019-09-25 PROCEDURE — 3017F COLORECTAL CA SCREEN DOC REV: CPT | Performed by: NURSE PRACTITIONER

## 2019-09-25 PROCEDURE — 90471 IMMUNIZATION ADMIN: CPT | Performed by: NURSE PRACTITIONER

## 2019-09-25 PROCEDURE — G8417 CALC BMI ABV UP PARAM F/U: HCPCS | Performed by: NURSE PRACTITIONER

## 2019-09-25 PROCEDURE — G8427 DOCREV CUR MEDS BY ELIG CLIN: HCPCS | Performed by: NURSE PRACTITIONER

## 2019-09-25 RX ORDER — GABAPENTIN 600 MG/1
600 TABLET ORAL 3 TIMES DAILY
Qty: 90 TABLET | Refills: 2 | Status: SHIPPED | OUTPATIENT
Start: 2019-09-25 | End: 2019-12-23

## 2019-09-25 RX ORDER — LISINOPRIL 20 MG/1
TABLET ORAL
Qty: 30 TABLET | Refills: 5 | Status: SHIPPED | OUTPATIENT
Start: 2019-09-25 | End: 2020-04-13

## 2019-09-25 ASSESSMENT — ENCOUNTER SYMPTOMS
SHORTNESS OF BREATH: 0
COUGH: 0

## 2019-09-25 NOTE — PROGRESS NOTES
medications, allergies, past medical, surgical, social and family histories were reviewed and updated asappropriate. Current Outpatient Medications   Medication Sig Dispense Refill    gabapentin (NEURONTIN) 600 MG tablet Take 1 tablet by mouth 3 times daily for 90 days. 90 tablet 2    lisinopril (PRINIVIL;ZESTRIL) 20 MG tablet TAKE ONE TABLET BY MOUTH DAILY 30 tablet 5    sulfamethoxazole-trimethoprim (BACTRIM DS;SEPTRA DS) 800-160 MG per tablet Take 1 tablet by mouth 2 times daily for 10 days 20 tablet 0    rOPINIRole (REQUIP) 0.5 MG tablet TAKE ONE TABLET BY MOUTH ONCE NIGHTLY 30 tablet 4    aspirin 81 MG chewable tablet Take 81 mg by mouth daily      ferrous sulfate 325 (65 Fe) MG tablet TAKE ONE TABLET BY MOUTH DAILY WITH BREAKFAST 90 tablet 1    primidone (MYSOLINE) 50 MG tablet Take 1 po qhs 30 tablet 3    Multiple Vitamins-Minerals (THERAPEUTIC MULTIVITAMIN-MINERALS) tablet Take 1 tablet by mouth daily       No current facility-administered medications for this visit.       Past Medical History:   Diagnosis Date    Chronic back pain     present pain mgmnt    Gastric ulcer 10/31/2015    large 2-3 cm    Hematuria     Hep C w/o coma, chronic (HCC)     History of blood transfusion     History of ETOH abuse     Hypertension     [facility] ([facility]     Seizures ([facility]     only one in 2016      Past Surgical History:   Procedure Laterality Date    BACK SURGERY      lumbar discectomy    COLONOSCOPY  03/14/2018    mod diverticulosis; internal hemorrhoids; retained stools    ENDOSCOPY, COLON, DIAGNOSTIC      HERNIA REPAIR      lt inguinal    NV COLON CA SCRN NOT HI RSK IND N/A 3/14/2018    COLONOSCOPY performed by Mauricio Witt MD at [address] [facility]  10/31/2015    large gastric ulcer    UPPER GASTROINTESTINAL ENDOSCOPY  03/14/2018    mod portal hypertensive gastrophathy    UPPER GASTROINTESTINAL ENDOSCOPY N/A 3/14/2018    EGD Effort normal and breath sounds normal.   Musculoskeletal: Normal range of motion. Neurological: He is alert and oriented to person, place, and time. Skin: Skin is warm and dry. Psychiatric: He has a normal mood and affect. Assessment/PLAN   1. Neuropathy    - gabapentin (NEURONTIN) 600 MG tablet; Take 1 tablet by mouth 3 times daily for 90 days. Dispense: 90 tablet; Refill: 2    2. Essential hypertension    - lisinopril (PRINIVIL;ZESTRIL) 20 MG tablet; TAKE ONE TABLET BY MOUTH DAILY  Dispense: 30 tablet; Refill: 5    3. Need for influenza vaccination    - INFLUENZA, QUADV, 3 YRS AND OLDER, IM, MDV, 0.5ML (Glenda Host)      RTO if symptoms worsen or fail to improve  Pt agreeable with plan      Patient given educational materials - see patientinstructions. Discussed use, benefit, and side effects of prescribed medications. All patient questions answered. Pt voiced understanding. Reviewed health maintenance. Instructed to continue current medications, diet andexercise. 1.  Yogi received counseling on the following healthy behaviors: nutrition, exercise and medication adherence  2. Patient given educationalmaterials when available - see patient instructions when applicable  3. Discussed use, benefit, and side effects of prescribed medications. Barriersto medication compliance addressed. All patient questions answered. Pt voiced understanding. 4.  Reviewed prior labs and health maintenance  5. Continue current medications, diet and exercise. CompletedRefills   Requested Prescriptions     Signed Prescriptions Disp Refills    gabapentin (NEURONTIN) 600 MG tablet 90 tablet 2     Sig: Take 1 tablet by mouth 3 times daily for 90 days.     lisinopril (PRINIVIL;ZESTRIL) 20 MG tablet 30 tablet 5     Sig: TAKE ONE TABLET BY MOUTH DAILY         Electronically signed by Isidro Blair CNP on 9/25/2019 at 9:14 AM

## 2019-09-25 NOTE — PROGRESS NOTES
Patient is present for medication refills    Patient would like a refill on gabapentin. Patient would like to discuss increasing the dose  States he used to be on 600mg    Pharmacy and medication reviewed with patient    Visit Information    Have you changed or started any medications since your last visit including any over-the-counter medicines, vitamins, or herbal medicines? no   Have you stopped taking any of your medications? Is so, why? -  no  Are you having any side effects from any of your medications? - no    Have you seen any other physician or provider since your last visit? yes - ortho   Have you had any other diagnostic tests since your last visit? yes -    Have you been seen in the emergency room and/or had an admission in a hospital since we last saw you?  yes -    Have you had your routine dental cleaning in the past 6 months?  no     Do you have an active MyChart account? If no, what is the barrier?   Yes    Patient Care Team:  REECE Holliday CNP as PCP - General (Certified Nurse Practitioner)  REECE Holliday CNP as PCP - [facility] Provider  Odalis Arreola MD as Consulting Physician (Gastroenterology)  Anat Wasserman DPM as Physician (Podiatry)    Medical History Review  Past Medical, Family, and Social History reviewed and does contribute to the patient presenting condition    Health Maintenance   Topic Date Due    Hepatitis B Vaccine (2 of 3 - Risk 3-dose series) 12/06/2018    Hepatitis A vaccine (2 of 2 - Risk 2-dose series) 05/08/2019    Flu vaccine (1) 09/01/2019    Shingles Vaccine (1 of 2) 04/09/2020 (Originally 10/24/2011)    Potassium monitoring  08/03/2020    Creatinine monitoring  08/03/2020    Diabetes screen  11/01/2020    Lipid screen  11/01/2022    Colon cancer screen colonoscopy  03/14/2023    DTaP/Tdap/Td vaccine (2 - Td) 04/16/2028    Pneumococcal 0-64 years Vaccine  Completed    HIV screen  Completed

## 2019-09-27 ENCOUNTER — TELEPHONE (OUTPATIENT)
Dept: ORTHOPEDIC SURGERY | Age: 59
End: 2019-09-27

## 2019-09-29 ENCOUNTER — FOLLOWUP TELEPHONE ENCOUNTER (OUTPATIENT)
Dept: ORTHOPEDIC SURGERY | Age: 59
End: 2019-09-29

## 2019-09-30 ENCOUNTER — OFFICE VISIT (OUTPATIENT)
Dept: ORTHOPEDIC SURGERY | Age: 59
End: 2019-09-30
Payer: MEDICARE

## 2019-09-30 VITALS — WEIGHT: 216.49 LBS | BODY MASS INDEX: 30.99 KG/M2 | HEIGHT: 70 IN

## 2019-09-30 DIAGNOSIS — M71.122 INFECTION OF LEFT OLECRANON BURSA: Primary | ICD-10-CM

## 2019-09-30 PROCEDURE — 1036F TOBACCO NON-USER: CPT | Performed by: STUDENT IN AN ORGANIZED HEALTH CARE EDUCATION/TRAINING PROGRAM

## 2019-09-30 PROCEDURE — G8417 CALC BMI ABV UP PARAM F/U: HCPCS | Performed by: STUDENT IN AN ORGANIZED HEALTH CARE EDUCATION/TRAINING PROGRAM

## 2019-09-30 PROCEDURE — G8427 DOCREV CUR MEDS BY ELIG CLIN: HCPCS | Performed by: STUDENT IN AN ORGANIZED HEALTH CARE EDUCATION/TRAINING PROGRAM

## 2019-09-30 PROCEDURE — 99213 OFFICE O/P EST LOW 20 MIN: CPT | Performed by: STUDENT IN AN ORGANIZED HEALTH CARE EDUCATION/TRAINING PROGRAM

## 2019-09-30 PROCEDURE — 3017F COLORECTAL CA SCREEN DOC REV: CPT | Performed by: STUDENT IN AN ORGANIZED HEALTH CARE EDUCATION/TRAINING PROGRAM

## 2019-09-30 RX ORDER — SULFAMETHOXAZOLE AND TRIMETHOPRIM 800; 160 MG/1; MG/1
1 TABLET ORAL 2 TIMES DAILY
Qty: 14 TABLET | Refills: 0 | Status: SHIPPED | OUTPATIENT
Start: 2019-10-02 | End: 2019-10-28 | Stop reason: SDUPTHER

## 2019-09-30 ASSESSMENT — ENCOUNTER SYMPTOMS
VOMITING: 0
DIARRHEA: 0
APNEA: 0
CHOKING: 0

## 2019-09-30 NOTE — PROGRESS NOTES
[facility]  [address] [facility] [address]  Dept: [phone]  Dept Fax: [phone]        Ambulatory Follow Up      Subjective:   Chano Cardoso is a 63y.o. year old male who presents to our office today for routine followup regarding his   1. Infection of left olecranon bursa    . Chief Complaint   Patient presents with    Elbow Pain     left     Patient here today for follow-up regarding his left septic olecranon bursitis. He was here 1 week ago where it was aspirated and sent for cultures. He was placed on Bactrim. He states that it has improved significantly. He states prior to last week he was unable to flex his elbow. Patient can reach up to his mouth. He says now he has no trouble doing that and has mostly full painless range of motion of the elbow. He still has some fluid collection noted to the olecranon bursa. He states that it occasionally drains very minimal amounts. He has been doing moist warm compresses to the elbow. No other complaints at this time. Review of Systems   Constitutional: Negative for chills and fever. Respiratory: Negative for apnea and choking. Cardiovascular: Negative for chest pain and palpitations. Gastrointestinal: Negative for diarrhea and vomiting. Musculoskeletal: Positive for arthralgias and joint swelling. I have reviewed the CC, HPI, ROS, PMH, FHX, Social History. I agree with the documentation provided by other staff, residents, and/or medical students and have reviewed their documentation prior to providing my signature indicating agreement. Objective :   General: Chano Cardoso is a 63 y.o. male who is alert and oriented and sitting comfortably in our office. Ortho Exam  MS: LUE: Erythema and palpable fluid collection noted to posterior elbow with minimal tenderness to palpation. Patient has full elbow active ROM with minimal pain in deep flexion of the elbow.  No open

## 2019-10-07 ENCOUNTER — OFFICE VISIT (OUTPATIENT)
Dept: ORTHOPEDIC SURGERY | Age: 59
End: 2019-10-07
Payer: MEDICARE

## 2019-10-07 VITALS — BODY MASS INDEX: 30.21 KG/M2 | WEIGHT: 211 LBS | HEIGHT: 70 IN

## 2019-10-07 DIAGNOSIS — M71.122 SEPTIC OLECRANON BURSITIS OF LEFT ELBOW: Primary | ICD-10-CM

## 2019-10-07 PROCEDURE — 1036F TOBACCO NON-USER: CPT | Performed by: ORTHOPAEDIC SURGERY

## 2019-10-07 PROCEDURE — G8417 CALC BMI ABV UP PARAM F/U: HCPCS | Performed by: ORTHOPAEDIC SURGERY

## 2019-10-07 PROCEDURE — 3017F COLORECTAL CA SCREEN DOC REV: CPT | Performed by: ORTHOPAEDIC SURGERY

## 2019-10-07 PROCEDURE — G8482 FLU IMMUNIZE ORDER/ADMIN: HCPCS | Performed by: ORTHOPAEDIC SURGERY

## 2019-10-07 PROCEDURE — 99214 OFFICE O/P EST MOD 30 MIN: CPT | Performed by: ORTHOPAEDIC SURGERY

## 2019-10-07 PROCEDURE — G8427 DOCREV CUR MEDS BY ELIG CLIN: HCPCS | Performed by: ORTHOPAEDIC SURGERY

## 2019-10-14 ENCOUNTER — OFFICE VISIT (OUTPATIENT)
Dept: ORTHOPEDIC SURGERY | Age: 59
End: 2019-10-14
Payer: MEDICARE

## 2019-10-14 VITALS — WEIGHT: 210.98 LBS | HEIGHT: 70 IN | BODY MASS INDEX: 30.2 KG/M2

## 2019-10-14 DIAGNOSIS — M71.122 SEPTIC OLECRANON BURSITIS OF LEFT ELBOW: Primary | ICD-10-CM

## 2019-10-14 PROCEDURE — 3017F COLORECTAL CA SCREEN DOC REV: CPT | Performed by: ORTHOPAEDIC SURGERY

## 2019-10-14 PROCEDURE — G8482 FLU IMMUNIZE ORDER/ADMIN: HCPCS | Performed by: ORTHOPAEDIC SURGERY

## 2019-10-14 PROCEDURE — G8427 DOCREV CUR MEDS BY ELIG CLIN: HCPCS | Performed by: ORTHOPAEDIC SURGERY

## 2019-10-14 PROCEDURE — 4004F PT TOBACCO SCREEN RCVD TLK: CPT | Performed by: ORTHOPAEDIC SURGERY

## 2019-10-14 PROCEDURE — G8417 CALC BMI ABV UP PARAM F/U: HCPCS | Performed by: ORTHOPAEDIC SURGERY

## 2019-10-14 PROCEDURE — 99214 OFFICE O/P EST MOD 30 MIN: CPT | Performed by: ORTHOPAEDIC SURGERY

## 2019-10-14 RX ORDER — PRIMIDONE 50 MG/1
TABLET ORAL
Qty: 30 TABLET | Refills: 1 | Status: SHIPPED | OUTPATIENT
Start: 2019-10-14 | End: 2019-11-19 | Stop reason: SDUPTHER

## 2019-10-16 ENCOUNTER — HOSPITAL ENCOUNTER (OUTPATIENT)
Age: 59
Setting detail: OUTPATIENT SURGERY
Discharge: HOME | End: 2019-10-16
Attending: ORTHOPAEDIC SURGERY | Admitting: ORTHOPAEDIC SURGERY
Payer: MEDICARE

## 2019-10-16 ENCOUNTER — ANESTHESIA (OUTPATIENT)
Dept: OPERATING ROOM | Age: 59
End: 2019-10-16
Payer: MEDICARE

## 2019-10-16 ENCOUNTER — ANESTHESIA EVENT (OUTPATIENT)
Dept: OPERATING ROOM | Age: 59
End: 2019-10-16
Payer: MEDICARE

## 2019-10-16 VITALS
SYSTOLIC BLOOD PRESSURE: 122 MMHG | HEIGHT: 70 IN | BODY MASS INDEX: 30.78 KG/M2 | HEART RATE: 59 BPM | RESPIRATION RATE: 16 BRPM | OXYGEN SATURATION: 96 % | DIASTOLIC BLOOD PRESSURE: 70 MMHG | TEMPERATURE: 96.8 F | WEIGHT: 215 LBS

## 2019-10-16 VITALS — SYSTOLIC BLOOD PRESSURE: 110 MMHG | DIASTOLIC BLOOD PRESSURE: 67 MMHG | OXYGEN SATURATION: 100 % | TEMPERATURE: 92.6 F

## 2019-10-16 DIAGNOSIS — G89.18 POST-OP PAIN: Primary | ICD-10-CM

## 2019-10-16 LAB
ABO/RH: NORMAL
ABSOLUTE EOS #: 0.26 K/UL (ref 0–0.44)
ABSOLUTE IMMATURE GRANULOCYTE: <0.03 K/UL (ref 0–0.3)
ABSOLUTE LYMPH #: 1.2 K/UL (ref 1.1–3.7)
ABSOLUTE MONO #: 0.63 K/UL (ref 0.1–1.2)
ANTIBODY SCREEN: NEGATIVE
ARM BAND NUMBER: NORMAL
BASOPHILS # BLD: 1 % (ref 0–2)
BASOPHILS ABSOLUTE: 0.05 K/UL (ref 0–0.2)
DIFFERENTIAL TYPE: ABNORMAL
EOSINOPHILS RELATIVE PERCENT: 5 % (ref 1–4)
EXPIRATION DATE: NORMAL
GFR NON-AFRICAN AMERICAN: >60 ML/MIN
GFR SERPL CREATININE-BSD FRML MDRD: >60 ML/MIN
GFR SERPL CREATININE-BSD FRML MDRD: NORMAL ML/MIN/{1.73_M2}
GLUCOSE BLD-MCNC: 111 MG/DL (ref 74–100)
HCT VFR BLD CALC: 35.3 % (ref 40.7–50.3)
HEMOGLOBIN: 11.4 G/DL (ref 13–17)
IMMATURE GRANULOCYTES: 0 %
INR BLD: 1.4
LYMPHOCYTES # BLD: 22 % (ref 24–43)
MCH RBC QN AUTO: 32.9 PG (ref 25.2–33.5)
MCHC RBC AUTO-ENTMCNC: 32.3 G/DL (ref 28.4–34.8)
MCV RBC AUTO: 101.7 FL (ref 82.6–102.9)
MONOCYTES # BLD: 12 % (ref 3–12)
NRBC AUTOMATED: 0 PER 100 WBC
PDW BLD-RTO: 15.5 % (ref 11.8–14.4)
PLATELET # BLD: 72 K/UL (ref 138–453)
PLATELET ESTIMATE: ABNORMAL
PMV BLD AUTO: 12.8 FL (ref 8.1–13.5)
POC CREATININE: 0.92 MG/DL (ref 0.51–1.19)
POC INR: 1
POC POTASSIUM: 4.3 MMOL/L (ref 3.5–4.5)
PROTHROMBIN TIME, POC: 12.4 SEC (ref 10.4–14.2)
PROTHROMBIN TIME: 14.4 SEC (ref 9–12)
RBC # BLD: 3.47 M/UL (ref 4.21–5.77)
RBC # BLD: ABNORMAL 10*6/UL
SEG NEUTROPHILS: 60 % (ref 36–65)
SEGMENTED NEUTROPHILS ABSOLUTE COUNT: 3.22 K/UL (ref 1.5–8.1)
WBC # BLD: 5.4 K/UL (ref 3.5–11.3)
WBC # BLD: ABNORMAL 10*3/UL

## 2019-10-16 PROCEDURE — 2709999900 HC NON-CHARGEABLE SUPPLY: Performed by: ORTHOPAEDIC SURGERY

## 2019-10-16 PROCEDURE — 24342 REPAIR OF RUPTURED TENDON: CPT | Performed by: ORTHOPAEDIC SURGERY

## 2019-10-16 PROCEDURE — 24105 EXCISION OLECRANON BURSA: CPT | Performed by: ORTHOPAEDIC SURGERY

## 2019-10-16 PROCEDURE — 3600000014 HC SURGERY LEVEL 4 ADDTL 15MIN: Performed by: ORTHOPAEDIC SURGERY

## 2019-10-16 PROCEDURE — 87186 SC STD MICRODIL/AGAR DIL: CPT

## 2019-10-16 PROCEDURE — 6360000002 HC RX W HCPCS: Performed by: ANESTHESIOLOGY

## 2019-10-16 PROCEDURE — 87070 CULTURE OTHR SPECIMN AEROBIC: CPT

## 2019-10-16 PROCEDURE — 93005 ELECTROCARDIOGRAM TRACING: CPT | Performed by: ORTHOPAEDIC SURGERY

## 2019-10-16 PROCEDURE — 2500000003 HC RX 250 WO HCPCS: Performed by: NURSE ANESTHETIST, CERTIFIED REGISTERED

## 2019-10-16 PROCEDURE — 86900 BLOOD TYPING SEROLOGIC ABO: CPT

## 2019-10-16 PROCEDURE — 87205 SMEAR GRAM STAIN: CPT

## 2019-10-16 PROCEDURE — 86403 PARTICLE AGGLUT ANTBDY SCRN: CPT

## 2019-10-16 PROCEDURE — 85025 COMPLETE CBC W/AUTO DIFF WBC: CPT

## 2019-10-16 PROCEDURE — 82947 ASSAY GLUCOSE BLOOD QUANT: CPT

## 2019-10-16 PROCEDURE — 7100000001 HC PACU RECOVERY - ADDTL 15 MIN: Performed by: ORTHOPAEDIC SURGERY

## 2019-10-16 PROCEDURE — 87075 CULTR BACTERIA EXCEPT BLOOD: CPT

## 2019-10-16 PROCEDURE — 82565 ASSAY OF CREATININE: CPT

## 2019-10-16 PROCEDURE — 2580000003 HC RX 258: Performed by: ANESTHESIOLOGY

## 2019-10-16 PROCEDURE — 3600000004 HC SURGERY LEVEL 4 BASE: Performed by: ORTHOPAEDIC SURGERY

## 2019-10-16 PROCEDURE — 3700000000 HC ANESTHESIA ATTENDED CARE: Performed by: ORTHOPAEDIC SURGERY

## 2019-10-16 PROCEDURE — 85610 PROTHROMBIN TIME: CPT

## 2019-10-16 PROCEDURE — 7100000040 HC SPAR PHASE II RECOVERY - FIRST 15 MIN: Performed by: ORTHOPAEDIC SURGERY

## 2019-10-16 PROCEDURE — 3700000001 HC ADD 15 MINUTES (ANESTHESIA): Performed by: ORTHOPAEDIC SURGERY

## 2019-10-16 PROCEDURE — 6360000002 HC RX W HCPCS: Performed by: NURSE ANESTHETIST, CERTIFIED REGISTERED

## 2019-10-16 PROCEDURE — 86850 RBC ANTIBODY SCREEN: CPT

## 2019-10-16 PROCEDURE — 86901 BLOOD TYPING SEROLOGIC RH(D): CPT

## 2019-10-16 PROCEDURE — 7100000041 HC SPAR PHASE II RECOVERY - ADDTL 15 MIN: Performed by: ORTHOPAEDIC SURGERY

## 2019-10-16 PROCEDURE — 6370000000 HC RX 637 (ALT 250 FOR IP): Performed by: NURSE ANESTHETIST, CERTIFIED REGISTERED

## 2019-10-16 PROCEDURE — 7100000000 HC PACU RECOVERY - FIRST 15 MIN: Performed by: ORTHOPAEDIC SURGERY

## 2019-10-16 PROCEDURE — 84132 ASSAY OF SERUM POTASSIUM: CPT

## 2019-10-16 RX ORDER — FENTANYL CITRATE 50 UG/ML
INJECTION, SOLUTION INTRAMUSCULAR; INTRAVENOUS PRN
Status: DISCONTINUED | OUTPATIENT
Start: 2019-10-16 | End: 2019-10-16 | Stop reason: SDUPTHER

## 2019-10-16 RX ORDER — SODIUM CHLORIDE, SODIUM LACTATE, POTASSIUM CHLORIDE, CALCIUM CHLORIDE 600; 310; 30; 20 MG/100ML; MG/100ML; MG/100ML; MG/100ML
INJECTION, SOLUTION INTRAVENOUS CONTINUOUS
Status: DISCONTINUED | OUTPATIENT
Start: 2019-10-16 | End: 2019-10-16 | Stop reason: HOSPADM

## 2019-10-16 RX ORDER — PROPOFOL 10 MG/ML
INJECTION, EMULSION INTRAVENOUS PRN
Status: DISCONTINUED | OUTPATIENT
Start: 2019-10-16 | End: 2019-10-16 | Stop reason: SDUPTHER

## 2019-10-16 RX ORDER — FENTANYL CITRATE 50 UG/ML
25 INJECTION, SOLUTION INTRAMUSCULAR; INTRAVENOUS EVERY 5 MIN PRN
Status: DISCONTINUED | OUTPATIENT
Start: 2019-10-16 | End: 2019-10-16 | Stop reason: HOSPADM

## 2019-10-16 RX ORDER — LIDOCAINE HYDROCHLORIDE 20 MG/ML
JELLY TOPICAL PRN
Status: DISCONTINUED | OUTPATIENT
Start: 2019-10-16 | End: 2019-10-16 | Stop reason: SDUPTHER

## 2019-10-16 RX ORDER — PHENYLEPHRINE HYDROCHLORIDE 10 MG/ML
INJECTION INTRAVENOUS PRN
Status: DISCONTINUED | OUTPATIENT
Start: 2019-10-16 | End: 2019-10-16 | Stop reason: SDUPTHER

## 2019-10-16 RX ORDER — HYDROCODONE BITARTRATE AND ACETAMINOPHEN 5; 325 MG/1; MG/1
1 TABLET ORAL EVERY 4 HOURS PRN
Qty: 20 TABLET | Refills: 0 | Status: SHIPPED | OUTPATIENT
Start: 2019-10-16 | End: 2019-10-21

## 2019-10-16 RX ORDER — HYDROCODONE BITARTRATE AND ACETAMINOPHEN 5; 325 MG/1; MG/1
1 TABLET ORAL PRN
Status: DISCONTINUED | OUTPATIENT
Start: 2019-10-16 | End: 2019-10-16 | Stop reason: HOSPADM

## 2019-10-16 RX ORDER — FENTANYL CITRATE 50 UG/ML
50 INJECTION, SOLUTION INTRAMUSCULAR; INTRAVENOUS EVERY 5 MIN PRN
Status: COMPLETED | OUTPATIENT
Start: 2019-10-16 | End: 2019-10-16

## 2019-10-16 RX ORDER — CEFAZOLIN SODIUM 2 G/100ML
INJECTION, SOLUTION INTRAVENOUS PRN
Status: DISCONTINUED | OUTPATIENT
Start: 2019-10-16 | End: 2019-10-16 | Stop reason: SDUPTHER

## 2019-10-16 RX ORDER — GLYCOPYRROLATE 1 MG/5 ML
SYRINGE (ML) INTRAVENOUS PRN
Status: DISCONTINUED | OUTPATIENT
Start: 2019-10-16 | End: 2019-10-16 | Stop reason: SDUPTHER

## 2019-10-16 RX ORDER — ONDANSETRON 2 MG/ML
INJECTION INTRAMUSCULAR; INTRAVENOUS PRN
Status: DISCONTINUED | OUTPATIENT
Start: 2019-10-16 | End: 2019-10-16 | Stop reason: SDUPTHER

## 2019-10-16 RX ORDER — HYDROCODONE BITARTRATE AND ACETAMINOPHEN 5; 325 MG/1; MG/1
2 TABLET ORAL PRN
Status: DISCONTINUED | OUTPATIENT
Start: 2019-10-16 | End: 2019-10-16 | Stop reason: HOSPADM

## 2019-10-16 RX ORDER — LIDOCAINE HYDROCHLORIDE 10 MG/ML
INJECTION, SOLUTION EPIDURAL; INFILTRATION; INTRACAUDAL; PERINEURAL PRN
Status: DISCONTINUED | OUTPATIENT
Start: 2019-10-16 | End: 2019-10-16 | Stop reason: SDUPTHER

## 2019-10-16 RX ADMIN — FENTANYL CITRATE 50 MCG: 50 INJECTION INTRAMUSCULAR; INTRAVENOUS at 11:45

## 2019-10-16 RX ADMIN — LIDOCAINE HYDROCHLORIDE 3 ML: 20 JELLY TOPICAL at 10:23

## 2019-10-16 RX ADMIN — FENTANYL CITRATE 50 MCG: 50 INJECTION INTRAMUSCULAR; INTRAVENOUS at 11:40

## 2019-10-16 RX ADMIN — SODIUM CHLORIDE, POTASSIUM CHLORIDE, SODIUM LACTATE AND CALCIUM CHLORIDE: 600; 310; 30; 20 INJECTION, SOLUTION INTRAVENOUS at 10:12

## 2019-10-16 RX ADMIN — PHENYLEPHRINE HYDROCHLORIDE 100 MCG: 10 INJECTION INTRAVENOUS at 10:40

## 2019-10-16 RX ADMIN — LIDOCAINE HYDROCHLORIDE 50 MG: 10 INJECTION, SOLUTION EPIDURAL; INFILTRATION; INTRACAUDAL; PERINEURAL at 10:22

## 2019-10-16 RX ADMIN — CEFAZOLIN SODIUM 2 G: 2 INJECTION, SOLUTION INTRAVENOUS at 10:51

## 2019-10-16 RX ADMIN — FENTANYL CITRATE 50 MCG: 50 INJECTION INTRAMUSCULAR; INTRAVENOUS at 11:50

## 2019-10-16 RX ADMIN — FENTANYL CITRATE 50 MCG: 50 INJECTION INTRAMUSCULAR; INTRAVENOUS at 10:19

## 2019-10-16 RX ADMIN — Medication 0.2 MG: at 10:22

## 2019-10-16 RX ADMIN — SODIUM CHLORIDE, POTASSIUM CHLORIDE, SODIUM LACTATE AND CALCIUM CHLORIDE: 600; 310; 30; 20 INJECTION, SOLUTION INTRAVENOUS at 11:51

## 2019-10-16 RX ADMIN — ONDANSETRON 4 MG: 2 INJECTION, SOLUTION INTRAMUSCULAR; INTRAVENOUS at 10:55

## 2019-10-16 RX ADMIN — PROPOFOL 200 MG: 10 INJECTION, EMULSION INTRAVENOUS at 10:22

## 2019-10-16 RX ADMIN — FENTANYL CITRATE 50 MCG: 50 INJECTION INTRAMUSCULAR; INTRAVENOUS at 11:35

## 2019-10-16 RX ADMIN — FENTANYL CITRATE 50 MCG: 50 INJECTION INTRAMUSCULAR; INTRAVENOUS at 10:17

## 2019-10-16 ASSESSMENT — PAIN DESCRIPTION - FREQUENCY
FREQUENCY: CONTINUOUS

## 2019-10-16 ASSESSMENT — PULMONARY FUNCTION TESTS
PIF_VALUE: 17
PIF_VALUE: 14
PIF_VALUE: 14
PIF_VALUE: 4
PIF_VALUE: 14
PIF_VALUE: 4
PIF_VALUE: 0
PIF_VALUE: 4
PIF_VALUE: 16
PIF_VALUE: 0
PIF_VALUE: 5
PIF_VALUE: 1
PIF_VALUE: 2
PIF_VALUE: 4
PIF_VALUE: 0
PIF_VALUE: 4
PIF_VALUE: 1
PIF_VALUE: 2
PIF_VALUE: 4
PIF_VALUE: 14
PIF_VALUE: 17
PIF_VALUE: 18
PIF_VALUE: 15
PIF_VALUE: 31
PIF_VALUE: 7
PIF_VALUE: 4
PIF_VALUE: 5
PIF_VALUE: 16
PIF_VALUE: 6
PIF_VALUE: 16
PIF_VALUE: 17
PIF_VALUE: 4
PIF_VALUE: 9
PIF_VALUE: 5
PIF_VALUE: 4
PIF_VALUE: 1
PIF_VALUE: 4
PIF_VALUE: 17
PIF_VALUE: 4
PIF_VALUE: 14
PIF_VALUE: 5
PIF_VALUE: 4
PIF_VALUE: 4
PIF_VALUE: 30
PIF_VALUE: 19
PIF_VALUE: 4
PIF_VALUE: 17
PIF_VALUE: 4
PIF_VALUE: 0
PIF_VALUE: 15
PIF_VALUE: 5
PIF_VALUE: 0
PIF_VALUE: 17
PIF_VALUE: 14
PIF_VALUE: 4
PIF_VALUE: 5
PIF_VALUE: 14
PIF_VALUE: 15
PIF_VALUE: 4
PIF_VALUE: 29
PIF_VALUE: 16
PIF_VALUE: 4
PIF_VALUE: 14
PIF_VALUE: 5
PIF_VALUE: 5
PIF_VALUE: 17
PIF_VALUE: 9
PIF_VALUE: 16

## 2019-10-16 ASSESSMENT — PAIN DESCRIPTION - PAIN TYPE
TYPE: SURGICAL PAIN

## 2019-10-16 ASSESSMENT — PAIN DESCRIPTION - ORIENTATION
ORIENTATION: LEFT

## 2019-10-16 ASSESSMENT — PAIN SCALES - GENERAL
PAINLEVEL_OUTOF10: 8
PAINLEVEL_OUTOF10: 10
PAINLEVEL_OUTOF10: 10
PAINLEVEL_OUTOF10: 9
PAINLEVEL_OUTOF10: 3
PAINLEVEL_OUTOF10: 10
PAINLEVEL_OUTOF10: 1

## 2019-10-16 ASSESSMENT — PAIN - FUNCTIONAL ASSESSMENT: PAIN_FUNCTIONAL_ASSESSMENT: 0-10

## 2019-10-16 ASSESSMENT — PAIN DESCRIPTION - ONSET
ONSET: ON-GOING

## 2019-10-16 ASSESSMENT — PAIN DESCRIPTION - LOCATION
LOCATION: ELBOW

## 2019-10-16 ASSESSMENT — LIFESTYLE VARIABLES: SMOKING_STATUS: 0

## 2019-10-17 LAB
EKG ATRIAL RATE: 44 BPM
EKG P AXIS: 11 DEGREES
EKG P-R INTERVAL: 140 MS
EKG Q-T INTERVAL: 516 MS
EKG QRS DURATION: 100 MS
EKG QTC CALCULATION (BAZETT): 441 MS
EKG R AXIS: 37 DEGREES
EKG T AXIS: 45 DEGREES
EKG VENTRICULAR RATE: 44 BPM

## 2019-10-17 PROCEDURE — 93010 ELECTROCARDIOGRAM REPORT: CPT | Performed by: INTERNAL MEDICINE

## 2019-10-24 DIAGNOSIS — M71.122 SEPTIC OLECRANON BURSITIS OF LEFT ELBOW: Primary | ICD-10-CM

## 2019-10-24 RX ORDER — CEPHALEXIN 500 MG/1
500 CAPSULE ORAL 4 TIMES DAILY
Qty: 40 CAPSULE | Refills: 0 | Status: SHIPPED | OUTPATIENT
Start: 2019-10-24 | End: 2019-10-28

## 2019-10-28 ENCOUNTER — OFFICE VISIT (OUTPATIENT)
Dept: ORTHOPEDIC SURGERY | Age: 59
End: 2019-10-28

## 2019-10-28 ENCOUNTER — TELEPHONE (OUTPATIENT)
Dept: ADMINISTRATIVE | Age: 59
End: 2019-10-28

## 2019-10-28 VITALS — HEIGHT: 70 IN | BODY MASS INDEX: 30.77 KG/M2 | WEIGHT: 214.95 LBS

## 2019-10-28 DIAGNOSIS — M71.122 SEPTIC OLECRANON BURSITIS OF LEFT ELBOW: Primary | ICD-10-CM

## 2019-10-28 DIAGNOSIS — M71.122 SEPTIC BURSITIS OF ELBOW, LEFT: Primary | ICD-10-CM

## 2019-10-28 PROCEDURE — 99024 POSTOP FOLLOW-UP VISIT: CPT | Performed by: ORTHOPAEDIC SURGERY

## 2019-10-28 RX ORDER — CEPHALEXIN 500 MG/1
500 CAPSULE ORAL 4 TIMES DAILY
Qty: 28 CAPSULE | Refills: 0 | Status: SHIPPED | OUTPATIENT
Start: 2019-10-28 | End: 2019-11-04

## 2019-10-28 RX ORDER — SULFAMETHOXAZOLE AND TRIMETHOPRIM 800; 160 MG/1; MG/1
1 TABLET ORAL 2 TIMES DAILY
Qty: 14 TABLET | Refills: 0 | Status: SHIPPED | OUTPATIENT
Start: 2019-10-28 | End: 2019-11-04

## 2019-11-04 ENCOUNTER — OFFICE VISIT (OUTPATIENT)
Dept: ORTHOPEDIC SURGERY | Age: 59
End: 2019-11-04

## 2019-11-04 VITALS — WEIGHT: 214.95 LBS | HEIGHT: 70 IN | BODY MASS INDEX: 30.77 KG/M2

## 2019-11-04 DIAGNOSIS — S46.312D TRAUMATIC RUPTURE OF LEFT TRICEPS TENDON, SUBSEQUENT ENCOUNTER: ICD-10-CM

## 2019-11-04 DIAGNOSIS — M71.122 SEPTIC OLECRANON BURSITIS OF LEFT ELBOW: Primary | ICD-10-CM

## 2019-11-04 PROCEDURE — 99024 POSTOP FOLLOW-UP VISIT: CPT | Performed by: ORTHOPAEDIC SURGERY

## 2019-11-12 ENCOUNTER — HOSPITAL ENCOUNTER (OUTPATIENT)
Dept: PHYSICAL THERAPY | Age: 59
Setting detail: THERAPIES SERIES
Discharge: HOME | End: 2019-11-12
Payer: MEDICARE

## 2019-11-12 PROCEDURE — 97161 PT EVAL LOW COMPLEX 20 MIN: CPT

## 2019-11-12 PROCEDURE — 97110 THERAPEUTIC EXERCISES: CPT

## 2019-11-14 ENCOUNTER — HOSPITAL ENCOUNTER (OUTPATIENT)
Dept: PHYSICAL THERAPY | Age: 59
Setting detail: THERAPIES SERIES
Discharge: HOME | End: 2019-11-14
Payer: MEDICARE

## 2019-11-14 PROCEDURE — 97110 THERAPEUTIC EXERCISES: CPT

## 2019-11-18 ENCOUNTER — OFFICE VISIT (OUTPATIENT)
Dept: ORTHOPEDIC SURGERY | Age: 59
End: 2019-11-18

## 2019-11-18 VITALS — WEIGHT: 214.95 LBS | HEIGHT: 70 IN | BODY MASS INDEX: 30.77 KG/M2

## 2019-11-18 DIAGNOSIS — M71.122 SEPTIC OLECRANON BURSITIS OF LEFT ELBOW: Primary | ICD-10-CM

## 2019-11-18 PROCEDURE — 99024 POSTOP FOLLOW-UP VISIT: CPT | Performed by: ORTHOPAEDIC SURGERY

## 2019-11-19 ENCOUNTER — OFFICE VISIT (OUTPATIENT)
Dept: NEUROLOGY | Age: 59
End: 2019-11-19
Payer: MEDICARE

## 2019-11-19 VITALS
HEIGHT: 70 IN | SYSTOLIC BLOOD PRESSURE: 146 MMHG | DIASTOLIC BLOOD PRESSURE: 80 MMHG | WEIGHT: 215 LBS | HEART RATE: 84 BPM | BODY MASS INDEX: 30.78 KG/M2

## 2019-11-19 DIAGNOSIS — G25.0 ESSENTIAL TREMOR: Primary | ICD-10-CM

## 2019-11-19 DIAGNOSIS — G62.9 SENSORY NEUROPATHY: ICD-10-CM

## 2019-11-19 PROCEDURE — 99214 OFFICE O/P EST MOD 30 MIN: CPT | Performed by: PSYCHIATRY & NEUROLOGY

## 2019-11-19 PROCEDURE — G8417 CALC BMI ABV UP PARAM F/U: HCPCS | Performed by: PSYCHIATRY & NEUROLOGY

## 2019-11-19 PROCEDURE — 1036F TOBACCO NON-USER: CPT | Performed by: PSYCHIATRY & NEUROLOGY

## 2019-11-19 PROCEDURE — G8482 FLU IMMUNIZE ORDER/ADMIN: HCPCS | Performed by: PSYCHIATRY & NEUROLOGY

## 2019-11-19 PROCEDURE — G8427 DOCREV CUR MEDS BY ELIG CLIN: HCPCS | Performed by: PSYCHIATRY & NEUROLOGY

## 2019-11-19 PROCEDURE — 3017F COLORECTAL CA SCREEN DOC REV: CPT | Performed by: PSYCHIATRY & NEUROLOGY

## 2019-11-19 RX ORDER — PRIMIDONE 50 MG/1
TABLET ORAL
Qty: 60 TABLET | Refills: 2 | Status: SHIPPED | OUTPATIENT
Start: 2019-11-19 | End: 2020-01-16 | Stop reason: SDUPTHER

## 2019-11-19 ASSESSMENT — ENCOUNTER SYMPTOMS
ALLERGIC/IMMUNOLOGIC NEGATIVE: 1
BACK PAIN: 1
RESPIRATORY NEGATIVE: 1
EYES NEGATIVE: 1
GASTROINTESTINAL NEGATIVE: 1

## 2019-11-21 ENCOUNTER — HOSPITAL ENCOUNTER (OUTPATIENT)
Dept: PHYSICAL THERAPY | Age: 59
Setting detail: THERAPIES SERIES
End: 2019-11-21
Payer: MEDICARE

## 2019-11-25 ENCOUNTER — OFFICE VISIT (OUTPATIENT)
Dept: ORTHOPEDIC SURGERY | Age: 59
End: 2019-11-25
Payer: MEDICARE

## 2019-11-25 VITALS — BODY MASS INDEX: 30.77 KG/M2 | WEIGHT: 214.95 LBS | HEIGHT: 70 IN

## 2019-11-25 DIAGNOSIS — M71.122 SEPTIC OLECRANON BURSITIS OF LEFT ELBOW: Primary | ICD-10-CM

## 2019-11-25 PROCEDURE — 99213 OFFICE O/P EST LOW 20 MIN: CPT | Performed by: ORTHOPAEDIC SURGERY

## 2019-12-09 ENCOUNTER — OFFICE VISIT (OUTPATIENT)
Dept: ORTHOPEDIC SURGERY | Age: 59
End: 2019-12-09

## 2019-12-09 VITALS — BODY MASS INDEX: 30.77 KG/M2 | HEIGHT: 70 IN | WEIGHT: 214.95 LBS

## 2019-12-09 DIAGNOSIS — M71.122 SEPTIC OLECRANON BURSITIS OF LEFT ELBOW: Primary | ICD-10-CM

## 2019-12-09 PROCEDURE — 99024 POSTOP FOLLOW-UP VISIT: CPT | Performed by: ORTHOPAEDIC SURGERY

## 2019-12-16 ENCOUNTER — HOSPITAL ENCOUNTER (OUTPATIENT)
Age: 59
Discharge: HOME | End: 2019-12-16
Payer: MEDICARE

## 2019-12-16 ENCOUNTER — OFFICE VISIT (OUTPATIENT)
Dept: ORTHOPEDIC SURGERY | Age: 59
End: 2019-12-16

## 2019-12-16 DIAGNOSIS — G62.9 SENSORY NEUROPATHY: ICD-10-CM

## 2019-12-16 DIAGNOSIS — M71.122 SEPTIC OLECRANON BURSITIS OF LEFT ELBOW: Primary | ICD-10-CM

## 2019-12-16 DIAGNOSIS — G25.0 ESSENTIAL TREMOR: ICD-10-CM

## 2019-12-16 LAB
C-REACTIVE PROTEIN: 4.8 MG/L (ref 0–5)
ESTIMATED AVERAGE GLUCOSE: 80 MG/DL
FOLATE: 6.6 NG/ML
HBA1C MFR BLD: 4.4 % (ref 4–6)
T3 FREE: 3.12 PG/ML (ref 2.02–4.43)
THYROXINE, FREE: 1.12 NG/DL (ref 0.93–1.7)
TSH SERPL DL<=0.05 MIU/L-ACNC: 2.01 MIU/L (ref 0.3–5)
VITAMIN B-12: 1574 PG/ML (ref 232–1245)

## 2019-12-16 PROCEDURE — 36415 COLL VENOUS BLD VENIPUNCTURE: CPT

## 2019-12-16 PROCEDURE — 84439 ASSAY OF FREE THYROXINE: CPT

## 2019-12-16 PROCEDURE — 84481 FREE ASSAY (FT-3): CPT

## 2019-12-16 PROCEDURE — 86038 ANTINUCLEAR ANTIBODIES: CPT

## 2019-12-16 PROCEDURE — 84155 ASSAY OF PROTEIN SERUM: CPT

## 2019-12-16 PROCEDURE — 84165 PROTEIN E-PHORESIS SERUM: CPT

## 2019-12-16 PROCEDURE — 86140 C-REACTIVE PROTEIN: CPT

## 2019-12-16 PROCEDURE — 82746 ASSAY OF FOLIC ACID SERUM: CPT

## 2019-12-16 PROCEDURE — 83036 HEMOGLOBIN GLYCOSYLATED A1C: CPT

## 2019-12-16 PROCEDURE — 82607 VITAMIN B-12: CPT

## 2019-12-16 PROCEDURE — 80184 ASSAY OF PHENOBARBITAL: CPT

## 2019-12-16 PROCEDURE — 84443 ASSAY THYROID STIM HORMONE: CPT

## 2019-12-16 PROCEDURE — 80188 ASSAY OF PRIMIDONE: CPT

## 2019-12-16 PROCEDURE — 99024 POSTOP FOLLOW-UP VISIT: CPT | Performed by: STUDENT IN AN ORGANIZED HEALTH CARE EDUCATION/TRAINING PROGRAM

## 2019-12-17 LAB
ALBUMIN (CALCULATED): 3.3 G/DL (ref 3.2–5.2)
ALBUMIN PERCENT: 50 % (ref 45–65)
ALPHA 1 PERCENT: 3 % (ref 3–6)
ALPHA 2 PERCENT: 8 % (ref 6–13)
ALPHA-1-GLOBULIN: 0.2 G/DL (ref 0.1–0.4)
ALPHA-2-GLOBULIN: 0.6 G/DL (ref 0.5–0.9)
ANTI-NUCLEAR ANTIBODY (ANA): NEGATIVE
BETA GLOBULIN: 0.6 G/DL (ref 0.5–1.1)
BETA PERCENT: 10 % (ref 11–19)
GAMMA GLOBULIN %: 29 % (ref 9–20)
GAMMA GLOBULIN: 1.9 G/DL (ref 0.5–1.5)
PATHOLOGIST: ABNORMAL
PROTEIN ELECTROPHORESIS, SERUM: ABNORMAL
TOTAL PROT. SUM,%: 100 % (ref 98–102)
TOTAL PROT. SUM: 6.6 G/DL (ref 6.3–8.2)
TOTAL PROTEIN: 6.6 G/DL (ref 6.4–8.3)

## 2019-12-19 DIAGNOSIS — G62.9 SENSORY NEUROPATHY: ICD-10-CM

## 2019-12-19 LAB
PHENOBARBITAL: <1.1 UG/ML (ref 15–40)
PRIMIDONE LEVEL: 2.8 UG/ML (ref 5–12)

## 2019-12-23 DIAGNOSIS — G62.9 NEUROPATHY: ICD-10-CM

## 2019-12-23 RX ORDER — GABAPENTIN 600 MG/1
TABLET ORAL
Qty: 90 TABLET | Refills: 1 | Status: SHIPPED | OUTPATIENT
Start: 2019-12-23 | End: 2020-02-24

## 2019-12-30 ENCOUNTER — OFFICE VISIT (OUTPATIENT)
Dept: FAMILY MEDICINE CLINIC | Age: 59
End: 2019-12-30
Payer: MEDICARE

## 2019-12-30 VITALS
TEMPERATURE: 98 F | WEIGHT: 228 LBS | DIASTOLIC BLOOD PRESSURE: 62 MMHG | SYSTOLIC BLOOD PRESSURE: 112 MMHG | HEART RATE: 61 BPM | BODY MASS INDEX: 32.71 KG/M2 | OXYGEN SATURATION: 97 %

## 2019-12-30 DIAGNOSIS — M54.31 SCIATIC PAIN, RIGHT: ICD-10-CM

## 2019-12-30 DIAGNOSIS — M70.22 OLECRANON BURSITIS OF LEFT ELBOW: Primary | ICD-10-CM

## 2019-12-30 DIAGNOSIS — I10 ESSENTIAL HYPERTENSION: ICD-10-CM

## 2019-12-30 PROCEDURE — G8417 CALC BMI ABV UP PARAM F/U: HCPCS | Performed by: NURSE PRACTITIONER

## 2019-12-30 PROCEDURE — G8427 DOCREV CUR MEDS BY ELIG CLIN: HCPCS | Performed by: NURSE PRACTITIONER

## 2019-12-30 PROCEDURE — 99214 OFFICE O/P EST MOD 30 MIN: CPT | Performed by: NURSE PRACTITIONER

## 2019-12-30 PROCEDURE — G8482 FLU IMMUNIZE ORDER/ADMIN: HCPCS | Performed by: NURSE PRACTITIONER

## 2019-12-30 PROCEDURE — 1036F TOBACCO NON-USER: CPT | Performed by: NURSE PRACTITIONER

## 2019-12-30 PROCEDURE — 3017F COLORECTAL CA SCREEN DOC REV: CPT | Performed by: NURSE PRACTITIONER

## 2019-12-30 RX ORDER — METHYLPREDNISOLONE 4 MG/1
TABLET ORAL
Qty: 1 KIT | Refills: 0 | Status: SHIPPED | OUTPATIENT
Start: 2019-12-30 | End: 2020-01-05

## 2019-12-30 ASSESSMENT — ENCOUNTER SYMPTOMS
SHORTNESS OF BREATH: 0
COUGH: 0

## 2020-01-13 ENCOUNTER — TELEPHONE (OUTPATIENT)
Dept: NEUROLOGY | Age: 60
End: 2020-01-13

## 2020-01-13 NOTE — TELEPHONE ENCOUNTER
Patient called the office back and this information was given to him. He was scheduled to see Dr. Yuridia Nunez this Thursday, January 16, 2020.

## 2020-01-13 NOTE — TELEPHONE ENCOUNTER
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   Needs to be added on my schedule week that I am back

## 2020-01-16 ENCOUNTER — OFFICE VISIT (OUTPATIENT)
Dept: NEUROLOGY | Age: 60
End: 2020-01-16
Payer: MEDICARE

## 2020-01-16 VITALS
DIASTOLIC BLOOD PRESSURE: 75 MMHG | BODY MASS INDEX: 32.93 KG/M2 | HEIGHT: 70 IN | WEIGHT: 230 LBS | SYSTOLIC BLOOD PRESSURE: 132 MMHG | HEART RATE: 83 BPM

## 2020-01-16 PROCEDURE — 3017F COLORECTAL CA SCREEN DOC REV: CPT | Performed by: PSYCHIATRY & NEUROLOGY

## 2020-01-16 PROCEDURE — G8417 CALC BMI ABV UP PARAM F/U: HCPCS | Performed by: PSYCHIATRY & NEUROLOGY

## 2020-01-16 PROCEDURE — 1036F TOBACCO NON-USER: CPT | Performed by: PSYCHIATRY & NEUROLOGY

## 2020-01-16 PROCEDURE — 99214 OFFICE O/P EST MOD 30 MIN: CPT | Performed by: PSYCHIATRY & NEUROLOGY

## 2020-01-16 PROCEDURE — G8482 FLU IMMUNIZE ORDER/ADMIN: HCPCS | Performed by: PSYCHIATRY & NEUROLOGY

## 2020-01-16 PROCEDURE — G8427 DOCREV CUR MEDS BY ELIG CLIN: HCPCS | Performed by: PSYCHIATRY & NEUROLOGY

## 2020-01-16 RX ORDER — PRIMIDONE 50 MG/1
TABLET ORAL
Qty: 60 TABLET | Refills: 5 | Status: SHIPPED
Start: 2020-01-16 | End: 2020-10-01 | Stop reason: CLARIF

## 2020-01-16 ASSESSMENT — ENCOUNTER SYMPTOMS
ALLERGIC/IMMUNOLOGIC NEGATIVE: 1
GASTROINTESTINAL NEGATIVE: 1
EYES NEGATIVE: 1
RESPIRATORY NEGATIVE: 1

## 2020-01-16 NOTE — LETTER
 Neuropathy 2009    FEET BILAT, SHAKEY HANDS    Portal hypertensive gastropathy ([address]     Seizures ([address]     only one in 2016    Wears glasses        Past Surgical History:   Procedure Laterality Date    BACK SURGERY  2000    lumbar discectomy    COLONOSCOPY  03/14/2018    mod diverticulosis; internal hemorrhoids; retained stools    ELBOW DEBRIDEMENT Left 10/16/2019     ELBOW INCISION AND DRAINAGE    HERNIA REPAIR Left 1975    lt inguinal    INCISION AND DRAINAGE Left 10/16/2019    ELBOW INCISION AND DRAINAGE, PARTIALTRICEP REPAIR performed by Jessica Ni DO at [address] [facility] [address] N/A 3/14/2018    COLONOSCOPY performed by Cady Johansen MD at [address]  10/31/2015    large gastric ulcer    UPPER GASTROINTESTINAL ENDOSCOPY  03/14/2018    mod portal hypertensive gastrophathy    UPPER GASTROINTESTINAL ENDOSCOPY N/A 3/14/2018    EGD BIOPSY performed by Cady Johansen MD at [address] N/A 1/22/2019    EGD BIOPSY performed by Manav Cunha MD at [address] N/A 2/1/2019    [facility]    EGD ESOPHAGOGASTRODUODENOSCOPY performed by Nicci Borrego MD at [address]  2007       Family History   Problem Relation Age of Onset    High Blood Pressure Mother     Other Father         neuropathy    Stroke Father     Prostate Cancer Father        Social History     Socioeconomic History    Marital status:      Spouse name: None    Number of children: 3    Years of education: None    Highest education level: None   Occupational History    None   Social Needs    Financial resource strain: None    Food insecurity:     Worry: None     Inability: None    Transportation needs:     Medical: None     Non-medical: None   Tobacco Use  Smoking status: Former Smoker     Packs/day: 1.00     Years: 15.00     Pack years: 15.00     Last attempt to quit: 1995     Years since quittin.3    Smokeless tobacco: Former User     Types: Chew   Substance and Sexual Activity    Alcohol use: Not Currently     Comment: ocassionally    Drug use: No    Sexual activity: None   Lifestyle    Physical activity:     Days per week: None     Minutes per session: None    Stress: None   Relationships    Social connections:     Talks on phone: None     Gets together: None     Attends [employer] service: None     Active member of club or organization: None     Attends meetings of clubs or organizations: None     Relationship status: None    Intimate partner violence:     Fear of current or ex partner: None     Emotionally abused: None     Physically abused: None     Forced sexual activity: None   Other Topics Concern    None   Social History Narrative    None       Current Outpatient Medications   Medication Sig Dispense Refill    primidone (MYSOLINE) 50 MG tablet Take 2 po qhs 60 tablet 5    gabapentin (NEURONTIN) 600 MG tablet TAKE ONE TABLET BY MOUTH THREE TIMES A DAY 90 tablet 1    lisinopril (PRINIVIL;ZESTRIL) 20 MG tablet TAKE ONE TABLET BY MOUTH DAILY 30 tablet 5    rOPINIRole (REQUIP) 0.5 MG tablet TAKE ONE TABLET BY MOUTH ONCE NIGHTLY 30 tablet 4    aspirin 81 MG chewable tablet Take 81 mg by mouth daily      ferrous sulfate 325 (65 Fe) MG tablet TAKE ONE TABLET BY MOUTH DAILY WITH BREAKFAST 90 tablet 1    Multiple Vitamins-Minerals (THERAPEUTIC MULTIVITAMIN-MINERALS) tablet Take 1 tablet by mouth daily       No current facility-administered medications for this visit. Allergies   Allergen Reactions    Sulfa Antibiotics Rash       Review of Systems   Constitutional: Negative. HENT: Positive for tinnitus. Eyes: Negative. Respiratory: Negative. Cardiovascular: Positive for leg swelling. Gastrointestinal: Negative. distal numbness along with minor EHL weakness with tendency to proceed with conservative treatment to get second opinion with neuromuscular specialist for management  . Foot pain is under fair control . He has also has good attenuation of tremor on mysoline       Plan:      Orders Placed This Encounter   Procedures    External Referral To Neurology     Referral Priority:   Routine     Referral Type:   Eval and Treat     Referral Reason:   Specialty Services Required     Requested Specialty:   Neurology     Number of Visits Requested:   1           Timothy Gallagher MD    If you have questions, please do not hesitate to call me. I look forward to following Emperatriz Hagen along with you.     Sincerely,        Timothy Gallagher MD    CC providers:  Nory Srivastava, APRN - CNP  [address] [place] [address]  VIA In Basket

## 2020-01-16 NOTE — PROGRESS NOTES
Left 10/16/2019    ELBOW INCISION AND DRAINAGE, PARTIALTRICEP REPAIR performed by Bridger Chapman DO at [address] [place] SCRN NOT  [address] [place] [address] N/A 3/14/2018    COLONOSCOPY performed by Violet Lopez MD at [address] [place] [address]  10/31/2015    large gastric ulcer    UPPER GASTROINTESTINAL ENDOSCOPY  2018    mod portal hypertensive gastrophathy    UPPER GASTROINTESTINAL ENDOSCOPY N/A 3/14/2018    EGD BIOPSY performed by Violet Lopez MD at [place] [address] N/A 2019    EGD BIOPSY performed by Santa Singh MD at [place] [address] N/A 2019    EGD BIOPSY performed by Hedy King MD at [address] [facility] 2019    EGD ESOPHAGOGASTRODUODENOSCOPY performed by Sagar Laboy MD at [address]         Family History   Problem Relation Age of Onset    High Blood Pressure Mother     Other Father         neuropathy    Stroke Father     Prostate Cancer Father        Social History     Socioeconomic History    Marital status:      Spouse name: None    Number of children: 3    Years of education: None    Highest education level: None   Occupational History    None   Social Needs    Financial resource strain: None    Food insecurity:     Worry: None     Inability: None    Transportation needs:     Medical: None     Non-medical: None   Tobacco Use    Smoking status: Former Smoker     Packs/day: 1.00     Years: 15.00     Pack years: 15.00     Last attempt to quit: 1995     Years since quittin.3    Smokeless tobacco: Former User     Types: Chew   Substance and Sexual Activity    Alcohol use: Not Currently     Comment: ocassionally    Drug use: No    Sexual activity: None   Lifestyle    Physical activity:     Days per week: None     Minutes per session: None    Stress: None   Relationships    Social connections:     Talks on Number of Visits Requested:   Parul Eaton MD

## 2020-01-17 NOTE — COMMUNICATION BODY
Left 10/16/2019    ELBOW INCISION AND DRAINAGE, PARTIALTRICEP REPAIR performed by Dorene Charles DO at [address] [facility] [address] [place] SCRN NOT  [address] [facility] N/A 3/14/2018    COLONOSCOPY performed by Álvaro Yousif MD at [address] [place] [address] Street  10/31/2015    large gastric ulcer    UPPER GASTROINTESTINAL ENDOSCOPY  2018    mod portal hypertensive gastrophathy    UPPER GASTROINTESTINAL ENDOSCOPY N/A 3/14/2018    EGD BIOPSY performed by Álvaro Yousif MD at [facility] N/A 2019    EGD BIOPSY performed by Sherman Quiñonez MD at [facility] N/A 2019    EGD BIOPSY performed by Serafin Hurt MD at [facility] 2019    EGD ESOPHAGOGASTRODUODENOSCOPY performed by Swati Alejandro MD at [address] [facility]  2007       Family History   Problem Relation Age of Onset    High Blood Pressure Mother     Other Father         neuropathy    Stroke Father     Prostate Cancer Father        Social History     Socioeconomic History    Marital status:      Spouse name: None    Number of children: 3    Years of education: None    Highest education level: None   Occupational History    None   Social Needs    Financial resource strain: None    Food insecurity:     Worry: None     Inability: None    Transportation needs:     Medical: None     Non-medical: None   Tobacco Use    Smoking status: Former Smoker     Packs/day: 1.00     Years: 15.00     Pack years: 15.00     Last attempt to quit: 1995     Years since quittin.3    Smokeless tobacco: Former User     Types: Chew   Substance and Sexual Activity    Alcohol use: Not Currently     Comment: ocassionally    Drug use: No    Sexual activity: None   Lifestyle    Physical activity:     Days per week: None     Minutes per session: None    Stress: None   Relationships    Social connections:     Talks on phone: None     Gets together: None     Attends [employer] service: None     Active member of club or organization: None     Attends meetings of clubs or organizations: None     Relationship status: None    Intimate partner violence:     Fear of current or ex partner: None     Emotionally abused: None     Physically abused: None     Forced sexual activity: None   Other Topics Concern    None   Social History Narrative    None       Current Outpatient Medications   Medication Sig Dispense Refill    primidone (MYSOLINE) 50 MG tablet Take 2 po qhs 60 tablet 5    gabapentin (NEURONTIN) 600 MG tablet TAKE ONE TABLET BY MOUTH THREE TIMES A DAY 90 tablet 1    lisinopril (PRINIVIL;ZESTRIL) 20 MG tablet TAKE ONE TABLET BY MOUTH DAILY 30 tablet 5    rOPINIRole (REQUIP) 0.5 MG tablet TAKE ONE TABLET BY MOUTH ONCE NIGHTLY 30 tablet 4    aspirin 81 MG chewable tablet Take 81 mg by mouth daily      ferrous sulfate 325 (65 Fe) MG tablet TAKE ONE TABLET BY MOUTH DAILY WITH BREAKFAST 90 tablet 1    Multiple Vitamins-Minerals (THERAPEUTIC MULTIVITAMIN-MINERALS) tablet Take 1 tablet by mouth daily       No current facility-administered medications for this visit. Allergies   Allergen Reactions    Sulfa Antibiotics Rash       Review of Systems   Constitutional: Negative. HENT: Positive for tinnitus. Eyes: Negative. Respiratory: Negative. Cardiovascular: Positive for leg swelling. Gastrointestinal: Negative. Endocrine: Negative. Musculoskeletal: Positive for arthralgias. Allergic/Immunologic: Negative. Neurological: Positive for numbness. Hematological: Negative. Psychiatric/Behavioral: Negative. Objective:   Physical Exam    Vitals:    01/16/20 1103   BP: 132/75   Pulse: 83     weight: 230 lb (104.3 kg)    Neurological Examination  Constitutional .General exam well groomed   Head/Ears /Nose/Throat: external ear . Normal exam  Neck and thyroid . Normal size.  No bruits  Respiratory Number of Visits Requested:   Renuka Du MD

## 2020-01-21 ENCOUNTER — TELEPHONE (OUTPATIENT)
Dept: NEUROLOGY | Age: 60
End: 2020-01-21

## 2020-02-10 RX ORDER — FERROUS SULFATE 325(65) MG
TABLET ORAL
Qty: 90 TABLET | Refills: 1 | Status: SHIPPED | OUTPATIENT
Start: 2020-02-10 | End: 2020-08-10

## 2020-02-10 RX ORDER — ROPINIROLE 0.5 MG/1
TABLET, FILM COATED ORAL
Qty: 90 TABLET | Refills: 1 | Status: SHIPPED | OUTPATIENT
Start: 2020-02-10 | End: 2020-08-10

## 2020-02-10 NOTE — TELEPHONE ENCOUNTER
Last visit: 12/30/19   Last Med refill: 9/13/19, 8/14/19   Does patient have enough medication for 72 hours: Yes    Next Visit Date:  Future Appointments   Date Time Provider Marino Junior   3/30/2020  8:00 AM REECE Grossman CNP [facility] [address] Maintenance   Topic Date Due    Hepatitis B vaccine (2 of 3 - Risk 3-dose series) 12/06/2018    Hepatitis A vaccine (2 of 2 - Risk 2-dose series) 05/08/2019    Shingles Vaccine (1 of 2) 04/09/2020 (Originally 10/24/2011)    Potassium monitoring  10/16/2020    Creatinine monitoring  10/16/2020    Lipid screen  11/01/2022    Diabetes screen  12/16/2022    Colon cancer screen colonoscopy  03/14/2023    DTaP/Tdap/Td vaccine (2 - Td) 04/16/2028    Flu vaccine  Completed    Pneumococcal 0-64 years Vaccine  Completed    HIV screen  Completed    Hib vaccine  Aged Out    Meningococcal (ACWY) vaccine  Aged Out       Hemoglobin A1C (%)   Date Value   12/16/2019 4.4   11/01/2017 5.6             ( goal A1C is < 7)   No results found for: LABMICR  LDL Cholesterol (mg/dL)   Date Value   11/01/2017 95       (goal LDL is <100)   AST (U/L)   Date Value   08/01/2019 158 (H)     ALT (U/L)   Date Value   08/01/2019 88 (H)     BUN (mg/dL)   Date Value   08/03/2019 6     BP Readings from Last 3 Encounters:   01/16/20 132/75   12/30/19 112/62   11/19/19 (!) 146/80          (goal 120/80)    All Future Testing planned in [facility]  Lab Frequency Next Occurrence   BUN & Creatinine Once 08/17/2019   Electrolyte Panel Once 08/17/2019   Hepatic Function Panel Once 08/17/2019   Sedimentation rate, automated Once 07/03/2019   Hepatitis C RNA, quantitative, PCR Once 07/03/2019   Ethanol Once 07/03/2019   EKG 12 Lead Once 08/12/2019   CBC Once 08/12/2019   Urinalysis Once 08/12/2019   Body fluid cell count Once 09/23/2019   Fungus Culture Once 09/23/2019   XR ELBOW LEFT (MIN 3 VIEWS) Once 01/29/2020               Patient Active Problem List:     GI bleed

## 2020-02-11 ENCOUNTER — APPOINTMENT (OUTPATIENT)
Dept: GENERAL RADIOLOGY | Age: 60
End: 2020-02-11
Payer: MEDICARE

## 2020-02-11 ENCOUNTER — HOSPITAL ENCOUNTER (EMERGENCY)
Age: 60
Discharge: HOME | End: 2020-02-11
Attending: EMERGENCY MEDICINE
Payer: MEDICARE

## 2020-02-11 VITALS
HEIGHT: 70 IN | HEART RATE: 73 BPM | TEMPERATURE: 98.6 F | WEIGHT: 230 LBS | BODY MASS INDEX: 32.93 KG/M2 | OXYGEN SATURATION: 98 % | DIASTOLIC BLOOD PRESSURE: 70 MMHG | SYSTOLIC BLOOD PRESSURE: 125 MMHG | RESPIRATION RATE: 15 BRPM

## 2020-02-11 PROCEDURE — 6370000000 HC RX 637 (ALT 250 FOR IP): Performed by: STUDENT IN AN ORGANIZED HEALTH CARE EDUCATION/TRAINING PROGRAM

## 2020-02-11 PROCEDURE — 73610 X-RAY EXAM OF ANKLE: CPT

## 2020-02-11 PROCEDURE — 73630 X-RAY EXAM OF FOOT: CPT

## 2020-02-11 PROCEDURE — 99283 EMERGENCY DEPT VISIT LOW MDM: CPT

## 2020-02-11 RX ORDER — LIDOCAINE 4 G/G
1 PATCH TOPICAL DAILY
Qty: 30 PATCH | Refills: 0 | Status: ON HOLD | OUTPATIENT
Start: 2020-02-11 | End: 2020-02-28

## 2020-02-11 RX ORDER — HYDROCODONE BITARTRATE AND ACETAMINOPHEN 5; 325 MG/1; MG/1
1 TABLET ORAL ONCE
Status: COMPLETED | OUTPATIENT
Start: 2020-02-11 | End: 2020-02-11

## 2020-02-11 RX ORDER — ACETAMINOPHEN 325 MG/1
325 TABLET ORAL EVERY 6 HOURS PRN
Qty: 120 TABLET | Refills: 3 | Status: ON HOLD | OUTPATIENT
Start: 2020-02-11 | End: 2020-02-28

## 2020-02-11 RX ADMIN — HYDROCODONE BITARTRATE AND ACETAMINOPHEN 1 TABLET: 5; 325 TABLET ORAL at 10:32

## 2020-02-11 ASSESSMENT — ENCOUNTER SYMPTOMS
ABDOMINAL PAIN: 0
RHINORRHEA: 0
BACK PAIN: 0
COUGH: 0
SHORTNESS OF BREATH: 0
VOMITING: 0
NAUSEA: 0

## 2020-02-11 ASSESSMENT — PAIN DESCRIPTION - DESCRIPTORS: DESCRIPTORS: THROBBING

## 2020-02-11 ASSESSMENT — PAIN DESCRIPTION - PAIN TYPE: TYPE: ACUTE PAIN

## 2020-02-11 ASSESSMENT — PAIN SCALES - GENERAL
PAINLEVEL_OUTOF10: 10
PAINLEVEL_OUTOF10: 10

## 2020-02-11 ASSESSMENT — PAIN DESCRIPTION - ORIENTATION: ORIENTATION: RIGHT

## 2020-02-11 ASSESSMENT — PAIN DESCRIPTION - FREQUENCY: FREQUENCY: CONTINUOUS

## 2020-02-11 ASSESSMENT — PAIN DESCRIPTION - PROGRESSION: CLINICAL_PROGRESSION: GRADUALLY WORSENING

## 2020-02-11 ASSESSMENT — PAIN DESCRIPTION - LOCATION: LOCATION: ANKLE

## 2020-02-11 NOTE — ED PROVIDER NOTES
[facility]  ED  Emergency Department Encounter  EmergencyMedicine Resident     Pt Name:Yogi Hsu  MRN: [id number]  [place] 1960  Date of evaluation: 2/11/20  PCP:  REECE Arechiga [id number]          Chief Complaint   Patient presents with    Joint Swelling     ankle swelling    Ankle Pain       HISTORY OF PRESENT ILLNESS  (Location/Symptom, Timing/Onset, Context/Setting, Quality, Duration, Modifying Factors, Severity.)       Vinicio Daniel is a 63 y.o. male who presents with right foot pain and swelling. Patient was walking outside yesterday in the snow, when he tripped and everted his foot. He was able to ambulate immediately, but has not been able to ambulate since that time due to swelling and pain. He has taken Tylenol, which has not improved his pain. He does have an ulcer, cannot take ibuprofen. He has tried ice and an Ace wrap, which she states has helped some. Denies any prior surgery to the foot. Denies any fevers. Denies any surgeries to that foot., Any history of gout, or any bleeding disorders. PAST MEDICAL / SURGICAL / SOCIAL / FAMILY HISTORY       has a past medical history of Bleeding ulcer, Chronic back pain, Gastric ulcer, Hematuria, Hep C w/o coma, chronic (HCC), History of blood transfusion, History of ETOH abuse, Hypertension, Neuropathy, Portal hypertensive gastropathy ([address] Seizures ([address] and Wears glasses. has a past surgical history that includes hernia repair (Left, 1975); Upper gastrointestinal endoscopy (10/31/2015); Tonsillectomy (1970); Upper gastrointestinal endoscopy (03/14/2018); Colonoscopy (03/14/2018); pr colon ca scrn not hi rsk ind (N/A, 3/14/2018); Upper gastrointestinal endoscopy (N/A, 3/14/2018); Upper gastrointestinal endoscopy (N/A, 1/22/2019); Upper gastrointestinal endoscopy (N/A, 2/1/2019); back surgery (2000); Upper gastrointestinal endoscopy (N/A, 8/1/2019); Vasectomy (2007);  Elbow Debridement (Left, 10/16/2019); and incision and drainage (Left, 10/16/2019). Social History     Socioeconomic History    Marital status:      Spouse name: Not on file    Number of children: 3    Years of education: Not on file    Highest education level: Not on file   Occupational History    Not on file   Social Needs    Financial resource strain: Not on file    Food insecurity:     Worry: Not on file     Inability: Not on file    Transportation needs:     Medical: Not on file     Non-medical: Not on file   Tobacco Use    Smoking status: Former Smoker     Packs/day: 1.00     Years: 15.00     Pack years: 15.00     Last attempt to quit: 1995     Years since quittin.3    Smokeless tobacco: Former User     Types: Chew   Substance and Sexual Activity    Alcohol use: Not Currently     Comment: ocassionally    Drug use: No    Sexual activity: Yes     Partners: Female   Lifestyle    Physical activity:     Days per week: Not on file     Minutes per session: Not on file    Stress: Not on file   Relationships    Social connections:     Talks on phone: Not on file     Gets together: Not on file     Attends Scientologist service: Not on file     Active member of club or organization: Not on file     Attends meetings of clubs or organizations: Not on file     Relationship status: Not on file    Intimate partner violence:     Fear of current or ex partner: Not on file     Emotionally abused: Not on file     Physically abused: Not on file     Forced sexual activity: Not on file   Other Topics Concern    Not on file   Social History Narrative    Not on file          Family History   Problem Relation Age of Onset    High Blood Pressure Mother     Other Father         neuropathy    Stroke Father     Prostate Cancer Father        Allergies:  Sulfa antibiotics    Home Medications:     Prior to Admission medications    Medication Sig Start Date End Date Taking?  Authorizing Provider reviewed. Constitutional:       Appearance: Normal appearance. HENT:      Head: Normocephalic and atraumatic. Nose: Nose normal.      Mouth/Throat:      Mouth: Mucous membranes are moist.   Cardiovascular:      Rate and Rhythm: Normal rate. Heart sounds: No murmur. Pulmonary:      Effort: Pulmonary effort is normal. No respiratory distress. Abdominal:      General: Abdomen is flat. Musculoskeletal:      Comments: RLE with swelling on medial portion of plantar surface of foot, no posterior tenderness to medial or lateral malleolus tenderness, swelling to anterior medial aspect of foot; dp/pt pulses 2+ bilaterall, cap refill <2 seconds, no tenderness to palpation of anterior tibia, no tenderness to palpation of base of fifth metatarsal   Skin:     General: Skin is warm. Capillary Refill: Capillary refill takes less than 2 seconds. Findings: Bruising present. Neurological:      General: No focal deficit present. Mental Status: He is alert and oriented to person, place, and time. DIFFERENTIAL  DIAGNOSIS     PLAN (LABS / IMAGING / EKG):     Orders Placed This Encounter   Procedures    XR ANKLE RIGHT (MIN 3 VIEWS)    XR FOOT RIGHT (MIN 3 VIEWS)       MEDICATIONS ORDERED:     Orders Placed This Encounter   Medications    HYDROcodone-acetaminophen (NORCO) 5-325 MG per tablet 1 tablet    acetaminophen (TYLENOL) 325 MG tablet     Sig: Take 1 tablet by mouth every 6 hours as needed for Pain     Dispense:  120 tablet     Refill:  3    lidocaine 4 % external patch     Sig: Place 1 patch onto the skin daily     Dispense:  30 patch     Refill:  0       DDX: Foot sprain, strain, less likely but considered his Lisfranc injury, doubt Li fracture  DIAGNOSTIC RESULTS / EMERGENCY DEPARTMENT COURSE / MDM     LABS:   No results found for this visit on 02/11/20.     RADIOLOGY:  Xr Ankle Right (min 3 Views)    Result Date: 2/11/2020  EXAMINATION: THREE XRAY VIEWS OF THE RIGHT ANKLE Place 1 patch onto the skin daily        Treva Oviedo MD  EmergencyMedicine Resident    (Please note that portions of this note were completed with a voice recognition program.  Efforts were made to edit the dictations but occasionally words are mis-transcribed.)          Treva Oviedo MD  02/11/20 2027

## 2020-02-11 NOTE — ED PROVIDER NOTES
[facility]     Emergency Department     Faculty Note/ Attestation      Pt Name: Mary Carmen Cheema                                       MRN: [id number]  [place] 1960  Date of evaluation: 2/11/2020  Patients PCP:    REECE Obando - CNP    Attestation  I performed a history and physical examination of the patient and discussed management with the resident. I reviewed the residents note and agree with the documented findings and plan of care. Any areas of disagreement are noted on the chart. I was personally present for the key portions of any procedures. I have documented in the chart those procedures where I was not present during the key portions. I have reviewed the emergency nurses triage note. I agree with the chief complaint, past medical history, past surgical history, allergies, medications, social and family history as documented unless otherwise noted below. For Physician Assistant/ Nurse Practitioner cases/documentation I have personally evaluated this patient and have completed at least one if not all key elements of the E/M (history, physical exam, and MDM). Additional findings are as noted. Initial Screens:             Vitals:    Vitals:    02/11/20 0958   BP: 125/70   Pulse: 73   Resp: 15   Temp: 98.6 °F (37 °C)   TempSrc: Oral   SpO2: 98%   Weight: 230 lb (104.3 kg)   Height: 5' 10\" (1.778 m)       Chief Complaint      Chief Complaint   Patient presents with    Joint Swelling     ankle swelling    Ankle Pain          height is 5' 10\" (1.778 m) and weight is 230 lb (104.3 kg). His oral temperature is 98.6 °F (37 °C). His blood pressure is 125/70 and his pulse is 73. His respiration is 15 and oxygen saturation is 98%. DIAGNOSTIC RESULTS       RADIOLOGY:   XR ANKLE RIGHT (MIN 3 VIEWS)   Final Result   Mild diffuse soft tissue swelling without acute osseous abnormality.          XR FOOT RIGHT (MIN 3 VIEWS)   Final Result   Mild diffuse soft tissue swelling without acute osseous abnormality. LABS:  Labs Reviewed - No data to display      EMERGENCY DEPARTMENT COURSE:     -------------------------      BP: 125/70, Temp: 98.6 °F (37 °C), Pulse: 73, Resp: 15    System Problem List     Patient Active Problem List   Diagnosis    GI bleed    Gastrointestinal hemorrhage    Iron deficiency anemia due to chronic blood loss    Diarrhea    Melena    New onset seizure (HCC)    Elevated liver enzymes    Essential hypertension    Gastric ulcer    Unspecified viral hepatitis C without hepatic coma    Portal hypertensive gastropathy (HCC)    Anemia    Cirrhosis of liver without ascites (HCC)    Duodenal ulcer    Acute blood loss anemia    Thrombocytopenia (HCC)    Alcoholic cirrhosis of liver without ascites (HCC)    Chronic midline low back pain without sciatica    Seizures (HCC)    Hypertension    Hep C w/o coma, chronic (HCC)    Chronic back pain    History of ETOH abuse    Abdominal pain    Elevated alpha fetoprotein    Olecranon bursitis of left elbow         Comments  Chronic Prob List noted          Olivier MD, F.A.C.E.P.   Attending Emergency Physician           Tess Araya MD  02/11/20 9941

## 2020-02-18 ENCOUNTER — APPOINTMENT (OUTPATIENT)
Dept: GENERAL RADIOLOGY | Age: 60
DRG: 364 | End: 2020-02-18
Payer: MEDICARE

## 2020-02-18 ENCOUNTER — HOSPITAL ENCOUNTER (INPATIENT)
Age: 60
LOS: 5 days | Discharge: HOME HEALTH | DRG: 364 | End: 2020-02-23
Attending: EMERGENCY MEDICINE | Admitting: INTERNAL MEDICINE
Payer: MEDICARE

## 2020-02-18 PROBLEM — M00.9 SEPTIC ARTHRITIS OF RIGHT FOOT (HCC): Status: ACTIVE | Noted: 2020-02-18

## 2020-02-18 PROBLEM — M00.9: Status: ACTIVE | Noted: 2020-02-18

## 2020-02-18 LAB
ABSOLUTE EOS #: 0.19 K/UL (ref 0–0.44)
ABSOLUTE IMMATURE GRANULOCYTE: 0.03 K/UL (ref 0–0.3)
ABSOLUTE LYMPH #: 1.05 K/UL (ref 1.1–3.7)
ABSOLUTE MONO #: 0.55 K/UL (ref 0.1–1.2)
ANION GAP SERPL CALCULATED.3IONS-SCNC: 11 MMOL/L (ref 9–17)
BASOPHILS # BLD: 1 % (ref 0–2)
BASOPHILS ABSOLUTE: 0.06 K/UL (ref 0–0.2)
BUN BLDV-MCNC: 12 MG/DL (ref 6–20)
BUN/CREAT BLD: ABNORMAL (ref 9–20)
C-REACTIVE PROTEIN: 42.3 MG/L (ref 0–5)
CALCIUM SERPL-MCNC: 7.7 MG/DL (ref 8.6–10.4)
CHLORIDE BLD-SCNC: 105 MMOL/L (ref 98–107)
CO2: 19 MMOL/L (ref 20–31)
CREAT SERPL-MCNC: 0.47 MG/DL (ref 0.7–1.2)
DIFFERENTIAL TYPE: ABNORMAL
EOSINOPHILS RELATIVE PERCENT: 3 % (ref 1–4)
GFR AFRICAN AMERICAN: >60 ML/MIN
GFR NON-AFRICAN AMERICAN: >60 ML/MIN
GFR SERPL CREATININE-BSD FRML MDRD: ABNORMAL ML/MIN/{1.73_M2}
GFR SERPL CREATININE-BSD FRML MDRD: ABNORMAL ML/MIN/{1.73_M2}
GLUCOSE BLD-MCNC: 126 MG/DL (ref 70–99)
HCT VFR BLD CALC: 33.8 % (ref 40.7–50.3)
HEMOGLOBIN: 10.8 G/DL (ref 13–17)
IMMATURE GRANULOCYTES: 1 %
LYMPHOCYTES # BLD: 17 % (ref 24–43)
MCH RBC QN AUTO: 33 PG (ref 25.2–33.5)
MCHC RBC AUTO-ENTMCNC: 32 G/DL (ref 28.4–34.8)
MCV RBC AUTO: 103.4 FL (ref 82.6–102.9)
MONOCYTES # BLD: 9 % (ref 3–12)
NRBC AUTOMATED: 0 PER 100 WBC
PDW BLD-RTO: 17.4 % (ref 11.8–14.4)
PLATELET # BLD: 61 K/UL (ref 138–453)
PLATELET ESTIMATE: ABNORMAL
PMV BLD AUTO: 12.2 FL (ref 8.1–13.5)
POTASSIUM SERPL-SCNC: 4.2 MMOL/L (ref 3.7–5.3)
RBC # BLD: 3.27 M/UL (ref 4.21–5.77)
RBC # BLD: ABNORMAL 10*6/UL
SEDIMENTATION RATE, ERYTHROCYTE: 20 MM (ref 0–10)
SEG NEUTROPHILS: 69 % (ref 36–65)
SEGMENTED NEUTROPHILS ABSOLUTE COUNT: 4.18 K/UL (ref 1.5–8.1)
SODIUM BLD-SCNC: 135 MMOL/L (ref 135–144)
WBC # BLD: 6.1 K/UL (ref 3.5–11.3)
WBC # BLD: ABNORMAL 10*3/UL

## 2020-02-18 PROCEDURE — 6370000000 HC RX 637 (ALT 250 FOR IP): Performed by: FAMILY MEDICINE

## 2020-02-18 PROCEDURE — 6370000000 HC RX 637 (ALT 250 FOR IP): Performed by: STUDENT IN AN ORGANIZED HEALTH CARE EDUCATION/TRAINING PROGRAM

## 2020-02-18 PROCEDURE — 99223 1ST HOSP IP/OBS HIGH 75: CPT | Performed by: PODIATRIST

## 2020-02-18 PROCEDURE — 99285 EMERGENCY DEPT VISIT HI MDM: CPT

## 2020-02-18 PROCEDURE — 6360000002 HC RX W HCPCS: Performed by: FAMILY MEDICINE

## 2020-02-18 PROCEDURE — 6360000002 HC RX W HCPCS: Performed by: STUDENT IN AN ORGANIZED HEALTH CARE EDUCATION/TRAINING PROGRAM

## 2020-02-18 PROCEDURE — 1200000000 HC SEMI PRIVATE

## 2020-02-18 PROCEDURE — 0J9Q0ZZ DRAINAGE OF RIGHT FOOT SUBCUTANEOUS TISSUE AND FASCIA, OPEN APPROACH: ICD-10-PCS | Performed by: PODIATRIST

## 2020-02-18 PROCEDURE — 80048 BASIC METABOLIC PNL TOTAL CA: CPT

## 2020-02-18 PROCEDURE — 6370000000 HC RX 637 (ALT 250 FOR IP): Performed by: NURSE PRACTITIONER

## 2020-02-18 PROCEDURE — 2580000003 HC RX 258: Performed by: FAMILY MEDICINE

## 2020-02-18 PROCEDURE — 86140 C-REACTIVE PROTEIN: CPT

## 2020-02-18 PROCEDURE — 85025 COMPLETE CBC W/AUTO DIFF WBC: CPT

## 2020-02-18 PROCEDURE — 85651 RBC SED RATE NONAUTOMATED: CPT

## 2020-02-18 PROCEDURE — 73630 X-RAY EXAM OF FOOT: CPT

## 2020-02-18 PROCEDURE — 99222 1ST HOSP IP/OBS MODERATE 55: CPT | Performed by: FAMILY MEDICINE

## 2020-02-18 RX ORDER — HYDROCODONE BITARTRATE AND ACETAMINOPHEN 5; 325 MG/1; MG/1
2 TABLET ORAL EVERY 4 HOURS PRN
Status: DISCONTINUED | OUTPATIENT
Start: 2020-02-18 | End: 2020-02-23 | Stop reason: HOSPADM

## 2020-02-18 RX ORDER — ACETAMINOPHEN 325 MG/1
650 TABLET ORAL EVERY 4 HOURS PRN
Status: DISCONTINUED | OUTPATIENT
Start: 2020-02-18 | End: 2020-02-23 | Stop reason: HOSPADM

## 2020-02-18 RX ORDER — HYDROCODONE BITARTRATE AND ACETAMINOPHEN 5; 325 MG/1; MG/1
1 TABLET ORAL ONCE
Status: COMPLETED | OUTPATIENT
Start: 2020-02-18 | End: 2020-02-18

## 2020-02-18 RX ORDER — LISINOPRIL 20 MG/1
20 TABLET ORAL DAILY
Status: DISCONTINUED | OUTPATIENT
Start: 2020-02-18 | End: 2020-02-23 | Stop reason: HOSPADM

## 2020-02-18 RX ORDER — CEPHALEXIN 250 MG/1
500 CAPSULE ORAL ONCE
Status: COMPLETED | OUTPATIENT
Start: 2020-02-18 | End: 2020-02-18

## 2020-02-18 RX ORDER — ROPINIROLE 0.25 MG/1
0.5 TABLET, FILM COATED ORAL DAILY
Status: DISCONTINUED | OUTPATIENT
Start: 2020-02-18 | End: 2020-02-23 | Stop reason: HOSPADM

## 2020-02-18 RX ORDER — SODIUM CHLORIDE 9 MG/ML
INJECTION, SOLUTION INTRAVENOUS CONTINUOUS
Status: DISCONTINUED | OUTPATIENT
Start: 2020-02-18 | End: 2020-02-23 | Stop reason: HOSPADM

## 2020-02-18 RX ORDER — PRIMIDONE 50 MG/1
50 TABLET ORAL NIGHTLY
Status: DISCONTINUED | OUTPATIENT
Start: 2020-02-18 | End: 2020-02-23 | Stop reason: HOSPADM

## 2020-02-18 RX ORDER — FENTANYL CITRATE 50 UG/ML
25 INJECTION, SOLUTION INTRAMUSCULAR; INTRAVENOUS ONCE
Status: COMPLETED | OUTPATIENT
Start: 2020-02-18 | End: 2020-02-18

## 2020-02-18 RX ORDER — GABAPENTIN 800 MG/1
400 TABLET ORAL 2 TIMES DAILY
Status: DISCONTINUED | OUTPATIENT
Start: 2020-02-18 | End: 2020-02-23 | Stop reason: HOSPADM

## 2020-02-18 RX ORDER — SODIUM CHLORIDE 0.9 % (FLUSH) 0.9 %
10 SYRINGE (ML) INJECTION EVERY 12 HOURS SCHEDULED
Status: DISCONTINUED | OUTPATIENT
Start: 2020-02-18 | End: 2020-02-23 | Stop reason: HOSPADM

## 2020-02-18 RX ORDER — HYDROCODONE BITARTRATE AND ACETAMINOPHEN 5; 325 MG/1; MG/1
1 TABLET ORAL EVERY 4 HOURS PRN
Status: DISCONTINUED | OUTPATIENT
Start: 2020-02-18 | End: 2020-02-23 | Stop reason: HOSPADM

## 2020-02-18 RX ORDER — LANOLIN ALCOHOL/MO/W.PET/CERES
325 CREAM (GRAM) TOPICAL 2 TIMES DAILY WITH MEALS
Status: DISCONTINUED | OUTPATIENT
Start: 2020-02-18 | End: 2020-02-23 | Stop reason: HOSPADM

## 2020-02-18 RX ORDER — ASPIRIN 81 MG/1
81 TABLET, CHEWABLE ORAL DAILY
Status: DISCONTINUED | OUTPATIENT
Start: 2020-02-18 | End: 2020-02-23 | Stop reason: HOSPADM

## 2020-02-18 RX ORDER — SODIUM CHLORIDE 0.9 % (FLUSH) 0.9 %
10 SYRINGE (ML) INJECTION PRN
Status: DISCONTINUED | OUTPATIENT
Start: 2020-02-18 | End: 2020-02-23 | Stop reason: HOSPADM

## 2020-02-18 RX ADMIN — FENTANYL CITRATE 25 MCG: 50 INJECTION, SOLUTION INTRAMUSCULAR; INTRAVENOUS at 16:50

## 2020-02-18 RX ADMIN — HYDROCODONE BITARTRATE AND ACETAMINOPHEN 2 TABLET: 5; 325 TABLET ORAL at 22:23

## 2020-02-18 RX ADMIN — GABAPENTIN 400 MG: 800 TABLET ORAL at 20:23

## 2020-02-18 RX ADMIN — Medication 1500 MG: at 18:20

## 2020-02-18 RX ADMIN — PIPERACILLIN AND TAZOBACTAM 3.38 G: 3; .375 INJECTION, POWDER, LYOPHILIZED, FOR SOLUTION INTRAVENOUS at 20:24

## 2020-02-18 RX ADMIN — PRIMIDONE 50 MG: 50 TABLET ORAL at 20:23

## 2020-02-18 RX ADMIN — ENOXAPARIN SODIUM 40 MG: 40 INJECTION SUBCUTANEOUS at 20:22

## 2020-02-18 RX ADMIN — ASPIRIN 81 MG: 81 TABLET, CHEWABLE ORAL at 20:22

## 2020-02-18 RX ADMIN — CEPHALEXIN 500 MG: 250 CAPSULE ORAL at 13:44

## 2020-02-18 RX ADMIN — SODIUM CHLORIDE: 9 INJECTION, SOLUTION INTRAVENOUS at 20:25

## 2020-02-18 RX ADMIN — Medication 1500 MG: at 16:15

## 2020-02-18 RX ADMIN — HYDROCODONE BITARTRATE AND ACETAMINOPHEN 1 TABLET: 5; 325 TABLET ORAL at 13:44

## 2020-02-18 ASSESSMENT — ENCOUNTER SYMPTOMS
COUGH: 0
CONSTIPATION: 0
PHOTOPHOBIA: 0
WHEEZING: 0
COLOR CHANGE: 1
VOMITING: 0
ABDOMINAL PAIN: 0
BACK PAIN: 0
SINUS PRESSURE: 0
CHOKING: 0
FACIAL SWELLING: 0
VOICE CHANGE: 0
SORE THROAT: 0
DIARRHEA: 0
CHEST TIGHTNESS: 0
RHINORRHEA: 0
SHORTNESS OF BREATH: 0
EYE PAIN: 0
NAUSEA: 0

## 2020-02-18 ASSESSMENT — PAIN DESCRIPTION - ORIENTATION
ORIENTATION: RIGHT

## 2020-02-18 ASSESSMENT — PAIN DESCRIPTION - LOCATION
LOCATION: FOOT

## 2020-02-18 ASSESSMENT — PAIN SCALES - GENERAL
PAINLEVEL_OUTOF10: 7
PAINLEVEL_OUTOF10: 4
PAINLEVEL_OUTOF10: 10
PAINLEVEL_OUTOF10: 3
PAINLEVEL_OUTOF10: 6
PAINLEVEL_OUTOF10: 10
PAINLEVEL_OUTOF10: 10

## 2020-02-18 ASSESSMENT — PAIN DESCRIPTION - DESCRIPTORS: DESCRIPTORS: DISCOMFORT;THROBBING

## 2020-02-18 ASSESSMENT — PAIN DESCRIPTION - PAIN TYPE
TYPE: ACUTE PAIN

## 2020-02-18 ASSESSMENT — PAIN DESCRIPTION - FREQUENCY
FREQUENCY: CONTINUOUS
FREQUENCY: CONTINUOUS

## 2020-02-18 NOTE — CONSULTS
Portal hypertensive gastropathy ([facility] [address] Seizures ([facility] [address] and Wears glasses. Past Surgical History   has a past surgical history that includes hernia repair (Left, 1975); Upper gastrointestinal endoscopy (10/31/2015); Tonsillectomy (1970); Upper gastrointestinal endoscopy (03/14/2018); Colonoscopy (03/14/2018); pr colon ca scrn not hi rsk ind (N/A, 3/14/2018); Upper gastrointestinal endoscopy (N/A, 3/14/2018); Upper gastrointestinal endoscopy (N/A, 1/22/2019); Upper gastrointestinal endoscopy (N/A, 2/1/2019); back surgery (2000); Upper gastrointestinal endoscopy (N/A, 8/1/2019); Vasectomy (2007); Elbow Debridement (Left, 10/16/2019); and incision and drainage (Left, 10/16/2019). Medications  Prior to Admission medications    Medication Sig Start Date End Date Taking?  Authorizing Provider   ferrous sulfate 325 (65 Fe) MG tablet TAKE ONE TABLET BY MOUTH DAILY WITH BREAKFAST 2/10/20  Yes REECE Brandt CNP   rOPINIRole (REQUIP) 0.5 MG tablet TAKE ONE TABLET BY MOUTH ONCE NIGHTLY 2/10/20  Yes REECE Brandt CNP   primidone (MYSOLINE) 50 MG tablet Take 2 po qhs 1/16/20  Yes Jonathan Jung MD   gabapentin (NEURONTIN) 600 MG tablet TAKE ONE TABLET BY MOUTH THREE TIMES A DAY 12/23/19 2/21/20 Yes Catherine Suazo MD   lisinopril (PRINIVIL;ZESTRIL) 20 MG tablet TAKE ONE TABLET BY MOUTH DAILY 9/25/19  Yes REECE Brandt CNP   aspirin 81 MG chewable tablet Take 81 mg by mouth daily   Yes Historical Provider, MD   acetaminophen (TYLENOL) 325 MG tablet Take 1 tablet by mouth every 6 hours as needed for Pain 2/11/20   Lacey Shi MD   lidocaine 4 % external patch Place 1 patch onto the skin daily 2/11/20 3/12/20  Lacey Shi MD   Multiple Vitamins-Minerals (THERAPEUTIC MULTIVITAMIN-MINERALS) tablet Take 1 tablet by mouth daily    Historical Provider, MD    Scheduled Meds:   aspirin  81 mg Oral Daily    ferrous sulfate  325 mg Oral BID WC    gabapentin  400 mg Oral BID    lisinopril  20 mg Oral Daily    primidone  50 mg Oral Nightly    rOPINIRole  0.5 mg Oral Daily    sodium chloride flush  10 mL Intravenous 2 times per day    enoxaparin  40 mg Subcutaneous Daily    piperacillin-tazobactam  3.375 g Intravenous Q8H    vancomycin (VANCOCIN) intermittent dosing (placeholder)   Other RX Placeholder    [START ON 2020] vancomycin  1,250 mg Intravenous Q18H     Continuous Infusions:   sodium chloride       PRN Meds:.sodium chloride flush, acetaminophen    Allergies  is allergic to sulfa antibiotics. Family History  family history includes High Blood Pressure in his mother; Other in his father; Prostate Cancer in his father; Stroke in his father. Social History   reports that he quit smoking about 24 years ago. He has a 15.00 pack-year smoking history. He has quit using smokeless tobacco.  His smokeless tobacco use included chew. reports previous alcohol use. reports no history of drug use. Objective     Vitals:  Patient Vitals for the past 8 hrs:   BP Temp Temp src Pulse Resp SpO2 Height Weight   20 1815 (!) 135/56 98.1 °F (36.7 °C) Oral 63 15 100 % -- --   20 1316 121/60 98.1 °F (36.7 °C) Oral 68 18 98 % 5' 10\" (1.778 m) 230 lb (104.3 kg)     Average, Min, and Max for last 24 hours Vitals:  TEMPERATURE:  Temp  Av.1 °F (36.7 °C)  Min: 98.1 °F (36.7 °C)  Max: 98.1 °F (36.7 °C)    RESPIRATIONS RANGE: Resp  Av.5  Min: 15  Max: 18    PULSE RANGE: Pulse  Av.5  Min: 63  Max: 68    BLOOD PRESSURE RANGE:  Systolic (57DNU), AGS:594 , Min:121 , QXE:019   ; Diastolic (50POQ), GXX:10, Min:56, Max:60      PULSE OXIMETRY RANGE: SpO2  Av %  Min: 98 %  Max: 100 %  I&O:  No intake/output data recorded.     CBC:  Recent Labs     20  1416   WBC 6.1   HGB 10.8*   HCT 33.8*   PLT 61*   CRP 42.3*        BMP:  Recent Labs     20  1416      K 4.2      CO2 19*   BUN 12   CREATININE 0.47*   GLUCOSE 126*   CALCIUM 7.7* Coags:  No results for input(s): APTT, PROT, INR in the last 72 hours. Lab Results   Component Value Date    LABA1C 4.4 12/16/2019     Lab Results   Component Value Date    SEDRATE 20 (H) 02/18/2020     Lab Results   Component Value Date    CRP 42.3 (H) 02/18/2020         Physical Exam:  Vascular: DP and PT pulses are palpable . CFT <3 seconds to all digits. Hair growth is absent to the level of the digits. Moderate edema. Neuro: Saph/sural/SP/DP/plantar sensation intact to light touch. Musculoskeletal: Muscle strength is 5/5, adequate ROM, adequate strength to all lower extremity muscle groups. Gross deformity is absent. No pain with compression of calf. Exquisite pain with palpation of first MPJ complex. No joint crepitus noted first MPJ. Dermatologic: No open wound. No purulent drainage appreciated. circumferential erythema around the first MPJ complex with associated increase in warmth. No proximal streaking lymphangitis. focal pocket of fluctuance noted dorsally to the base of the first proximal phalanx. Does not probe to bone, sinus track, or undermine. Clinical:               Imaging:   XR FOOT RIGHT (MIN 3 VIEWS)   Final Result   Diffuse soft tissue swelling. Possible erosion of the 1st proximal phalanx base. Please correlate for   potential infection at this site, possibly septic arthritis. MRI FOOT RIGHT W CONTRAST    (Results Pending)       Cultures: Pending    Assessment     Mary Carmen Cheema is a 63 y.o. male with   1. Cellulitis, R foot  2. Possible abscess, R foot  3. Possible septic joint, R foot  4.  Peripheral neuropathy     Principal Problem:    Septic arthritis of IP joint of toe, right (HCC)  Active Problems:    Essential hypertension    Unspecified viral hepatitis C without hepatic coma    Cirrhosis of liver without ascites (HCC)    Alcoholic cirrhosis of liver without ascites (HCC)    Septic arthritis of right foot ([address]  Resolved Problems:    * No resolved hospital problems. *        Plan     · Patient examined and evaluated at bedside   · Treatment options discussed in detail with the patient  · Radiographs reviewed and discussed in detail with patient. · MRI R foot-- r/o abscess and septic joint  · IV abx: Vanc/Zosyn  · No surgical intervention planned at this time. · Dressing applied to Right foot: betadine, DSD, ACE  · WBAT to Right lower extremity with use of surgical shoe    Procedure: Patient understands that I&D of right foot needs to be performed. Patient agrees to procedure. Right foot was marked. Timeout performed with patient, RN, and writer in room. 10cc of 1% lidocaine plain was used for a local block. A stab incision was made with #15 blade over apex of the abscess to the level of subcutaneous tissue. 1-2cc of purulent drainage expressed. The site was irrigated with sterile saline. Dry sterile dressing applied. Hemostasis spontaneous with direct pressure and packing. Reports 0/10 post debridement pain. Patient tolerated procedure well. Samanta Arreguin DPM   Podiatric Medicine & Surgery   2/18/2020 at 6:41 PM     I performed a history and physical examination of the patient and discussed management with the resident. I reviewed the residents note and agree with the documented findings and plan of care. Any areas of disagreement are noted on the chart. I was personally present for the key portions of any procedures. I have documented in the chart those procedures where I was not present during the key portions. I have reviewed the Podiatry Resident progress note. I agree with the chief complaint, past medical history, past surgical history, allergies, medications, social and family history as documented unless otherwise noted below. Documentation of the HPI, Physical Exam and Medical Decision Making performed by medical students or scribes is based on my personal performance of the HPI, PE and MDM.  I have personally evaluated this

## 2020-02-18 NOTE — ED PROVIDER NOTES
cases/documentation I have had a face to face evaluation of this patient and have completed at least one if not all key elements of the E/M (history, physical exam, and MDM). Additional findings are as noted. For APC cases I have personally evaluated and examined the patient in conjunction with the APC and agree with the treatment plan and disposition of the patient as recorded by the APC.     Terrence Tyler MD  Attending Emergency  Physician       Minh Tipton MD  02/18/20 1565

## 2020-02-18 NOTE — ED PROVIDER NOTES
gastrointestinal endoscopy (N/A, 2019); Vasectomy (); Elbow Debridement (Left, 10/16/2019); and incision and drainage (Left, 10/16/2019).        Social History     Socioeconomic History    Marital status:      Spouse name: Not on file    Number of children: 3    Years of education: Not on file    Highest education level: Not on file   Occupational History    Not on file   Social Needs    Financial resource strain: Not on file    Food insecurity:     Worry: Not on file     Inability: Not on file    Transportation needs:     Medical: Not on file     Non-medical: Not on file   Tobacco Use    Smoking status: Former Smoker     Packs/day: 1.00     Years: 15.00     Pack years: 15.00     Last attempt to quit: 1995     Years since quittin.4    Smokeless tobacco: Former User     Types: Chew   Substance and Sexual Activity    Alcohol use: Not Currently     Comment: ocassionally    Drug use: No    Sexual activity: Yes     Partners: Female   Lifestyle    Physical activity:     Days per week: Not on file     Minutes per session: Not on file    Stress: Not on file   Relationships    Social connections:     Talks on phone: Not on file     Gets together: Not on file     Attends Gnosticism service: Not on file     Active member of club or organization: Not on file     Attends meetings of clubs or organizations: Not on file     Relationship status: Not on file    Intimate partner violence:     Fear of current or ex partner: Not on file     Emotionally abused: Not on file     Physically abused: Not on file     Forced sexual activity: Not on file   Other Topics Concern    Not on file   Social History Narrative    Not on file          Family History   Problem Relation Age of Onset    High Blood Pressure Mother     Other Father         neuropathy    Stroke Father     Prostate Cancer Father        Allergies:  Sulfa antibiotics    Home Medications:     Prior to Admission medications routine    Telemetry monitoring    Notify physician    Up as tolerated    Up with assistance    Daily weights    Intake and output    Tobacco cessation education    Place intermittent pneumatic compression device    Full Code    Inpatient consult to [address] [facility] [address] to Dose: Vancomycin    Inpatient consult to Podiatry    OT eval and treat    PT evaluation and treat    Initiate Oxygen Therapy Protocol    PATIENT STATUS (FROM ED OR OR/PROCEDURAL) Inpatient       MEDICATIONS ORDERED:     Orders Placed This Encounter   Medications    HYDROcodone-acetaminophen (NORCO) 5-325 MG per tablet 1 tablet    cephALEXin (KEFLEX) capsule 500 mg    vancomycin (VANCOCIN) 1500 mg in dextrose 5 % 250 mL IVPB    aspirin chewable tablet 81 mg    ferrous sulfate EC tablet 325 mg    gabapentin (NEURONTIN) tablet 400 mg    lisinopril (PRINIVIL;ZESTRIL) tablet 20 mg    primidone (MYSOLINE) tablet 50 mg    rOPINIRole (REQUIP) tablet 0.5 mg    0.9 % sodium chloride infusion    sodium chloride flush 0.9 % injection 10 mL    sodium chloride flush 0.9 % injection 10 mL    enoxaparin (LOVENOX) injection 40 mg     May adjust as needed for renal function.     acetaminophen (TYLENOL) tablet 650 mg    piperacillin-tazobactam (ZOSYN) 3.375 g in dextrose 5 % 50 mL IVPB extended infusion (mini-bag)    DISCONTD: vancomycin (VANCOCIN) 1500 mg in dextrose 5 % 250 mL IVPB    fentaNYL (SUBLIMAZE) injection 25 mcg    vancomycin (VANCOCIN) intermittent dosing (placeholder)    vancomycin (VANCOCIN) 1250 mg in dextrose 5 % 250 mL IVPB    OR Linked Order Group     HYDROcodone-acetaminophen (NORCO) 5-325 MG per tablet 1 tablet     HYDROcodone-acetaminophen (Burlene Guerra) 5-325 MG per tablet 2 tablet       DDX: cellulitis, abscess, osteo, septic arthritis  DIAGNOSTIC RESULTS / EMERGENCY DEPARTMENT COURSE / MDM     LABS:     Results for orders placed or performed during the hospital encounter of 02/18/20   CBC Auto Differential   Result Value Ref Range    WBC 6.1 3.5 - 11.3 k/uL    RBC 3.27 (L) 4.21 - 5.77 m/uL    Hemoglobin 10.8 (L) 13.0 - 17.0 g/dL    Hematocrit 33.8 (L) 40.7 - 50.3 %    .4 (H) 82.6 - 102.9 fL    MCH 33.0 25.2 - 33.5 pg    MCHC 32.0 28.4 - 34.8 g/dL    RDW 17.4 (H) 11.8 - 14.4 %    Platelets 61 (L) 380 - 453 k/uL    MPV 12.2 8.1 - 13.5 fL    NRBC Automated 0.0 0.0 per 100 WBC    Differential Type NOT REPORTED     Seg Neutrophils 69 (H) 36 - 65 %    Lymphocytes 17 (L) 24 - 43 %    Monocytes 9 3 - 12 %    Eosinophils % 3 1 - 4 %    Basophils 1 0 - 2 %    Immature Granulocytes 1 (H) 0 %    Segs Absolute 4.18 1.50 - 8.10 k/uL    Absolute Lymph # 1.05 (L) 1.10 - 3.70 k/uL    Absolute Mono # 0.55 0.10 - 1.20 k/uL    Absolute Eos # 0.19 0.00 - 0.44 k/uL    Basophils Absolute 0.06 0.00 - 0.20 k/uL    Absolute Immature Granulocyte 0.03 0.00 - 0.30 k/uL    WBC Morphology NOT REPORTED     RBC Morphology ANISOCYTOSIS PRESENT     Platelet Estimate NOT REPORTED    Basic Metabolic Panel   Result Value Ref Range    Glucose 126 (H) 70 - 99 mg/dL    BUN 12 6 - 20 mg/dL    CREATININE 0.47 (L) 0.70 - 1.20 mg/dL    Bun/Cre Ratio NOT REPORTED 9 - 20    Calcium 7.7 (L) 8.6 - 10.4 mg/dL    Sodium 135 135 - 144 mmol/L    Potassium 4.2 3.7 - 5.3 mmol/L    Chloride 105 98 - 107 mmol/L    CO2 19 (L) 20 - 31 mmol/L    Anion Gap 11 9 - 17 mmol/L    GFR Non-African American >60 >60 mL/min    GFR African American >60 >60 mL/min    GFR Comment          GFR Staging NOT REPORTED    Sedimentation Rate   Result Value Ref Range    Sed Rate 20 (H) 0 - 10 mm   C-REACTIVE PROTEIN   Result Value Ref Range    CRP 42.3 (H) 0.0 - 5.0 mg/L   CBC   Result Value Ref Range    WBC 7.3 3.5 - 11.3 k/uL    RBC 3.34 (L) 4.21 - 5.77 m/uL    Hemoglobin 10.9 (L) 13.0 - 17.0 g/dL    Hematocrit 33.7 (L) 40.7 - 50.3 %    .9 82.6 - 102.9 fL    MCH 32.6 25.2 - 33.5 pg    MCHC 32.3 28.4 - 34.8 g/dL    RDW 17.2 (H) 11.8 - 14.4 % Platelets 60 (L) 709 - 453 k/uL    MPV 11.8 8.1 - 13.5 fL    NRBC Automated 0.0 0.0 per 100 WBC   Comprehensive Metabolic Panel w/ Reflex to MG   Result Value Ref Range    Glucose 113 (H) 70 - 99 mg/dL    BUN 10 6 - 20 mg/dL    CREATININE 0.57 (L) 0.70 - 1.20 mg/dL    Bun/Cre Ratio NOT REPORTED 9 - 20    Calcium 7.8 (L) 8.6 - 10.4 mg/dL    Sodium 137 135 - 144 mmol/L    Potassium 3.8 3.7 - 5.3 mmol/L    Chloride 106 98 - 107 mmol/L    CO2 21 20 - 31 mmol/L    Anion Gap 10 9 - 17 mmol/L    Alkaline Phosphatase 154 (H) 40 - 129 U/L    ALT 43 (H) 5 - 41 U/L     (H) <40 U/L    Total Bilirubin 5.15 (H) 0.3 - 1.2 mg/dL    Total Protein 5.6 (L) 6.4 - 8.3 g/dL    Alb 2.3 (L) 3.5 - 5.2 g/dL    Albumin/Globulin Ratio 0.7 (L) 1.0 - 2.5    GFR Non-African American >60 >60 mL/min    GFR African American >60 >60 mL/min    GFR Comment          GFR Staging NOT REPORTED    Ionized Calcium   Result Value Ref Range    Calcium, Ion 1.05 (L) 1.13 - 1.33 mmol/L   Magnesium   Result Value Ref Range    Magnesium 1.6 1.6 - 2.6 mg/dL   Phosphorus   Result Value Ref Range    Phosphorus 2.8 2.5 - 4.5 mg/dL   Protime-INR   Result Value Ref Range    Protime 15.0 (H) 9.0 - 12.0 sec    INR 1.5        RADIOLOGY:  XR FOOT RIGHT (MIN 3 VIEWS)   Final Result   Diffuse soft tissue swelling. Possible erosion of the 1st proximal phalanx base. Please correlate for   potential infection at this site, possibly septic arthritis. MRI FOOT RIGHT W CONTRAST    (Results Pending)             MDM/EMERGENCY DEPARTMENT COURSE:  1:11 PM: 62 yo male with recent injury to right foot presenting with right great toe swelling with surrounding erythema and warmth. Nontoxic appearing, vitals WNL, compared to visit last week, presentation now consistent w/ cellulitis and possible abscess formation. Will obtain xrays of fooot to eval for bony abnormalities. Abx, analgesia, possible admission.        ED Course as of Feb 19 0747   Tue Feb 18, 2020   0137

## 2020-02-18 NOTE — H&P
[facility]      HISTORY AND PHYSICAL EXAMINATION            Date:   2/18/2020  Patient name:  Ashley Wadsworth  Date of admission:  2/18/2020  1:07 PM  MRN:   [id number]  Account:  [de-identified]  YOB: 1960  PCP:    REECE Steen CNP  Room:   2026/2026-01  Code Status:    Full Code    Chief Complaint:     Chief Complaint   Patient presents with    Foot Swelling     Right foot swelling with pain, New formation of a vesicle on greater toe, Has Neuropathy, New onset of redness with the pre-existing swelling; Started getting worse ~3 days ago   R foot pain and swelling     History Obtained From:     patient, electronic medical record    History of Present Illness: The patient is a 63 y.o. Non-/non  male who presents with Foot Swelling (Right foot swelling with pain, New formation of a vesicle on greater toe, Has Neuropathy, New onset of redness with the pre-existing swelling; Started getting worse ~3 days ago)   and he is admitted to the hospital for the management of  Septic arthritis of IP joint of toe, right ([address] Patient seen in emergency room. He came with right great toe pain, swelling, redness. Patient reported fall and injury to foot about 1 week before. He was seen in emergency room and discharged with lidocaine patch after x-ray was negative for any fractures. Symptoms worsened over the course of 7 days and he was unable to walk on right foot due to pain, redness. Patient denies any chills, nausea, vomiting. He has underlying history of peripheral neuropathy likely secondary alcohol. Patient is sober now. Other comorbidities include hep C, liver cirrhosis with portal hypertension  Evaluation emergency room showed temperature 98.1, blood pressure 121/60. Lab work showed normal electrolytes, kidney function, normal WBC count. CRP was elevated at 42. 3.   X-ray was suggestive of possible erosion at first proximal phalanx base cough, choking, chest tightness, shortness of breath and wheezing. Cardiovascular: Negative for chest pain, palpitations and leg swelling. Gastrointestinal: Negative for abdominal pain, constipation, diarrhea, nausea and vomiting. Genitourinary: Negative for difficulty urinating, discharge, dysuria, frequency and testicular pain. Musculoskeletal: Negative for back pain. Skin: Positive for color change and wound. Negative for rash. Neurological: Positive for numbness. Negative for dizziness, weakness, light-headedness and headaches. Hematological: Does not bruise/bleed easily. Psychiatric/Behavioral: Negative for agitation, behavioral problems, confusion, self-injury, sleep disturbance and suicidal ideas. Physical Exam:   /60   Pulse 68   Temp 98.1 °F (36.7 °C) (Oral)   Resp 18   Ht 5' 10\" (1.778 m)   Wt 230 lb (104.3 kg)   SpO2 98%   BMI 33.00 kg/m²   Temp (24hrs), Av.1 °F (36.7 °C), Min:98.1 °F (36.7 °C), Max:98.1 °F (36.7 °C)    No results for input(s): POCGLU in the last 72 hours. No intake or output data in the 24 hours ending 20 1830  Physical Exam  Vitals signs and nursing note reviewed. Constitutional:       General: He is not in acute distress. Appearance: He is not diaphoretic. HENT:      Head: Normocephalic and atraumatic. Nose:      Right Sinus: No maxillary sinus tenderness or frontal sinus tenderness. Left Sinus: No maxillary sinus tenderness or frontal sinus tenderness. Mouth/Throat:      Pharynx: No oropharyngeal exudate. Eyes:      General: No scleral icterus. Conjunctiva/sclera: Conjunctivae normal.      Pupils: Pupils are equal, round, and reactive to light. Neck:      Musculoskeletal: Full passive range of motion without pain and neck supple. Thyroid: No thyromegaly. Vascular: No JVD. Cardiovascular:      Rate and Rhythm: Normal rate and regular rhythm.       Pulses:           Dorsalis pedis pulses are 2+ on CONSULT TO PODIATRY  IP CONSULT TO HOSPITALIST  PHARMACY TO DOSE VANCOMYCIN  IP CONSULT TO PODIATRY    Patient is admitted as inpatient status because of co-morbidities listed above, severity of signs and symptoms as outlined, requirement for current medical therapies and most importantly because of direct risk to patient if care not provided in a hospital setting.     Palak Chu MD  2/18/2020    Copy sent to Dr. Usha Foster, REECE - CNP    (Please note that portions of this note were completed with a voice recognition program. Efforts were made to edit the dictations but occasionally words are mis-transcribed.)

## 2020-02-19 LAB
ALBUMIN SERPL-MCNC: 2.3 G/DL (ref 3.5–5.2)
ALBUMIN/GLOBULIN RATIO: 0.7 (ref 1–2.5)
ALP BLD-CCNC: 154 U/L (ref 40–129)
ALT SERPL-CCNC: 43 U/L (ref 5–41)
ANION GAP SERPL CALCULATED.3IONS-SCNC: 10 MMOL/L (ref 9–17)
AST SERPL-CCNC: 106 U/L
BILIRUB SERPL-MCNC: 5.15 MG/DL (ref 0.3–1.2)
BUN BLDV-MCNC: 10 MG/DL (ref 6–20)
BUN/CREAT BLD: ABNORMAL (ref 9–20)
CALCIUM IONIZED: 1.05 MMOL/L (ref 1.13–1.33)
CALCIUM SERPL-MCNC: 7.8 MG/DL (ref 8.6–10.4)
CHLORIDE BLD-SCNC: 106 MMOL/L (ref 98–107)
CO2: 21 MMOL/L (ref 20–31)
CREAT SERPL-MCNC: 0.57 MG/DL (ref 0.7–1.2)
GFR AFRICAN AMERICAN: >60 ML/MIN
GFR NON-AFRICAN AMERICAN: >60 ML/MIN
GFR SERPL CREATININE-BSD FRML MDRD: ABNORMAL ML/MIN/{1.73_M2}
GFR SERPL CREATININE-BSD FRML MDRD: ABNORMAL ML/MIN/{1.73_M2}
GLUCOSE BLD-MCNC: 113 MG/DL (ref 70–99)
HCT VFR BLD CALC: 33.7 % (ref 40.7–50.3)
HEMOGLOBIN: 10.9 G/DL (ref 13–17)
INR BLD: 1.5
MAGNESIUM: 1.6 MG/DL (ref 1.6–2.6)
MCH RBC QN AUTO: 32.6 PG (ref 25.2–33.5)
MCHC RBC AUTO-ENTMCNC: 32.3 G/DL (ref 28.4–34.8)
MCV RBC AUTO: 100.9 FL (ref 82.6–102.9)
NRBC AUTOMATED: 0 PER 100 WBC
PDW BLD-RTO: 17.2 % (ref 11.8–14.4)
PHOSPHORUS: 2.8 MG/DL (ref 2.5–4.5)
PLATELET # BLD: 60 K/UL (ref 138–453)
PMV BLD AUTO: 11.8 FL (ref 8.1–13.5)
POTASSIUM SERPL-SCNC: 3.8 MMOL/L (ref 3.7–5.3)
PROTHROMBIN TIME: 15 SEC (ref 9–12)
RBC # BLD: 3.34 M/UL (ref 4.21–5.77)
SODIUM BLD-SCNC: 137 MMOL/L (ref 135–144)
TOTAL PROTEIN: 5.6 G/DL (ref 6.4–8.3)
URIC ACID: 3.8 MG/DL (ref 3.4–7)
WBC # BLD: 7.3 K/UL (ref 3.5–11.3)

## 2020-02-19 PROCEDURE — 83735 ASSAY OF MAGNESIUM: CPT

## 2020-02-19 PROCEDURE — 6360000002 HC RX W HCPCS: Performed by: FAMILY MEDICINE

## 2020-02-19 PROCEDURE — 82330 ASSAY OF CALCIUM: CPT

## 2020-02-19 PROCEDURE — 2580000003 HC RX 258: Performed by: INTERNAL MEDICINE

## 2020-02-19 PROCEDURE — 6360000002 HC RX W HCPCS: Performed by: NURSE PRACTITIONER

## 2020-02-19 PROCEDURE — 84100 ASSAY OF PHOSPHORUS: CPT

## 2020-02-19 PROCEDURE — 36415 COLL VENOUS BLD VENIPUNCTURE: CPT

## 2020-02-19 PROCEDURE — 6360000002 HC RX W HCPCS: Performed by: INTERNAL MEDICINE

## 2020-02-19 PROCEDURE — 80053 COMPREHEN METABOLIC PANEL: CPT

## 2020-02-19 PROCEDURE — 99232 SBSQ HOSP IP/OBS MODERATE 35: CPT | Performed by: INTERNAL MEDICINE

## 2020-02-19 PROCEDURE — 2580000003 HC RX 258: Performed by: FAMILY MEDICINE

## 2020-02-19 PROCEDURE — 6370000000 HC RX 637 (ALT 250 FOR IP): Performed by: FAMILY MEDICINE

## 2020-02-19 PROCEDURE — 1200000000 HC SEMI PRIVATE

## 2020-02-19 PROCEDURE — 6370000000 HC RX 637 (ALT 250 FOR IP): Performed by: NURSE PRACTITIONER

## 2020-02-19 PROCEDURE — 85610 PROTHROMBIN TIME: CPT

## 2020-02-19 PROCEDURE — 84550 ASSAY OF BLOOD/URIC ACID: CPT

## 2020-02-19 PROCEDURE — 85027 COMPLETE CBC AUTOMATED: CPT

## 2020-02-19 RX ORDER — LORAZEPAM 2 MG/ML
0.5 INJECTION INTRAMUSCULAR ONCE
Status: COMPLETED | OUTPATIENT
Start: 2020-02-19 | End: 2020-02-19

## 2020-02-19 RX ADMIN — HYDROCODONE BITARTRATE AND ACETAMINOPHEN 2 TABLET: 5; 325 TABLET ORAL at 20:24

## 2020-02-19 RX ADMIN — CALCIUM GLUCONATE 1 G: 98 INJECTION, SOLUTION INTRAVENOUS at 23:47

## 2020-02-19 RX ADMIN — ROPINIROLE HYDROCHLORIDE 0.5 MG: 0.25 TABLET, FILM COATED ORAL at 20:24

## 2020-02-19 RX ADMIN — PIPERACILLIN AND TAZOBACTAM 3.38 G: 3; .375 INJECTION, POWDER, LYOPHILIZED, FOR SOLUTION INTRAVENOUS at 20:25

## 2020-02-19 RX ADMIN — VANCOMYCIN HYDROCHLORIDE 1250 MG: 10 INJECTION, POWDER, LYOPHILIZED, FOR SOLUTION INTRAVENOUS at 19:11

## 2020-02-19 RX ADMIN — FERROUS SULFATE TAB EC 325 MG (65 MG FE EQUIVALENT) 325 MG: 325 (65 FE) TABLET DELAYED RESPONSE at 09:05

## 2020-02-19 RX ADMIN — ASPIRIN 81 MG: 81 TABLET, CHEWABLE ORAL at 09:06

## 2020-02-19 RX ADMIN — GABAPENTIN 400 MG: 800 TABLET ORAL at 20:23

## 2020-02-19 RX ADMIN — HYDROCODONE BITARTRATE AND ACETAMINOPHEN 2 TABLET: 5; 325 TABLET ORAL at 09:10

## 2020-02-19 RX ADMIN — Medication 10 ML: at 09:06

## 2020-02-19 RX ADMIN — PRIMIDONE 50 MG: 50 TABLET ORAL at 20:23

## 2020-02-19 RX ADMIN — ENOXAPARIN SODIUM 40 MG: 40 INJECTION SUBCUTANEOUS at 09:05

## 2020-02-19 RX ADMIN — VANCOMYCIN HYDROCHLORIDE 1250 MG: 10 INJECTION, POWDER, LYOPHILIZED, FOR SOLUTION INTRAVENOUS at 06:46

## 2020-02-19 RX ADMIN — LORAZEPAM 0.5 MG: 2 INJECTION INTRAMUSCULAR; INTRAVENOUS at 20:24

## 2020-02-19 RX ADMIN — PIPERACILLIN AND TAZOBACTAM 3.38 G: 3; .375 INJECTION, POWDER, LYOPHILIZED, FOR SOLUTION INTRAVENOUS at 12:39

## 2020-02-19 RX ADMIN — LISINOPRIL 20 MG: 20 TABLET ORAL at 09:05

## 2020-02-19 RX ADMIN — FERROUS SULFATE TAB EC 325 MG (65 MG FE EQUIVALENT) 325 MG: 325 (65 FE) TABLET DELAYED RESPONSE at 19:11

## 2020-02-19 RX ADMIN — GABAPENTIN 400 MG: 800 TABLET ORAL at 09:05

## 2020-02-19 RX ADMIN — PIPERACILLIN AND TAZOBACTAM 3.38 G: 3; .375 INJECTION, POWDER, LYOPHILIZED, FOR SOLUTION INTRAVENOUS at 03:00

## 2020-02-19 ASSESSMENT — PAIN SCALES - GENERAL
PAINLEVEL_OUTOF10: 8
PAINLEVEL_OUTOF10: 1
PAINLEVEL_OUTOF10: 8

## 2020-02-19 ASSESSMENT — ENCOUNTER SYMPTOMS
ABDOMINAL PAIN: 0
COUGH: 0
SHORTNESS OF BREATH: 0
BACK PAIN: 0
RHINORRHEA: 0
VOMITING: 0
NAUSEA: 0
PHOTOPHOBIA: 0

## 2020-02-19 NOTE — PROGRESS NOTES
acetaminophen    Objective     Vitals:  Patient Vitals for the past 8 hrs:   BP Pulse Resp SpO2   20 0817 (!) 172/68 93 12 99 %     Average, Min, and Max for last 24 hours Vitals:  TEMPERATURE:  Temp  Av.1 °F (36.7 °C)  Min: 98.1 °F (36.7 °C)  Max: 98.1 °F (36.7 °C)    RESPIRATIONS RANGE: Resp  Avg: 15.3  Min: 12  Max: 18    PULSE RANGE: Pulse  Av.5  Min: 63  Max: 93    BLOOD PRESSURE RANGE:  Systolic (01UJZ), AXO:590 , Min:121 , NPJ:541   ; Diastolic (72HJB), KWY:25, Min:56, Max:68      PULSE OXIMETRY RANGE: SpO2  Av %  Min: 98 %  Max: 100 %    I/O last 3 completed shifts: In: 1800 [P.O.:600; I.V.:900; IV Piggyback:300]  Out: -     CBC:  Recent Labs     20  1416 20  0513   WBC 6.1 7.3   HGB 10.8* 10.9*   HCT 33.8* 33.7*   PLT 61* 60*   CRP 42.3*  --         BMP:  Recent Labs     20  1416 20  0513    137   K 4.2 3.8    106   CO2 19* 21   BUN 12 10   CREATININE 0.47* 0.57*   GLUCOSE 126* 113*   CALCIUM 7.7* 7.8*        Coags:  Recent Labs     20  05   PROT 5.6*   INR 1.5       Lab Results   Component Value Date    SEDRATE 20 (H) 2020     Recent Labs     20  1416   CRP 42.3*       Physical Exam:  Vascular: DP and PT pulses are palpable . CFT <3 seconds to all digits. Hair growth is absent to the level of the digits. Moderate edema.       Neuro: Saph/sural/SP/DP/plantar sensation intact to light touch.     Musculoskeletal: Muscle strength is 5/5, adequate ROM, adequate strength to all lower extremity muscle groups. Gross deformity is absent. No pain with compression of calf. Exquisite pain with palpation of first MPJ complex. No joint crepitus noted first MPJ.     Dermatologic: No open wound. No purulent drainage appreciated. Persistent circumferential erythema around the first MPJ complex with associated increase in warmth. No proximal streaking lymphangitis. Stab incision site noted to dorsal hallux.  Does not probe to bone, sinus track, or undermine.          Clinical Images:              Imaging:   XR FOOT RIGHT (MIN 3 VIEWS)   Final Result   Diffuse soft tissue swelling. Possible erosion of the 1st proximal phalanx base. Please correlate for   potential infection at this site, possibly septic arthritis. MRI FOOT RIGHT W WO CONTRAST    (Results Pending)       Cultures:   CULTURE BLOOD #1 [[id number]]    Order Status: Sent Specimen: Blood    Culture blood #2 [[id number]]    Order Status: Sent Specimen: Blood          Assessment   Meeta Syed is a 63 y.o. male with   1. Cellulitis, R foot  2. Possible abscess, R foot  3. Possible septic joint, R foot  4. Peripheral neuropathy     Principal Problem:    Septic arthritis of IP joint of toe, right (HCC)  Active Problems:    Essential hypertension    Unspecified viral hepatitis C without hepatic coma    Cirrhosis of liver without ascites (HCC)    Alcoholic cirrhosis of liver without ascites (HCC)    Septic arthritis of right foot ([address]    Foot infection  Resolved Problems:    * No resolved hospital problems. *       Plan   · Patient examined and evaluated at bedside   · Treatment options discussed in detail with the patient  · Radiographs reviewed and discussed in detail with patient-- possible septic arthritis   · MRI R foot pending-- r/o abscess and septic joint  · IV abx: Vanc/Zosyn  · Pain management per primary  · No surgical intervention planned at this time.   · Dressing applied to Right foot: betadine, DSD, ACE  · WBAT to Right lower extremity with use of surgical [address] DPM   Podiatric Medicine & Surgery   2/19/2020 at 10:29 AM

## 2020-02-19 NOTE — PLAN OF CARE
Problem: Falls - Risk of:  Goal: Will remain free from falls  Description  Will remain free from falls  2/19/2020 0743 by Henrietta Holt RN  Outcome: Ongoing  2/18/2020 1838 by Marizol Velez RN  Outcome: Ongoing  Goal: Absence of physical injury  Description  Absence of physical injury  2/19/2020 0743 by Henrietta Holt RN  Outcome: Ongoing  2/18/2020 1838 by Marizol Velez RN  Outcome: Ongoing     Problem: Pain:  Goal: Pain level will decrease  Description  Pain level will decrease  2/19/2020 0743 by Henrietta Holt RN  Outcome: Ongoing  2/18/2020 1838 by Marizol Velez RN  Outcome: Ongoing  Goal: Control of acute pain  Description  Control of acute pain  2/19/2020 0743 by Henrietta Holt RN  Outcome: Ongoing  2/18/2020 1838 by Marizol Velez RN  Outcome: Ongoing

## 2020-02-19 NOTE — PLAN OF CARE
Report hand off given to [facility]. Notified of meds still need to be given as pt was recently admitted to unit and meds just recv'd from pharm.

## 2020-02-19 NOTE — PROGRESS NOTES
Occupational Therapy Not Seen Note    DATE: 2020  Name: Itzel Morel  : 1960  MRN: [id number]    Patient not available for Occupational Therapy due to:     Other: Awaiting surgical shoe for OOB activity; RN is aware    Next Scheduled Treatment: 2020    Electronically signed by ZAINAB Yen on 2020 at 3:06 PM

## 2020-02-20 ENCOUNTER — APPOINTMENT (OUTPATIENT)
Dept: GENERAL RADIOLOGY | Age: 60
DRG: 364 | End: 2020-02-20
Payer: MEDICARE

## 2020-02-20 PROCEDURE — 1200000000 HC SEMI PRIVATE

## 2020-02-20 PROCEDURE — 6360000002 HC RX W HCPCS: Performed by: FAMILY MEDICINE

## 2020-02-20 PROCEDURE — 97530 THERAPEUTIC ACTIVITIES: CPT

## 2020-02-20 PROCEDURE — 2580000003 HC RX 258: Performed by: FAMILY MEDICINE

## 2020-02-20 PROCEDURE — 99232 SBSQ HOSP IP/OBS MODERATE 35: CPT | Performed by: INTERNAL MEDICINE

## 2020-02-20 PROCEDURE — 6370000000 HC RX 637 (ALT 250 FOR IP): Performed by: NURSE PRACTITIONER

## 2020-02-20 PROCEDURE — 97166 OT EVAL MOD COMPLEX 45 MIN: CPT

## 2020-02-20 PROCEDURE — 6370000000 HC RX 637 (ALT 250 FOR IP): Performed by: FAMILY MEDICINE

## 2020-02-20 PROCEDURE — 73630 X-RAY EXAM OF FOOT: CPT

## 2020-02-20 PROCEDURE — 97535 SELF CARE MNGMENT TRAINING: CPT

## 2020-02-20 RX ADMIN — HYDROCODONE BITARTRATE AND ACETAMINOPHEN 2 TABLET: 5; 325 TABLET ORAL at 20:42

## 2020-02-20 RX ADMIN — Medication 1250 MG: at 18:50

## 2020-02-20 RX ADMIN — PIPERACILLIN AND TAZOBACTAM 3.38 G: 3; .375 INJECTION, POWDER, LYOPHILIZED, FOR SOLUTION INTRAVENOUS at 12:31

## 2020-02-20 RX ADMIN — FERROUS SULFATE TAB EC 325 MG (65 MG FE EQUIVALENT) 325 MG: 325 (65 FE) TABLET DELAYED RESPONSE at 18:50

## 2020-02-20 RX ADMIN — GABAPENTIN 400 MG: 800 TABLET ORAL at 08:25

## 2020-02-20 RX ADMIN — PIPERACILLIN AND TAZOBACTAM 3.38 G: 3; .375 INJECTION, POWDER, LYOPHILIZED, FOR SOLUTION INTRAVENOUS at 20:41

## 2020-02-20 RX ADMIN — Medication 10 ML: at 08:26

## 2020-02-20 RX ADMIN — LISINOPRIL 20 MG: 20 TABLET ORAL at 08:25

## 2020-02-20 RX ADMIN — ASPIRIN 81 MG: 81 TABLET, CHEWABLE ORAL at 08:25

## 2020-02-20 RX ADMIN — HYDROCODONE BITARTRATE AND ACETAMINOPHEN 2 TABLET: 5; 325 TABLET ORAL at 08:29

## 2020-02-20 RX ADMIN — PIPERACILLIN AND TAZOBACTAM 3.38 G: 3; .375 INJECTION, POWDER, LYOPHILIZED, FOR SOLUTION INTRAVENOUS at 04:21

## 2020-02-20 RX ADMIN — PRIMIDONE 50 MG: 50 TABLET ORAL at 20:41

## 2020-02-20 RX ADMIN — GABAPENTIN 400 MG: 800 TABLET ORAL at 21:50

## 2020-02-20 RX ADMIN — Medication 1250 MG: at 10:59

## 2020-02-20 RX ADMIN — FERROUS SULFATE TAB EC 325 MG (65 MG FE EQUIVALENT) 325 MG: 325 (65 FE) TABLET DELAYED RESPONSE at 08:25

## 2020-02-20 RX ADMIN — ROPINIROLE HYDROCHLORIDE 0.5 MG: 0.25 TABLET, FILM COATED ORAL at 20:41

## 2020-02-20 RX ADMIN — SODIUM CHLORIDE: 9 INJECTION, SOLUTION INTRAVENOUS at 04:20

## 2020-02-20 RX ADMIN — ENOXAPARIN SODIUM 40 MG: 40 INJECTION SUBCUTANEOUS at 08:25

## 2020-02-20 ASSESSMENT — PAIN DESCRIPTION - PAIN TYPE
TYPE: ACUTE PAIN
TYPE: ACUTE PAIN

## 2020-02-20 ASSESSMENT — PAIN DESCRIPTION - LOCATION
LOCATION: FOOT
LOCATION: FOOT

## 2020-02-20 ASSESSMENT — PAIN DESCRIPTION - DESCRIPTORS
DESCRIPTORS: ACHING
DESCRIPTORS: ACHING;DISCOMFORT;THROBBING

## 2020-02-20 ASSESSMENT — PAIN SCALES - GENERAL
PAINLEVEL_OUTOF10: 8
PAINLEVEL_OUTOF10: 6
PAINLEVEL_OUTOF10: 8

## 2020-02-20 ASSESSMENT — PAIN DESCRIPTION - ORIENTATION
ORIENTATION: RIGHT
ORIENTATION: RIGHT

## 2020-02-20 NOTE — PROGRESS NOTES
Progress Note  Podiatric Medicine and Surgery     Subjective     CC: Cellulitis, R foot    Patient seen and examined at bedside. No acute events overnight. Patient anxious to go home to take care of his wife, understands reasons for delaying MRI imaging states he wishes to stay inpatient until imaging is completed. Tolerating diet      HPI :  Meeta Syed is a 63 y.o. male seen at [facility] for worsening pain and cellulitis of right foot. Patient presented to ED approximately 1 week ago after sustaining suspected inversion mechanism injury to the right foot. Radiographs at that time were negative for any overt fractures or dislocations. Patient was discharged home. Over the course of the past week patient has developed worsening redness, swelling, pain to the right first MPJ to the point where the patient is unable to ambulate. Patient admits to numbness and tingling to bilateral lower extremity particularly to the digits. Patient denies being diabetic. States neuropathy is due to hereditary reasons. Patient has history of alcohol abuse. He denies smoking. He denies any trauma to the right foot since his last ED visit.     PCP is REECE Honeycutt - CNP       ROS: Denies N/V/F/C/SOB/CP. Otherwise negative except at stated in the HPI.      Medications:  Scheduled Meds:   vancomycin  1,250 mg Intravenous Q8H    aspirin  81 mg Oral Daily    ferrous sulfate  325 mg Oral BID WC    gabapentin  400 mg Oral BID    lisinopril  20 mg Oral Daily    primidone  50 mg Oral Nightly    rOPINIRole  0.5 mg Oral Daily    sodium chloride flush  10 mL Intravenous 2 times per day    enoxaparin  40 mg Subcutaneous Daily    piperacillin-tazobactam  3.375 g Intravenous Q8H    vancomycin (VANCOCIN) intermittent dosing (placeholder)   Other RX Placeholder       Continuous Infusions:   sodium chloride 75 mL/hr at 02/20/20 0420       PRN Meds:sodium chloride flush, acetaminophen, HYDROcodone 5 mg - acetaminophen **OR** HYDROcodone 5 mg - acetaminophen    Objective     Vitals:  Patient Vitals for the past 8 hrs:   BP Temp Temp src Pulse Resp SpO2   20 0900 (!) 157/87 98.4 °F (36.9 °C) Oral 111 18 97 %     Average, Min, and Max for last 24 hours Vitals:  TEMPERATURE:  Temp  Av.3 °F (36.8 °C)  Min: 98.2 °F (36.8 °C)  Max: 98.4 °F (36.9 °C)    RESPIRATIONS RANGE: Resp  Av  Min: 16  Max: 18    PULSE RANGE: Pulse  Av.8  Min: 99  Max: 118    BLOOD PRESSURE RANGE:  Systolic (30ACH), CDM:786 , Min:157 , FFI:373   ; Diastolic (24ANZ), IVO:95, Min:63, Max:87      PULSE OXIMETRY RANGE: SpO2  Av.3 %  Min: 97 %  Max: 98 %    I/O last 3 completed shifts: In: 1300 [P.O.:540; I.V.:760]  Out: 600 [Urine:600]    CBC:  Recent Labs     20  1416 20  0513   WBC 6.1 7.3   HGB 10.8* 10.9*   HCT 33.8* 33.7*   PLT 61* 60*   CRP 42.3*  --         BMP:  Recent Labs     20  1416 20  0513    137   K 4.2 3.8    106   CO2 19* 21   BUN 12 10   CREATININE 0.47* 0.57*   GLUCOSE 126* 113*   CALCIUM 7.7* 7.8*        Coags:  Recent Labs     20  0513   PROT 5.6*   INR 1.5       Lab Results   Component Value Date    SEDRATE 20 (H) 2020     Recent Labs     20  1416   CRP 42.3*       Physical Exam:  Vascular: DP and PT pulses are palpable . CFT <3 seconds to all digits. Hair growth is absent to the level of the digits. Moderate edema.       Neuro: Saph/sural/SP/DP/plantar sensation intact to light touch.     Musculoskeletal: Muscle strength is 5/5, adequate ROM, adequate strength to all lower extremity muscle groups. Gross deformity is absent. No pain with compression of calf. Tenderness with palpation of first MPJ complex, particularly plantar aspect. No joint crepitus noted first MPJ.     Dermatologic: No open wound. No purulent drainage appreciated. Persistent circumferential improving erythema around the first MPJ complex with slight increase in warmth.   No proximal streaking lymphangitis.         Clinical Images:              Imaging:   XR FOOT RIGHT (MIN 3 VIEWS)   Final Result   Diffuse soft tissue swelling. Possible erosion of the 1st proximal phalanx base. Please correlate for   potential infection at this site, possibly septic arthritis. MRI FOOT RIGHT W WO CONTRAST    (Results Pending)   XR FOOT RIGHT (MIN 3 VIEWS)    (Results Pending)       Cultures:   CULTURE BLOOD #1 [[id number]]    Order Status: Sent Specimen: Blood    Culture blood #2 [[id number]]    Order Status: Sent Specimen: Blood          Assessment   Vinicio Daniel is a 63 y.o. male with   1. Cellulitis, R foot  2. Possible abscess, R foot  3. Possible septic joint, R foot  4. Peripheral neuropathy     Principal Problem:    Septic arthritis of IP joint of toe, right (HCC)  Active Problems:    Essential hypertension    Unspecified viral hepatitis C without hepatic coma    Cirrhosis of liver without ascites (HCC)    Alcoholic cirrhosis of liver without ascites (HCC)    Septic arthritis of right foot ([address]    Foot infection  Resolved Problems:    * No resolved hospital problems. *       Plan   · Patient examined and evaluated at bedside   · Treatment options discussed in detail with the patient  · Radiographs reviewed and discussed in detail with patient-- possible septic arthritis   · Discussed case in detail with Dr. Octavia Munoz to see if CT scan could be obtained over MRI to accelerate treatment plan of patient, recommended MRI for most appropriate treatment of patient  · MRI R foot pending-- r/o abscess and septic joint  · IV abx: Vanc/Zosyn  · Pain management per primary  · Pain and cellulitis slightly improved today  · No surgical intervention planned at this time.   · Dressing applied to Right foot: betadine, DSD, ACE  · WBAT to Right lower extremity with use of surgical [address] DPM   Podiatric Medicine & Surgery   2/20/2020 at 1:35 PM    I performed a history and

## 2020-02-20 NOTE — PLAN OF CARE
Problem: Falls - Risk of:  Goal: Will remain free from falls  Description  Will remain free from falls  2/20/2020 0734 by Beatriz Singh RN  Outcome: Ongoing  2/20/2020 2861 by Vamsi Farias RN  Outcome: Ongoing  Goal: Absence of physical injury  Description  Absence of physical injury  2/20/2020 0734 by Beatriz Singh RN  Outcome: Ongoing  2/20/2020 8486 by Vamsi Farias RN  Outcome: Ongoing     Problem: Pain:  Goal: Pain level will decrease  Description  Pain level will decrease  2/20/2020 0734 by Beatriz Singh RN  Outcome: Ongoing  2/20/2020 6214 by Vamsi Farias RN  Outcome: Ongoing  Goal: Control of acute pain  Description  Control of acute pain  2/20/2020 0734 by Beatriz Singh RN  Outcome: Ongoing  2/20/2020 6699 by Vamsi Farias RN  Outcome: Ongoing

## 2020-02-20 NOTE — PROGRESS NOTES
first proximal phalanx base concerning for septic arthritis. 2/19/2020  Per patient podiatry tried to drain the abscess yesterday but only blood came out  He has been scheduled to get MRI right foot today to rule out abscess/septic joint  Still has pain in right foot, dressing is in place  Denies any history of gout  Review of Systems:     Constitutional:  negative for chills, fevers, sweats  Respiratory:  negative for cough, dyspnea on exertion, shortness of breath, wheezing  Cardiovascular:  negative for chest pain, chest pressure/discomfort, lower extremity edema, palpitations  Gastrointestinal:  negative for abdominal pain, constipation, diarrhea, nausea, vomiting  Neurological:  negative for dizziness, headache    Medications: Allergies: Allergies   Allergen Reactions    Sulfa Antibiotics Rash       Current Meds:   Scheduled Meds:    aspirin  81 mg Oral Daily    ferrous sulfate  325 mg Oral BID WC    gabapentin  400 mg Oral BID    lisinopril  20 mg Oral Daily    primidone  50 mg Oral Nightly    rOPINIRole  0.5 mg Oral Daily    sodium chloride flush  10 mL Intravenous 2 times per day    enoxaparin  40 mg Subcutaneous Daily    piperacillin-tazobactam  3.375 g Intravenous Q8H    vancomycin (VANCOCIN) intermittent dosing (placeholder)   Other RX Placeholder    vancomycin  1,250 mg Intravenous Q18H     Continuous Infusions:    sodium chloride 75 mL/hr at 02/20/20 0420     PRN Meds: sodium chloride flush, acetaminophen, HYDROcodone 5 mg - acetaminophen **OR** HYDROcodone 5 mg - acetaminophen    Data:     Past Medical History:   has a past medical history of Bleeding ulcer, Chronic back pain, Gastric ulcer, Hematuria, Hep C w/o coma, chronic ([facility] [address] History of blood transfusion, History of ETOH abuse, Hypertension, Neuropathy, Portal hypertensive gastropathy ([facility] [address] Seizures ([facility] [address] and Wears glasses. Social History:   reports that he quit smoking about 24 years ago.  He has a 15.00 pack-year smoking history. He has quit using smokeless tobacco.  His smokeless tobacco use included chew. He reports previous alcohol use. He reports that he does not use drugs. Family History:   Family History   Problem Relation Age of Onset    High Blood Pressure Mother     Other Father         neuropathy    Stroke Father     Prostate Cancer Father        Vitals:  BP (!) 170/63   Pulse 99   Temp 98.2 °F (36.8 °C) (Oral)   Resp 16   Ht 5' 10\" (1.778 m)   Wt 244 lb (110.7 kg)   SpO2 97%   BMI 35.01 kg/m²   Temp (24hrs), Av.2 °F (36.8 °C), Min:98.2 °F (36.8 °C), Max:98.2 °F (36.8 °C)    No results for input(s): POCGLU in the last 72 hours. I/O (24Hr):     Intake/Output Summary (Last 24 hours) at 2020 0808  Last data filed at 2020 0537  Gross per 24 hour   Intake 1300 ml   Output 600 ml   Net 700 ml       Labs:  Hematology:  Recent Labs     20  1416 20  05   WBC 6.1 7.3   RBC 3.27* 3.34*   HGB 10.8* 10.9*   HCT 33.8* 33.7*   .4* 100.9   MCH 33.0 32.6   MCHC 32.0 32.3   RDW 17.4* 17.2*   PLT 61* 60*   MPV 12.2 11.8   SEDRATE 20*  --    CRP 42.3*  --    INR  --  1.5     Chemistry:  Recent Labs     20  1416 20  0513    137   K 4.2 3.8    106   CO2 19* 21   GLUCOSE 126* 113*   BUN 12 10   CREATININE 0.47* 0.57*   MG  --  1.6   ANIONGAP 11 10   LABGLOM >60 >60   GFRAA >60 >60   CALCIUM 7.7* 7.8*   CAION  --  1.05*   PHOS  --  2.8     Recent Labs     20  0513   PROT 5.6*   LABALBU 2.3*   *   ALT 43*   ALKPHOS 154*   BILITOT 5.15*   URICACID 3.8     ABG:  Lab Results   Component Value Date    PH 7.20 2017    PCO2 33 2017    PO2 39 2017    HCO3 12.9 2017    O2SAT 63 2017    FIO2 NOT REPORTED 2017     Lab Results   Component Value Date/Time    SPECIAL NOT REPORTED 10/16/2019 10:53 AM     Lab Results   Component Value Date/Time    CULTURE (A) 10/16/2019 10:53 AM     STAPHYLOCOCCUS AUREUS LIGHT GROWTH This isolate is methicillin susceptible. CULTURE NO ANAEROBIC ORGANISMS ISOLATED AT 5 DAYS (A) 10/16/2019 10:53 AM       Radiology:  Brain Daniel Foot Right (min 3 Views)    Result Date: 2/18/2020  Diffuse soft tissue swelling. Possible erosion of the 1st proximal phalanx base. Please correlate for potential infection at this site, possibly septic arthritis. Physical Examination:        General appearance:  alert, cooperative and no distress  Mental Status:  oriented to person, place and time and normal affect  Lungs:  clear to auscultation bilaterally, normal effort  Heart:  regular rate and rhythm, no murmur  Abdomen:  soft, nontender, nondistended, normal bowel sounds, no masses, hepatomegaly, splenomegaly  Extremities:  + Dressing right foot, on podiatry exam there was redness tenderness and swelling yesterday  Skin:  no gross lesions, rashes, induration    Assessment:        Hospital Problems           Last Modified POA    * (Principal) Septic arthritis of IP joint of toe, right ([address] 2/18/2020 Yes    Essential hypertension 2/18/2020 Yes    Unspecified viral hepatitis C without hepatic coma 2/18/2020 Yes    Cirrhosis of liver without ascites ([address] 8/17/7588 Yes    Alcoholic cirrhosis of liver without ascites ([address] 2/18/2020 Yes    Septic arthritis of right foot ([address] 2/18/2020 Yes    Foot infection 2/19/2020 Yes          Plan:        1. Continue IV antibiotics  2. MRI foot as planned to rule out septic arthritis  3.  Follow with GI as outpatient for hep C and cirrhosis liver without ascites      Annika Carroll MD  2/20/2020  8:08 AM

## 2020-02-20 NOTE — PROGRESS NOTES
Vision: Impaired  Vision Exceptions: Wears glasses for reading  Hearing: Within functional limits         Balance  Sitting Balance: Independent  Standing Balance: Minimal assistance  Standing Balance  Time: ~5 minutes  Activity: pt stood EOB with RW  Functional Mobility  Functional - Mobility Device: Rolling Walker  Activity: Other  Assist Level: Minimal assistance  Functional Mobility Comments: Pt completed 2-3 steps forward/backwards at EOB with use of RW and Min A as pt RLE in pain when upright and attempting to weight bear     ADL  Feeding: Independent;Setup  Grooming: Independent;Setup  UE Bathing: Contact guard assistance;Setup  LE Bathing: Moderate assistance;Setup  UE Dressing: Contact guard assistance;Setup  LE Dressing: Setup; Moderate assistance(to don R surgical shoe and L sock; pt has difficulty with LB ADLs d/t difficulty bearing weight through RLE)  Toileting: Minimal assistance  Tone RUE  RUE Tone: Normotonic  Tone LUE  LUE Tone: Normotonic  Coordination  Movements Are Fluid And Coordinated: Yes    Bed mobility  Supine to Sit: Stand by assistance  Sit to Supine: Stand by assistance  Scooting: Stand by assistance  Comment: HOB elevated throughout bed mobility  Transfers  Sit to stand: Minimal assistance  Stand to sit: Minimal assistance  Transfer Comments: with RW    Cognition  Overall Cognitive Status: WFL       Sensation  Overall Sensation Status: Impaired(neuropathy in toes)        LUE AROM (degrees)  LUE AROM : WFL  Left Hand AROM (degrees)  Left Hand AROM: WFL  RUE AROM (degrees)  RUE AROM : WFL  Right Hand AROM (degrees)  Right Hand AROM: WFL  LUE Strength  Gross LUE Strength: WFL  L Hand General: 4+/5  LUE Strength Comment: LUE grossly 4+/5  RUE Strength  Gross RUE Strength: WFL  R Hand General: 4+/5  RUE Strength Comment: RUE grossly 4+/5                   Plan   Plan  Times per week: 3-5 x/wk  Current Treatment Recommendations: Balance Training, Functional Mobility Training, Endurance Training, Pain Management, Safety Education & Training, Patient/Caregiver Education & Training, Equipment Evaluation, Education, & procurement, Self-Care / ADL    AM-PAC Score        AM-PAC Inpatient Daily Activity Raw Score: 18 (02/20/20 1525)  AM-PAC Inpatient ADL T-Scale Score : 38.66 (02/20/20 1525)  ADL Inpatient CMS 0-100% Score: 46.65 (02/20/20 1525)  ADL Inpatient CMS G-Code Modifier : CK (02/20/20 1525)    Goals  Short term goals  Time Frame for Short term goals: By discharge, pt will:  Short term goal 1: Demo Mod I with use of LRD PRN for functional transfers and functional mobility  Short term goal 2: Demo I with UB ADLs and grooming tasks with setup provided  Short term goal 3: Demo Min A with use of AE PRN for LB ADLs and toileting tasks  Short term goal 4: Demo 10+ minutes of standing tolerance to increase safety and independence with functional ADLs  Short term goal 5: Demo I with safety awareness and pain management techniques throughout therapy session       Therapy Time   Individual Concurrent Group Co-treatment   Time In 1149         Time Out 1213         Minutes 24         Timed Code Treatment Minutes: 23 Minutes       NEFTALI Solis/L

## 2020-02-21 ENCOUNTER — APPOINTMENT (OUTPATIENT)
Dept: MRI IMAGING | Age: 60
DRG: 364 | End: 2020-02-21
Payer: MEDICARE

## 2020-02-21 LAB
CULTURE: NORMAL
DIRECT EXAM: NORMAL
DIRECT EXAM: NORMAL
Lab: NORMAL
Lab: NORMAL
SPECIMEN DESCRIPTION: NORMAL
SPECIMEN DESCRIPTION: NORMAL
VANCOMYCIN TROUGH DATE LAST DOSE: NORMAL
VANCOMYCIN TROUGH DOSE AMOUNT: NORMAL
VANCOMYCIN TROUGH TIME LAST DOSE: NORMAL
VANCOMYCIN TROUGH: 10.7 UG/ML (ref 10–20)

## 2020-02-21 PROCEDURE — 1200000000 HC SEMI PRIVATE

## 2020-02-21 PROCEDURE — 2580000003 HC RX 258: Performed by: FAMILY MEDICINE

## 2020-02-21 PROCEDURE — 87205 SMEAR GRAM STAIN: CPT

## 2020-02-21 PROCEDURE — 99232 SBSQ HOSP IP/OBS MODERATE 35: CPT | Performed by: INTERNAL MEDICINE

## 2020-02-21 PROCEDURE — 6370000000 HC RX 637 (ALT 250 FOR IP): Performed by: FAMILY MEDICINE

## 2020-02-21 PROCEDURE — 6360000004 HC RX CONTRAST MEDICATION: Performed by: FAMILY MEDICINE

## 2020-02-21 PROCEDURE — 97161 PT EVAL LOW COMPLEX 20 MIN: CPT

## 2020-02-21 PROCEDURE — 6370000000 HC RX 637 (ALT 250 FOR IP): Performed by: NURSE PRACTITIONER

## 2020-02-21 PROCEDURE — 73720 MRI LWR EXTREMITY W/O&W/DYE: CPT

## 2020-02-21 PROCEDURE — A9576 INJ PROHANCE MULTIPACK: HCPCS | Performed by: FAMILY MEDICINE

## 2020-02-21 PROCEDURE — 87070 CULTURE OTHR SPECIMN AEROBIC: CPT

## 2020-02-21 PROCEDURE — 0S9M3ZX DRAINAGE OF RIGHT METATARSAL-PHALANGEAL JOINT, PERCUTANEOUS APPROACH, DIAGNOSTIC: ICD-10-PCS | Performed by: PODIATRIST

## 2020-02-21 PROCEDURE — 6360000002 HC RX W HCPCS: Performed by: FAMILY MEDICINE

## 2020-02-21 PROCEDURE — 97116 GAIT TRAINING THERAPY: CPT

## 2020-02-21 PROCEDURE — 88112 CYTOPATH CELL ENHANCE TECH: CPT

## 2020-02-21 PROCEDURE — 97535 SELF CARE MNGMENT TRAINING: CPT

## 2020-02-21 PROCEDURE — 87075 CULTR BACTERIA EXCEPT BLOOD: CPT

## 2020-02-21 PROCEDURE — 88305 TISSUE EXAM BY PATHOLOGIST: CPT

## 2020-02-21 PROCEDURE — 80202 ASSAY OF VANCOMYCIN: CPT

## 2020-02-21 RX ORDER — SODIUM CHLORIDE 0.9 % (FLUSH) 0.9 %
10 SYRINGE (ML) INJECTION 2 TIMES DAILY
Status: DISCONTINUED | OUTPATIENT
Start: 2020-02-21 | End: 2020-02-23 | Stop reason: HOSPADM

## 2020-02-21 RX ADMIN — ENOXAPARIN SODIUM 40 MG: 40 INJECTION SUBCUTANEOUS at 08:49

## 2020-02-21 RX ADMIN — HYDROCODONE BITARTRATE AND ACETAMINOPHEN 2 TABLET: 5; 325 TABLET ORAL at 22:29

## 2020-02-21 RX ADMIN — ASPIRIN 81 MG: 81 TABLET, CHEWABLE ORAL at 08:50

## 2020-02-21 RX ADMIN — PIPERACILLIN AND TAZOBACTAM 3.38 G: 3; .375 INJECTION, POWDER, LYOPHILIZED, FOR SOLUTION INTRAVENOUS at 16:15

## 2020-02-21 RX ADMIN — GADOTERIDOL 20 ML: 279.3 INJECTION, SOLUTION INTRAVENOUS at 12:46

## 2020-02-21 RX ADMIN — FERROUS SULFATE TAB EC 325 MG (65 MG FE EQUIVALENT) 325 MG: 325 (65 FE) TABLET DELAYED RESPONSE at 17:40

## 2020-02-21 RX ADMIN — Medication 1250 MG: at 20:23

## 2020-02-21 RX ADMIN — GABAPENTIN 400 MG: 800 TABLET ORAL at 20:23

## 2020-02-21 RX ADMIN — ROPINIROLE HYDROCHLORIDE 0.5 MG: 0.25 TABLET, FILM COATED ORAL at 20:23

## 2020-02-21 RX ADMIN — HYDROCODONE BITARTRATE AND ACETAMINOPHEN 2 TABLET: 5; 325 TABLET ORAL at 18:28

## 2020-02-21 RX ADMIN — PIPERACILLIN AND TAZOBACTAM 3.38 G: 3; .375 INJECTION, POWDER, LYOPHILIZED, FOR SOLUTION INTRAVENOUS at 05:25

## 2020-02-21 RX ADMIN — Medication 1250 MG: at 13:42

## 2020-02-21 RX ADMIN — LISINOPRIL 20 MG: 20 TABLET ORAL at 08:48

## 2020-02-21 RX ADMIN — FERROUS SULFATE TAB EC 325 MG (65 MG FE EQUIVALENT) 325 MG: 325 (65 FE) TABLET DELAYED RESPONSE at 08:48

## 2020-02-21 RX ADMIN — PIPERACILLIN AND TAZOBACTAM 3.38 G: 3; .375 INJECTION, POWDER, LYOPHILIZED, FOR SOLUTION INTRAVENOUS at 23:56

## 2020-02-21 RX ADMIN — GABAPENTIN 400 MG: 800 TABLET ORAL at 08:49

## 2020-02-21 RX ADMIN — HYDROCODONE BITARTRATE AND ACETAMINOPHEN 2 TABLET: 5; 325 TABLET ORAL at 08:48

## 2020-02-21 RX ADMIN — PRIMIDONE 50 MG: 50 TABLET ORAL at 20:23

## 2020-02-21 RX ADMIN — Medication 1250 MG: at 03:10

## 2020-02-21 ASSESSMENT — PAIN DESCRIPTION - FREQUENCY
FREQUENCY: CONTINUOUS
FREQUENCY: CONTINUOUS

## 2020-02-21 ASSESSMENT — PAIN DESCRIPTION - PAIN TYPE
TYPE: ACUTE PAIN

## 2020-02-21 ASSESSMENT — PAIN DESCRIPTION - LOCATION
LOCATION: FOOT;TOE (COMMENT WHICH ONE)
LOCATION: FOOT
LOCATION: FOOT

## 2020-02-21 ASSESSMENT — PAIN DESCRIPTION - PROGRESSION
CLINICAL_PROGRESSION: GRADUALLY IMPROVING
CLINICAL_PROGRESSION: NOT CHANGED

## 2020-02-21 ASSESSMENT — PAIN DESCRIPTION - ONSET: ONSET: ON-GOING

## 2020-02-21 ASSESSMENT — PAIN DESCRIPTION - ORIENTATION
ORIENTATION: RIGHT

## 2020-02-21 ASSESSMENT — PAIN SCALES - GENERAL
PAINLEVEL_OUTOF10: 8
PAINLEVEL_OUTOF10: 9
PAINLEVEL_OUTOF10: 7
PAINLEVEL_OUTOF10: 10
PAINLEVEL_OUTOF10: 6

## 2020-02-21 ASSESSMENT — PAIN DESCRIPTION - DESCRIPTORS
DESCRIPTORS: ACHING;THROBBING;SHARP
DESCRIPTORS: ACHING

## 2020-02-21 ASSESSMENT — PAIN - FUNCTIONAL ASSESSMENT: PAIN_FUNCTIONAL_ASSESSMENT: PREVENTS OR INTERFERES SOME ACTIVE ACTIVITIES AND ADLS

## 2020-02-21 NOTE — CARE COORDINATION
Transitional planning-talked with patient. Plan is to go home. If home care needed chose [address] Referral faxed and called to Latonya Pica with [address] Referral to [facility] for possible IV antibiotics.  Called and left message with Tevin Romero and faxed face sheet to [facility].

## 2020-02-21 NOTE — PROGRESS NOTES
[address]    Progress Note    2/21/2020    8:04 AM    Name:   Blas Velez  MRN:     [id number]     Acct:      [de-identified]   Room:   2026/2026-01  IP Day:  3  Admit Date:  2/18/2020  1:07 PM    PCP:   REECE Kramer CNP  Code Status:  Full Code    Subjective:     C/C:   Chief Complaint   Patient presents with    Foot Swelling     Right foot swelling with pain, New formation of a vesicle on greater toe, Has Neuropathy, New onset of redness with the pre-existing swelling; Started getting worse ~3 days ago     Interval History Status:    Patient was here to be evaluated by podiatry   MRI foot has not been done yet for unknown reasons  Uric acid came normal  Brief History:   The patient is a 63 y.o. Non-/non  male who presents with Foot Swelling (Right foot swelling with pain, New formation of a vesicle on greater toe, Has Neuropathy, New onset of redness with the pre-existing swelling; Started getting worse ~3 days ago)   and he is admitted to the hospital for the management of  Septic arthritis of IP joint of toe, right ([address] Patient seen in emergency room. He came with right great toe pain, swelling, redness. Patient reported fall and injury to foot about 1 week before. He was seen in emergency room and discharged with lidocaine patch after x-ray was negative for any fractures. Symptoms worsened over the course of 7 days and he was unable to walk on right foot due to pain, redness. Patient denies any chills, nausea, vomiting. He has underlying history of peripheral neuropathy likely secondary alcohol. Patient is sober now. Other comorbidities include hep C, liver cirrhosis with portal hypertension  Evaluation emergency room showed temperature 98.1, blood pressure 121/60. Lab work showed normal electrolytes, kidney function, normal WBC count. CRP was elevated at 42. 3.   X-ray was suggestive of possible erosion at first proximal phalanx base concerning for septic arthritis. 2/19/2020  Per patient podiatry tried to drain the abscess yesterday but only blood came out  He has been scheduled to get MRI right foot today to rule out abscess/septic joint  Still has pain in right foot, dressing is in place  Denies any history of gout  Review of Systems:     Constitutional:  negative for chills, fevers, sweats  Respiratory:  negative for cough, dyspnea on exertion, shortness of breath, wheezing  Cardiovascular:  negative for chest pain, chest pressure/discomfort, lower extremity edema, palpitations  Gastrointestinal:  negative for abdominal pain, constipation, diarrhea, nausea, vomiting  Neurological:  negative for dizziness, headache    Medications: Allergies: Allergies   Allergen Reactions    Sulfa Antibiotics Rash       Current Meds:   Scheduled Meds:    vancomycin  1,250 mg Intravenous Q8H    aspirin  81 mg Oral Daily    ferrous sulfate  325 mg Oral BID WC    gabapentin  400 mg Oral BID    lisinopril  20 mg Oral Daily    primidone  50 mg Oral Nightly    rOPINIRole  0.5 mg Oral Daily    sodium chloride flush  10 mL Intravenous 2 times per day    enoxaparin  40 mg Subcutaneous Daily    piperacillin-tazobactam  3.375 g Intravenous Q8H    vancomycin (VANCOCIN) intermittent dosing (placeholder)   Other RX Placeholder     Continuous Infusions:    sodium chloride 75 mL/hr at 02/20/20 0420     PRN Meds: sodium chloride flush, acetaminophen, HYDROcodone 5 mg - acetaminophen **OR** HYDROcodone 5 mg - acetaminophen    Data:     Past Medical History:   has a past medical history of Bleeding ulcer, Chronic back pain, Gastric ulcer, Hematuria, Hep C w/o coma, chronic (HCC), History of blood transfusion, History of ETOH abuse, Hypertension, Neuropathy, Portal hypertensive gastropathy ([facility] [address] Seizures ([facility] [address] and Wears glasses. Social History:   reports that he quit smoking about 24 years ago.  He has a GROWTH This isolate is methicillin susceptible. CULTURE NO ANAEROBIC ORGANISMS ISOLATED AT 5 DAYS (A) 10/16/2019 10:53 AM       Radiology:  Youssef Re Foot Right (min 3 Views)    Result Date: 2/18/2020  Diffuse soft tissue swelling. Possible erosion of the 1st proximal phalanx base. Please correlate for potential infection at this site, possibly septic arthritis. Physical Examination:        General appearance:  alert, cooperative and no distress  Mental Status:  oriented to person, place and time and normal affect  Lungs:  clear to auscultation bilaterally, normal effort  Heart:  regular rate and rhythm, no murmur  Abdomen:  soft, nontender, nondistended, normal bowel sounds, no masses, hepatomegaly, splenomegaly  Extremities:  + Dressing right foot, on podiatry exam there was redness tenderness and swelling yesterday  Skin:  no gross lesions, rashes, induration    Assessment:        Hospital Problems           Last Modified POA    * (Principal) Septic arthritis of IP joint of toe, right ([address] 2/18/2020 Yes    Essential hypertension 2/18/2020 Yes    Unspecified viral hepatitis C without hepatic coma 2/18/2020 Yes    Cirrhosis of liver without ascites ([address] 6/31/9178 Yes    Alcoholic cirrhosis of liver without ascites ([address] 2/18/2020 Yes    Septic arthritis of right foot ([address] 2/18/2020 Yes    Foot infection 2/19/2020 Yes          Plan:        1. Continue IV antibiotics pending MRI result  2. MRI foot as planned to rule out septic arthritis  3.  Follow with GI as outpatient for hep C and cirrhosis liver without ascites    Possible discharge after getting MRI result    Gabriela Nuno MD  2/21/2020  8:04 AM

## 2020-02-21 NOTE — PROGRESS NOTES
Occupational Therapy  Facility/Department: [facility] 2  Daily Treatment Note  NAME: Heide Saint  : 1960  MRN: [id number]    Date of Service: 2020    Discharge Recommendations:  Patient would benefit from continued therapy after discharge   Assessment   Performance deficits / Impairments: Decreased functional mobility ; Decreased ADL status; Decreased balance;Decreased high-level IADLs  Prognosis: Good  Patient Education: OT POC, transfer/walker safety, importance of therapy - good return   REQUIRES OT FOLLOW UP: Yes  Activity Tolerance  Activity Tolerance: Patient Tolerated treatment well;Patient limited by pain  Safety Devices  Safety Devices in place: Yes  Type of devices: Left in bed;Gait belt;Call light within reach;Nurse notified         Patient Diagnosis(es): The encounter diagnosis was Foot infection. has a past medical history of Bleeding ulcer, Chronic back pain, Gastric ulcer, Hematuria, Hep C w/o coma, chronic (HCC), History of blood transfusion, History of ETOH abuse, Hypertension, Neuropathy, Portal hypertensive gastropathy ([facility] [address] Seizures ([facility] [address] and Wears glasses. has a past surgical history that includes hernia repair (Left, ); Upper gastrointestinal endoscopy (10/31/2015); Tonsillectomy (); Upper gastrointestinal endoscopy (2018); Colonoscopy (2018); pr colon ca scrn not hi rsk ind (N/A, 3/14/2018); Upper gastrointestinal endoscopy (N/A, 3/14/2018); Upper gastrointestinal endoscopy (N/A, 2019); Upper gastrointestinal endoscopy (N/A, 2019); back surgery (); Upper gastrointestinal endoscopy (N/A, 2019); Vasectomy (); Elbow Debridement (Left, 10/16/2019); and incision and drainage (Left, 10/16/2019).     Restrictions  Restrictions/Precautions  Restrictions/Precautions: Weight Bearing, Up as Tolerated  Required Braces or Orthoses?: Yes(surgical shoe R)  Lower Extremity Weight Bearing Restrictions  Right Lower Extremity Weight Bearing:

## 2020-02-21 NOTE — PROGRESS NOTES
Progress Note  Podiatric Medicine and Surgery     Subjective     CC: Cellulitis, R foot    Patient seen and examined at bedside. No acute events overnight. Tolerating diet  Persistent to R hallux, controlled with medication  Still awaiting MRI, per tech imaging will be complete today        HPI :  Vinicio Daniel is a 63 y.o. male seen at [facility] for worsening pain and cellulitis of right foot. Patient presented to ED approximately 1 week ago after sustaining suspected inversion mechanism injury to the right foot. Radiographs at that time were negative for any overt fractures or dislocations. Patient was discharged home. Over the course of the past week patient has developed worsening redness, swelling, pain to the right first MPJ to the point where the patient is unable to ambulate. Patient admits to numbness and tingling to bilateral lower extremity particularly to the digits. Patient denies being diabetic. States neuropathy is due to hereditary reasons. Patient has history of alcohol abuse. He denies smoking. He denies any trauma to the right foot since his last ED visit.     PCP is REECE Arechiga - CNP       ROS: Denies N/V/F/C/SOB/CP. Otherwise negative except at stated in the HPI.      Medications:  Scheduled Meds:   vancomycin  1,250 mg Intravenous Q8H    aspirin  81 mg Oral Daily    ferrous sulfate  325 mg Oral BID WC    gabapentin  400 mg Oral BID    lisinopril  20 mg Oral Daily    primidone  50 mg Oral Nightly    rOPINIRole  0.5 mg Oral Daily    sodium chloride flush  10 mL Intravenous 2 times per day    enoxaparin  40 mg Subcutaneous Daily    piperacillin-tazobactam  3.375 g Intravenous Q8H    vancomycin (VANCOCIN) intermittent dosing (placeholder)   Other RX Placeholder       Continuous Infusions:   sodium chloride 75 mL/hr at 02/20/20 0420       PRN Meds:sodium chloride flush, acetaminophen, HYDROcodone 5 mg - acetaminophen **OR** HYDROcodone 5 mg - acetaminophen    Objective     Vitals:  Patient Vitals for the past 8 hrs:   BP Temp Temp src Pulse Resp SpO2   20 0848 121/69 97.9 °F (36.6 °C) Oral 100 18 98 %     Average, Min, and Max for last 24 hours Vitals:  TEMPERATURE:  Temp  Av.2 °F (36.8 °C)  Min: 97.9 °F (36.6 °C)  Max: 98.4 °F (36.9 °C)    RESPIRATIONS RANGE: Resp  Av  Min: 18  Max: 18    PULSE RANGE: Pulse  Av.5  Min: 100  Max: 107    BLOOD PRESSURE RANGE:  Systolic (16KPM), LRI:065 , Min:121 , SSW:255   ; Diastolic (54YLY), ANW:12, Min:57, Max:69      PULSE OXIMETRY RANGE: SpO2  Av.5 %  Min: 97 %  Max: 98 %    I/O last 3 completed shifts: In: 5734 [P.O.:400; I.V.:1175]  Out: 700 [Urine:700]    CBC:  Recent Labs     20  1416 20  05   WBC 6.1 7.3   HGB 10.8* 10.9*   HCT 33.8* 33.7*   PLT 61* 60*   CRP 42.3*  --         BMP:  Recent Labs     20  1416 20  05    137   K 4.2 3.8    106   CO2 19* 21   BUN 12 10   CREATININE 0.47* 0.57*   GLUCOSE 126* 113*   CALCIUM 7.7* 7.8*        Coags:  Recent Labs     20  0513   PROT 5.6*   INR 1.5       Lab Results   Component Value Date    SEDRATE 20 (H) 2020     Recent Labs     20  1416   CRP 42.3*       Physical Exam:  Vascular: DP and PT pulses are palpable . CFT <3 seconds to all digits. Hair growth is absent to the level of the digits. Moderate edema.       Neuro: Saph/sural/SP/DP/plantar sensation intact to light touch.     Musculoskeletal: Muscle strength is 5/5, adequate ROM, adequate strength to all lower extremity muscle groups. Gross deformity is absent. No pain with compression of calf. Tenderness with palpation of first MPJ complex, particularly plantar aspect. No joint crepitus noted first MPJ.     Dermatologic: No open wound. No purulent drainage appreciated. Circumferential  Erythema improving around the first MPJ complex with no increase in warmth.   New ecchymosis and induration noted to plantar aspect of base of hallux.         Clinical Images:                      Imaging:   XR FOOT RIGHT (MIN 3 VIEWS)   Final Result   Persistent soft tissue swelling. No acute osseous abnormality. XR FOOT RIGHT (MIN 3 VIEWS)   Final Result   Diffuse soft tissue swelling. Possible erosion of the 1st proximal phalanx base. Please correlate for   potential infection at this site, possibly septic arthritis. MRI FOOT RIGHT W WO CONTRAST    (Results Pending)       Cultures:   CULTURE BLOOD #1 [[id number]]    Order Status: Sent Specimen: Blood    Culture blood #2 [[id number]]    Order Status: Sent Specimen: Blood          Assessment   Heide Saint is a 63 y.o. male with   1. Cellulitis, R foot  2. Possible abscess, R foot  3. Possible septic joint, R foot  4. Peripheral neuropathy     Principal Problem:    Septic arthritis of IP joint of toe, right (HCC)  Active Problems:    Essential hypertension    Unspecified viral hepatitis C without hepatic coma    Cirrhosis of liver without ascites (HCC)    Alcoholic cirrhosis of liver without ascites (HCC)    Septic arthritis of right foot ([address]    Foot infection  Resolved Problems:    * No resolved hospital problems. *       Plan   · Patient examined and evaluated at bedside   · Treatment options discussed in detail with the patient  · Radiographs reviewed and discussed in detail with patient-- possible septic arthritis, no new osseous erosions   · MRI R foot pending-- r/o abscess and septic joint  · IV abx: Vanc/Zosyn  · Pain management per primary  · Pain and cellulitis slightly improved   · No surgical intervention planned at this time.   · Dressing applied to Right foot: betadine, DSD, ACE  · WBAT to Right lower extremity with use of surgical [address] DPM   Podiatric Medicine & Surgery   2/21/2020 at 9:06 AM

## 2020-02-21 NOTE — DISCHARGE SUMMARY
[address]    Discharge Summary     Patient ID: Luz July  :  1960   MRN: [id number]     ACCOUNT:  [de-identified]   Patient's PCP: REECE Montenegro CNP  Admit Date: 2020   Discharge Date: 2020    Length of Stay: 5  Code Status:  Full Code  Admitting Physician: Shane Alvarez MD  Discharge Physician: Shane Alvarez MD     Active Discharge Diagnoses:     Hospital Problem Lists:  Principal Problem:    Septic arthritis of IP joint of toe, right ([address]  Active Problems:    Essential hypertension    Unspecified viral hepatitis C without hepatic coma    Cirrhosis of liver without ascites ([address]    Alcoholic cirrhosis of liver without ascites (HCC)    Septic arthritis of right foot ([address]    Foot infection  Resolved Problems:    * No resolved hospital problems. *      Admission Condition:  poor     Discharged Condition: stable    Hospital Stay:   Admitting history:  The patient is a 60 y. o.  Non-/non  male who presents with Foot Swelling (Right foot swelling with pain, New formation of a vesicle on greater toe, Has Neuropathy, New onset of redness with the pre-existing swelling; Started getting worse ~3 days ago)   and he is admitted to the hospital for the management of  Septic arthritis of IP joint of toe, right ([address]   Patient seen in emergency room. Briana Lindsey came with right great toe pain, swelling, redness.  Patient reported fall and injury to foot about 1 week before. Briana Lindsey was seen in emergency room and discharged with lidocaine patch after x-ray was negative for any fractures.  Symptoms worsened over the course of 7 days and he was unable to walk on right foot due to pain, redness.  Patient denies any chills, nausea, vomiting.  He has underlying history of peripheral neuropathy likely secondary alcohol.  Patient is sober now.  Other comorbidities include hep C, liver cirrhosis with portal hypertension  Evaluation emergency room showed temperature 98.1, blood pressure 121/60.  Lab work showed normal electrolytes, kidney function, normal WBC count.  CRP was elevated at 42. 3. Olam Common was suggestive of possible erosion at first proximal phalanx base concerning for septic arthritis.     2/19/2020  Per patient podiatry tried to drain the abscess yesterday but only blood came out  He has been scheduled to get MRI right foot today to rule out abscess/septic joint  Still has pain in right foot, dressing is in place  Denies any history of gout    Hospital Course:   Patient was here to be evaluated by podiatry   MRI foot showed evidence of cellulitis but no osteomyelitis  Podiatry has done arthrocentesis of the right first metatarsophalangeal joint 3/21/2020 and wanted ID to evaluate for antibiotic choice, oral versus IV  Uric acid came normal however patient was started on colchicine 0.6 mg twice daily in the absence of any definite evidence of infection    Hospital Problems            Last Modified POA     * (Principal) Septic arthritis of IP joint of toe, right ([address] 2/18/2020 Yes     Essential hypertension 2/18/2020 Yes     Unspecified viral hepatitis C without hepatic coma 2/18/2020 Yes     Cirrhosis of liver without ascites ([address] 2/16/5967 Yes     Alcoholic cirrhosis of liver without ascites ([address] 2/18/2020 Yes     Septic arthritis of right foot ([address] 2/18/2020 Yes     Foot infection 2/19/2020 Yes             Plan:         1. ID recommended to switch to oral Augmentin for 10 days and follow-up with ID after that  2. Follow-up with podiatry as scheduled  3. Follow with GI as outpatient for hep C and cirrhosis liver without ascites  4. Use crutches as recommended by podiatry for nonweightbearing on right foot  5.  Discharge home with home care           Significant therapeutic interventions: See above notes    Significant Diagnostic Studies:   Labs / Micro:  CBC:   Lab Results   Component Value Date    WBC 5.3 02/22/2020    RBC 3.29 02/22/2020    HGB 10.9 02/22/2020    HCT 33.9 02/22/2020    .0 02/22/2020    MCH 33.1 02/22/2020    MCHC 32.2 02/22/2020    RDW 16.6 02/22/2020    PLT See Reflexed IPF Result 02/22/2020     BMP:    Lab Results   Component Value Date    GLUCOSE 113 02/19/2020     02/19/2020    K 3.8 02/19/2020     02/19/2020    CO2 21 02/19/2020    ANIONGAP 10 02/19/2020    BUN 6 02/23/2020    CREATININE 0.53 02/23/2020    BUNCRER NOT REPORTED 02/19/2020    CALCIUM 7.8 02/19/2020    LABGLOM >60 02/23/2020    GFRAA >60 02/23/2020    GFR      02/23/2020    GFR NOT REPORTED 02/23/2020     HFP:    Lab Results   Component Value Date    PROT 5.6 02/19/2020     CMP:    Lab Results   Component Value Date    GLUCOSE 113 02/19/2020     02/19/2020    K 3.8 02/19/2020     02/19/2020    CO2 21 02/19/2020    BUN 6 02/23/2020    CREATININE 0.53 02/23/2020    ANIONGAP 10 02/19/2020    ALKPHOS 154 02/19/2020    ALT 43 02/19/2020     02/19/2020    BILITOT 5.15 02/19/2020    LABALBU 2.3 02/19/2020    ALBUMIN 0.7 02/19/2020    LABGLOM >60 02/23/2020    GFRAA >60 02/23/2020    GFR      02/23/2020    GFR NOT REPORTED 02/23/2020    PROT 5.6 02/19/2020    CALCIUM 7.8 02/19/2020     PT/INR:    Lab Results   Component Value Date    PROTIME 15.0 02/19/2020    PROTIME 12.4 10/16/2019    INR 1.5 02/19/2020     PTT:   Lab Results   Component Value Date    APTT 31.1 01/30/2019     FLP:    Lab Results   Component Value Date    CHOL 174 11/01/2017    TRIG 198 11/01/2017    HDL 39 11/01/2017     U/A:    Lab Results   Component Value Date    COLORU YELLOW 02/20/2017    TURBIDITY CLEAR 02/20/2017    SPECGRAV 1.018 02/20/2017    HGBUR 3+ 02/20/2017    PHUR 5.5 02/20/2017    PROTEINU 1+ 02/20/2017    GLUCOSEU NEGATIVE 02/20/2017    KETUA NEGATIVE 02/20/2017    BILIRUBINUR  02/20/2017     Presumptive positive. Unable to confirm due to unavailability of reagent.     UROBILINOGEN Normal 02/20/2017    NITRU Further Evaluation/Follow Up POST HOSPITALIZATION/Incidental Findings: Follow with GI for alcoholic cirrhosis of liver    Diet: Cardiac diet    Activity: As tolerated    Instructions to Patient: Nonweightbearing on right foot and use crutches as recommended by podiatry    Discharge Medications:      Medication List      START taking these medications    amoxicillin-clavulanate 875-125 MG per tablet  Commonly known as:  AUGMENTIN  Take 1 tablet by mouth every 12 hours for 10 days     colchicine 0.6 MG tablet  Commonly known as:  COLCRYS  Take 1 tablet by mouth 2 times daily for 7 days        CONTINUE taking these medications    acetaminophen 325 MG tablet  Commonly known as:  Tylenol  Take 1 tablet by mouth every 6 hours as needed for Pain     aspirin 81 MG chewable tablet     ferrous sulfate 325 (65 Fe) MG tablet  TAKE ONE TABLET BY MOUTH DAILY WITH BREAKFAST     gabapentin 600 MG tablet  Commonly known as:  NEURONTIN  TAKE ONE TABLET BY MOUTH THREE TIMES A DAY     lidocaine 4 % external patch  Place 1 patch onto the skin daily     lisinopril 20 MG tablet  Commonly known as:  PRINIVIL;ZESTRIL  TAKE ONE TABLET BY MOUTH DAILY     primidone 50 MG tablet  Commonly known as: MYSOLINE  Take 2 po qhs     rOPINIRole 0.5 MG tablet  Commonly known as:  REQUIP  TAKE ONE TABLET BY MOUTH ONCE NIGHTLY     therapeutic multivitamin-minerals tablet           Where to Get Your Medications      These medications were sent to [address], [address] [place] [address],[address]    Phone:  [phone]   · amoxicillin-clavulanate 875-125 MG per tablet  · colchicine 0.6 MG tablet       TOÑO completed    No discharge procedures on file. Time Spent on discharge is 40 minutes  in patient examination, evaluation, counseling as well as medication reconciliation, prescriptions for required medications, discharge plan and follow up.     Electronically signed by   Renaldo Clarke MD  2/23/2020  3:08 PM      Thank you REECE Tovar - ALAN for the opportunity to be involved in this patient's care.

## 2020-02-22 LAB
BUN BLDV-MCNC: 6 MG/DL (ref 6–20)
CREAT SERPL-MCNC: 0.48 MG/DL (ref 0.7–1.2)
GFR AFRICAN AMERICAN: >60 ML/MIN
GFR NON-AFRICAN AMERICAN: >60 ML/MIN
GFR SERPL CREATININE-BSD FRML MDRD: ABNORMAL ML/MIN/{1.73_M2}
GFR SERPL CREATININE-BSD FRML MDRD: ABNORMAL ML/MIN/{1.73_M2}
HCT VFR BLD CALC: 33.9 % (ref 40.7–50.3)
HEMOGLOBIN: 10.9 G/DL (ref 13–17)
MCH RBC QN AUTO: 33.1 PG (ref 25.2–33.5)
MCHC RBC AUTO-ENTMCNC: 32.2 G/DL (ref 28.4–34.8)
MCV RBC AUTO: 103 FL (ref 82.6–102.9)
NRBC AUTOMATED: 0 PER 100 WBC
PDW BLD-RTO: 16.6 % (ref 11.8–14.4)
PLATELET # BLD: ABNORMAL K/UL (ref 138–453)
PLATELET, FLUORESCENCE: 70 K/UL (ref 138–453)
PLATELET, IMMATURE FRACTION: 3.1 % (ref 1.1–10.3)
PMV BLD AUTO: ABNORMAL FL (ref 8.1–13.5)
RBC # BLD: 3.29 M/UL (ref 4.21–5.77)
WBC # BLD: 5.3 K/UL (ref 3.5–11.3)

## 2020-02-22 PROCEDURE — 6370000000 HC RX 637 (ALT 250 FOR IP): Performed by: NURSE PRACTITIONER

## 2020-02-22 PROCEDURE — 6360000002 HC RX W HCPCS: Performed by: FAMILY MEDICINE

## 2020-02-22 PROCEDURE — 36415 COLL VENOUS BLD VENIPUNCTURE: CPT

## 2020-02-22 PROCEDURE — 6370000000 HC RX 637 (ALT 250 FOR IP): Performed by: FAMILY MEDICINE

## 2020-02-22 PROCEDURE — 85055 RETICULATED PLATELET ASSAY: CPT

## 2020-02-22 PROCEDURE — 82565 ASSAY OF CREATININE: CPT

## 2020-02-22 PROCEDURE — 2580000003 HC RX 258: Performed by: FAMILY MEDICINE

## 2020-02-22 PROCEDURE — 99254 IP/OBS CNSLTJ NEW/EST MOD 60: CPT | Performed by: INTERNAL MEDICINE

## 2020-02-22 PROCEDURE — 1200000000 HC SEMI PRIVATE

## 2020-02-22 PROCEDURE — 6370000000 HC RX 637 (ALT 250 FOR IP): Performed by: INTERNAL MEDICINE

## 2020-02-22 PROCEDURE — 85027 COMPLETE CBC AUTOMATED: CPT

## 2020-02-22 PROCEDURE — 84520 ASSAY OF UREA NITROGEN: CPT

## 2020-02-22 PROCEDURE — 99232 SBSQ HOSP IP/OBS MODERATE 35: CPT | Performed by: INTERNAL MEDICINE

## 2020-02-22 RX ORDER — COLCHICINE 0.6 MG/1
0.6 TABLET ORAL 2 TIMES DAILY
Status: DISCONTINUED | OUTPATIENT
Start: 2020-02-22 | End: 2020-02-23 | Stop reason: HOSPADM

## 2020-02-22 RX ADMIN — PIPERACILLIN AND TAZOBACTAM 3.38 G: 3; .375 INJECTION, POWDER, LYOPHILIZED, FOR SOLUTION INTRAVENOUS at 08:43

## 2020-02-22 RX ADMIN — HYDROCODONE BITARTRATE AND ACETAMINOPHEN 2 TABLET: 5; 325 TABLET ORAL at 22:28

## 2020-02-22 RX ADMIN — GABAPENTIN 400 MG: 800 TABLET ORAL at 22:28

## 2020-02-22 RX ADMIN — PRIMIDONE 50 MG: 50 TABLET ORAL at 22:29

## 2020-02-22 RX ADMIN — SODIUM CHLORIDE: 9 INJECTION, SOLUTION INTRAVENOUS at 04:01

## 2020-02-22 RX ADMIN — GABAPENTIN 400 MG: 800 TABLET ORAL at 08:43

## 2020-02-22 RX ADMIN — HYDROCODONE BITARTRATE AND ACETAMINOPHEN 2 TABLET: 5; 325 TABLET ORAL at 15:31

## 2020-02-22 RX ADMIN — ASPIRIN 81 MG: 81 TABLET, CHEWABLE ORAL at 08:43

## 2020-02-22 RX ADMIN — Medication 1250 MG: at 04:02

## 2020-02-22 RX ADMIN — FERROUS SULFATE TAB EC 325 MG (65 MG FE EQUIVALENT) 325 MG: 325 (65 FE) TABLET DELAYED RESPONSE at 18:06

## 2020-02-22 RX ADMIN — PIPERACILLIN AND TAZOBACTAM 3.38 G: 3; .375 INJECTION, POWDER, LYOPHILIZED, FOR SOLUTION INTRAVENOUS at 16:13

## 2020-02-22 RX ADMIN — Medication 1250 MG: at 22:28

## 2020-02-22 RX ADMIN — ENOXAPARIN SODIUM 40 MG: 40 INJECTION SUBCUTANEOUS at 08:43

## 2020-02-22 RX ADMIN — FERROUS SULFATE TAB EC 325 MG (65 MG FE EQUIVALENT) 325 MG: 325 (65 FE) TABLET DELAYED RESPONSE at 08:43

## 2020-02-22 RX ADMIN — LISINOPRIL 20 MG: 20 TABLET ORAL at 08:43

## 2020-02-22 RX ADMIN — Medication 1250 MG: at 12:53

## 2020-02-22 RX ADMIN — ROPINIROLE HYDROCHLORIDE 0.5 MG: 0.25 TABLET, FILM COATED ORAL at 22:29

## 2020-02-22 RX ADMIN — COLCHICINE 0.6 MG: 0.6 TABLET, FILM COATED ORAL at 18:51

## 2020-02-22 RX ADMIN — HYDROCODONE BITARTRATE AND ACETAMINOPHEN 2 TABLET: 5; 325 TABLET ORAL at 06:44

## 2020-02-22 ASSESSMENT — PAIN SCALES - GENERAL
PAINLEVEL_OUTOF10: 7
PAINLEVEL_OUTOF10: 5

## 2020-02-22 NOTE — PROGRESS NOTES
Podiatry Brief Update Note    Arthrocentesis of right 1st metatarsophalangeal joint performed bedside. 1-2cc of synovial fluid obtained with appreciable white precipitate. Procedure listed in detail below. Procedure: Patient understands that arthrocentesis of the right 1st metatarsophalangeal joint needs to be performed. Patient agrees to procedure. right foot was marked. Timeout performed with patient, RN, and writer in room. 18cc of 1% lidocaine plain was used for a local block. Attention was directed to the dorsal aspect of the 1st metatarsophalangeal joint and vital neurovascular structures identified. Next an 22g needle was inserted to the joint under slight negative pressure. 1-2cc of joint fluid was collected and then sent for analysis. The site was dressed with a DSD with ACE overtop. Hemostasis spontaneous with direct pressure. Reports 0/10 post procedure. Patient tolerated procedure well.      Annita Underwood DPM   Podiatric Medicine & Surgery   2/21/2020 at 10:58 PM

## 2020-02-22 NOTE — PROGRESS NOTES
Progress Note  Podiatric Medicine and Surgery     Subjective     CC: Cellulitis, R foot    Status post arthrocentesis of right first metatarsophalangeal joint  Patient seen and examined at bedside. No acute events overnight. 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 HPI :  Padmini Gallardo is a 63 y.o. male seen at [facility] for worsening pain and cellulitis of right foot. Patient presented to ED approximately 1 week ago after sustaining suspected inversion mechanism injury to the right foot. Radiographs at that time were negative for any overt fractures or dislocations. Patient was discharged home. Over the course of the past week patient has developed worsening redness, swelling, pain to the right first MPJ to the point where the patient is unable to ambulate. Patient admits to numbness and tingling to bilateral lower extremity particularly to the digits. Patient denies being diabetic. States neuropathy is due to hereditary reasons. Patient has history of alcohol abuse. He denies smoking. He denies any trauma to the right foot since his last ED visit.     PCP is REECE Morales - CNP       ROS: Denies N/V/F/C/SOB/CP. Otherwise negative except at stated in the HPI.      Medications:  Scheduled Meds:   sodium chloride flush  10 mL Intravenous BID    vancomycin  1,250 mg Intravenous Q8H    aspirin  81 mg Oral Daily    ferrous sulfate  325 mg Oral BID WC    gabapentin  400 mg Oral BID    lisinopril  20 mg Oral Daily    primidone  50 mg Oral Nightly    rOPINIRole  0.5 mg Oral Daily    sodium chloride flush  10 mL Intravenous 2 times per day    enoxaparin  40 mg Subcutaneous Daily    piperacillin-tazobactam  3.375 g Intravenous Q8H    vancomycin (VANCOCIN) intermittent dosing (placeholder)   Other RX Placeholder       Continuous Infusions:   sodium chloride 75 mL/hr at 02/22/20 2484 PRN Meds:sodium chloride flush, acetaminophen, HYDROcodone 5 mg - acetaminophen **OR** HYDROcodone 5 mg - acetaminophen    Objective     Vitals:  Patient Vitals for the past 8 hrs:   BP Temp Temp src SpO2   20 0800 (!) 145/86 98.1 °F (36.7 °C) Oral 98 %     Average, Min, and Max for last 24 hours Vitals:  TEMPERATURE:  Temp  Av.1 °F (36.7 °C)  Min: 98 °F (36.7 °C)  Max: 98.1 °F (36.7 °C)    RESPIRATIONS RANGE: Resp  Av  Min: 18  Max: 18    PULSE RANGE: Pulse  Av  Min: 91  Max: 91    BLOOD PRESSURE RANGE:  Systolic (72FAO), FJU:555 , Min:139 , HGM:678   ; Diastolic (58MJY), REC:78, Min:79, Max:86      PULSE OXIMETRY RANGE: SpO2  Av %  Min: 98 %  Max: 98 %    I/O last 3 completed shifts: In: 2297 [P.O.:600; I.V.:1697]  Out: 2125 [Urine:2125]    CBC:  Recent Labs     20  05   WBC 5.3   HGB 10.9*   HCT 33.9*   PLT See Reflexed IPF Result        BMP:  Recent Labs     20   BUN 6   CREATININE 0.48*        Coags:  No results for input(s): APTT, PROT, INR in the last 72 hours. Lab Results   Component Value Date    SEDRATE 20 (H) 2020     No results for input(s): CRP in the last 72 hours. Physical Exam:  Vascular: DP and PT pulses are palpable . CFT <3 seconds to all digits. Hair growth is absent to the level of the digits. Moderate edema.       Neuro: Saph/sural/SP/DP/plantar sensation intact to light touch.     Musculoskeletal: Muscle strength is 5/5, adequate ROM, adequate strength to all lower extremity muscle groups. Gross deformity is absent. No pain with compression of calf. Tenderness with palpation of first MPJ complex, particularly plantar aspect. No joint crepitus noted first MPJ. Pain with palpation of hallux particularly of the medial aspect.     Dermatologic: No open wound. No purulent drainage appreciated. Circumferential  erythema improving around the first MPJ complex with no increase in warmth.   No fluctuance, no induration.         Clinical Images:                      Imaging:   MRI FOOT RIGHT W WO CONTRAST   Final Result   1. Small amount of fluid and postcontrast enhancement in association with the   flexor tendon to the 1st digit at the level of the forefoot which could   represent infectious tenosynovitis. No osseous destruction evident. 2. Severe intertarsal myositis. Moderate cellulitis at the dorsum of the   foot. No discrete organized fluid collection. 3. No MR evidence that meets the criteria for osteomyelitis at this point. 4. Susceptibility artifact from postsurgical changes at the distal 1st, 4th,   and 5th digits. 5. Degenerative changes as detailed above. The findings were sent to the Radiology Results [address] at 1:03   pm on 2/21/2020to be communicated to a licensed caregiver. XR FOOT RIGHT (MIN 3 VIEWS)   Final Result   Persistent soft tissue swelling. No acute osseous abnormality. XR FOOT RIGHT (MIN 3 VIEWS)   Final Result   Diffuse soft tissue swelling. Possible erosion of the 1st proximal phalanx base. Please correlate for   potential infection at this site, possibly septic arthritis. Cultures:   CULTURE BLOOD #1 [[id number]]    Order Status: Sent Specimen: Blood    Culture blood #2 [[id number]]    Order Status: Sent Specimen: Blood          Assessment   Blas Velez is a 63 y.o. male with   1. Possible gout attack of first MPJ, right foot  2. Cellulitis, right foot  3. Myositis, right foot  4. Alcohol induced peripheral neuropathy     Principal Problem:    Septic arthritis of IP joint of toe, right (HCC)  Active Problems:    Essential hypertension    Unspecified viral hepatitis C without hepatic coma    Cirrhosis of liver without ascites (HCC)    Alcoholic cirrhosis of liver without ascites (HCC)    Septic arthritis of right foot ([address]    Foot infection  Resolved Problems:    * No resolved hospital problems.  *       Plan   · Patient

## 2020-02-22 NOTE — PLAN OF CARE
Problem: Falls - Risk of:  Goal: Will remain free from falls  Description  Will remain free from falls  2/22/2020 1745 by Pili Kingsley RN  Outcome: Ongoing  2/22/2020 0417 by Cecil Cordova RN  Outcome: Ongoing  Goal: Absence of physical injury  Description  Absence of physical injury  2/22/2020 1745 by Pili Kingsley RN  Outcome: Ongoing  2/22/2020 0417 by Cecil Cordova RN  Outcome: Ongoing     Problem: Pain:  Goal: Pain level will decrease  Description  Pain level will decrease  2/22/2020 1745 by Pili Kingsley RN  Outcome: Ongoing  2/22/2020 0417 by Cecil Cordova RN  Outcome: Ongoing  Goal: Control of acute pain  Description  Control of acute pain  2/22/2020 1745 by Pili Kingsley RN  Outcome: Ongoing  2/22/2020 0417 by Cecil Cordova RN  Outcome: Ongoing

## 2020-02-22 NOTE — PROGRESS NOTES
temperature 98.1, blood pressure 121/60. Lab work showed normal electrolytes, kidney function, normal WBC count. CRP was elevated at 42. 3. X-ray was suggestive of possible erosion at first proximal phalanx base concerning for septic arthritis. 2/19/2020  Per patient podiatry tried to drain the abscess yesterday but only blood came out  He has been scheduled to get MRI right foot today to rule out abscess/septic joint  Still has pain in right foot, dressing is in place  Denies any history of gout  Review of Systems:     Constitutional:  negative for chills, fevers, sweats  Respiratory:  negative for cough, dyspnea on exertion, shortness of breath, wheezing  Cardiovascular:  negative for chest pain, chest pressure/discomfort, lower extremity edema, palpitations  Gastrointestinal:  negative for abdominal pain, constipation, diarrhea, nausea, vomiting  Neurological:  negative for dizziness, headache    Medications: Allergies:     Allergies   Allergen Reactions    Sulfa Antibiotics Rash       Current Meds:   Scheduled Meds:    sodium chloride flush  10 mL Intravenous BID    vancomycin  1,250 mg Intravenous Q8H    aspirin  81 mg Oral Daily    ferrous sulfate  325 mg Oral BID WC    gabapentin  400 mg Oral BID    lisinopril  20 mg Oral Daily    primidone  50 mg Oral Nightly    rOPINIRole  0.5 mg Oral Daily    sodium chloride flush  10 mL Intravenous 2 times per day    enoxaparin  40 mg Subcutaneous Daily    piperacillin-tazobactam  3.375 g Intravenous Q8H    vancomycin (VANCOCIN) intermittent dosing (placeholder)   Other RX Placeholder     Continuous Infusions:    sodium chloride 75 mL/hr at 02/22/20 0401     PRN Meds: sodium chloride flush, acetaminophen, HYDROcodone 5 mg - acetaminophen **OR** HYDROcodone 5 mg - acetaminophen    Data:     Past Medical History:   has a past medical history of Bleeding ulcer, Chronic back pain, Gastric ulcer, Hematuria, Hep C w/o coma, chronic ([facility] History of blood

## 2020-02-22 NOTE — CONSULTS
discrete organized fluid collection. 3. No MR evidence that meets the criteria for osteomyelitis at this point. 4. Susceptibility artifact from postsurgical changes at the distal 1st, 4th,   and 5th digits. 5. Degenerative changes as detailed above. The findings were sent to the Radiology Results [address] at 1:03   pm on 2/21/2020to be communicated to a licensed caregiver. BUN:6  Cr:0.48    WBC:5.3  Hb:10.9  Plat:     Cultures:  Urine:  ·   Blood:  ·   Sputum :  ·   Wound:  ·   Synovial Fluid:  - 02-21: Few Neutrophils, No bacteria seen to date, Culture pending      Discussed with patient, RN. I have personally reviewed the past medical history, past surgical history, medications, social history, and family history, and I have updated the database accordingly.   Past Medical History:     Past Medical History:   Diagnosis Date    Bleeding ulcer 2019    Chronic back pain 2015    Gastric ulcer 10/31/2015    large 2-3 cm    Hematuria 2017    2019-RESOLVED NOW    Hep C w/o coma, chronic ([address] 2017    WAITING APPROVAL FROM INSURANCE COMPANY TO GET TREATMENT STARTED    History of blood transfusion 2019    ANEMIA R/T BLEEDING ULCERS    History of ETOH abuse     QUIT 01/2017    Hypertension 2017    ON RX    Neuropathy 2009    FEET BILAT, SHAKEY HANDS    Portal hypertensive gastropathy ([address]     Seizures ([address]     only one in 2016    Wears glasses        Past Surgical  History:     Past Surgical History:   Procedure Laterality Date    BACK SURGERY  2000    lumbar discectomy    COLONOSCOPY  03/14/2018    mod diverticulosis; internal hemorrhoids; retained stools    ELBOW DEBRIDEMENT Left 10/16/2019     ELBOW INCISION AND DRAINAGE    HERNIA REPAIR Left 1975    lt inguinal    INCISION AND DRAINAGE Left 10/16/2019    ELBOW INCISION AND DRAINAGE, PARTIALTRICEP REPAIR performed by Shimon Sauceda DO at [address]  [facility] N[facility]A 3/14/2018    COLONOSCOPY performed by and Sexual Activity    Alcohol use: Not Currently     Comment: ocassionally    Drug use: No    Sexual activity: Yes     Partners: Female   Lifestyle    Physical activity:     Days per week: Not on file     Minutes per session: Not on file    Stress: Not on file   Relationships    Social connections:     Talks on phone: Not on file     Gets together: Not on file     Attends Hoahaoism service: Not on file     Active member of club or organization: Not on file     Attends meetings of clubs or organizations: Not on file     Relationship status: Not on file    Intimate partner violence:     Fear of current or ex partner: Not on file     Emotionally abused: Not on file     Physically abused: Not on file     Forced sexual activity: Not on file   Other Topics Concern    Not on file   Social History Narrative    Not on file       Family History:     Family History   Problem Relation Age of Onset    High Blood Pressure Mother     Other Father         neuropathy    Stroke Father     Prostate Cancer Father         Allergies:   Sulfa antibiotics     Review of Systems:   Constitutional: No fevers or chills. No systemic complaints  Head: No headaches  Eyes: No double vision or blurry vision. No conjunctival inflammation. ENT: No sore throat or runny nose. . No hearing loss, tinnitus or vertigo. Cardiovascular: No chest pain or palpitations. No shortness of breath. No BE  Lung: No shortness of breath or cough. No sputum production  Abdomen: No nausea, vomiting, diarrhea, or abdominal pain. Otis Brooking No cramps. Genitourinary: No increased urinary frequency, or dysuria. No hematuria. No suprapubic or CVA pain  Musculoskeletal: No muscle aches. No joint effusions, swelling or deformities. Pain at right foot localized. Hematologic: No bleeding or bruising. Neurologic: No headache, weakness, numbness, or tingling. Integument: No rash, no ulcers. Psychiatric: No depression.    Endocrine: No polyuria, no polydipsia, no GLUCOSE 113 02/19/2020       Medical Decision Making-Imaging:     MRI FOOT RIGHT W WO CONTRAST [[id number]] Collected: 02/21/20 1253      Order Status: Completed Updated: 02/21/20 1648     Narrative:       EXAMINATION:  MRI OF THE RIGHT FOOT WITHOUT AND WITH CONTRAST, 2/21/2020 11:43 am    TECHNIQUE:  Multiplanar multisequence MRI of the right foot was performed without and  with the administration of intravenous contrast.    COMPARISON:  Right foot plain radiographs from 2/20/2020    HISTORY:  ORDERING SYSTEM PROVIDED HISTORY: Septic arthritis  TECHNOLOGIST PROVIDED HISTORY:  Septic arthritis  What is the area of interest?->Forefoot  Reason for Exam: septic arthritis    64-year-old male with possible septic arthritis    FINDINGS:  Exam limited due to patient motion. LISFRANC JOINT: Lisfranc ligament complex appears intact. BONE MARROW: Mild degenerative changes of the midfoot and tarsal metatarsal  joints.  Mild degenerative changes of the 1st IP joint.  Susceptibility  artifact dorsal to the distal phalanx 1st digit.  No osseous destruction  evident.  Susceptibility artifact involving the distal 4th and 5th digits  likely related to prior surgery.  No marginal erosions.  No MR evidence that  meets the criteria for osteomyelitis.  Mild diffuse degenerative changes of  the midfoot and tarsal metatarsal joints. GREATER AND LESSER MTP JOINTS: Mild degenerative changes of the 1st MTP/MTS  joints. SOFT TISSUES: Severe edema throughout the visualized intertarsal musculature  consistent with severe diffuse myositis.  Moderate soft tissue swelling and  edema at the dorsal aspect of the foot.  No discrete organized fluid  collection identified. TENDONS: Small amount of fluid and associated postcontrast enhancement in the  flexor tendon to the 1st digit within the forefoot.  This could be related to  an infectious tenosynovitis.     Impression:       1.  Small amount of fluid and postcontrast enhancement in association with the  flexor tendon to the 1st digit at the level of the forefoot which could  represent infectious tenosynovitis.  No osseous destruction evident. 2. Severe intertarsal myositis.  Moderate cellulitis at the dorsum of the  foot.  No discrete organized fluid collection. 3. No MR evidence that meets the criteria for osteomyelitis at this point. 4. Susceptibility artifact from postsurgical changes at the distal 1st, 4th,  and 5th digits. 5. Degenerative changes as detailed above. The findings were sent to the Radiology Results [address] at 1:03  pm on 2/21/2020to be communicated to a licensed caregiver.     XR FOOT RIGHT (MIN 3 VIEWS) [[id number]] Collected: 02/20/20 1351     Order Status: Completed Updated: 02/20/20 1406     Narrative:       EXAMINATION:  THREE XRAY VIEWS OF THE RIGHT FOOT    2/20/2020 1:49 pm    COMPARISON:  02/18/2020, 02/11/2020    HISTORY:  ORDERING SYSTEM PROVIDED HISTORY: persist swelling  TECHNOLOGIST PROVIDED HISTORY:  persist swelling  Acuity: Unknown  Type of Exam: Initial    FINDINGS:  Persistent diffuse soft tissue swelling in the forefoot.  No acute fracture  or stress fracture.  Joint spaces and alignment are maintained.  No discrete  erosions are bony destruction at the 1st MTP joint.     Impression:       Persistent soft tissue swelling.  No acute osseous abnormality.     MRI FOOT RIGHT W CONTRAST [[id number]]      Order Status: Canceled      XR FOOT RIGHT (MIN 3 VIEWS) [[id number]] Collected: 02/18/20 1348     Order Status: Completed Updated: 02/18/20 1358     Narrative:       EXAMINATION:  THREE XRAY VIEWS OF THE RIGHT FOOT    2/18/2020 1:44 pm    COMPARISON:  None.     HISTORY:  ORDERING SYSTEM PROVIDED HISTORY: cellulitis  TECHNOLOGIST PROVIDED HISTORY:  cellulitis  Reason for Exam: stepped wrong on block of ice    FINDINGS:  1st MTP joint degenerative change.  Possible erosion of the medial base of  the 1st proximal phalanx.  Soft tissue swelling diffusely.     Impression:       Diffuse soft tissue swelling. Possible erosion of the 1st proximal phalanx base.  Please correlate for  potential infection at this site, possibly septic arthritis. Medical Decision [id number]-[id number]:     2/21/2020  6:44 PM - Alex, Mary Incoming Lab Results From [facility]     Specimen Information: Synovial Fluid        Component Collected Lab   Specimen Description 02/21/2020  5:00  [address]   . SYNOVIAL FLUID    Special Requests 02/21/2020  5:00  [address]   NOT REPORTED    Direct Exam Abnormal  02/21/2020  5:00  [address]    Direct Exam 02/21/2020  5:00  [address]   NO BACTERIA SEEN    Culture 02/21/2020  5:00  [address]   PENDING        Medical Decision Making-Other:     Note:  · Labs, medications, radiologic studies were reviewed with personal review of films  · Moderate Large amounts of data were reviewed  · Discussed with nursing Staff, Discharge planner  · Infection Control and Prevention measures reviewed  · All prior entries were reviewed  · Administer medications as ordered  · Prognosis: Good  · Discharge planning reviewed  · Follow up as outpatient. Thank you for allowing us to participate in the care of this patient. Please call with questions. Eric Astudillo     ATTESTATION:    I have discussed the case, including pertinent history and exam findings with the residents and students. I have seen and examined the patient and the key elements of the encounter have been performed by me. I was present when the student obtained his information or examined the patient. I have reviewed the laboratory data, other diagnostic studies and discussed them with the residents. I have updated the medical record where necessary. I agree with the assessment, plan and orders as documented by the resident/ student.     Katey Rand MD.    Pager: [phone]

## 2020-02-23 VITALS
DIASTOLIC BLOOD PRESSURE: 76 MMHG | OXYGEN SATURATION: 96 % | TEMPERATURE: 98.1 F | HEIGHT: 70 IN | HEART RATE: 88 BPM | BODY MASS INDEX: 34.93 KG/M2 | WEIGHT: 244 LBS | RESPIRATION RATE: 16 BRPM | SYSTOLIC BLOOD PRESSURE: 139 MMHG

## 2020-02-23 LAB
BUN BLDV-MCNC: 6 MG/DL (ref 6–20)
CREAT SERPL-MCNC: 0.53 MG/DL (ref 0.7–1.2)
GFR AFRICAN AMERICAN: >60 ML/MIN
GFR NON-AFRICAN AMERICAN: >60 ML/MIN
GFR SERPL CREATININE-BSD FRML MDRD: ABNORMAL ML/MIN/{1.73_M2}
GFR SERPL CREATININE-BSD FRML MDRD: ABNORMAL ML/MIN/{1.73_M2}

## 2020-02-23 PROCEDURE — 6370000000 HC RX 637 (ALT 250 FOR IP): Performed by: INTERNAL MEDICINE

## 2020-02-23 PROCEDURE — 84520 ASSAY OF UREA NITROGEN: CPT

## 2020-02-23 PROCEDURE — 36415 COLL VENOUS BLD VENIPUNCTURE: CPT

## 2020-02-23 PROCEDURE — 82565 ASSAY OF CREATININE: CPT

## 2020-02-23 PROCEDURE — 99239 HOSP IP/OBS DSCHRG MGMT >30: CPT | Performed by: INTERNAL MEDICINE

## 2020-02-23 PROCEDURE — 99232 SBSQ HOSP IP/OBS MODERATE 35: CPT | Performed by: INTERNAL MEDICINE

## 2020-02-23 PROCEDURE — 6360000002 HC RX W HCPCS: Performed by: FAMILY MEDICINE

## 2020-02-23 PROCEDURE — 2580000003 HC RX 258: Performed by: FAMILY MEDICINE

## 2020-02-23 PROCEDURE — 6370000000 HC RX 637 (ALT 250 FOR IP): Performed by: NURSE PRACTITIONER

## 2020-02-23 PROCEDURE — 6370000000 HC RX 637 (ALT 250 FOR IP): Performed by: FAMILY MEDICINE

## 2020-02-23 RX ORDER — AMOXICILLIN AND CLAVULANATE POTASSIUM 875; 125 MG/1; MG/1
1 TABLET, FILM COATED ORAL EVERY 12 HOURS SCHEDULED
Status: DISCONTINUED | OUTPATIENT
Start: 2020-02-23 | End: 2020-02-23 | Stop reason: HOSPADM

## 2020-02-23 RX ORDER — COLCHICINE 0.6 MG/1
0.6 TABLET ORAL 2 TIMES DAILY
Qty: 14 TABLET | Refills: 0 | Status: ON HOLD | OUTPATIENT
Start: 2020-02-23 | End: 2020-03-02 | Stop reason: HOSPADM

## 2020-02-23 RX ORDER — AMOXICILLIN AND CLAVULANATE POTASSIUM 875; 125 MG/1; MG/1
1 TABLET, FILM COATED ORAL EVERY 12 HOURS SCHEDULED
Qty: 20 TABLET | Refills: 0 | Status: ON HOLD
Start: 2020-02-23 | End: 2020-03-02 | Stop reason: HOSPADM

## 2020-02-23 RX ADMIN — HYDROCODONE BITARTRATE AND ACETAMINOPHEN 2 TABLET: 5; 325 TABLET ORAL at 04:55

## 2020-02-23 RX ADMIN — PIPERACILLIN AND TAZOBACTAM 3.38 G: 3; .375 INJECTION, POWDER, LYOPHILIZED, FOR SOLUTION INTRAVENOUS at 08:50

## 2020-02-23 RX ADMIN — Medication 1250 MG: at 04:54

## 2020-02-23 RX ADMIN — ASPIRIN 81 MG: 81 TABLET, CHEWABLE ORAL at 08:51

## 2020-02-23 RX ADMIN — LISINOPRIL 20 MG: 20 TABLET ORAL at 08:52

## 2020-02-23 RX ADMIN — GABAPENTIN 400 MG: 800 TABLET ORAL at 08:51

## 2020-02-23 RX ADMIN — FERROUS SULFATE TAB EC 325 MG (65 MG FE EQUIVALENT) 325 MG: 325 (65 FE) TABLET DELAYED RESPONSE at 08:51

## 2020-02-23 RX ADMIN — PIPERACILLIN AND TAZOBACTAM 3.38 G: 3; .375 INJECTION, POWDER, LYOPHILIZED, FOR SOLUTION INTRAVENOUS at 00:49

## 2020-02-23 RX ADMIN — COLCHICINE 0.6 MG: 0.6 TABLET, FILM COATED ORAL at 08:51

## 2020-02-23 RX ADMIN — ENOXAPARIN SODIUM 40 MG: 40 INJECTION SUBCUTANEOUS at 08:51

## 2020-02-23 RX ADMIN — Medication 1250 MG: at 11:47

## 2020-02-23 ASSESSMENT — PAIN SCALES - GENERAL
PAINLEVEL_OUTOF10: 6
PAINLEVEL_OUTOF10: 8
PAINLEVEL_OUTOF10: 6

## 2020-02-23 NOTE — PROGRESS NOTES
temperature 98.1, blood pressure 121/60. Lab work showed normal electrolytes, kidney function, normal WBC count. CRP was elevated at 42. 3. X-ray was suggestive of possible erosion at first proximal phalanx base concerning for septic arthritis. 2/19/2020  Per patient podiatry tried to drain the abscess yesterday but only blood came out  He has been scheduled to get MRI right foot today to rule out abscess/septic joint  Still has pain in right foot, dressing is in place  Denies any history of gout  Review of Systems:     Constitutional:  negative for chills, fevers, sweats  Respiratory:  negative for cough, dyspnea on exertion, shortness of breath, wheezing  Cardiovascular:  negative for chest pain, chest pressure/discomfort, lower extremity edema, palpitations  Gastrointestinal:  negative for abdominal pain, constipation, diarrhea, nausea, vomiting  Neurological:  negative for dizziness, headache    Medications: Allergies:     Allergies   Allergen Reactions    Sulfa Antibiotics Rash       Current Meds:   Scheduled Meds:    colchicine  0.6 mg Oral BID    sodium chloride flush  10 mL Intravenous BID    vancomycin  1,250 mg Intravenous Q8H    aspirin  81 mg Oral Daily    ferrous sulfate  325 mg Oral BID WC    gabapentin  400 mg Oral BID    lisinopril  20 mg Oral Daily    primidone  50 mg Oral Nightly    rOPINIRole  0.5 mg Oral Daily    sodium chloride flush  10 mL Intravenous 2 times per day    enoxaparin  40 mg Subcutaneous Daily    piperacillin-tazobactam  3.375 g Intravenous Q8H    vancomycin (VANCOCIN) intermittent dosing (placeholder)   Other RX Placeholder     Continuous Infusions:    sodium chloride 75 mL/hr at 02/22/20 0401     PRN Meds: sodium chloride flush, acetaminophen, HYDROcodone 5 mg - acetaminophen **OR** HYDROcodone 5 mg - acetaminophen    Data:     Past Medical History:   has a past medical history of Bleeding ulcer, Chronic back pain, Gastric ulcer, Hematuria, Hep C w/o coma, chronic ([facility] History of blood transfusion, History of ETOH abuse, Hypertension, Neuropathy, Portal hypertensive gastropathy ([facility] Seizures ([facility] and Wears glasses. Social History:   reports that he quit smoking about 24 years ago. He has a 15.00 pack-year smoking history. He has quit using smokeless tobacco.  His smokeless tobacco use included chew. He reports previous alcohol use. He reports that he does not use drugs. Family History:   Family History   Problem Relation Age of Onset    High Blood Pressure Mother     Other Father         neuropathy    Stroke Father     Prostate Cancer Father        Vitals:  /74   Pulse 88   Temp 98.3 °F (36.8 °C) (Oral)   Resp 18   Ht 5' 10\" (1.778 m)   Wt 244 lb (110.7 kg)   SpO2 95%   BMI 35.01 kg/m²   Temp (24hrs), Av.3 °F (36.8 °C), Min:98.3 °F (36.8 °C), Max:98.3 °F (36.8 °C)    No results for input(s): POCGLU in the last 72 hours. I/O (24Hr): Intake/Output Summary (Last 24 hours) at 2020 0820  Last data filed at 2020 1808  Gross per 24 hour   Intake --   Output 1000 ml   Net -1000 ml       Labs:  Hematology:  Recent Labs     20  0519   WBC 5.3   RBC 3.29*   HGB 10.9*   HCT 33.9*   .0*   MCH 33.1   MCHC 32.2   RDW 16.6*   PLT See Reflexed IPF Result   MPV NOT REPORTED     Chemistry:  Recent Labs     20  0519 20  0502   BUN 6 6   CREATININE 0.48* 0.53*   LABGLOM >60 >60   GFRAA >60 >60     No results for input(s): PROT, LABALBU, LABA1C, N1GKIQV, W7CZQRS, FT4, TSH, AST, ALT, LDH, GGT, ALKPHOS, LABGGT, BILITOT, BILIDIR, AMMONIA, AMYLASE, LIPASE, LACTATE, CHOL, HDL, LDLCHOLESTEROL, CHOLHDLRATIO, TRIG, VLDL, ZXV47VV, PHENYTOIN, PHENYF, URICACID, POCGLU in the last 72 hours.   ABG:  Lab Results   Component Value Date    PH 7.20 2017    PCO2 33 2017    PO2 39 2017    HCO3 12.9 2017    O2SAT 63 2017    FIO2 NOT REPORTED 2017     Lab Results   Component Value Date/Time    SPECIAL NOT REPORTED 02/21/2020 05:00 PM     Lab Results   Component Value Date/Time    CULTURE NO GROWTH 22 HOURS 02/21/2020 05:00 PM       Radiology:  Xr Foot Right (min 3 Views)    Result Date: 2/18/2020  Diffuse soft tissue swelling. Possible erosion of the 1st proximal phalanx base. Please correlate for potential infection at this site, possibly septic arthritis. Physical Examination:        General appearance:  alert, cooperative and no distress  Mental Status:  oriented to person, place and time and normal affect  Lungs:  clear to auscultation bilaterally, normal effort  Heart:  regular rate and rhythm, no murmur  Abdomen:  soft, nontender, nondistended, normal bowel sounds, no masses, hepatomegaly, splenomegaly  Extremities:  + Dressing right foot, on podiatry exam there was redness tenderness and swelling yesterday  Skin:  no gross lesions, rashes, induration    Assessment:        Hospital Problems           Last Modified POA    * (Principal) Septic arthritis of IP joint of toe, right ([address] 2/18/2020 Yes    Essential hypertension 2/18/2020 Yes    Unspecified viral hepatitis C without hepatic coma 2/18/2020 Yes    Cirrhosis of liver without ascites ([address] 2/06/4282 Yes    Alcoholic cirrhosis of liver without ascites ([address] 2/18/2020 Yes    Septic arthritis of right foot ([address] 2/18/2020 Yes    Foot infection 2/19/2020 Yes          Plan:        1. Continue IV antibiotics pending ID evaluation  2. Discharge plan will depend on ID recommendations for antibiotics and clearance from podiatry  3.  Follow with GI as outpatient for hep C and cirrhosis liver without ascites    Possible discharge after ID evaluation today    Kristopher Daniel MD  2/23/2020  8:20 AM

## 2020-02-23 NOTE — PLAN OF CARE
Problem: Falls - Risk of:  Goal: Will remain free from falls  Description  Will remain free from falls  Outcome: Completed  Goal: Absence of physical injury  Description  Absence of physical injury  Outcome: Completed     Problem: Pain:  Goal: Pain level will decrease  Description  Pain level will decrease  Outcome: Completed  Goal: Control of acute pain  Description  Control of acute pain  Outcome: Completed

## 2020-02-23 NOTE — DISCHARGE INSTR - COC
Continuity of Care Form    Patient Name: Gray Malone   :  1960  MRN:  [id number]    Admit date:  2020  Discharge date:  ***    Code Status Order: Full Code   Advance Directives:   Advance Care Flowsheet Documentation     Date/Time Healthcare Directive Type of Healthcare Directive Copy in [address] Agent's Name Healthcare Agent's Phone Number    20 1830  No, patient does not have an advance directive for healthcare treatment -- -- -- -- --          Admitting Physician:  Renaldo Clarke MD  PCP: Hoda Nesbitt APRN - CNP    Discharging Nurse: [facility] Unit/Room#:   Discharging Unit Phone Number: ***    Emergency Contact:   Extended Emergency Contact Information  Primary Emergency Contact: Fabiola Johnson  Address: [address] [place] [address] Phone: [phone]  Work Phone: [phone]  Mobile Phone: [phone]  Relation: Spouse    Past Surgical History:  Past Surgical History:   Procedure Laterality Date    BACK SURGERY  2000    lumbar discectomy    COLONOSCOPY  2018    mod diverticulosis; internal hemorrhoids; retained stools    ELBOW DEBRIDEMENT Left 10/16/2019     ELBOW INCISION AND DRAINAGE    HERNIA REPAIR Left     lt inguinal    INCISION AND DRAINAGE Left 10/16/2019    ELBOW INCISION AND DRAINAGE, PARTIALTRICEP REPAIR performed by Nikolai Bourgeois DO at [address] [place] [address] [address] [place] [address] N/A 3/14/2018    COLONOSCOPY performed by Primo Santo MD at [address] [place] [address]  10/31/2015    large gastric ulcer    UPPER GASTROINTESTINAL ENDOSCOPY  2018    mod portal hypertensive gastrophathy    UPPER GASTROINTESTINAL ENDOSCOPY N/A 3/14/2018    EGD BIOPSY [facility] Primo Santo MD at [facility] 2019    EGD BIOPSY performed by Ana Roca MD at [facility] ENDOSCOPY N/A 2/1/2019    EGD BIOPSY performed by Tc Vyas MD at [facility] N/A 8/1/2019    EGD ESOPHAGOGASTRODUODENOSCOPY performed by Jocelyn Garcia MD at [facility]  2007       Immunization History:   Immunization History   Administered Date(s) Administered    Hepatitis A Adult (Vaqta) 11/08/2018    Hepatitis B Adult (Engerix-B) 11/08/2018    Influenza Virus Vaccine 11/30/2018    Influenza, [employer], IM, (6 mo and older Fluzone, Flulaval, Fluarix and 3 yrs and older Afluria) 09/25/2019    Influenza, [employer], Recombinant, IM PF (Flublok 18 yrs and older) 11/30/2018    Pneumococcal Polysaccharide ([id number]) 12/14/2017    Tdap (Boostrix, Adacel) 04/16/2018       Active Problems:  Patient Active Problem List   Diagnosis Code    GI bleed K92.2    Gastrointestinal hemorrhage K92.2    Iron deficiency anemia due to chronic blood loss D50.0    Diarrhea R19.7    Melena K92.1    New onset seizure ([facility] R56.9    Elevated liver enzymes R74.8    Essential hypertension I10    Gastric ulcer K25.9    Unspecified viral hepatitis C without hepatic coma B19.20    Portal hypertensive gastropathy (HCC) K76.6, K31.89    Anemia D64.9    Cirrhosis of liver without ascites (HCC) K74.60    Duodenal ulcer K26.9    Acute blood loss anemia D62    Thrombocytopenia (HCC) G35.2    Alcoholic cirrhosis of liver without ascites (HCC) K70.30    Chronic midline low back pain without sciatica M54.5, G89.29    Seizures (HCC) R56.9    Hypertension I10    Hep C w/o coma, chronic (HCC) B18.2    Chronic back pain M54.9, G89.29    History of ETOH abuse F10.11    Abdominal pain R10.9    Elevated alpha fetoprotein R77.2    Olecranon bursitis of left elbow M70.22    Septic arthritis of IP joint of toe, right (HCC) M00.9    Septic arthritis of right foot (HCC) M00.9    Foot infection L08.9       Isolation/Infection:   Isolation          No Care for less 30 days.      Update Admission H&P: No change in H&P    PHYSICIAN SIGNATURE:  Electronically signed by Iain Chandler MD on 2/23/20 at 5:30 PM

## 2020-02-23 NOTE — PROGRESS NOTES
Progress Note  Podiatric Medicine and Surgery     Subjective     CC: Cellulitis, R foot    Status post arthrocentesis of right first metatarsophalangeal joint  Patient seen and examined at bedside. No acute events overnight. Overall pain is much improved, has required as much pain medication  Cultures still pending    HPI :  Luz Washburn is a 63 y.o. male seen at [facility] for worsening pain and cellulitis of right foot. Patient presented to ED approximately 1 week ago after sustaining suspected inversion mechanism injury to the right foot. Radiographs at that time were negative for any overt fractures or dislocations. Patient was discharged home. Over the course of the past week patient has developed worsening redness, swelling, pain to the right first MPJ to the point where the patient is unable to ambulate. Patient admits to numbness and tingling to bilateral lower extremity particularly to the digits. Patient denies being diabetic. States neuropathy is due to hereditary reasons. Patient has history of alcohol abuse. He denies smoking. He denies any trauma to the right foot since his last ED visit.     PCP is REECE Montenegro CNP       ROS: Denies N/V/F/C/SOB/CP. Otherwise negative except at stated in the HPI.      Medications:  Scheduled Meds:   colchicine  0.6 mg Oral BID    sodium chloride flush  10 mL Intravenous BID    vancomycin  1,250 mg Intravenous Q8H    aspirin  81 mg Oral Daily    ferrous sulfate  325 mg Oral BID WC    gabapentin  400 mg Oral BID    lisinopril  20 mg Oral Daily    primidone  50 mg Oral Nightly    rOPINIRole  0.5 mg Oral Daily    sodium chloride flush  10 mL Intravenous 2 times per day    enoxaparin  40 mg Subcutaneous Daily    piperacillin-tazobactam  3.375 g Intravenous Q8H    vancomycin (VANCOCIN) intermittent dosing (placeholder)   Other RX Placeholder       Continuous Infusions:   sodium chloride 75 mL/hr at 02/22/20 0401       PRN Meds:sodium chloride flush, acetaminophen, HYDROcodone 5 mg - acetaminophen **OR** HYDROcodone 5 mg - acetaminophen    Objective     Vitals:  Patient Vitals for the past 8 hrs:   BP   20 0852 139/76     Average, Min, and Max for last 24 hours Vitals:  TEMPERATURE:  Temp  Av.3 °F (36.8 °C)  Min: 98.3 °F (36.8 °C)  Max: 98.3 °F (36.8 °C)    RESPIRATIONS RANGE: Resp  Av  Min: 18  Max: 18    PULSE RANGE: Pulse  Av  Min: 88  Max: 88    BLOOD PRESSURE RANGE:  Systolic (92JXS), SFY:114 , Min:137 , MCQ:667   ; Diastolic (28BYM), DRI:37, Min:74, Max:76      PULSE OXIMETRY RANGE: SpO2  Av %  Min: 95 %  Max: 95 %    I/O last 3 completed shifts:  In: -   Out: 1000 [Urine:1000]    CBC:  Recent Labs     20  0519   WBC 5.3   HGB 10.9*   HCT 33.9*   PLT See Reflexed IPF Result        BMP:  Recent Labs     20  0519 20  0502   BUN 6 6   CREATININE 0.48* 0.53*        Coags:  No results for input(s): APTT, PROT, INR in the last 72 hours. Lab Results   Component Value Date    SEDRATE 20 (H) 2020     No results for input(s): CRP in the last 72 hours. Physical Exam:  Vascular: DP and PT pulses are palpable . CFT <3 seconds to all digits. Hair growth is absent to the level of the digits. Moderate edema.       Neuro: Saph/sural/SP/DP/plantar sensation intact to light touch.     Musculoskeletal: Muscle strength is 5/5, adequate ROM, adequate strength to all lower extremity muscle groups. Gross deformity is absent. No pain with compression of calf. Tenderness with palpation of first MPJ complex, particularly plantar aspect. No joint crepitus noted first MPJ. Pain with palpation of hallux particularly of the medial aspect.     Dermatologic: No open wound. No purulent drainage appreciated. Circumferential  erythema improving around the first MPJ complex with no increase in warmth.   No fluctuance, no induration.         Clinical Images:              Imaging:   MRI FOOT RIGHT W WO CONTRAST   Final Result   1. Small amount of fluid and postcontrast enhancement in association with the   flexor tendon to the 1st digit at the level of the forefoot which could   represent infectious tenosynovitis. No osseous destruction evident. 2. Severe intertarsal myositis. Moderate cellulitis at the dorsum of the   foot. No discrete organized fluid collection. 3. No MR evidence that meets the criteria for osteomyelitis at this point. 4. Susceptibility artifact from postsurgical changes at the distal 1st, 4th,   and 5th digits. 5. Degenerative changes as detailed above. The findings were sent to the Radiology Results [address] at 1:03   pm on 2/21/2020to be communicated to a licensed caregiver. XR FOOT RIGHT (MIN 3 VIEWS)   Final Result   Persistent soft tissue swelling. No acute osseous abnormality. XR FOOT RIGHT (MIN 3 VIEWS)   Final Result   Diffuse soft tissue swelling. Possible erosion of the 1st proximal phalanx base. Please correlate for   potential infection at this site, possibly septic arthritis. Cultures:   Culture, Anaerobic and Aerobic [[id number]] (Abnormal) Collected: 02/21/20 1700   Order Status: Completed Specimen: Synovial Fluid Updated: 02/22/20 2132    Specimen Description . SYNOVIAL FLUID    Special Requests NOT REPORTED    Direct Exam FEW NEUTROPHILSAbnormal      NO BACTERIA SEEN    Culture NO GROWTH 22 HOURS   Culture, Body Fluid [[id number]] Collected: 02/21/20 1649   Order Status: Completed Specimen: Body Fluid from Synovial Fluid Updated: 02/21/20 1700    Specimen Description . SYNOVIAL FLUID    Special Requests NOT REPORTED    Direct Exam DUE TO THE SPECIMEN TYPE, THE ORDER WAS CANCELED AND REORDERED. PLEASE REFER TO: AEROBIC ANAEROBIC CULTURE    Culture PENDING   Raven Castellano [[id number]] Collected: 02/21/20 1649   Order Status: Completed Specimen: Synovial Fluid Updated: 02/21/20 1657    Specimen Description . SYNOVIAL FLUID Special Requests NOT REPORTED    Direct Exam DUPLICATE ORDER   CULTURE BLOOD #1 [[id number]]    Order Status: Sent Specimen: Blood    Culture blood #2 [[id number]]    Order Status: Sent Specimen: Blood            Assessment   Gray Malone is a 63 y.o. male with   1. Possible gout attack of first MPJ, right foot  2. Cellulitis, right foot  3. Myositis, right foot  4. Alcohol induced peripheral neuropathy     Principal Problem:    Septic arthritis of IP joint of toe, right (HCC)  Active Problems:    Essential hypertension    Unspecified viral hepatitis C without hepatic coma    Cirrhosis of liver without ascites (HCC)    Alcoholic cirrhosis of liver without ascites (HCC)    Septic arthritis of right foot ([address]    Foot infection  Resolved Problems:    * No resolved hospital problems. *       Plan   · Patient examined and evaluated at bedside   · Treatment options discussed in detail with the patient  · Radiographs reviewed and discussed in detail with patient-- possible septic arthritis, no new osseous erosions   · MRI R foot-- no signs of osteomyelitis,abscess, septic joint. Findings consistent with moderate cellulitis and myositis in the right foot.   · IV abx: Vanc/Zosyn  · ID following will wait for cultures  · Pain management per primary  · Colchicine started, symptoms have improved after 2 doses  · Persistent pain to right first metatarsophalangeal joint  · Patient is okay for discharge from podiatry standpoint  · Follow up with Dr. Emanuel Cronin within 1 week  · Dressing applied to Right foot: betadine, DSD, ACE  · WBAT to Right lower extremity with use of surgical shoe  · Crutches ordered    Charlene Mercado DPM   Podiatric Medicine & Surgery   2/23/2020 at 9:19 AM

## 2020-02-23 NOTE — PROGRESS NOTES
acute osseous abnormality. MRI Rt Foot   Date: 02/21/2020  1. Small amount of fluid and postcontrast enhancement in association with the   flexor tendon to the 1st digit at the level of the forefoot which could   represent infectious tenosynovitis.  No osseous destruction evident. 2. Severe intertarsal myositis.  Moderate cellulitis at the dorsum of the   foot.  No discrete organized fluid collection. 3. No MR evidence that meets the criteria for osteomyelitis at this point. 4. Susceptibility artifact from postsurgical changes at the distal 1st, 4th,   and 5th digits. 5. Degenerative changes as detailed above. The findings were sent to the Radiology Results [address] at 1:03   pm on 2/21/2020to be communicated to a licensed caregiver. No new labs    BUN:6->6  Cr:0.48->0.53    WBC:5.3  Hb:10.9  Plat:     Cultures:  Urine:  ·   Blood:  ·   Sputum :  ·   Wound:  ·   Synovial Fluid:  - 02-21: Few Neutrophils, No bacteria seen to date, no growth to date. Discussed with patient, RN. I have personally reviewed the past medical history, past surgical history, medications, social history, and family history, and I have updated the database accordingly.   Past Medical History:     Past Medical History:   Diagnosis Date    Bleeding ulcer 2019    Chronic back pain 2015    Gastric ulcer 10/31/2015    large 2-3 cm    Hematuria 2017    2019-RESOLVED NOW    Hep C w/o coma, chronic ([address] 2017    WAITING APPROVAL FROM INSURANCE COMPANY TO GET TREATMENT STARTED    History of blood transfusion 2019    ANEMIA R/T BLEEDING ULCERS    History of ETOH abuse     QUIT 01/2017    Hypertension 2017    ON RX    Neuropathy 2009    FEET BILAT, SHAKEY HANDS    Portal hypertensive gastropathy ([address]     Seizures ([address]     only one in 2016    Wears glasses        Past Surgical  History:     Past Surgical History:   Procedure Laterality Date    BACK SURGERY  2000    lumbar discectomy    COLONOSCOPY musculature  consistent with severe diffuse myositis.  Moderate soft tissue swelling and  edema at the dorsal aspect of the foot.  No discrete organized fluid  collection identified. TENDONS: Small amount of fluid and associated postcontrast enhancement in the  flexor tendon to the 1st digit within the forefoot.  This could be related to  an infectious tenosynovitis.     Impression:       1. Small amount of fluid and postcontrast enhancement in association with the  flexor tendon to the 1st digit at the level of the forefoot which could  represent infectious tenosynovitis.  No osseous destruction evident. 2. Severe intertarsal myositis.  Moderate cellulitis at the dorsum of the  foot.  No discrete organized fluid collection. 3. No MR evidence that meets the criteria for osteomyelitis at this point. 4. Susceptibility artifact from postsurgical changes at the distal 1st, 4th,  and 5th digits. 5. Degenerative changes as detailed above.   The findings were sent to the Radiology Results [address] at 1:03  pm on 2/21/2020to be communicated to a licensed caregiver.     XR FOOT RIGHT (MIN 3 VIEWS) [[id number]] Collected: 02/20/20 1351     Order Status: Completed Updated: 02/20/20 1404     Narrative:       EXAMINATION:  THREE XRAY VIEWS OF THE RIGHT FOOT    2/20/2020 1:49 pm    COMPARISON:  02/18/2020, 02/11/2020    HISTORY:  ORDERING SYSTEM PROVIDED HISTORY: persist swelling  TECHNOLOGIST PROVIDED HISTORY:  persist swelling  Acuity: Unknown  Type of Exam: Initial    FINDINGS:  Persistent diffuse soft tissue swelling in the forefoot.  No acute fracture  or stress fracture.  Joint spaces and alignment are maintained.  No discrete  erosions are bony destruction at the 1st MTP joint.     Impression:       Persistent soft tissue swelling.  No acute osseous abnormality.     MRI FOOT RIGHT W CONTRAST [[id number]]      Order Status: Canceled      XR FOOT RIGHT (MIN 3 VIEWS) [[id number]] Collected: 02/18/20 134     Order Status: Completed Updated: 02/18/20 1354     Narrative:       EXAMINATION:  THREE XRAY VIEWS OF THE RIGHT FOOT    2/18/2020 1:44 pm    COMPARISON:  None. HISTORY:  ORDERING SYSTEM PROVIDED HISTORY: cellulitis  TECHNOLOGIST PROVIDED HISTORY:  cellulitis  Reason for Exam: stepped wrong on block of ice    FINDINGS:  1st MTP joint degenerative change.  Possible erosion of the medial base of  the 1st proximal phalanx.  Soft tissue swelling diffusely.     Impression:       Diffuse soft tissue swelling. Possible erosion of the 1st proximal phalanx base.  Please correlate for  potential infection at this site, possibly septic arthritis. Medical Decision Snaiyr-[id number]-Xesbh:   2/22/2020  9:32 PM - Alex, Mary Incoming Lab Results From [facility]     Specimen Information: Synovial Fluid        Component Collected Lab   Specimen Description 02/21/2020  5:00  [address]   . SYNOVIAL FLUID    Special Requests 02/21/2020  5:00  [address]   NOT REPORTED    Direct Exam Abnormal  02/21/2020  5:00  [address]    Direct Exam 02/21/2020  5:00  [address]   NO BACTERIA SEEN    Culture 02/21/2020  5:00  [address]   NO GROWTH 22 HOURS        Medical Decision Making-Other:     Note:  · Labs, medications, radiologic studies were reviewed with personal review of films  · Moderate Large amounts of data were reviewed  · Discussed with nursing Staff, Discharge planner  · Infection Control and Prevention measures reviewed  · All prior entries were reviewed  · Administer medications as ordered  · Prognosis: Good  · Discharge planning reviewed  · Follow up as outpatient. Thank you for allowing us to participate in the care of this patient. Please call with questions. Dottie Clayton     ATTESTATION:    I have discussed the case, including pertinent history and exam findings with the residents and students.  I have seen and examined the patient and the key elements of the encounter have been performed by me. I was present when the student obtained his information or examined the patient. I have reviewed the laboratory data, other diagnostic studies and discussed them with the residents. I have updated the medical record where necessary. I agree with the assessment, plan and orders as documented by the resident/ student.     Rajesh Powers MD.    Pager: [phone] - Office: [phone]

## 2020-02-24 LAB
CASE NUMBER:: NORMAL
INTERVENTION: NORMAL
SPECIMEN DESCRIPTION: NORMAL

## 2020-02-24 RX ORDER — GABAPENTIN 600 MG/1
TABLET ORAL
Qty: 90 TABLET | Refills: 0 | Status: SHIPPED | OUTPATIENT
Start: 2020-02-24 | End: 2020-03-24

## 2020-02-24 NOTE — TELEPHONE ENCOUNTER
Gastrointestinal hemorrhage     Iron deficiency anemia due to chronic blood loss     Diarrhea     Melena     New onset seizure (HCC)     Elevated liver enzymes     Essential hypertension     Gastric ulcer     Unspecified viral hepatitis C without hepatic coma     Portal hypertensive gastropathy (HCC)     Anemia     Cirrhosis of liver without ascites (HCC)     Duodenal ulcer     Acute blood loss anemia     Thrombocytopenia (HCC)     Alcoholic cirrhosis of liver without ascites (HCC)     Chronic midline low back pain without sciatica     Seizures (HCC)     Hypertension     Hep C w/o coma, chronic (HCC)     Chronic back pain     History of ETOH abuse     Abdominal pain     Elevated alpha fetoprotein     Olecranon bursitis of left elbow     Septic arthritis of IP joint of toe, right (HCC)     Septic arthritis of right foot ([address]     Foot infection

## 2020-02-25 ENCOUNTER — TELEPHONE (OUTPATIENT)
Dept: FAMILY MEDICINE CLINIC | Age: 60
End: 2020-02-25

## 2020-02-25 LAB — SURGICAL PATHOLOGY REPORT: NORMAL

## 2020-02-25 NOTE — TELEPHONE ENCOUNTER
[facility] Initial Follow Up Call    Outreach made within 2 business days of discharge: Yes    Patient: Abelardo Brasher Patient : 1960   MRN: [id number]  Reason for Admission: There are no discharge diagnoses documented for the most recent discharge.   Discharge Date: 20       Spoke with: jay    Discharge department/facility:  [facility]    Follow Up  Future Appointments   Date Time Provider Marino Junior   3/30/2020  8:00 AM REECE Hawthorne - CNP [facility] [place], [place]

## 2020-02-26 LAB
CULTURE: ABNORMAL
DIRECT EXAM: ABNORMAL
DIRECT EXAM: ABNORMAL
Lab: ABNORMAL
SPECIMEN DESCRIPTION: ABNORMAL

## 2020-02-28 ENCOUNTER — HOSPITAL ENCOUNTER (INPATIENT)
Age: 60
LOS: 3 days | Discharge: HOME | DRG: 344 | End: 2020-03-02
Attending: INTERNAL MEDICINE | Admitting: INTERNAL MEDICINE
Payer: MEDICARE

## 2020-02-28 ENCOUNTER — APPOINTMENT (OUTPATIENT)
Dept: GENERAL RADIOLOGY | Age: 60
DRG: 344 | End: 2020-02-28
Attending: INTERNAL MEDICINE
Payer: MEDICARE

## 2020-02-28 ENCOUNTER — OFFICE VISIT (OUTPATIENT)
Dept: INFECTIOUS DISEASES | Age: 60
End: 2020-02-28
Payer: MEDICARE

## 2020-02-28 VITALS
BODY MASS INDEX: 32.21 KG/M2 | OXYGEN SATURATION: 96 % | WEIGHT: 225 LBS | HEIGHT: 70 IN | HEART RATE: 68 BPM | SYSTOLIC BLOOD PRESSURE: 111 MMHG | RESPIRATION RATE: 20 BRPM | TEMPERATURE: 97.6 F | DIASTOLIC BLOOD PRESSURE: 69 MMHG

## 2020-02-28 PROBLEM — M00.9 SEPTIC ARTHRITIS (HCC): Status: ACTIVE | Noted: 2020-02-28

## 2020-02-28 LAB
ABSOLUTE EOS #: 0.19 K/UL (ref 0–0.44)
ABSOLUTE IMMATURE GRANULOCYTE: <0.03 K/UL (ref 0–0.3)
ABSOLUTE LYMPH #: 1.26 K/UL (ref 1.1–3.7)
ABSOLUTE MONO #: 0.41 K/UL (ref 0.1–1.2)
ANION GAP SERPL CALCULATED.3IONS-SCNC: 10 MMOL/L (ref 9–17)
BASOPHILS # BLD: 2 % (ref 0–2)
BASOPHILS ABSOLUTE: 0.09 K/UL (ref 0–0.2)
BUN BLDV-MCNC: 14 MG/DL (ref 6–20)
BUN/CREAT BLD: ABNORMAL (ref 9–20)
C-REACTIVE PROTEIN: 8.3 MG/L (ref 0–5)
CALCIUM SERPL-MCNC: 8.2 MG/DL (ref 8.6–10.4)
CHLORIDE BLD-SCNC: 105 MMOL/L (ref 98–107)
CO2: 21 MMOL/L (ref 20–31)
CREAT SERPL-MCNC: 0.6 MG/DL (ref 0.7–1.2)
DIFFERENTIAL TYPE: ABNORMAL
EOSINOPHILS RELATIVE PERCENT: 4 % (ref 1–4)
GFR AFRICAN AMERICAN: >60 ML/MIN
GFR NON-AFRICAN AMERICAN: >60 ML/MIN
GFR SERPL CREATININE-BSD FRML MDRD: ABNORMAL ML/MIN/{1.73_M2}
GFR SERPL CREATININE-BSD FRML MDRD: ABNORMAL ML/MIN/{1.73_M2}
GLUCOSE BLD-MCNC: 108 MG/DL (ref 70–99)
HCT VFR BLD CALC: 35.4 % (ref 40.7–50.3)
HEMOGLOBIN: 11.7 G/DL (ref 13–17)
IMMATURE GRANULOCYTES: 0 %
LYMPHOCYTES # BLD: 25 % (ref 24–43)
MCH RBC QN AUTO: 33.9 PG (ref 25.2–33.5)
MCHC RBC AUTO-ENTMCNC: 33.1 G/DL (ref 28.4–34.8)
MCV RBC AUTO: 102.6 FL (ref 82.6–102.9)
MONOCYTES # BLD: 8 % (ref 3–12)
NRBC AUTOMATED: 0 PER 100 WBC
PDW BLD-RTO: 16.6 % (ref 11.8–14.4)
PLATELET # BLD: ABNORMAL K/UL (ref 138–453)
PLATELET ESTIMATE: ABNORMAL
PLATELET, FLUORESCENCE: 66 K/UL (ref 138–453)
PLATELET, IMMATURE FRACTION: 5 % (ref 1.1–10.3)
PMV BLD AUTO: ABNORMAL FL (ref 8.1–13.5)
POTASSIUM SERPL-SCNC: 3.9 MMOL/L (ref 3.7–5.3)
PROCALCITONIN: 0.1 NG/ML
RBC # BLD: 3.45 M/UL (ref 4.21–5.77)
RBC # BLD: ABNORMAL 10*6/UL
SEG NEUTROPHILS: 61 % (ref 36–65)
SEGMENTED NEUTROPHILS ABSOLUTE COUNT: 3.06 K/UL (ref 1.5–8.1)
SODIUM BLD-SCNC: 136 MMOL/L (ref 135–144)
WBC # BLD: 5 K/UL (ref 3.5–11.3)
WBC # BLD: ABNORMAL 10*3/UL

## 2020-02-28 PROCEDURE — 2580000003 HC RX 258: Performed by: PHYSICIAN ASSISTANT

## 2020-02-28 PROCEDURE — 84145 PROCALCITONIN (PCT): CPT

## 2020-02-28 PROCEDURE — 85055 RETICULATED PLATELET ASSAY: CPT

## 2020-02-28 PROCEDURE — 86140 C-REACTIVE PROTEIN: CPT

## 2020-02-28 PROCEDURE — 73630 X-RAY EXAM OF FOOT: CPT

## 2020-02-28 PROCEDURE — 80048 BASIC METABOLIC PNL TOTAL CA: CPT

## 2020-02-28 PROCEDURE — 85651 RBC SED RATE NONAUTOMATED: CPT

## 2020-02-28 PROCEDURE — 6370000000 HC RX 637 (ALT 250 FOR IP): Performed by: NURSE PRACTITIONER

## 2020-02-28 PROCEDURE — G8482 FLU IMMUNIZE ORDER/ADMIN: HCPCS | Performed by: INTERNAL MEDICINE

## 2020-02-28 PROCEDURE — G8417 CALC BMI ABV UP PARAM F/U: HCPCS | Performed by: INTERNAL MEDICINE

## 2020-02-28 PROCEDURE — 1036F TOBACCO NON-USER: CPT | Performed by: INTERNAL MEDICINE

## 2020-02-28 PROCEDURE — 1200000000 HC SEMI PRIVATE

## 2020-02-28 PROCEDURE — G8427 DOCREV CUR MEDS BY ELIG CLIN: HCPCS | Performed by: INTERNAL MEDICINE

## 2020-02-28 PROCEDURE — 6370000000 HC RX 637 (ALT 250 FOR IP): Performed by: PHYSICIAN ASSISTANT

## 2020-02-28 PROCEDURE — 85025 COMPLETE CBC W/AUTO DIFF WBC: CPT

## 2020-02-28 PROCEDURE — 99223 1ST HOSP IP/OBS HIGH 75: CPT | Performed by: INTERNAL MEDICINE

## 2020-02-28 PROCEDURE — 1111F DSCHRG MED/CURRENT MED MERGE: CPT | Performed by: INTERNAL MEDICINE

## 2020-02-28 PROCEDURE — 99214 OFFICE O/P EST MOD 30 MIN: CPT | Performed by: INTERNAL MEDICINE

## 2020-02-28 PROCEDURE — 6360000002 HC RX W HCPCS: Performed by: PHYSICIAN ASSISTANT

## 2020-02-28 PROCEDURE — 36415 COLL VENOUS BLD VENIPUNCTURE: CPT

## 2020-02-28 PROCEDURE — 3017F COLORECTAL CA SCREEN DOC REV: CPT | Performed by: INTERNAL MEDICINE

## 2020-02-28 RX ORDER — ROPINIROLE 0.25 MG/1
0.5 TABLET, FILM COATED ORAL NIGHTLY
Status: DISCONTINUED | OUTPATIENT
Start: 2020-02-28 | End: 2020-03-02 | Stop reason: HOSPADM

## 2020-02-28 RX ORDER — ACETAMINOPHEN 650 MG/1
650 SUPPOSITORY RECTAL EVERY 6 HOURS PRN
Status: DISCONTINUED | OUTPATIENT
Start: 2020-02-28 | End: 2020-03-02 | Stop reason: HOSPADM

## 2020-02-28 RX ORDER — HYDROCODONE BITARTRATE AND ACETAMINOPHEN 5; 325 MG/1; MG/1
1 TABLET ORAL EVERY 4 HOURS PRN
Status: DISCONTINUED | OUTPATIENT
Start: 2020-02-28 | End: 2020-03-02 | Stop reason: HOSPADM

## 2020-02-28 RX ORDER — COLCHICINE 0.6 MG/1
1.2 TABLET ORAL ONCE
Status: COMPLETED | OUTPATIENT
Start: 2020-02-28 | End: 2020-02-28

## 2020-02-28 RX ORDER — POTASSIUM CHLORIDE 7.45 MG/ML
10 INJECTION INTRAVENOUS PRN
Status: DISCONTINUED | OUTPATIENT
Start: 2020-02-28 | End: 2020-03-02 | Stop reason: HOSPADM

## 2020-02-28 RX ORDER — MORPHINE SULFATE 4 MG/ML
4 INJECTION, SOLUTION INTRAMUSCULAR; INTRAVENOUS
Status: DISCONTINUED | OUTPATIENT
Start: 2020-02-28 | End: 2020-02-28

## 2020-02-28 RX ORDER — COLCHICINE 0.6 MG/1
0.6 TABLET ORAL ONCE
Status: COMPLETED | OUTPATIENT
Start: 2020-02-28 | End: 2020-02-28

## 2020-02-28 RX ORDER — LISINOPRIL 20 MG/1
20 TABLET ORAL DAILY
Status: DISCONTINUED | OUTPATIENT
Start: 2020-02-28 | End: 2020-03-02 | Stop reason: HOSPADM

## 2020-02-28 RX ORDER — SODIUM CHLORIDE 0.9 % (FLUSH) 0.9 %
10 SYRINGE (ML) INJECTION EVERY 12 HOURS SCHEDULED
Status: DISCONTINUED | OUTPATIENT
Start: 2020-02-28 | End: 2020-03-02 | Stop reason: HOSPADM

## 2020-02-28 RX ORDER — ACETAMINOPHEN 325 MG/1
650 TABLET ORAL EVERY 6 HOURS PRN
Status: DISCONTINUED | OUTPATIENT
Start: 2020-02-28 | End: 2020-03-02 | Stop reason: HOSPADM

## 2020-02-28 RX ORDER — HYDROCODONE BITARTRATE AND ACETAMINOPHEN 5; 325 MG/1; MG/1
2 TABLET ORAL EVERY 4 HOURS PRN
Status: DISCONTINUED | OUTPATIENT
Start: 2020-02-28 | End: 2020-03-02 | Stop reason: HOSPADM

## 2020-02-28 RX ORDER — NICOTINE 21 MG/24HR
1 PATCH, TRANSDERMAL 24 HOURS TRANSDERMAL DAILY PRN
Status: DISCONTINUED | OUTPATIENT
Start: 2020-02-28 | End: 2020-03-02 | Stop reason: HOSPADM

## 2020-02-28 RX ORDER — MAGNESIUM SULFATE 1 G/100ML
1 INJECTION INTRAVENOUS PRN
Status: DISCONTINUED | OUTPATIENT
Start: 2020-02-28 | End: 2020-03-02 | Stop reason: HOSPADM

## 2020-02-28 RX ORDER — ASPIRIN 81 MG/1
81 TABLET, CHEWABLE ORAL DAILY
Status: DISCONTINUED | OUTPATIENT
Start: 2020-02-28 | End: 2020-03-02 | Stop reason: HOSPADM

## 2020-02-28 RX ORDER — POTASSIUM CHLORIDE 20 MEQ/1
40 TABLET, EXTENDED RELEASE ORAL PRN
Status: DISCONTINUED | OUTPATIENT
Start: 2020-02-28 | End: 2020-03-02 | Stop reason: HOSPADM

## 2020-02-28 RX ORDER — PRIMIDONE 50 MG/1
100 TABLET ORAL NIGHTLY
Status: DISCONTINUED | OUTPATIENT
Start: 2020-02-28 | End: 2020-03-02 | Stop reason: HOSPADM

## 2020-02-28 RX ORDER — SODIUM CHLORIDE 0.9 % (FLUSH) 0.9 %
10 SYRINGE (ML) INJECTION PRN
Status: DISCONTINUED | OUTPATIENT
Start: 2020-02-28 | End: 2020-03-02 | Stop reason: HOSPADM

## 2020-02-28 RX ORDER — POLYETHYLENE GLYCOL 3350 17 G/17G
17 POWDER, FOR SOLUTION ORAL DAILY PRN
Status: DISCONTINUED | OUTPATIENT
Start: 2020-02-28 | End: 2020-03-02 | Stop reason: HOSPADM

## 2020-02-28 RX ORDER — GABAPENTIN 600 MG/1
600 TABLET ORAL 3 TIMES DAILY
Status: DISCONTINUED | OUTPATIENT
Start: 2020-02-28 | End: 2020-03-02 | Stop reason: HOSPADM

## 2020-02-28 RX ORDER — PROMETHAZINE HYDROCHLORIDE 25 MG/1
12.5 TABLET ORAL EVERY 6 HOURS PRN
Status: DISCONTINUED | OUTPATIENT
Start: 2020-02-28 | End: 2020-03-02 | Stop reason: HOSPADM

## 2020-02-28 RX ORDER — MORPHINE SULFATE 2 MG/ML
2 INJECTION, SOLUTION INTRAMUSCULAR; INTRAVENOUS
Status: DISCONTINUED | OUTPATIENT
Start: 2020-02-28 | End: 2020-02-28

## 2020-02-28 RX ORDER — ONDANSETRON 2 MG/ML
4 INJECTION INTRAMUSCULAR; INTRAVENOUS EVERY 6 HOURS PRN
Status: DISCONTINUED | OUTPATIENT
Start: 2020-02-28 | End: 2020-03-02 | Stop reason: HOSPADM

## 2020-02-28 RX ORDER — LANOLIN ALCOHOL/MO/W.PET/CERES
325 CREAM (GRAM) TOPICAL
Status: DISCONTINUED | OUTPATIENT
Start: 2020-02-29 | End: 2020-03-02 | Stop reason: HOSPADM

## 2020-02-28 RX ADMIN — ROPINIROLE HYDROCHLORIDE 0.5 MG: 0.25 TABLET, FILM COATED ORAL at 23:25

## 2020-02-28 RX ADMIN — COLCHICINE 0.6 MG: 0.6 TABLET, FILM COATED ORAL at 16:14

## 2020-02-28 RX ADMIN — Medication 10 ML: at 23:26

## 2020-02-28 RX ADMIN — ENOXAPARIN SODIUM 40 MG: 40 INJECTION SUBCUTANEOUS at 14:57

## 2020-02-28 RX ADMIN — GABAPENTIN 600 MG: 600 TABLET ORAL at 14:57

## 2020-02-28 RX ADMIN — CEFEPIME HYDROCHLORIDE 2 G: 2 INJECTION, POWDER, FOR SOLUTION INTRAVENOUS at 14:57

## 2020-02-28 RX ADMIN — HYDROCODONE BITARTRATE AND ACETAMINOPHEN 2 TABLET: 5; 325 TABLET ORAL at 21:32

## 2020-02-28 RX ADMIN — GABAPENTIN 600 MG: 600 TABLET ORAL at 23:25

## 2020-02-28 RX ADMIN — COLCHICINE 1.2 MG: 0.6 TABLET, FILM COATED ORAL at 14:57

## 2020-02-28 RX ADMIN — PRIMIDONE 100 MG: 50 TABLET ORAL at 23:25

## 2020-02-28 ASSESSMENT — ENCOUNTER SYMPTOMS
NAUSEA: 0
SHORTNESS OF BREATH: 0
ABDOMINAL PAIN: 0
VOMITING: 0
COLOR CHANGE: 1

## 2020-02-28 ASSESSMENT — PAIN DESCRIPTION - ONSET
ONSET: ON-GOING
ONSET: ON-GOING

## 2020-02-28 ASSESSMENT — PAIN DESCRIPTION - ORIENTATION
ORIENTATION: RIGHT
ORIENTATION: RIGHT

## 2020-02-28 ASSESSMENT — PAIN DESCRIPTION - DESCRIPTORS
DESCRIPTORS: ACHING;THROBBING
DESCRIPTORS: ACHING;PRESSURE

## 2020-02-28 ASSESSMENT — PAIN SCALES - GENERAL
PAINLEVEL_OUTOF10: 8
PAINLEVEL_OUTOF10: 3
PAINLEVEL_OUTOF10: 8
PAINLEVEL_OUTOF10: 7
PAINLEVEL_OUTOF10: 7

## 2020-02-28 ASSESSMENT — PAIN DESCRIPTION - FREQUENCY
FREQUENCY: CONTINUOUS
FREQUENCY: CONTINUOUS

## 2020-02-28 ASSESSMENT — PAIN DESCRIPTION - LOCATION
LOCATION: FOOT
LOCATION: FOOT

## 2020-02-28 ASSESSMENT — PAIN DESCRIPTION - PAIN TYPE
TYPE: ACUTE PAIN
TYPE: ACUTE PAIN

## 2020-02-28 ASSESSMENT — PAIN DESCRIPTION - PROGRESSION: CLINICAL_PROGRESSION: NOT CHANGED

## 2020-02-28 NOTE — CONSULTS
[facility] - Initial Consultation Note  Today's Date and Time: 2/28/2020, 4:26 PM    Diagnostic Impression :        1. Foot infection    2. Chronic hepatitis C without hepatic coma ([address]    3. Septic arthritis of IP joint of toe, right ([address]    4. Alcoholic cirrhosis of liver without ascites ([facility]    5. 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 Patient seen at the request of Yancy Maher and Eduardo       INITIAL HISTORY:     Pt with PMH of chronic back pain, gastric ulcer, hematuria, Hep C, ETOH abuse, HTN, ETOH induced neuropathy, portal hypertensive gastropathy and seizures.      Pt first presented on 02- with right foot pain and swelling. He was walking in the snow when he tripped and everted his foot and since then has not been able to ambulate. Tylenol did not improve his pain but ice and Ace wrap helped slightly. Pt denies history of gout, bleeding disorders, or previous surgeries and trauma to the foot. X-rays were negative and pt was discharged same day with Tylenol and Lidocaine patch.     Pt then returned to the ED on 02- with swelling and increased pain to the right foot associated with a rash to the great toe. The rash spread to the other digits of the foot. He had no fevers, chills and Tylenol did not help with the pain. Upon examination, he had significant erythema with significant swelling over the right great toe.  There was an area that appeared to be an abscess over the dorsum of the right great toe.  X-ray was together: Not on file     Attends [employer] service: Not on file     Active member of club or organization: Not on file     Attends meetings of clubs or organizations: Not on file     Relationship status: Not on file    Intimate partner violence:     Fear of current or ex partner: Not on file     Emotionally abused: Not on file     Physically abused: Not on file     Forced sexual activity: Not on file   Other Topics Concern    Not on file   Social History Narrative    Not on file       Family History:     Family History   Problem Relation Age of Onset    High Blood Pressure Mother     Other Father         neuropathy    Stroke Father     Prostate Cancer Father         Allergies:   Sulfa antibiotics     Review of Systems:   Constitutional: No fevers or chills. No systemic complaints  Head: No headaches  Eyes: No double vision or blurry vision. ENT: No sore throat or runny nose. . No hearing loss, tinnitus or vertigo. Cardiovascular: No chest pain or palpitations. No shortness of breath. No BE  Lung: No shortness of breath or cough. No sputum production  Abdomen: No nausea, vomiting, diarrhea, or abdominal pain. .  Genitourinary: No increased urinary frequency, or dysuria. No hematuria. No suprapubic or CVA pain  Musculoskeletal: No muscle aches or pains. No joint effusions, swelling or deformities. Has developed worsening of Rt toe edema and new plantar erythema, pain  Hematologic: No bleeding or bruising. Neurologic: No headache, weakness, numbness, or tingling. Physical Examination :   /79   Pulse 54   Temp 98.1 °F (36.7 °C) (Oral)   Resp 21   Ht 5' 10\" (1.778 m)   Wt 225 lb (102.1 kg)   SpO2 99%   BMI 32.28 kg/m²    General Appearance: Awake, alert, and in no apparent distress  Head:  Normocephalic, no trauma  Eyes: Pupils equal, round, reactive, to light and accommodation; extraocular movements intact; sclera anicteric; conjunctivae pink. No embolic phenomena.   ENT: Oropharynx clear,

## 2020-02-28 NOTE — H&P
[facility]    HISTORY AND PHYSICAL EXAMINATION            Date:   2/28/2020  Patient name:  Loretta Dennison  Date of admission:  2/28/2020  1:23 PM  MRN:   [id number]  Account:  [de-identified]  YOB: 1960  PCP:    Yale Lefort, APRN - CNP  Room:   9156/8583-98  Code Status:    Full Code    Chief Complaint:     Referral for admission from the ID consultants office    History Obtained From:     Patient, electronic medical record    History of Present Illness:     Loretta Dennison is a 63 y.o. Non-/non  male who presents with No chief complaint on file. and is admitted to the hospital for the management of <principal problem not specified>. 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 He was therefore referred for admission for further work-up and management. Patient has history of hypertension, dyslipidemia, chronic hepatitis C infection with resultant cirrhosis and portal hypertension and remote GI bleed. He denied any fever or chills, nausea or vomiting.     Past Medical History:     Past Medical History:   Diagnosis Date    Bleeding ulcer 2019    Chronic back pain 2015    Gastric ulcer 10/31/2015    large 2-3 cm    Hematuria 2017    2019-RESOLVED NOW    Hep C w/o coma, chronic ([address] 2017    WAITING APPROVAL FROM INSURANCE COMPANY TO GET TREATMENT STARTED    History of blood transfusion 2019    ANEMIA R/T BLEEDING ULCERS    History of ETOH abuse     QUIT 01/2017    Hypertension 2017    ON RX    Neuropathy 2009    FEET BILAT, SHAKEY HANDS    Portal hypertensive gastropathy ([address]     Seizures ([address]     only one in 2016    Wears glasses         Past Surgical History:     Past Surgical History:   Procedure Laterality Date    BACK SURGERY  2000    lumbar discectomy    COLONOSCOPY  03/14/2018    mod diverticulosis; internal hemorrhoids; retained stools    ELBOW DEBRIDEMENT Left 10/16/2019     ELBOW INCISION AND DRAINAGE    HERNIA REPAIR Left 1975    lt inguinal    INCISION AND DRAINAGE Left 10/16/2019    ELBOW INCISION AND DRAINAGE, PARTIALTRICEP REPAIR performed by Luiz Robbins DO at [address] [facility] N/A 3/14/2018    COLONOSCOPY performed by Lorrie Mcdonough MD at [address] [place] [address]  10/31/2015    large gastric ulcer    UPPER GASTROINTESTINAL ENDOSCOPY  03/14/2018    mod portal hypertensive gastrophathy    UPPER GASTROINTESTINAL ENDOSCOPY N/A 3/14/2018    EGD BIOPSY performed by Lorrie Mcdonough MD at [address] N/A 1/22/2019    EGD BIOPSY performed by Fadia Carlson MD at [facility] 2/1/2019    EGD BIOPSY performed by Eduin Holloway MD at [address] N/A 8/1/2019    EGD ESOPHAGOGASTRODUODENOSCOPY performed by Hannah Gordon MD at [address] [place] [address]  2007        Medications Prior to Admission:     Prior to Admission medications    Medication Sig Start Date End Date Taking?  Authorizing Provider   gabapentin (NEURONTIN) 600 MG tablet TAKE ONE TABLET BY MOUTH THREE TIMES A DAY 2/24/20 4/24/20  REECE Thorpe CNP   amoxicillin-clavulanate (AUGMENTIN) 875-125 MG per tablet Take 1 tablet by mouth every 12 hours for 10 days 2/23/20 3/4/20  Mitchell Arellano MD   colchicine (COLCRYS) 0.6 MG tablet Take 1 tablet by mouth 2 times daily for 7 days  Patient not taking: Reported on 2/28/2020 2/23/20 3/1/20  Lauren Casarez MD   ferrous sulfate 325 (65 Fe) MG tablet TAKE ONE TABLET BY MOUTH DAILY WITH BREAKFAST 2/10/20   REECE Thorpe CNP   rOPINIRole (REQUIP) 0.5 MG tablet TAKE ONE TABLET BY MOUTH ONCE NIGHTLY 2/10/20   REECE Thorpe CNP   primidone (MYSOLINE) extremities  BEHAVIOR/PSYCH:  negative for depression, anxiety    Physical Exam:   /79   Pulse 54   Temp 98.1 °F (36.7 °C) (Oral)   Resp 21   Ht 5' 10\" (1.778 m)   Wt 225 lb (102.1 kg)   SpO2 99%   BMI 32.28 kg/m²   Temp (24hrs), Av.9 °F (36.6 °C), Min:97.6 °F (36.4 °C), Max:98.1 °F (36.7 °C)    No results for input(s): POCGLU in the last 72 hours. No intake or output data in the 24 hours ending 20 1843    General Appearance: alert, well appearing, and in no acute distress  Mental status: oriented to person, place, and time  Head: normocephalic, atraumatic  Eye: no icterus, redness, pupils equal and reactive, extraocular eye movements intact, conjunctiva clear  Ear: normal external ear, no discharge, hearing intact  Nose: no drainage noted  Mouth: mucous membranes moist  Neck: supple, no carotid bruits, thyroid not palpable  Lungs: Bilateral equal air entry, clear to ausculation, no wheezing, rales or rhonchi, normal effort  Cardiovascular: normal rate, regular rhythm, no murmur, gallop, rub  Abdomen: Soft, nontender, nondistended, normal bowel sounds, no hepatomegaly or splenomegaly  Neurologic: There are no new focal motor or sensory deficits, normal muscle tone and bulk, no abnormal sensation, normal speech, cranial nerves II through XII grossly intact  Skin: No gross lesions, rashes, bruising or bleeding on exposed skin area. Redness involving the right foot more prominent on the right great toe  Extremities: peripheral pulses palpable, swelling of the right foot with redness spreading from the right great toe into greater half of the plantar surface with tenderness.   No calf tenderness with palpation  Psych: normal affect    Investigations:      Laboratory Testing:  Recent Results (from the past 24 hour(s))   CBC with DIFF    Collection Time: 20  3:26 PM   Result Value Ref Range    WBC 5.0 3.5 - 11.3 k/uL    RBC 3.45 (L) 4.21 - 5.77 m/uL    Hemoglobin 11.7 (L) 13.0 - 17.0 g/dL Hematocrit 35.4 (L) 40.7 - 50.3 %    .6 82.6 - 102.9 fL    MCH 33.9 (H) 25.2 - 33.5 pg    MCHC 33.1 28.4 - 34.8 g/dL    RDW 16.6 (H) 11.8 - 14.4 %    Platelets See Reflexed IPF Result 138 - 453 k/uL    MPV NOT REPORTED 8.1 - 13.5 fL    NRBC Automated 0.0 0.0 per 100 WBC    Differential Type NOT REPORTED     WBC Morphology NOT REPORTED     RBC Morphology ANISOCYTOSIS PRESENT     Platelet Estimate NOT REPORTED     Seg Neutrophils 61 36 - 65 %    Lymphocytes 25 24 - 43 %    Monocytes 8 3 - 12 %    Eosinophils % 4 1 - 4 %    Basophils 2 0 - 2 %    Immature Granulocytes 0 0 %    Segs Absolute 3.06 1.50 - 8.10 k/uL    Absolute Lymph # 1.26 1.10 - 3.70 k/uL    Absolute Mono # 0.41 0.10 - 1.20 k/uL    Absolute Eos # 0.19 0.00 - 0.44 k/uL    Basophils Absolute 0.09 0.00 - 0.20 k/uL    Absolute Immature Granulocyte <0.03 0.00 - 0.30 k/uL   Basic Metabolic Panel w/ Reflex to MG    Collection Time: 02/28/20  3:26 PM   Result Value Ref Range    Glucose 108 (H) 70 - 99 mg/dL    BUN 14 6 - 20 mg/dL    CREATININE 0.60 (L) 0.70 - 1.20 mg/dL    Bun/Cre Ratio NOT REPORTED 9 - 20    Calcium 8.2 (L) 8.6 - 10.4 mg/dL    Sodium 136 135 - 144 mmol/L    Potassium 3.9 3.7 - 5.3 mmol/L    Chloride 105 98 - 107 mmol/L    CO2 21 20 - 31 mmol/L    Anion Gap 10 9 - 17 mmol/L    GFR Non-African American >60 >60 mL/min    GFR African American >60 >60 mL/min    GFR Comment          GFR Staging NOT REPORTED    C-Reactive Protein    Collection Time: 02/28/20  3:26 PM   Result Value Ref Range    CRP 8.3 (H) 0.0 - 5.0 mg/L   Procalcitonin    Collection Time: 02/28/20  3:26 PM   Result Value Ref Range    Procalcitonin 0.10 (H) <0.09 ng/mL   Immature Platelet Fraction    Collection Time: 02/28/20  3:26 PM   Result Value Ref Range    Platelet, Immature Fraction 5.0 1.1 - 10.3 %    Platelet, Fluorescence 66 (L) 138 - 453 k/uL       Imaging/Diagnostics:  No results found.     Assessment :      Hospital Problems           Last Modified POA    * (Principal) Septic arthritis of right foot ([facility] 2/29/2020 Yes    Iron deficiency anemia due to chronic blood loss 2/29/2020 Yes    Essential hypertension 2/29/2020 Yes    Cirrhosis of liver without ascites ([facility] 2/29/2020 Yes    Thrombocytopenia ([facility] 2/29/2020 Yes    Hep C w/o coma, chronic ([facility] 2/29/2020 Yes          Plan:     Patient status inpatient in the Med/Surge    1. Patient has been started on empiric IV antibiotics with Zosyn and vancomycin-with pharmacy dosing per protocol; and will be followed closely by ID consult with further recommendations  2. Podiatry consult has already been placed and we are currently awaiting their input with reference to possible I&D  3. Resume home regimen and closely monitor for any bleeding. Patient has baseline thrombocytopenia as a result of the cirrhosis  4. Maintain patient on fluid and salt restriction, avoid all hepatotoxins  5. Optimize pain control, GI/DVT prophylaxis  6. A.m. labs    Consultations:   IP CONSULT TO PODIATRY  IP CONSULT TO INFECTIOUS DISEASES  [facility]    Patient is admitted as inpatient status because of co-morbidities listed above, severity of signs and symptoms as outlined, requirement for current medical therapies and most importantly because of direct risk to patient if care not provided in a hospital setting.     Fei Wasserman MD  2/28/2020  6:43 PM    Copy sent to Dr. Hoda Nesbitt, APRN - CNP

## 2020-02-28 NOTE — PROGRESS NOTES
[facility] - Initial Office Progress Note  Today's Date and Time: 2/28/2020, 11:26 AM    Diagnostic Impression :     1. Foot infection    2. Chronic hepatitis C without hepatic coma ([facility]    3. Septic arthritis of IP joint of toe, right ([address]    4. Alcoholic cirrhosis of liver without ascites ([facility]    5. Crystal arthropathy        Recommendations   · Patient will require admission to exclude plantar abscess  · Will need Podiatry evaluation and possible aspiration   · Facial rash may be secondary to Augmentin    Chief complaint/reason for consultation:     Chief Complaint   Patient presents with    Wound Infection     Rt. foot    Rash     noticed facial rash yesterday       History of Present Illness:   Heide Saint is a 63y.o.-year-old  male who was initially evaluated on 2/28/2020. Patient seen at the request of Vivi Renteria and Eduardo       INITIAL HISTORY:     Pt with PMH of chronic back pain, gastric ulcer, hematuria, Hep C, ETOH abuse, HTN, ETOH induced neuropathy, portal hypertensive gastropathy and seizures.      Pt first presented on 02- with right foot pain and swelling. He was walking in the snow when he tripped and everted his foot and since then has not been able to ambulate. Tylenol did not improve his pain but ice and Ace wrap helped slightly. Pt denies history of gout, bleeding disorders, or previous surgeries and trauma to the foot. X-rays were negative and pt was discharged same day with Tylenol and Lidocaine patch.     Pt then returned to the ED on 02- with swelling and increased pain to the right foot associated with a rash to the great toe. The rash spread to the other digits of the foot. He had no fevers, chills and Tylenol did not help with the pain.  Upon examination, he had significant erythema with significant swelling over the right great toe.  There was an area that appeared to be an abscess over the dorsum of the right great toe. X-ray was negative for subcutaneous gas but positive for possible bony erosion. Pt was started on IV Vancomycin & Zosyn.      PT had an I&D of right foot on 02- by podiatry. [facility] with use of surgical shoe     Podiatry performed Arthrocentesis of right 1st metatarsophalangeal joint performed at bedside on 02-. Obtained 1-2cc of synovial fluid with white precipitate. Crystal analysis was not done. Culture yielded no growth. Patient was discharged on 2-23-20 with colchicine and po augmentin 875 mg po BID x 10 days . He was instructed not to put any weight.     CURRENT EVALUATION: 2/28/2020    Patient called this morning indicating new onset of plantar redness, pain and more swelling.     VS stable. Hemodynamically stable. Afebrile. The Rt hallux looks more swollen on the dorsum. In the plantar area he has a new area of erythema, pain and possibly fluctuance. (Difficult to ascertain because of of degree of pain on pressure)       Labs, X rays reviewed: 2/28/2020       X-ray Rt Foot (3 views)  Date: 02/18/2020  Diffuse soft tissue swelling. Possible erosion of the 1st proximal phalanx base.  Please correlate for  potential infection at this site, possibly septic arthritis.     X-ray Rt Foot (3 views)  Date: 02/20/2020  Persistent soft tissue swelling.  No acute osseous abnormality.     MRI Rt Foot   Date: 02/21/2020  1. Small amount of fluid and postcontrast enhancement in association with the   flexor tendon to the 1st digit at the level of the forefoot which could   represent infectious tenosynovitis.  No osseous destruction evident. 2. Severe intertarsal myositis.  Moderate cellulitis at the dorsum of the   foot.  No discrete organized fluid collection. 3. No MR evidence that meets the criteria for osteomyelitis at this point. 4. Susceptibility artifact from postsurgical changes at the distal 1st, 4th,   and 5th digits.    5. Degenerative changes Sulfa antibiotics     Review of Systems:   Constitutional: No fevers or chills. No systemic complaints  Head: No headaches  Eyes: No double vision or blurry vision. ENT: No sore throat or runny nose. . No hearing loss, tinnitus or vertigo. Cardiovascular: No chest pain or palpitations. No shortness of breath. No BE  Lung: No shortness of breath or cough. No sputum production  Abdomen: No nausea, vomiting, diarrhea, or abdominal pain. .  Genitourinary: No increased urinary frequency, or dysuria. No hematuria. No suprapubic or CVA pain  Musculoskeletal: No muscle aches or pains. No joint effusions, swelling or deformities. Has developed worsening of Rt toe edema and new plantar erythema, pain  Hematologic: No bleeding or bruising. Neurologic: No headache, weakness, numbness, or tingling. Physical Examination :   /69 (Site: Right Upper Arm, Position: Sitting, Cuff Size: Large Adult)   Pulse 68   Temp 97.6 °F (36.4 °C) (Oral)   Resp 20   Ht 5' 10\" (1.778 m)   Wt 225 lb (102.1 kg)   SpO2 96% Comment: room air at rest  BMI 32.28 kg/m²    General Appearance: Awake, alert, and in no apparent distress  Head:  Normocephalic, no trauma  Eyes: Pupils equal, round, reactive, to light and accommodation; extraocular movements intact; sclera anicteric; conjunctivae pink. No embolic phenomena. ENT: Oropharynx clear, without erythema, exudate, or thrush. No tenderness of sinuses. Mouth/throat: mucosa pink and moist. No lesions. Dentition in good repair. Neck:Supple, without lymphadenopathy. Thyroid normal, No bruits. Pulmonary/Chest: Clear to auscultation, without wheezes, rales, or rhonchi. No dullness to percussion. Cardiovascular: Regular rate and rhythm without murmurs, rubs, or gallops. Abdomen: Soft, non tender. Bowel sounds normal. No organomegaly  All four Extremities: No cyanosis, clubbing, edema, or effusions. Rt plantar eythema  Neurologic: No gross sensory or motor deficits.   Skin: Warm and dry Apryl Patterson MD  Pager: [phone] - Office: [phone]

## 2020-02-28 NOTE — CARE COORDINATION
Case Management Initial Discharge Plan  Loretta Dennison,             Met with:patient to discuss discharge plans. Information verified: address, contacts, phone number, , insurance Yes  PCP: Yale Lefort, APRN - CNP  Date of last visit: Dec 2019    Insurance Provider: [employer]    Discharge Planning    Living Arrangements:  Spouse/Significant Other, Children   Support Systems:  Spouse/Significant Other, Family Members    Home has 1 stor  1 stairs to climb to get into front door, stairs to climb to reach second floor  Location of bedroom/bathroom in home     Patient able to perform ADL's:Independent    Current Services (outpatient & in home) none  DME equipment: crutches  DME provider:       Potential Assistance Needed:  N/A    Patient agreeable to home care: Yes if needed  Freedom of choice provided:  Yes if needed    Prior SNF/Rehab Placement and Facility: none  Agreeable to SNF/Rehab: No  [place] of choice provided: n/a   Evaluation: no    Expected Discharge date:  20  Patient expects to be discharged to:  home  Follow Up Appointment: Best Day/ Time: Monday AM    Transportation provider: cary Silva  Transportation arrangements needed for discharge: No    Readmission Risk              Risk of Unplanned Readmission:        17             Does patient have a readmission risk score greater than 14?: Yes  If yes, follow-up appointment must be made within 7 days of discharge.      Goals of Care: able to walk without pain      Discharge Plan: TBD currently on iv abx ID following will need PCP appt  Pt states when he left [facility] on  he got home found out he needed a prior auth on his colchicine then [facility] called and he was frustrated and told [facility] he didn't need them so VNS was never started  Pt readmitted from Dr Edgardo Esquivel office dx septic arthritis  Pt agreeable to VNS if needed and pharmacy changed to meds to beds           Electronically signed by

## 2020-02-28 NOTE — FLOWSHEET NOTE
[facility] [address]  PROGRESS NOTE    Room # 8585/6502-77   Name: Loretta Dennison            Amish: Melly Andrade; [facility] in [place], [place]     Shift date: 2/28/20  Shift day: Friday   Shift # 1    Reason for visit: [address] [place] [address] with the patient. Admit Date & Time: 2/28/2020  1:23 PM    Assessment:  Loretta Dennison is a 63 y.o. male in the hospital because of an injury to his toe that his infected. Upon entering the room, pt was laying in bed watching TV. He had requested to speak to a . He talked about coming into his yaima through [place] [address] and [employer] is how he met his wife. His family is very important to him and he is worried about his wife being alone because she has MS, but his 23 yo son is helping to take care of her. Pt is confident in his yaima and that he \"will get through this. \"      Intervention:  I introduced myself and my title as . I offered space for pt to express feelings, needs, and concerns and provided a ministry presence. I actively listened and empathized. Outcome:  Pt was grateful and receptive. Plan:  Chaplains will remain available to offer spiritual and emotional support as needed. Electronically signed by Vinicio Thibodeaux Resident, on 2/28/2020 at 2:31 PM.  Antony Rouse  [phone]       02/28/20 1430   Encounter Summary   Services provided to: Patient   Referral/Consult From: Rounding;Patient   Support System Spouse; Children   Place of [address] [place] [address] in [address] No   Continue Visiting   (2/28/20)   Complexity of Encounter Moderate   Length of Encounter 30 minutes   Spiritual Assessment Completed Yes   Routine   Type Initial   Assessment Calm; Approachable; Hopeful;Coping   Intervention Active listening;Explored feelings, thoughts, concerns;Sustaining presence/ Ministry of presence; Discussed

## 2020-02-29 ENCOUNTER — APPOINTMENT (OUTPATIENT)
Dept: MRI IMAGING | Age: 60
DRG: 344 | End: 2020-02-29
Attending: INTERNAL MEDICINE
Payer: MEDICARE

## 2020-02-29 PROBLEM — M86.071 ACUTE HEMATOGENOUS OSTEOMYELITIS OF RIGHT FOOT (HCC): Status: ACTIVE | Noted: 2020-02-29

## 2020-02-29 PROBLEM — M65.9 TENOSYNOVITIS OF RIGHT FOOT: Status: ACTIVE | Noted: 2020-02-29

## 2020-02-29 LAB
ALBUMIN SERPL-MCNC: 2.4 G/DL (ref 3.5–5.2)
ALBUMIN/GLOBULIN RATIO: 0.6 (ref 1–2.5)
ALP BLD-CCNC: 129 U/L (ref 40–129)
ALT SERPL-CCNC: 46 U/L (ref 5–41)
ANION GAP SERPL CALCULATED.3IONS-SCNC: 11 MMOL/L (ref 9–17)
AST SERPL-CCNC: 127 U/L
BILIRUB SERPL-MCNC: 4.57 MG/DL (ref 0.3–1.2)
BUN BLDV-MCNC: 12 MG/DL (ref 6–20)
BUN/CREAT BLD: ABNORMAL (ref 9–20)
C-REACTIVE PROTEIN: 7.7 MG/L (ref 0–5)
CALCIUM SERPL-MCNC: 8.4 MG/DL (ref 8.6–10.4)
CHLORIDE BLD-SCNC: 105 MMOL/L (ref 98–107)
CO2: 22 MMOL/L (ref 20–31)
CREAT SERPL-MCNC: 0.6 MG/DL (ref 0.7–1.2)
GFR AFRICAN AMERICAN: >60 ML/MIN
GFR NON-AFRICAN AMERICAN: >60 ML/MIN
GFR SERPL CREATININE-BSD FRML MDRD: ABNORMAL ML/MIN/{1.73_M2}
GFR SERPL CREATININE-BSD FRML MDRD: ABNORMAL ML/MIN/{1.73_M2}
GLUCOSE BLD-MCNC: 88 MG/DL (ref 70–99)
HCT VFR BLD CALC: 35.6 % (ref 40.7–50.3)
HEMOGLOBIN: 11.8 G/DL (ref 13–17)
INR BLD: 1.5
MCH RBC QN AUTO: 33.6 PG (ref 25.2–33.5)
MCHC RBC AUTO-ENTMCNC: 33.1 G/DL (ref 28.4–34.8)
MCV RBC AUTO: 101.4 FL (ref 82.6–102.9)
NRBC AUTOMATED: 0 PER 100 WBC
PDW BLD-RTO: 16.3 % (ref 11.8–14.4)
PLATELET # BLD: 67 K/UL (ref 138–453)
PMV BLD AUTO: 11.6 FL (ref 8.1–13.5)
POTASSIUM SERPL-SCNC: 3.8 MMOL/L (ref 3.7–5.3)
PROTHROMBIN TIME: 15.6 SEC (ref 9–12)
RBC # BLD: 3.51 M/UL (ref 4.21–5.77)
SEDIMENTATION RATE, ERYTHROCYTE: 29 MM (ref 0–10)
SODIUM BLD-SCNC: 138 MMOL/L (ref 135–144)
TOTAL PROTEIN: 6.3 G/DL (ref 6.4–8.3)
URIC ACID: 5.3 MG/DL (ref 3.4–7)
WBC # BLD: 5.3 K/UL (ref 3.5–11.3)

## 2020-02-29 PROCEDURE — 99232 SBSQ HOSP IP/OBS MODERATE 35: CPT | Performed by: INTERNAL MEDICINE

## 2020-02-29 PROCEDURE — A9579 GAD-BASE MR CONTRAST NOS,1ML: HCPCS | Performed by: STUDENT IN AN ORGANIZED HEALTH CARE EDUCATION/TRAINING PROGRAM

## 2020-02-29 PROCEDURE — 6360000002 HC RX W HCPCS: Performed by: PHYSICIAN ASSISTANT

## 2020-02-29 PROCEDURE — 97535 SELF CARE MNGMENT TRAINING: CPT

## 2020-02-29 PROCEDURE — 97162 PT EVAL MOD COMPLEX 30 MIN: CPT

## 2020-02-29 PROCEDURE — 6370000000 HC RX 637 (ALT 250 FOR IP): Performed by: NURSE PRACTITIONER

## 2020-02-29 PROCEDURE — 97166 OT EVAL MOD COMPLEX 45 MIN: CPT

## 2020-02-29 PROCEDURE — 1200000000 HC SEMI PRIVATE

## 2020-02-29 PROCEDURE — 36415 COLL VENOUS BLD VENIPUNCTURE: CPT

## 2020-02-29 PROCEDURE — 97530 THERAPEUTIC ACTIVITIES: CPT

## 2020-02-29 PROCEDURE — 85027 COMPLETE CBC AUTOMATED: CPT

## 2020-02-29 PROCEDURE — 73720 MRI LWR EXTREMITY W/O&W/DYE: CPT

## 2020-02-29 PROCEDURE — 80053 COMPREHEN METABOLIC PANEL: CPT

## 2020-02-29 PROCEDURE — 2580000003 HC RX 258: Performed by: INTERNAL MEDICINE

## 2020-02-29 PROCEDURE — 86140 C-REACTIVE PROTEIN: CPT

## 2020-02-29 PROCEDURE — 6360000004 HC RX CONTRAST MEDICATION: Performed by: STUDENT IN AN ORGANIZED HEALTH CARE EDUCATION/TRAINING PROGRAM

## 2020-02-29 PROCEDURE — 0S9M3ZZ DRAINAGE OF RIGHT METATARSAL-PHALANGEAL JOINT, PERCUTANEOUS APPROACH: ICD-10-PCS | Performed by: PODIATRIST

## 2020-02-29 PROCEDURE — 3E0U3BZ INTRODUCTION OF ANESTHETIC AGENT INTO JOINTS, PERCUTANEOUS APPROACH: ICD-10-PCS | Performed by: PODIATRIST

## 2020-02-29 PROCEDURE — 84550 ASSAY OF BLOOD/URIC ACID: CPT

## 2020-02-29 PROCEDURE — 6370000000 HC RX 637 (ALT 250 FOR IP): Performed by: PHYSICIAN ASSISTANT

## 2020-02-29 PROCEDURE — 6360000002 HC RX W HCPCS: Performed by: INTERNAL MEDICINE

## 2020-02-29 PROCEDURE — 3E0U33Z INTRODUCTION OF ANTI-INFLAMMATORY INTO JOINTS, PERCUTANEOUS APPROACH: ICD-10-PCS | Performed by: PODIATRIST

## 2020-02-29 PROCEDURE — 85610 PROTHROMBIN TIME: CPT

## 2020-02-29 PROCEDURE — 2580000003 HC RX 258: Performed by: PHYSICIAN ASSISTANT

## 2020-02-29 PROCEDURE — 6370000000 HC RX 637 (ALT 250 FOR IP): Performed by: INTERNAL MEDICINE

## 2020-02-29 RX ORDER — SODIUM CHLORIDE 0.9 % (FLUSH) 0.9 %
10 SYRINGE (ML) INJECTION PRN
Status: CANCELLED | OUTPATIENT
Start: 2020-02-29

## 2020-02-29 RX ORDER — BETAMETHASONE SODIUM PHOSPHATE AND BETAMETHASONE ACETATE 3; 3 MG/ML; MG/ML
3 INJECTION, SUSPENSION INTRA-ARTICULAR; INTRALESIONAL; INTRAMUSCULAR; SOFT TISSUE ONCE
Status: DISCONTINUED | OUTPATIENT
Start: 2020-02-29 | End: 2020-03-02 | Stop reason: HOSPADM

## 2020-02-29 RX ORDER — LIDOCAINE HYDROCHLORIDE 10 MG/ML
5 INJECTION, SOLUTION INFILTRATION; PERINEURAL ONCE
Status: DISCONTINUED | OUTPATIENT
Start: 2020-02-29 | End: 2020-03-02 | Stop reason: HOSPADM

## 2020-02-29 RX ORDER — SODIUM CHLORIDE 0.9 % (FLUSH) 0.9 %
10 SYRINGE (ML) INJECTION PRN
Status: DISCONTINUED | OUTPATIENT
Start: 2020-02-29 | End: 2020-03-02 | Stop reason: HOSPADM

## 2020-02-29 RX ORDER — COLCHICINE 0.6 MG/1
0.6 TABLET ORAL DAILY
Status: DISCONTINUED | OUTPATIENT
Start: 2020-02-29 | End: 2020-03-02 | Stop reason: HOSPADM

## 2020-02-29 RX ORDER — SODIUM CHLORIDE 0.9 % (FLUSH) 0.9 %
10 SYRINGE (ML) INJECTION EVERY 12 HOURS SCHEDULED
Status: CANCELLED | OUTPATIENT
Start: 2020-02-29

## 2020-02-29 RX ADMIN — Medication 10 ML: at 09:26

## 2020-02-29 RX ADMIN — GABAPENTIN 600 MG: 600 TABLET ORAL at 09:23

## 2020-02-29 RX ADMIN — GABAPENTIN 600 MG: 600 TABLET ORAL at 14:26

## 2020-02-29 RX ADMIN — Medication 10 ML: at 21:51

## 2020-02-29 RX ADMIN — GADOTERIDOL 20 ML: 279.3 INJECTION, SOLUTION INTRAVENOUS at 11:07

## 2020-02-29 RX ADMIN — HYDROCODONE BITARTRATE AND ACETAMINOPHEN 2 TABLET: 5; 325 TABLET ORAL at 15:13

## 2020-02-29 RX ADMIN — LISINOPRIL 20 MG: 20 TABLET ORAL at 09:23

## 2020-02-29 RX ADMIN — CEFEPIME HYDROCHLORIDE 2 G: 2 INJECTION, POWDER, FOR SOLUTION INTRAVENOUS at 14:26

## 2020-02-29 RX ADMIN — HYDROCODONE BITARTRATE AND ACETAMINOPHEN 2 TABLET: 5; 325 TABLET ORAL at 06:39

## 2020-02-29 RX ADMIN — HYDROCODONE BITARTRATE AND ACETAMINOPHEN 2 TABLET: 5; 325 TABLET ORAL at 10:01

## 2020-02-29 RX ADMIN — ASPIRIN 81 MG: 81 TABLET, CHEWABLE ORAL at 09:23

## 2020-02-29 RX ADMIN — CEFEPIME HYDROCHLORIDE 2 G: 2 INJECTION, POWDER, FOR SOLUTION INTRAVENOUS at 03:09

## 2020-02-29 RX ADMIN — COLCHICINE 0.6 MG: 0.6 TABLET, FILM COATED ORAL at 16:51

## 2020-02-29 RX ADMIN — GABAPENTIN 600 MG: 600 TABLET ORAL at 21:51

## 2020-02-29 RX ADMIN — ENOXAPARIN SODIUM 40 MG: 40 INJECTION SUBCUTANEOUS at 11:28

## 2020-02-29 RX ADMIN — HYDROCODONE BITARTRATE AND ACETAMINOPHEN 2 TABLET: 5; 325 TABLET ORAL at 19:25

## 2020-02-29 RX ADMIN — ROPINIROLE HYDROCHLORIDE 0.5 MG: 0.25 TABLET, FILM COATED ORAL at 21:50

## 2020-02-29 RX ADMIN — HYDROCODONE BITARTRATE AND ACETAMINOPHEN 2 TABLET: 5; 325 TABLET ORAL at 23:40

## 2020-02-29 RX ADMIN — FERROUS SULFATE TAB EC 325 MG (65 MG FE EQUIVALENT) 325 MG: 325 (65 FE) TABLET DELAYED RESPONSE at 09:23

## 2020-02-29 RX ADMIN — PRIMIDONE 100 MG: 50 TABLET ORAL at 21:50

## 2020-02-29 ASSESSMENT — PAIN SCALES - GENERAL
PAINLEVEL_OUTOF10: 5
PAINLEVEL_OUTOF10: 7
PAINLEVEL_OUTOF10: 5
PAINLEVEL_OUTOF10: 7
PAINLEVEL_OUTOF10: 5
PAINLEVEL_OUTOF10: 8
PAINLEVEL_OUTOF10: 5
PAINLEVEL_OUTOF10: 5
PAINLEVEL_OUTOF10: 10
PAINLEVEL_OUTOF10: 10
PAINLEVEL_OUTOF10: 7

## 2020-02-29 ASSESSMENT — PAIN DESCRIPTION - PAIN TYPE
TYPE: ACUTE PAIN

## 2020-02-29 ASSESSMENT — PAIN DESCRIPTION - DESCRIPTORS
DESCRIPTORS: ACHING;DISCOMFORT
DESCRIPTORS: ACHING;DISCOMFORT
DESCRIPTORS: ACHING;CONSTANT;THROBBING

## 2020-02-29 ASSESSMENT — PAIN DESCRIPTION - PROGRESSION: CLINICAL_PROGRESSION: NOT CHANGED

## 2020-02-29 ASSESSMENT — PAIN DESCRIPTION - ORIENTATION
ORIENTATION: RIGHT

## 2020-02-29 ASSESSMENT — PAIN DESCRIPTION - LOCATION
LOCATION: FOOT

## 2020-02-29 ASSESSMENT — PAIN DESCRIPTION - ONSET: ONSET: ON-GOING

## 2020-02-29 ASSESSMENT — PAIN DESCRIPTION - FREQUENCY
FREQUENCY: CONTINUOUS
FREQUENCY: CONTINUOUS

## 2020-02-29 NOTE — CONSULTS
K 3.9      CO2 21   BUN 14   CREATININE 0.60*   GLUCOSE 108*   CALCIUM 8.2*        Coags:  No results for input(s): APTT, PROT, INR in the last 72 hours. Lab Results   Component Value Date    LABA1C 4.4 12/16/2019     Lab Results   Component Value Date    SEDRATE 20 (H) 02/18/2020     Lab Results   Component Value Date    CRP 8.3 (H) 02/28/2020         Lower Extremity Physical Exam:  Vascular: Left DP and PT pulses are palpable, unable to palpate secondary to edema right foot, DP/PT audible on Doppler. CFT <3 seconds to all digits.  Hair growth is absent to the level of the digits.  Moderate edema.       Neuro: Saph/sural/SP/DP/plantar sensation intact to light touch.     Musculoskeletal: Muscle strength is 5/5, adequate ROM, adequate strength to all lower extremity muscle groups. Gross deformity is absent.  No pain with compression of calf.   Tenderness with palpation of first MPJ complex, particularly plantar aspect.  No joint crepitus noted first MPJ. Pain with palpation of hallux particularly of the plantar medial aspect proximal to the first MPJ right foot.     Dermatologic: No open wound. No purulent drainage appreciated. Circumferential  erythema  around the first MPJ complex with increase in warmth. No fluctuance appreciated. Clinical Images:              Imaging:   XR FOOT RIGHT (MIN 3 VIEWS)   Final Result   No radiographic evidence of osteomyelitis or soft tissue gas. MRI FOOT RIGHT W WO CONTRAST    (Results Pending)       Cultures: None    Assessment     Noreen Franz is a 63 y.o. male with   1. Cellulitis versus gout right foot  2. Hepatitis C  3. 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 Hx GI bleed  5. Liver cirrhosis    Active Problems:    Septic arthritis ([address]  Resolved Problems:    * No resolved hospital problems.  *        Plan     · Patient examined and evaluated at bedside   · Treatment options discussed in detail with the patient  · XR reviewed- no acute osseous abnormalities, OM, soft tissue gas. · MRI ordered to assess for deep abscess  · IM- medical management  · ID following-cefepime  · Given clinical picture, likely gouty attack given patient has been off colchicine since Sunday. MRI will be ordered to rule out deep abscess. If negative; will aspirate first MTPJ to confirm gouty crystals.   Recommend continuing colchicine and antibiotics per ID  · Elevate heels off bed  · Ice to affected area  · NWB to Right lower extremity in surgical shoe  · Discussed with Radha Lyles 26   Podiatric Medicine & Surgery   2/28/2020 at 8:39 PM

## 2020-02-29 NOTE — PROGRESS NOTES
Patient asking about one time dose of cholchicine received 2/28. Epic shows no other orders for cholchicine. Perfected served Dr. Adilene Wilhelm stating \" patient had one time dose of cholchicine yesterday. patient asking when they will receive next dose. No other orders in epic for cholchicine\".

## 2020-02-29 NOTE — PROGRESS NOTES
Occupational Therapy   Occupational Therapy Initial Assessment  Date: 2020   Patient Name: Vinicio Daniel  MRN: [id number]     : 1960    Date of Service: 2020    Discharge Recommendations:  Patient would benefit from continued therapy after discharge  OT Equipment Recommendations  Equipment Needed: Yes  Mobility Devices: [place]: Rolling    Assessment   Performance deficits / Impairments: Decreased functional mobility ; Decreased ADL status; Decreased endurance;Decreased high-level IADLs;Decreased balance  Prognosis: Good  Decision Making: Medium Complexity  OT Education: OT Role;Plan of Care;Orientation;Equipment;Precautions; ADL Adaptive Strategies;Transfer Training  REQUIRES OT FOLLOW UP: Yes  Activity Tolerance  Activity Tolerance: Patient Tolerated treatment well  Safety Devices  Safety Devices in place: Yes  Type of devices: Call light within reach;Nurse notified; Left in bed  Restraints  Initially in place: No           Patient Diagnosis(es): There were no encounter diagnoses. has a past medical history of Bleeding ulcer, Chronic back pain, Gastric ulcer, Hematuria, Hep C w/o coma, chronic (HCC), History of blood transfusion, History of ETOH abuse, Hypertension, Neuropathy, Portal hypertensive gastropathy ([facility] [address] Seizures ([facility] [address] and Wears glasses. has a past surgical history that includes hernia repair (Left, ); Upper gastrointestinal endoscopy (10/31/2015); Tonsillectomy (); Upper gastrointestinal endoscopy (2018); Colonoscopy (2018); pr colon ca scrn not hi rsk ind (N/A, 3/14/2018); Upper gastrointestinal endoscopy (N/A, 3/14/2018); Upper gastrointestinal endoscopy (N/A, 2019); Upper gastrointestinal endoscopy (N/A, 2019); back surgery (); Upper gastrointestinal endoscopy (N/A, 2019); Vasectomy (); Elbow Debridement (Left, 10/16/2019); and incision and drainage (Left, 10/16/2019). Restrictions  Restrictions/Precautions  Restrictions/Precautions: Weight Bearing, Fall Risk  Required Braces or Orthoses?: Yes(R surgical shoe)  Upper Extremity Weight Bearing Restrictions  Right Upper Extremity Weight Bearing: Non Weight Bearing  Position Activity Restriction  Other position/activity restrictions: Up with assistance    Subjective   General  Chart Reviewed: Orders, Progress Notes, History and Physical, Imaging, Labs  Patient assessed for rehabilitation services?: Yes  Family / Caregiver Present: No  General Comment  Comments: RN ok'd for therapy this AM. Pt agreeable to participate in session and pleasant/cooperative throughout. Patient Currently in Pain: Yes  Pain Assessment  Pain Assessment: 0-10  Pain Level: 5  Pain Type: Acute pain  Pain Location: Foot  Pain Orientation: Right  Pain Descriptors: Aching;Discomfort  Non-Pharmaceutical Pain Intervention(s): Ambulation/Increased Activity; Distraction;Repositioned  Response to Pain Intervention: Patient Satisfied  Vital Signs  Patient Currently in Pain: Yes     Social/Functional History  Social/Functional History  Lives With: Family(spouse and 25yr old son)  Type of Home: House  Home Layout: One level  Home Access: Level entry(door threshold only)  Bathroom Shower/Tub: Walk-in shower  Bathroom Toilet: Standard  Bathroom Equipment: Grab bars in shower, Grab bars around toilet, Shower chair  Bathroom Accessibility: Accessible  Home Equipment: [facility] Help From: Family(Pt reports supportive family however states wife with MS and unable to provide any physical assistance to pt and son does work fulltime)  ADL Assistance: Independent  Homemaking Assistance: Independent  Homemaking Responsibilities: Yes(all IADLs shared with spouse)  Ambulation Assistance: Independent  Transfer Assistance: Independent  Active : Yes  Mode of Transportation: Car  Occupation: Self employed(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 Objective   Vision: Impaired  Vision Exceptions: Wears glasses for reading  Hearing: Within functional limits    Orientation  Overall Orientation Status: Within Functional Limits        Balance  Sitting Balance: Stand by assistance  Standing Balance: Contact guard assistance  Functional Mobility  Functional - Mobility Device: Rolling Walker  Activity: To/from bathroom  Assist Level: Contact guard assistance  Functional Mobility Comments: Pt mildly unsteady however with no LOB and demo good ability to maintain NWB RLE throughout. Pt with noted fatigue following minimal activity. ADL  Feeding: Independent  Grooming: Stand by assistance  UE Bathing: Stand by assistance  LE Bathing: Contact guard assistance  UE Dressing: Stand by assistance  LE Dressing: Contact guard assistance(seated EOB to don socks and R surgical shoe)  Toileting: Contact guard assistance     Tone RUE  RUE Tone: Normotonic  Tone LUE  LUE Tone: Normotonic  Coordination  Movements Are Fluid And Coordinated: Yes        Bed mobility  Supine to Sit: Stand by assistance  Sit to Supine: Stand by assistance  Scooting: Stand by assistance  Comment: Raised HOB throughout     Transfers  Sit to stand: Contact guard assistance  Stand to sit: Contact guard assistance  Transfer Comments: Pt required min VCs for proper hand placement/safety techniques during functional transfers.         Cognition  Overall Cognitive Status: WFL           Sensation  Overall Sensation Status: WFL        LUE AROM (degrees)  LUE AROM : WFL  RUE AROM (degrees)  RUE AROM : WFL  LUE Strength  Gross LUE Strength: WFL  RUE Strength  Gross RUE Strength: WFL           Plan   Plan  Times per week: 3-5x/wk  Current Treatment Recommendations: Balance Training, Functional

## 2020-02-29 NOTE — PROGRESS NOTES
[facility] - Initial Consultation Note  Today's Date and Time: 2/29/2020, 12:09 AM    Diagnostic Impression :   · Chronic hepatitis C without coma  · Alcoholic cirrhosis without ascites  · Right septic IP and MTP joints infection  · Right hallux tenosynovitis and plantar myositis  · Right sesamoid bone osteomyelitis  · Facial rash possibly related to Augmentin         Recommendations   · Patient continues on cefepime, pending podiatry surgical decision. And possible aspiration   · Facial rash may be secondary to Augmentin, better  · Cefepime 2 gm IV q 12 hr for foot pending further data    Chief complaint/reason for consultation:     Foot plantar infection    History of Present Illness:   Margarita Myles is a 63y.o.-year-old  male who was initially evaluated on 2/28/2020. Patient seen at the request of Ervin Garcia and Eduardo       INITIAL HISTORY:     Pt with PMH of chronic back pain, gastric ulcer, hematuria, Hep C, ETOH abuse, HTN, ETOH induced neuropathy, portal hypertensive gastropathy and seizures.      Pt first presented on 02- with right foot pain and swelling. He was walking in the snow when he tripped and everted his foot and since then has not been able to ambulate. Tylenol did not improve his pain but ice and Ace wrap helped slightly. Pt denies history of gout, bleeding disorders, or previous surgeries and trauma to the foot. X-rays were negative and pt was discharged same day with Tylenol and Lidocaine patch.     Pt then returned to the ED on 02- with swelling and increased pain to the right foot associated with a rash to the great toe. The rash spread to the other digits of the foot. He had no fevers, chills and Tylenol did not help with the pain. Upon examination, he had significant erythema with significant swelling over the right great toe.  There was an area that appeared to be an abscess over the dorsum of the right great toe. Physically abused: Not on file     Forced sexual activity: Not on file   Other Topics Concern    Not on file   Social History Narrative    Not on file       Family History:     Family History   Problem Relation Age of Onset    High Blood Pressure Mother     Other Father         neuropathy    Stroke Father     Prostate Cancer Father         Allergies:   Sulfa antibiotics     Review of Systems:   Constitutional: No fevers or chills. No systemic complaints  Head: No headaches  Eyes: No double vision or blurry vision. ENT: No sore throat or runny nose. . No hearing loss, tinnitus or vertigo. Cardiovascular: No chest pain or palpitations. No shortness of breath. No BE  Lung: No shortness of breath or cough. No sputum production  Abdomen: No nausea, vomiting, diarrhea, or abdominal pain. .  Genitourinary: No increased urinary frequency, or dysuria. No hematuria. No suprapubic or CVA pain  Musculoskeletal: R foot red and tender and swollen right hallux  Hematologic: No bleeding or bruising. Neurologic: No headache, weakness, numbness, or tingling. Physical Examination :   BP (!) 153/74   Pulse 72   Temp 98.8 °F (37.1 °C) (Oral)   Resp 21   Ht 5' 10\" (1.778 m)   Wt 225 lb (102.1 kg)   SpO2 97%   BMI 32.28 kg/m²    General Appearance: Awake, alert, and in no apparent distress  Head:  Normocephalic, no trauma  Eyes: Pupils equal, round, reactive, to light and accommodation; extraocular movements intact; sclera anicteric; conjunctivae pink. No embolic phenomena. ENT: no thrush  Neck:Supple,   Pulmonary/Chest: Clear to auscultation   Cardiovascular: Regular rate and rhythm without murmurs, rubs, or gallops. Abdomen: Soft, non tender. Bowel sounds normal. No organomegaly  All four Extremities: right foot red and tender swollen  Neurologic: No gross sensory or motor deficits. Skin: Warm and dry with good turgor. No signs of peripheral arterial or venous insufficiency. Rt foot with swollen Rt hallux and new plantar erythema , pain and possible fluctuance. Medical Decision Making:   I have independently reviewed/ordered the following labs:    CBC with Differential:  Lab Results   Component Value Date    WBC 5.0 02/28/2020    WBC 5.3 02/22/2020    HGB 11.7 02/28/2020    HGB 10.9 02/22/2020    HCT 35.4 02/28/2020    HCT 33.9 02/22/2020    PLT See Reflexed IPF Result 02/28/2020    PLT See Reflexed IPF Result 02/22/2020    LYMPHOPCT 25 02/28/2020    LYMPHOPCT 17 02/18/2020    MONOPCT 8 02/28/2020    MONOPCT 9 02/18/2020     BMP:   Lab Results   Component Value Date     02/28/2020     02/19/2020    K 3.9 02/28/2020    K 3.8 02/19/2020     02/28/2020     02/19/2020    CO2 21 02/28/2020    CO2 21 02/19/2020    BUN 14 02/28/2020    BUN 6 02/23/2020    CREATININE 0.60 02/28/2020    CREATININE 0.53 02/23/2020    MG 1.6 02/19/2020    MG 1.7 08/03/2019     Hepatic Function Panel:  Lab Results   Component Value Date    PROT 5.6 02/19/2020    PROT 6.6 12/16/2019    LABALBU 2.3 02/19/2020    LABALBU 3.2 08/01/2019    BILIDIR 2.01 08/01/2019    BILIDIR 2.41 07/30/2019    IBILI 1.67 08/01/2019    IBILI 2.14 07/30/2019    BILITOT 5.15 02/19/2020    BILITOT 3.68 08/01/2019    ALKPHOS 154 02/19/2020    ALKPHOS 101 08/01/2019    ALT 43 02/19/2020    ALT 88 08/01/2019     02/19/2020     08/01/2019     No results found for: RPR  No results found for: HIV  No results found for: [facility]  Lab Results   Component Value Date    MUCUS 1+ 02/20/2017    PH 7.20 02/20/2017    RBC 3.45 02/28/2020    TRICHOMONAS NOT REPORTED 02/20/2017    WBC 5.0 02/28/2020    YEAST NOT REPORTED 02/20/2017    TURBIDITY CLEAR 02/20/2017     Lab Results   Component Value Date    CREATININE 0.60 02/28/2020    GLUCOSE 108 02/28/2020       Thank you for allowing us to participate in the care of this patient. Please call with questions.     Ren Gallagher MD  - Office: [phone]

## 2020-02-29 NOTE — PROGRESS NOTES
Physical Therapy    Facility/Department: [address] ORTHO/MED SURG  Initial Assessment    NAME: Chente Nicolas  : 1960  MRN: [id number]    Date of Service: 2020    Discharge Recommendations: Further therapy recommended at discharge. PT Equipment Recommendations  Equipment Needed: Yes  Mobility Devices: Brody [place]: Rolling    Assessment   Body structures, Functions, Activity limitations: Decreased functional mobility ; Decreased endurance;Decreased safe awareness; Increased pain;Decreased balance  Assessment: The pt ambulated 15ft with RW and CGA, able to maintain NWB RLE safely when using RW. Recommend continued therapy to maximize safety and independence. Prognosis: Good  Decision Making: Medium Complexity  PT Education: Goals;PT Role;Plan of Care;Transfer Training;Gait Training  REQUIRES PT FOLLOW UP: Yes  Activity Tolerance  Activity Tolerance: Patient limited by endurance       Patient Diagnosis(es): There were no encounter diagnoses. has a past medical history of Bleeding ulcer, Chronic back pain, Gastric ulcer, Hematuria, Hep C w/o coma, chronic (HCC), History of blood transfusion, History of ETOH abuse, Hypertension, Neuropathy, Portal hypertensive gastropathy ([facility] [address] Seizures ([facility] [address] and Wears glasses. has a past surgical history that includes hernia repair (Left, ); Upper gastrointestinal endoscopy (10/31/2015); Tonsillectomy (); Upper gastrointestinal endoscopy (2018); Colonoscopy (2018); pr colon ca scrn not hi rsk ind (N/A, 3/14/2018); Upper gastrointestinal endoscopy (N/A, 3/14/2018); Upper gastrointestinal endoscopy (N/A, 2019); Upper gastrointestinal endoscopy (N/A, 2019); back surgery (); Upper gastrointestinal endoscopy (N/A, 2019); Vasectomy (); Elbow Debridement (Left, 10/16/2019); and incision and drainage (Left, 10/16/2019).     Restrictions  Restrictions/Precautions  Restrictions/Precautions: Weight Bearing, Fall Risk  Required Braces or Orthoses?: Yes(R surgical shoe)  Upper Extremity Weight Bearing Restrictions  Right Upper Extremity Weight Bearing: Non Weight Bearing  Position Activity Restriction  Other position/activity restrictions: Up with assistance  Vision/Hearing  Vision: Impaired  Vision Exceptions: Wears glasses for reading  Hearing: Within functional limits     Subjective  General  Patient assessed for rehabilitation services?: Yes  Response To Previous Treatment: Not applicable  Family / Caregiver Present: No  Follows Commands: Within Functional Limits  Subjective  Subjective: RN and pt agreeable to PT. Pt supine in bed upon arrival, pleasant and cooperative throughout.   Pain Screening  Patient Currently in Pain: Yes  Pain Assessment  Pain Assessment: 0-10  Pain Level: 5  Pain Type: Acute pain  Pain Location: Foot  Pain Orientation: Right  Pain Descriptors: Aching;Discomfort  Pain Frequency: Continuous  Pain Onset: On-going  Non-Pharmaceutical Pain Intervention(s): Ambulation/Increased Activity;Repositioned  Response to Pain Intervention: Patient Satisfied  Vital Signs  Patient Currently in Pain: Yes       Orientation  Orientation  Overall Orientation Status: Within Functional Limits  Social/Functional History  Social/Functional History  Lives With: Family(spouse and 25yr old son)  Type of Home: House  Home Layout: One level  Home Access: Level entry(door threshold only)  Bathroom Shower/Tub: Walk-in shower  Bathroom Toilet: Standard  Bathroom Equipment: Grab bars in shower, Grab bars around toilet, Shower chair  Bathroom Accessibility: Accessible  Home Equipment: Crutches  Receives Help From: Family(Pt reports supportive family however states wife with MS and unable to provide any physical assistance to pt and son does work fulltime)  ADL Assistance: Independent  Homemaking Assistance: Independent  Homemaking Responsibilities: Yes(all IADLs shared with spouse)  Ambulation Assistance: Independent  Transfer Assistance:

## 2020-02-29 NOTE — PROGRESS NOTES
[address]    Progress Note    2/29/2020    3:36 PM    Name:   Ashley Wadsworth  MRN:     [id number]     Acct:      [de-identified]   Room:   [facility]  IP Day:  1  Admit Date:  2/28/2020  1:23 PM    PCP:   REECE Steen CNP  Code Status:  Full Code    Subjective:     C/C: Referral for admission from the ID consultants office    Interval History Status: not changed. Patient was seen and examined this afternoon with pain remaining the same. Podiatry input noted with presentation likely due to gouty attack. However MRI showing right hallux tenosynovitis with probable osteomyelitis of the sesamoid bones and plantar myositis. Antibiotic has been changed to cefepime by ID consult, maintaining the vancomycin. Patient denies any fever or chills. Uric acid on 2/19/2020 was 3.8 and today is 5.3. Brief History:     Ashley Wadsworth is a 63 y.o. Non-/non  male who presents with No chief complaint on file. and is admitted to the hospital for the management of <principal problem not specified>. 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 He was therefore referred for admission for further work-up and management. Patient has history of hypertension, dyslipidemia, chronic hepatitis C infection with resultant cirrhosis and portal hypertension and remote GI bleed. He denied any fever or chills, nausea or vomiting.     Review of Systems:     Constitutional:  negative for chills, fevers, sweats  Respiratory:  negative for cough, dyspnea on exertion, shortness of breath, wheezing  Cardiovascular:  negative for chest pain, chest pressure/discomfort, lower extremity edema, palpitations  Gastrointestinal:  negative for abdominal pain, constipation, diarrhea, nausea, vomiting  Neurological: Examination:        General Appearance: alert, well appearing, and in no acute distress  Mental status: oriented to person, place, and time  Head: normocephalic, atraumatic  Eye: no icterus, redness, pupils equal and reactive, extraocular eye movements intact, conjunctiva clear  Ear: normal external ear, no discharge, hearing intact  Nose: no drainage noted  Mouth: mucous membranes moist  Neck: supple, no carotid bruits, thyroid not palpable  Lungs: Bilateral equal air entry, clear to ausculation, no wheezing, rales or rhonchi, normal effort  Cardiovascular: normal rate, regular rhythm, no murmur, gallop, rub  Abdomen: Soft, nontender, nondistended, normal bowel sounds, no hepatomegaly or splenomegaly  Neurologic: There are no new focal motor or sensory deficits, normal muscle tone and bulk, no abnormal sensation, normal speech, cranial nerves II through XII grossly intact  Skin: No gross lesions, rashes, bruising or bleeding on exposed skin area. Redness involving the right foot more prominent on the right great toe  Extremities: peripheral pulses palpable, swelling of the right foot with redness spreading from the right great toe into greater half of the plantar surface with tenderness. No calf tenderness with palpation  Psych: normal affect    Assessment:        Hospital Problems           Last Modified POA    * (Principal) Acute hematogenous osteomyelitis of right foot (HCC)-Involving the sesamoid bones 2/29/2020 Yes    Iron deficiency anemia due to chronic blood loss 2/29/2020 Yes    Essential hypertension 2/29/2020 Yes    Thrombocytopenia ([address] 3/48/9213 Yes    Alcoholic cirrhosis of liver without ascites ([address] 2/29/2020 Yes    Hep C w/o coma, chronic ([address] 2/29/2020 Yes    Septic arthritis of IP joint of toe, right ([address] 2/29/2020 Yes    Tenosynovitis of right foot-right Hallux 2/29/2020 Yes          Plan:        1.  IV antibiotic management as recommended and ordered by ID consult pending culture and

## 2020-02-29 NOTE — PROGRESS NOTES
Progress Note  Podiatric Medicine and Surgery     Subjective     CC: Right first MPJ pain    Patient seen and examined at bedside. Afebrile, vital signs stable   Notes pain about the same, controlled with pain meds  Denies n/c/v/f  Notes redness and swelling was worse this am    HPI :  Gray Malone is a 63 y.o. male seen at [place] [address] for right great joint pain. Patient was previously admitted on 2/18/2020 for same issue. Started on IV antibiotics and colchicine. Following administration of colchicine patient reported relief to great toe joint and was discharged instructed to follow-up with Dr. Emanuel Cronin and Dr. Bg Mercedes outpatient. Patient notes that he was unable to fill his colchicine prescription due to insurance issues, last dose was on 2/23. Notes that redness and swelling and pain had improved remarkably for several days after discharge however relates worsening pain in his great toe joint starting yesterday. Denies any falls or trauma. On visit with ID physician today patient reported worsening pain and swelling, Dr. Bg Mercedes recommended admission for further work-up to rule out septic joint.      PCP is Hoda Nesbitt, REECE - CNP    ROS: Denies N/V/F/C/SOB/CP. Otherwise negative except at stated in the HPI.      Medications:  Scheduled Meds:   colchicine  0.6 mg Oral Daily    aspirin  81 mg Oral Daily    ferrous sulfate  325 mg Oral Daily with breakfast    gabapentin  600 mg Oral TID    lisinopril  20 mg Oral Daily    primidone  100 mg Oral Nightly    rOPINIRole  0.5 mg Oral Nightly    sodium chloride flush  10 mL Intravenous 2 times per day    enoxaparin  40 mg Subcutaneous Daily    cefepime  2 g Intravenous Q12H       Continuous Infusions:    PRN Meds:sodium chloride flush, sodium chloride flush, potassium chloride **OR** potassium alternative oral replacement **OR** potassium chloride, magnesium sulfate, acetaminophen **OR** acetaminophen, polyethylene glycol, promethazine **OR** ondansetron, nicotine, HYDROcodone 5 mg - acetaminophen **OR** HYDROcodone 5 mg - acetaminophen    Objective     Vitals:  Patient Vitals for the past 8 hrs:   BP Temp Temp src Pulse Resp SpO2   20 0805 (!) 158/68 98.3 °F (36.8 °C) Oral 78 20 99 %     Average, Min, and Max for last 24 hours Vitals:  TEMPERATURE:  Temp  Av.6 °F (37 °C)  Min: 98.3 °F (36.8 °C)  Max: 98.8 °F (37.1 °C)    RESPIRATIONS RANGE: Resp  Av  Min: 20  Max: 20    PULSE RANGE: Pulse  Av.7  Min: 62  Max: 78    BLOOD PRESSURE RANGE:  Systolic (75HSP), FM , Min:127 , DIL:910   ; Diastolic (35RAV), RFO:01, Min:56, Max:74      PULSE OXIMETRY RANGE: SpO2  Av %  Min: 97 %  Max: 99 %    I/O last 3 completed shifts:  In: -   Out: 900 [Urine:900]    CBC:  Recent Labs     20  1526 20  0557   WBC 5.0 5.3   HGB 11.7* 11.8*   HCT 35.4* 35.6*   PLT See Reflexed IPF Result 67*   CRP 8.3* 7.7*        BMP:  Recent Labs     20  1526 20  0557    138   K 3.9 3.8    105   CO2 21 22   BUN 14 12   CREATININE 0.60* 0.60*   GLUCOSE 108* 88   CALCIUM 8.2* 8.4*        Coags:  Recent Labs     20  0557   PROT 6.3*   INR 1.5       Lab Results   Component Value Date    SEDRATE 29 (H) 2020     Recent Labs     20  1526 20  0557   CRP 8.3* 7.7*       Lower Extremity Physical Exam:  Vascular: Left DP and PT pulses are palpable, unable to palpate secondary to edema right foot, DP/PT audible on Doppler. CFT <3 seconds to all digits.  Hair growth is absent to the level of the digits.  Moderate edema.       Neuro: Saph/sural/SP/DP/plantar sensation intact to light touch.     Musculoskeletal: Muscle strength is 5/5, adequate ROM, adequate strength to all lower extremity muscle groups.  Gross deformity is absent.  No pain with compression of calf.   Tenderness with palpation of first MPJ complex, particularly plantar aspect.  No joint crepitus noted first MPJ.  Pain with palpation of hallux particularly of the plantar medial aspect proximal to the first MPJ right foot. No pain dorsal 1st mptj. Pain on active and passive plantarflexion at 1st mtpj. Pain along course of FHL tendon to level of 1st tmtj     Dermatologic: No open wound. No purulent drainage appreciated. Circumferential  erythema  around the first MPJ complex with increase in warmth.  No fluctuance appreciated.       Clinical Images:                  Imaging:   MRI FOOT RIGHT W WO CONTRAST   Final Result   1. Extensive tenosynovitis of the FHL tendon extending from the 1st digit   proximally to at least the level of the 1st TMT joint. Surrounding myositis   in the plantar medial foot. 2. Edema and enhancement in the sesamoid bones along the margins of the FHL   tendon. This could be reactive or due to acute osteomyelitis. 3. Synovitis in the 1st MTP joint without discrete effusion. Mild   periarticular edema at proximal phalanx and 1st metatarsal head. Findings   could be reactive or due to infection. XR FOOT RIGHT (MIN 3 VIEWS)   Final Result   No radiographic evidence of osteomyelitis or soft tissue gas. Cultures: none    Assessment   Ashley Wadsworth is a 63 y.o. male with   1. Cellulitis versus gout right foot  2.   3. Hepatitis C  4. Peripheral neuropathy 2/2 alcohol  5. Hx GI bleed  6. Liver cirrhosis    Principal Problem:    Acute hematogenous osteomyelitis of right foot (HCC)-Involving the sesamoid bones  Active Problems:    Iron deficiency anemia due to chronic blood loss    Essential hypertension    Thrombocytopenia (HCC)    Alcoholic cirrhosis of liver without ascites (HCC)    Hep C w/o coma, chronic (HCC)    Septic arthritis of IP joint of toe, right (HCC)    Tenosynovitis of right foot-right Hallux  Resolved Problems:    * No resolved hospital problems.  *       Plan     · Patient examined and evaluated at bedside   · Treatment options discussed in detail with the patient  · XR reviewed- no acute osseous abnormalities, OM, soft tissue gas. · MRI ordered to assess for deep abscess  · IM- medical management  · ID following-cefepime  · MRI reviewed- no abscess noted, flexor hallucis longus tenosynovitis and myositis suspected as it correlates with pain on physical exam  · Will aspirate 1st MTPJ later today. Recommend continuing colchicine.    · Elevate heels off bed  · Ice to affected area  · NWB to Right lower extremity in surgical shoe  · Discussed with Dr. Saúl Banda, [place]   Podiatric Medicine & Surgery   2/29/2020 at 4:01 PM

## 2020-03-01 LAB
ABSOLUTE EOS #: 0 K/UL (ref 0–0.4)
ABSOLUTE IMMATURE GRANULOCYTE: 0 K/UL (ref 0–0.3)
ABSOLUTE LYMPH #: 0.63 K/UL (ref 1–4.8)
ABSOLUTE MONO #: 0.07 K/UL (ref 0.1–0.8)
ANION GAP SERPL CALCULATED.3IONS-SCNC: 10 MMOL/L (ref 9–17)
BASOPHILS # BLD: 2 % (ref 0–2)
BASOPHILS ABSOLUTE: 0.14 K/UL (ref 0–0.2)
BUN BLDV-MCNC: 9 MG/DL (ref 6–20)
BUN/CREAT BLD: ABNORMAL (ref 9–20)
CALCIUM SERPL-MCNC: 8.7 MG/DL (ref 8.6–10.4)
CHLORIDE BLD-SCNC: 103 MMOL/L (ref 98–107)
CO2: 21 MMOL/L (ref 20–31)
CREAT SERPL-MCNC: 0.55 MG/DL (ref 0.7–1.2)
DIFFERENTIAL TYPE: ABNORMAL
EOSINOPHILS RELATIVE PERCENT: 0 % (ref 1–4)
GFR AFRICAN AMERICAN: >60 ML/MIN
GFR NON-AFRICAN AMERICAN: >60 ML/MIN
GFR SERPL CREATININE-BSD FRML MDRD: ABNORMAL ML/MIN/{1.73_M2}
GFR SERPL CREATININE-BSD FRML MDRD: ABNORMAL ML/MIN/{1.73_M2}
GLUCOSE BLD-MCNC: 122 MG/DL (ref 70–99)
HCT VFR BLD CALC: 37.4 % (ref 40.7–50.3)
HEMOGLOBIN: 12.3 G/DL (ref 13–17)
IMMATURE GRANULOCYTES: 0 %
LYMPHOCYTES # BLD: 9 % (ref 24–44)
MCH RBC QN AUTO: 33.2 PG (ref 25.2–33.5)
MCHC RBC AUTO-ENTMCNC: 32.9 G/DL (ref 28.4–34.8)
MCV RBC AUTO: 100.8 FL (ref 82.6–102.9)
MONOCYTES # BLD: 1 % (ref 1–7)
MORPHOLOGY: ABNORMAL
NRBC AUTOMATED: 0 PER 100 WBC
PDW BLD-RTO: 16.1 % (ref 11.8–14.4)
PLATELET # BLD: ABNORMAL K/UL (ref 138–453)
PLATELET ESTIMATE: ABNORMAL
PLATELET, FLUORESCENCE: 88 K/UL (ref 138–453)
PLATELET, IMMATURE FRACTION: 4.4 % (ref 1.1–10.3)
PMV BLD AUTO: ABNORMAL FL (ref 8.1–13.5)
POTASSIUM SERPL-SCNC: 4.1 MMOL/L (ref 3.7–5.3)
RBC # BLD: 3.71 M/UL (ref 4.21–5.77)
RBC # BLD: ABNORMAL 10*6/UL
SEG NEUTROPHILS: 88 % (ref 36–66)
SEGMENTED NEUTROPHILS ABSOLUTE COUNT: 6.16 K/UL (ref 1.8–7.7)
SODIUM BLD-SCNC: 134 MMOL/L (ref 135–144)
WBC # BLD: 7 K/UL (ref 3.5–11.3)
WBC # BLD: ABNORMAL 10*3/UL

## 2020-03-01 PROCEDURE — 6370000000 HC RX 637 (ALT 250 FOR IP): Performed by: NURSE PRACTITIONER

## 2020-03-01 PROCEDURE — 6360000002 HC RX W HCPCS: Performed by: PHYSICIAN ASSISTANT

## 2020-03-01 PROCEDURE — 6370000000 HC RX 637 (ALT 250 FOR IP): Performed by: PHYSICIAN ASSISTANT

## 2020-03-01 PROCEDURE — 6370000000 HC RX 637 (ALT 250 FOR IP): Performed by: INTERNAL MEDICINE

## 2020-03-01 PROCEDURE — 85055 RETICULATED PLATELET ASSAY: CPT

## 2020-03-01 PROCEDURE — 6360000002 HC RX W HCPCS: Performed by: INTERNAL MEDICINE

## 2020-03-01 PROCEDURE — 36415 COLL VENOUS BLD VENIPUNCTURE: CPT

## 2020-03-01 PROCEDURE — 2580000003 HC RX 258: Performed by: INTERNAL MEDICINE

## 2020-03-01 PROCEDURE — 1200000000 HC SEMI PRIVATE

## 2020-03-01 PROCEDURE — 6360000002 HC RX W HCPCS: Performed by: NURSE PRACTITIONER

## 2020-03-01 PROCEDURE — 80048 BASIC METABOLIC PNL TOTAL CA: CPT

## 2020-03-01 PROCEDURE — 85025 COMPLETE CBC W/AUTO DIFF WBC: CPT

## 2020-03-01 PROCEDURE — 99232 SBSQ HOSP IP/OBS MODERATE 35: CPT | Performed by: INTERNAL MEDICINE

## 2020-03-01 RX ORDER — HYDRALAZINE HYDROCHLORIDE 20 MG/ML
10 INJECTION INTRAMUSCULAR; INTRAVENOUS ONCE
Status: COMPLETED | OUTPATIENT
Start: 2020-03-01 | End: 2020-03-01

## 2020-03-01 RX ORDER — AMLODIPINE BESYLATE 10 MG/1
10 TABLET ORAL DAILY
Status: DISCONTINUED | OUTPATIENT
Start: 2020-03-01 | End: 2020-03-02 | Stop reason: HOSPADM

## 2020-03-01 RX ADMIN — GABAPENTIN 600 MG: 600 TABLET ORAL at 20:23

## 2020-03-01 RX ADMIN — ASPIRIN 81 MG: 81 TABLET, CHEWABLE ORAL at 08:40

## 2020-03-01 RX ADMIN — FERROUS SULFATE TAB EC 325 MG (65 MG FE EQUIVALENT) 325 MG: 325 (65 FE) TABLET DELAYED RESPONSE at 08:40

## 2020-03-01 RX ADMIN — PRIMIDONE 100 MG: 50 TABLET ORAL at 20:23

## 2020-03-01 RX ADMIN — GABAPENTIN 600 MG: 600 TABLET ORAL at 08:40

## 2020-03-01 RX ADMIN — HYDROCODONE BITARTRATE AND ACETAMINOPHEN 2 TABLET: 5; 325 TABLET ORAL at 04:03

## 2020-03-01 RX ADMIN — COLCHICINE 0.6 MG: 0.6 TABLET, FILM COATED ORAL at 08:40

## 2020-03-01 RX ADMIN — LISINOPRIL 20 MG: 20 TABLET ORAL at 08:40

## 2020-03-01 RX ADMIN — HYDROCODONE BITARTRATE AND ACETAMINOPHEN 2 TABLET: 5; 325 TABLET ORAL at 12:50

## 2020-03-01 RX ADMIN — ENOXAPARIN SODIUM 40 MG: 40 INJECTION SUBCUTANEOUS at 08:41

## 2020-03-01 RX ADMIN — AMLODIPINE BESYLATE 10 MG: 10 TABLET ORAL at 12:52

## 2020-03-01 RX ADMIN — CEFEPIME HYDROCHLORIDE 2 G: 2 INJECTION, POWDER, FOR SOLUTION INTRAVENOUS at 04:03

## 2020-03-01 RX ADMIN — ROPINIROLE HYDROCHLORIDE 0.5 MG: 0.25 TABLET, FILM COATED ORAL at 20:23

## 2020-03-01 RX ADMIN — HYDRALAZINE HYDROCHLORIDE 10 MG: 20 INJECTION INTRAMUSCULAR; INTRAVENOUS at 00:36

## 2020-03-01 RX ADMIN — CEFEPIME HYDROCHLORIDE 2 G: 2 INJECTION, POWDER, FOR SOLUTION INTRAVENOUS at 15:21

## 2020-03-01 RX ADMIN — GABAPENTIN 600 MG: 600 TABLET ORAL at 14:39

## 2020-03-01 ASSESSMENT — PAIN DESCRIPTION - ONSET: ONSET: ON-GOING

## 2020-03-01 ASSESSMENT — PAIN SCALES - GENERAL
PAINLEVEL_OUTOF10: 4
PAINLEVEL_OUTOF10: 5
PAINLEVEL_OUTOF10: 8
PAINLEVEL_OUTOF10: 8
PAINLEVEL_OUTOF10: 3
PAINLEVEL_OUTOF10: 7

## 2020-03-01 ASSESSMENT — PAIN DESCRIPTION - LOCATION: LOCATION: FOOT

## 2020-03-01 ASSESSMENT — PAIN DESCRIPTION - ORIENTATION: ORIENTATION: RIGHT

## 2020-03-01 ASSESSMENT — PAIN DESCRIPTION - DESCRIPTORS: DESCRIPTORS: ACHING;DISCOMFORT

## 2020-03-01 ASSESSMENT — PAIN DESCRIPTION - FREQUENCY: FREQUENCY: CONTINUOUS

## 2020-03-01 ASSESSMENT — PAIN DESCRIPTION - PAIN TYPE: TYPE: ACUTE PAIN

## 2020-03-01 NOTE — PROGRESS NOTES
(24hrs), Av.3 °F (36.8 °C), Min:98 °F (36.7 °C), Max:98.4 °F (36.9 °C)    No results for input(s): POCGLU in the last 72 hours. I/O (24Hr): Intake/Output Summary (Last 24 hours) at 3/1/2020 1722  Last data filed at 3/1/2020 2853  Gross per 24 hour   Intake 50.93 ml   Output 1600 ml   Net -1549.07 ml       Labs:  Hematology:  Recent Labs     20  1526 20  0557 20  1005   WBC 5.0 5.3 7.0   RBC 3.45* 3.51* 3.71*   HGB 11.7* 11.8* 12.3*   HCT 35.4* 35.6* 37.4*   .6 101.4 100.8   MCH 33.9* 33.6* 33.2   MCHC 33.1 33.1 32.9   RDW 16.6* 16.3* 16.1*   PLT See Reflexed IPF Result 67* See Reflexed IPF Result   MPV NOT REPORTED 11.6 NOT REPORTED   SEDRATE 29*  --   --    CRP 8.3* 7.7*  --    INR  --  1.5  --      Chemistry:  Recent Labs     20  1526 20  0557 20  1005    138 134*   K 3.9 3.8 4.1    105 103   CO2 21 22 21   GLUCOSE 108* 88 122*   BUN 14 12 9   CREATININE 0.60* 0.60* 0.55*   ANIONGAP 10 11 10   LABGLOM >60 >60 >60   GFRAA >60 >60 >60   CALCIUM 8.2* 8.4* 8.7     Recent Labs     20  0557   PROT 6.3*   LABALBU 2.4*   *   ALT 46*   ALKPHOS 129   BILITOT 4.57*   URICACID 5.3     ABG:  Lab Results   Component Value Date    PH 7.20 2017    PCO2 33 2017    PO2 39 2017    HCO3 12.9 2017    O2SAT 63 2017    FIO2 NOT REPORTED 2017     Lab Results   Component Value Date/Time    SPECIAL NOT REPORTED 2020 05:00 PM     Lab Results   Component Value Date/Time    CULTURE NO GROWTH 5 DAYS 2020 05:00 PM       Radiology:  Xr Foot Right (min 3 Views)    Result Date: 2020  No radiographic evidence of osteomyelitis or soft tissue gas. Mri Foot Right W Wo Contrast    Result Date: 2020  1. Extensive tenosynovitis of the FHL tendon extending from the 1st digit proximally to at least the level of the 1st TMT joint. Surrounding myositis in the plantar medial foot.  2. Edema and enhancement in the sesamoid 2/29/2020 Yes    Hep C w/o coma, chronic ([facility] [address] 2/29/2020 Yes    Septic arthritis of IP joint of toe, right ([facility] [address] 2/29/2020 Yes    Tenosynovitis of right foot-right Hallux 2/29/2020 Yes          Plan:        1. IV antibiotic management as recommended and ordered by ID consult pending culture and sensitivity report  2. Colchicine ordered as per the recommendation by podiatry consult. Uric acid is within normal limits  3. Continue with other supportive management, elevate right lower extremity on the pillow when sleeping or sitting  4.  Maintain in-house pending further investigation and input by consultants  5. A.m. labs    Beti Pedro MD  3/1/2020  5:22 PM

## 2020-03-01 NOTE — PROGRESS NOTES
Discussed risks and benefits of first MPJ aspiration right foot with patient. No guarantees given or implied. Consent signed and in chart with RN witness. Right foot marked     Arthrocentesis of right 1st metatarsophalangeal joint performed bedside. No synovial fluid was able to be collected. Timeout performed with patient, RN, and writer in room. Right foot was prepped in aseptic fashion using Betadine. 10cc of 1% lidocaine plain was used for a local block. Attention was directed to the dorsal aspect of the 1st metatarsophalangeal joint and vital neurovascular structures identified. A 22g needle was inserted to the joint under slight negative pressure. 0cc of joint fluid was able to be collected. Hemostasis achieved with direct pressure. Reports 0/10 pain post procedure. Patient tolerated procedure well. Upon aspiration attempt it was evident that there was no appreciable fluid or purulence expressed from the great toe joint. Given clinical picture and history as well as recent lab values, low suspicion for septic arthritis. Discussed risks and benefits of right foot first MTPJ steroid injection with patient, patient amenable to procedure and expressed verbal consent. The right foot was prepped in aseptic fashion using Betadine. A total of 3 cc of a mixture of 2.5 cc of 1% lidocaine plain and 0.5 cc of Celestone was injected into the first MPJ right foot utilizing the same technique as mentioned above for aspiration. Patient tolerated procedure well. Hemostasis achieved with direct pressure. 0/10 post procedure pain. Wound was dressed with adhesive bandage. Will monitor for response to steroid injection and colchicine.     Electronically signed by Isidro Gonzalez DPM on 2/29/2020 at 9:20 PM

## 2020-03-01 NOTE — PROGRESS NOTES
Progress Note  Podiatric Medicine and Surgery     Subjective     CC: Right first MPJ pain    Patient seen and examined at bedside. Afebrile  Denies n/c/v/f  Notes 4/10 pain today; taking less medications   Relates better ROM to right great toe    HPI :  Margarita Myles is a 63 y.o. male seen at [facility] for right great joint pain. Patient was previously admitted on 2/18/2020 for same issue. Started on IV antibiotics and colchicine. Following administration of colchicine patient reported relief to great toe joint and was discharged instructed to follow-up with Dr. Myra Morgan and Dr. Shirleen Brittle outpatient. Patient notes that he was unable to fill his colchicine prescription due to insurance issues, last dose was on 2/23. Notes that redness and swelling and pain had improved remarkably for several days after discharge however relates worsening pain in his great toe joint starting yesterday. Denies any falls or trauma. On visit with ID physician today patient reported worsening pain and swelling, Dr. Shirleen Brittle recommended admission for further work-up to rule out septic joint.      PCP is REECE Bhatt - CNP    ROS: Denies N/V/F/C/SOB/CP. Otherwise negative except at stated in the HPI.      Medications:  Scheduled Meds:   amLODIPine  10 mg Oral Daily    colchicine  0.6 mg Oral Daily    lidocaine  5 mL Intradermal Once    betamethasone acetate-betamethasone sodium phosphate  3 mg Intra-articular Once    aspirin  81 mg Oral Daily    ferrous sulfate  325 mg Oral Daily with breakfast    gabapentin  600 mg Oral TID    lisinopril  20 mg Oral Daily    primidone  100 mg Oral Nightly    rOPINIRole  0.5 mg Oral Nightly    sodium chloride flush  10 mL Intravenous 2 times per day    enoxaparin  40 mg Subcutaneous Daily    cefepime  2 g Intravenous Q12H       Continuous Infusions:    PRN Meds:sodium chloride flush, sodium chloride flush, potassium chloride **OR** potassium alternative oral replacement **OR** potassium chloride, magnesium sulfate, acetaminophen **OR** acetaminophen, polyethylene glycol, promethazine **OR** ondansetron, nicotine, HYDROcodone 5 mg - acetaminophen **OR** HYDROcodone 5 mg - acetaminophen    Objective     Vitals:  Patient Vitals for the past 8 hrs:   BP Temp Temp src Pulse Resp SpO2   20 0800 (!) 159/91 98.4 °F (36.9 °C) Oral 95 18 95 %     Average, Min, and Max for last 24 hours Vitals:  TEMPERATURE:  Temp  Av.3 °F (36.8 °C)  Min: 98 °F (36.7 °C)  Max: 98.4 °F (36.9 °C)    RESPIRATIONS RANGE: Resp  Av  Min: 18  Max: 18    PULSE RANGE: Pulse  Av.8  Min: 68  Max: 102    BLOOD PRESSURE RANGE:  Systolic (21BTX), PRP:148 , Min:154 , SUN:868   ; Diastolic (96PXG), ODS:28, Min:75, Max:91      PULSE OXIMETRY RANGE: SpO2  Av.8 %  Min: 95 %  Max: 98 %    I/O last 3 completed shifts: In: 50.9 [I.V.:50.9]  Out: 1600 [Urine:1600]    CBC:  Recent Labs     20  1526 20  0557 20  1005   WBC 5.0 5.3 7.0   HGB 11.7* 11.8* 12.3*   HCT 35.4* 35.6* 37.4*   PLT See Reflexed IPF Result 67* See Reflexed IPF Result   CRP 8.3* 7.7*  --         BMP:  Recent Labs     20  1526 20  0557 20  1005    138 134*   K 3.9 3.8 4.1    105 103   CO2 21 22 21   BUN 14 12 9   CREATININE 0.60* 0.60* 0.55*   GLUCOSE 108* 88 122*   CALCIUM 8.2* 8.4* 8.7        Coags:  Recent Labs     20  0557   PROT 6.3*   INR 1.5       Lab Results   Component Value Date    SEDRATE  (H) 2020     Recent Labs     20  1526 20  0557   CRP 8.3* 7.7*       Lower Extremity Physical Exam:  Vascular: Left DP and PT pulses are palpable, unable to palpate secondary to edema right foot, DP/PT audible on Doppler.   CFT <3 seconds to all digits.  Hair growth is absent to the level of the digits.  Moderate edema.       Neuro: Saph/sural/SP/DP/plantar sensation intact to light touch.     Musculoskeletal: Muscle strength is 5/5, adequate ROM, adequate IP joint of toe, right (HCC)    Tenosynovitis of right foot-right Hallux  Resolved Problems:    * No resolved hospital problems. *       Plan     · Patient examined and evaluated at bedside   · Treatment options discussed in detail with the patient  · XR reviewed- no acute osseous abnormalities, OM, soft tissue gas. · MRI ordered to assess for deep abscess  · IM- medical management  · ID following-cefepime  · MRI reviewed- no abscess noted, flexor hallucis longus tenosynovitis and myositis suspected as it correlates with pain on physical exam  · Pain significantly improved today. Improved ROM and edema. Low suspicion for infectious process. Likely inflammatory process involving FHL tendon causing great toe pain. Pt stable for discharge from podiatry standpoint. Recommend continuing colchicine, abx per ID recs. Pt to follow up with Dr. Rikki Hernandez as scheduled on 3/3/2020.    · Elevate heels off bed  · Ice to affected area  · NWB to Right lower extremity in surgical shoe  · Discussed with Dr. Jemma Carter [facility]   Podiatric Medicine & Surgery   3/1/2020 at 12:46 PM

## 2020-03-02 VITALS
HEART RATE: 90 BPM | TEMPERATURE: 98.3 F | OXYGEN SATURATION: 97 % | BODY MASS INDEX: 33.09 KG/M2 | RESPIRATION RATE: 14 BRPM | SYSTOLIC BLOOD PRESSURE: 152 MMHG | HEIGHT: 70 IN | DIASTOLIC BLOOD PRESSURE: 89 MMHG | WEIGHT: 231.1 LBS

## 2020-03-02 LAB
ABSOLUTE EOS #: 0.25 K/UL (ref 0–0.44)
ABSOLUTE IMMATURE GRANULOCYTE: 0.06 K/UL (ref 0–0.3)
ABSOLUTE LYMPH #: 1.83 K/UL (ref 1.1–3.7)
ABSOLUTE MONO #: 1.1 K/UL (ref 0.1–1.2)
ANION GAP SERPL CALCULATED.3IONS-SCNC: 9 MMOL/L (ref 9–17)
BASOPHILS # BLD: 1 % (ref 0–2)
BASOPHILS ABSOLUTE: 0.11 K/UL (ref 0–0.2)
BUN BLDV-MCNC: 10 MG/DL (ref 6–20)
BUN/CREAT BLD: ABNORMAL (ref 9–20)
C-REACTIVE PROTEIN: 5.5 MG/L (ref 0–5)
CALCIUM SERPL-MCNC: 8.5 MG/DL (ref 8.6–10.4)
CHLORIDE BLD-SCNC: 102 MMOL/L (ref 98–107)
CO2: 22 MMOL/L (ref 20–31)
CREAT SERPL-MCNC: 0.57 MG/DL (ref 0.7–1.2)
DIFFERENTIAL TYPE: ABNORMAL
EOSINOPHILS RELATIVE PERCENT: 3 % (ref 1–4)
GFR AFRICAN AMERICAN: >60 ML/MIN
GFR NON-AFRICAN AMERICAN: >60 ML/MIN
GFR SERPL CREATININE-BSD FRML MDRD: ABNORMAL ML/MIN/{1.73_M2}
GFR SERPL CREATININE-BSD FRML MDRD: ABNORMAL ML/MIN/{1.73_M2}
GLUCOSE BLD-MCNC: 112 MG/DL (ref 70–99)
HCT VFR BLD CALC: 36.1 % (ref 40.7–50.3)
HEMOGLOBIN: 11.8 G/DL (ref 13–17)
IMMATURE GRANULOCYTES: 1 %
LYMPHOCYTES # BLD: 18 % (ref 24–43)
MCH RBC QN AUTO: 33.1 PG (ref 25.2–33.5)
MCHC RBC AUTO-ENTMCNC: 32.7 G/DL (ref 28.4–34.8)
MCV RBC AUTO: 101.4 FL (ref 82.6–102.9)
MONOCYTES # BLD: 11 % (ref 3–12)
NRBC AUTOMATED: 0 PER 100 WBC
PDW BLD-RTO: 16.5 % (ref 11.8–14.4)
PLATELET # BLD: 106 K/UL (ref 138–453)
PLATELET ESTIMATE: ABNORMAL
PMV BLD AUTO: 12.6 FL (ref 8.1–13.5)
POTASSIUM SERPL-SCNC: 4.1 MMOL/L (ref 3.7–5.3)
RBC # BLD: 3.56 M/UL (ref 4.21–5.77)
RBC # BLD: ABNORMAL 10*6/UL
SEDIMENTATION RATE, ERYTHROCYTE: 18 MM (ref 0–10)
SEG NEUTROPHILS: 67 % (ref 36–65)
SEGMENTED NEUTROPHILS ABSOLUTE COUNT: 6.83 K/UL (ref 1.5–8.1)
SODIUM BLD-SCNC: 133 MMOL/L (ref 135–144)
WBC # BLD: 10.2 K/UL (ref 3.5–11.3)
WBC # BLD: ABNORMAL 10*3/UL

## 2020-03-02 PROCEDURE — 99239 HOSP IP/OBS DSCHRG MGMT >30: CPT | Performed by: INTERNAL MEDICINE

## 2020-03-02 PROCEDURE — 85025 COMPLETE CBC W/AUTO DIFF WBC: CPT

## 2020-03-02 PROCEDURE — 6360000002 HC RX W HCPCS: Performed by: PHYSICIAN ASSISTANT

## 2020-03-02 PROCEDURE — 80048 BASIC METABOLIC PNL TOTAL CA: CPT

## 2020-03-02 PROCEDURE — 6370000000 HC RX 637 (ALT 250 FOR IP): Performed by: INTERNAL MEDICINE

## 2020-03-02 PROCEDURE — 99232 SBSQ HOSP IP/OBS MODERATE 35: CPT | Performed by: INTERNAL MEDICINE

## 2020-03-02 PROCEDURE — 6360000002 HC RX W HCPCS: Performed by: INTERNAL MEDICINE

## 2020-03-02 PROCEDURE — 2580000003 HC RX 258: Performed by: INTERNAL MEDICINE

## 2020-03-02 PROCEDURE — 36415 COLL VENOUS BLD VENIPUNCTURE: CPT

## 2020-03-02 PROCEDURE — 86140 C-REACTIVE PROTEIN: CPT

## 2020-03-02 PROCEDURE — 6370000000 HC RX 637 (ALT 250 FOR IP): Performed by: PHYSICIAN ASSISTANT

## 2020-03-02 PROCEDURE — C9113 INJ PANTOPRAZOLE SODIUM, VIA: HCPCS | Performed by: INTERNAL MEDICINE

## 2020-03-02 PROCEDURE — 85651 RBC SED RATE NONAUTOMATED: CPT

## 2020-03-02 RX ORDER — COLCHICINE 0.6 MG/1
0.6 TABLET ORAL DAILY
Qty: 30 TABLET | Refills: 1 | Status: SHIPPED | OUTPATIENT
Start: 2020-03-03 | End: 2020-05-18

## 2020-03-02 RX ORDER — PANTOPRAZOLE SODIUM 40 MG/10ML
40 INJECTION, POWDER, LYOPHILIZED, FOR SOLUTION INTRAVENOUS ONCE
Status: COMPLETED | OUTPATIENT
Start: 2020-03-02 | End: 2020-03-02

## 2020-03-02 RX ORDER — DOXYCYCLINE HYCLATE 100 MG
100 TABLET ORAL 2 TIMES DAILY
Qty: 28 TABLET | Refills: 0 | Status: SHIPPED | OUTPATIENT
Start: 2020-03-02 | End: 2020-03-16

## 2020-03-02 RX ORDER — ALUMINA, MAGNESIA, AND SIMETHICONE 2400; 2400; 240 MG/30ML; MG/30ML; MG/30ML
30 SUSPENSION ORAL EVERY 6 HOURS PRN
Qty: 1 BOTTLE | Refills: 1 | Status: SHIPPED | OUTPATIENT
Start: 2020-03-02 | End: 2020-05-18

## 2020-03-02 RX ORDER — AMLODIPINE BESYLATE 10 MG/1
10 TABLET ORAL DAILY
Qty: 30 TABLET | Refills: 3 | Status: SHIPPED | OUTPATIENT
Start: 2020-03-03 | End: 2020-05-20 | Stop reason: SDUPTHER

## 2020-03-02 RX ORDER — PANTOPRAZOLE SODIUM 40 MG/1
40 TABLET, DELAYED RELEASE ORAL
Status: DISCONTINUED | OUTPATIENT
Start: 2020-03-03 | End: 2020-03-02 | Stop reason: HOSPADM

## 2020-03-02 RX ORDER — PANTOPRAZOLE SODIUM 40 MG/1
40 TABLET, DELAYED RELEASE ORAL
Qty: 30 TABLET | Refills: 5 | Status: SHIPPED | OUTPATIENT
Start: 2020-03-02

## 2020-03-02 RX ORDER — MAGNESIUM HYDROXIDE/ALUMINUM HYDROXICE/SIMETHICONE 120; 1200; 1200 MG/30ML; MG/30ML; MG/30ML
30 SUSPENSION ORAL ONCE
Status: COMPLETED | OUTPATIENT
Start: 2020-03-02 | End: 2020-03-02

## 2020-03-02 RX ORDER — DOXYCYCLINE HYCLATE 100 MG
100 TABLET ORAL EVERY 12 HOURS SCHEDULED
Status: DISCONTINUED | OUTPATIENT
Start: 2020-03-02 | End: 2020-03-02 | Stop reason: HOSPADM

## 2020-03-02 RX ORDER — MAGNESIUM HYDROXIDE/ALUMINUM HYDROXICE/SIMETHICONE 120; 1200; 1200 MG/30ML; MG/30ML; MG/30ML
30 SUSPENSION ORAL EVERY 6 HOURS PRN
Status: DISCONTINUED | OUTPATIENT
Start: 2020-03-02 | End: 2020-03-02 | Stop reason: HOSPADM

## 2020-03-02 RX ORDER — TRAMADOL HYDROCHLORIDE 50 MG/1
50 TABLET ORAL EVERY 6 HOURS PRN
Qty: 28 TABLET | Refills: 0 | Status: SHIPPED | OUTPATIENT
Start: 2020-03-02 | End: 2020-03-09

## 2020-03-02 RX ADMIN — ENOXAPARIN SODIUM 40 MG: 40 INJECTION SUBCUTANEOUS at 08:23

## 2020-03-02 RX ADMIN — GABAPENTIN 600 MG: 600 TABLET ORAL at 08:24

## 2020-03-02 RX ADMIN — FERROUS SULFATE TAB EC 325 MG (65 MG FE EQUIVALENT) 325 MG: 325 (65 FE) TABLET DELAYED RESPONSE at 08:24

## 2020-03-02 RX ADMIN — GABAPENTIN 600 MG: 600 TABLET ORAL at 14:30

## 2020-03-02 RX ADMIN — COLCHICINE 0.6 MG: 0.6 TABLET, FILM COATED ORAL at 08:23

## 2020-03-02 RX ADMIN — ALUMINUM HYDROXIDE, MAGNESIUM HYDROXIDE, AND SIMETHICONE 30 ML: 200; 200; 20 SUSPENSION ORAL at 13:18

## 2020-03-02 RX ADMIN — CEFEPIME HYDROCHLORIDE 2 G: 2 INJECTION, POWDER, FOR SOLUTION INTRAVENOUS at 02:44

## 2020-03-02 RX ADMIN — ASPIRIN 81 MG: 81 TABLET, CHEWABLE ORAL at 08:24

## 2020-03-02 RX ADMIN — PANTOPRAZOLE SODIUM 40 MG: 40 INJECTION, POWDER, FOR SOLUTION INTRAVENOUS at 13:17

## 2020-03-02 RX ADMIN — AMLODIPINE BESYLATE 10 MG: 10 TABLET ORAL at 08:23

## 2020-03-02 RX ADMIN — LISINOPRIL 20 MG: 20 TABLET ORAL at 08:23

## 2020-03-02 ASSESSMENT — PAIN SCALES - GENERAL: PAINLEVEL_OUTOF10: 2

## 2020-03-02 NOTE — PROGRESS NOTES
Progress Note  Podiatric Medicine and Surgery     Subjective     CC: Right first MPJ pain    Patient seen and examined at bedside. Afebrile  Denies n/c/v/f  Notes 3/10 today, hasn't needed pain meds  Relates better ROM to right great toe    HPI :  Meeta Syed is a 63 y.o. male seen at [facility] for right great joint pain. Patient was previously admitted on 2/18/2020 for same issue. Started on IV antibiotics and colchicine. Following administration of colchicine patient reported relief to great toe joint and was discharged instructed to follow-up with Dr. Soo Mike and Dr. Bhavya Erickson outpatient. Patient notes that he was unable to fill his colchicine prescription due to insurance issues, last dose was on 2/23. Notes that redness and swelling and pain had improved remarkably for several days after discharge however relates worsening pain in his great toe joint starting yesterday. Denies any falls or trauma. On visit with ID physician today patient reported worsening pain and swelling, Dr. Bhavya Erickson recommended admission for further work-up to rule out septic joint.      PCP is REECE Honeycutt - CNP    ROS: Denies N/V/F/C/SOB/CP. Otherwise negative except at stated in the HPI.      Medications:  Scheduled Meds:   amLODIPine  10 mg Oral Daily    colchicine  0.6 mg Oral Daily    lidocaine  5 mL Intradermal Once    betamethasone acetate-betamethasone sodium phosphate  3 mg Intra-articular Once    aspirin  81 mg Oral Daily    ferrous sulfate  325 mg Oral Daily with breakfast    gabapentin  600 mg Oral TID    lisinopril  20 mg Oral Daily    primidone  100 mg Oral Nightly    rOPINIRole  0.5 mg Oral Nightly    sodium chloride flush  10 mL Intravenous 2 times per day    enoxaparin  40 mg Subcutaneous Daily    cefepime  2 g Intravenous Q12H       Continuous Infusions:    PRN Meds:sodium chloride flush, sodium chloride flush, potassium chloride **OR** potassium alternative oral replacement **OR** potassium chloride, magnesium sulfate, acetaminophen **OR** acetaminophen, polyethylene glycol, promethazine **OR** ondansetron, nicotine, HYDROcodone 5 mg - acetaminophen **OR** HYDROcodone 5 mg - acetaminophen    Objective     Vitals:  Patient Vitals for the past 8 hrs:   BP Temp Temp src Pulse Resp SpO2   20 0640 (!) 152/89 98.3 °F (36.8 °C) Oral 90 14 97 %     Average, Min, and Max for last 24 hours Vitals:  TEMPERATURE:  Temp  Av.8 °F (37.1 °C)  Min: 98.3 °F (36.8 °C)  Max: 99.2 °F (37.3 °C)    RESPIRATIONS RANGE: Resp  Av  Min: 14  Max: 18    PULSE RANGE: Pulse  Av  Min: 90  Max: 110    BLOOD PRESSURE RANGE:  Systolic (14JWM), UWT:884 , Min:133 , SFJ:736   ; Diastolic (31AHE), IUZ:17, Min:82, Max:89      PULSE OXIMETRY RANGE: SpO2  Av.5 %  Min: 96 %  Max: 97 %    I/O last 3 completed shifts: In: 257.4 [I.V.:257.4]  Out: 500 [Urine:500]    CBC:  Recent Labs     20  1526 20  0557 20  1005 20  0546   WBC 5.0 5.3 7.0 10.2   HGB 11.7* 11.8* 12.3* 11.8*   HCT 35.4* 35.6* 37.4* 36.1*   PLT See Reflexed IPF Result 67* See Reflexed IPF Result 106*   CRP 8.3* 7.7*  --   --         BMP:  Recent Labs     20  0557 20  1005 20  0546    134* 133*   K 3.8 4.1 4.1    103 102   CO2 22 21 22   BUN 12 9 10   CREATININE 0.60* 0.55* 0.57*   GLUCOSE 88 122* 112*   CALCIUM 8.4* 8.7 8.5*        Coags:  Recent Labs     20  0557   PROT 6.3*   INR 1.5       Lab Results   Component Value Date    SEDRATE 29 (H) 2020     Recent Labs     20  1526 20  0557   CRP 8.3* 7.7*       Lower Extremity Physical Exam:  Vascular: Left DP and PT pulses are palpable, unable to palpate secondary to edema right foot, DP/PT audible on Doppler.   CFT <3 seconds to all digits.  Hair growth is absent to the level of the digits.  Moderate edema.       Neuro: Saph/sural/SP/DP/plantar sensation intact to light touch.     Musculoskeletal: Muscle strength is 5/5, adequate ROM, adequate strength to all lower extremity muscle groups. Gross deformity is absent.  No pain with compression of calf.   Tenderness with palpation of first MPJ complex, particularly plantar aspect.  No joint crepitus noted first MPJ.  minimal Pain with palpation of plantar base of distal phalanx of the hallux at insertion of FHL tendon. No pain along sesamoids. No pain dorsal 1st mptj. Significant Improved Pain on plantarflexion at 1st mtpj. Minimal Pain along course of FHL tendon to level of 1st tmtj     Dermatologic: No open wound. No purulent drainage or fluctuance appreciated. improved  Circumferential  erythema  around the first MPJ complex with mild increase in warmth.  No fluctuance appreciated.       Clinical Images:                              Imaging:   MRI FOOT RIGHT W WO CONTRAST   Final Result   1. Extensive tenosynovitis of the FHL tendon extending from the 1st digit   proximally to at least the level of the 1st TMT joint. Surrounding myositis   in the plantar medial foot. 2. Edema and enhancement in the sesamoid bones along the margins of the FHL   tendon. This could be reactive or due to acute osteomyelitis. 3. Synovitis in the 1st MTP joint without discrete effusion. Mild   periarticular edema at proximal phalanx and 1st metatarsal head. Findings   could be reactive or due to infection. XR FOOT RIGHT (MIN 3 VIEWS)   Final Result   No radiographic evidence of osteomyelitis or soft tissue gas. Cultures: none    Assessment   Kendall Melgar is a 63 y.o. male with   1. Flexor hallucis longus Tendonitis R foot   2. Hepatitis C  3. 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 Hx GI bleed  5.  Liver cirrhosis    Principal Problem:    Acute hematogenous osteomyelitis of right foot (HCC)-Involving the sesamoid bones  Active Problems:    Iron deficiency anemia due to chronic blood loss    Essential hypertension    Thrombocytopenia (HCC)    Alcoholic cirrhosis of liver without ascites (HCC)    Hep C w/o coma, chronic (HCC)    Septic arthritis of IP joint of toe, right (HCC)    Tenosynovitis of right foot-right Hallux  Resolved Problems:    * No resolved hospital problems. *       Plan     · Patient examined and evaluated at bedside   · Treatment options discussed in detail with the patient  · XR reviewed- no acute osseous abnormalities, OM, soft tissue gas. · MRI ordered to assess for deep abscess  · IM- medical management  · ID following-cefepime  · MRI reviewed- no abscess noted, flexor hallucis longus tenosynovitis and myositis suspected as it correlates with pain on physical exam  · Pain significantly improved today. Patient stable for discharge from podiatry standpoint. Improved ROM and edema. Low suspicion for infectious process. Likely inflammatory process involving FHL tendon causing great toe pain. Pt stable for discharge from podiatry standpoint. Recommend continuing colchicine, abx per ID recs. Pt to follow up with Dr. Soo Mike as scheduled on 3/3/2020.    · Elevate heels off bed  · Ice to affected area  · NWB to Right lower extremity in surgical shoe  · Discussed with GEENA Hood [facility]   Podiatric Medicine & Surgery   3/2/2020 at 8:48 AM

## 2020-03-02 NOTE — PROGRESS NOTES
thrush. No tenderness of sinuses. No nasal drainage. Neck: Supple, without lymphadenopathy. No JVD. Pulmonary/Chest: Clear to auscultation, without wheezes, rales, or rhonchi. No areas of consolidation. Good air movement bilaterally. No egophony. Cardiovascular:Regular rate and rhythm without murmurs, rubs, or gallops. S1 and S2 normal.  Abdomen: Soft, non tender. Bowel sounds normal. No cramps. No organomegaly  Right lower extremity covered with dressing  Neurologic:No gross sensory or motor deficits. Skin: No rash or lesions. IV site inspected: no inflammation. Medical Decision Making:   I have independently reviewed/ordered the following labs:    CBC with Differential:   Recent Labs     03/01/20  1005 03/02/20  0546   WBC 7.0 10.2   HGB 12.3* 11.8*   HCT 37.4* 36.1*   PLT See Reflexed IPF Result 106*   LYMPHOPCT 9* 18*   MONOPCT 1 11     BMP:   Recent Labs     03/01/20  1005 03/02/20  0546   * 133*   K 4.1 4.1    102   CO2 21 22   BUN 9 10   CREATININE 0.55* 0.57*     Hepatic Function Panel:   Recent Labs     02/29/20  0557   PROT 6.3*   LABALBU 2.4*   BILITOT 4.57*   ALKPHOS 129   ALT 46*   *     Lab Results   Component Value Date    [facility] 10.7 02/21/2020          Medications:      amLODIPine  10 mg Oral Daily    colchicine  0.6 mg Oral Daily    lidocaine  5 mL Intradermal Once    betamethasone acetate-betamethasone sodium phosphate  3 mg Intra-articular Once    aspirin  81 mg Oral Daily    ferrous sulfate  325 mg Oral Daily with breakfast    gabapentin  600 mg Oral TID    lisinopril  20 mg Oral Daily    primidone  100 mg Oral Nightly    rOPINIRole  0.5 mg Oral Nightly    sodium chloride flush  10 mL Intravenous 2 times per day    enoxaparin  40 mg Subcutaneous Daily    cefepime  2 g Intravenous Q12H       Thank you for allowing us to participate in the care of this patient. Please call with questions.     Theresa Castleman, MD  PGY1 Internal Medicine Resident  [facility] [address]:    I have discussed the case, including pertinent history and exam findings with the residents and students. I have seen and examined the patient and the key elements of the encounter have been performed by me. I was present when the student obtained his information or examined the patient. I have reviewed the laboratory data, other diagnostic studies and discussed them with the residents. I have updated the medical record where necessary. I agree with the assessment, plan and orders as documented by the resident/ student.     Jose Landa MD.    3/2/2020 11:45 AM

## 2020-03-02 NOTE — DISCHARGE SUMMARY
[address]    Discharge Summary     Patient ID: Florentino Gomez  :  1960   MRN: [id number]     ACCOUNT:  [de-identified]   Patient's PCP: REECE Alegre CNP  Admit Date: 2020   Discharge Date: 3/2/2020     Length of Stay: 3  Code Status:  Full Code  Admitting Physician: Polly Gonzalez MD  Discharge Physician: Polly Gonzalez MD     Active Discharge Diagnoses:     Hospital Problem Lists:  Principal Problem:    Acute hematogenous osteomyelitis of right foot (HCC)-Involving the sesamoid bones  Active Problems:    Iron deficiency anemia due to chronic blood loss    Essential hypertension    Thrombocytopenia (HCC)    Alcoholic cirrhosis of liver without ascites (HCC)    Hep C w/o coma, chronic (HCC)    Septic arthritis of IP joint of toe, right (HCC)    Tenosynovitis of right foot-right Hallux  Resolved Problems:    * No resolved hospital problems. *      Admission Condition:  fair     Discharged Condition: good    Hospital Stay:     Hospital Course:  Florentino Gomez is a 63 y.o. male who was admitted for the management of  Acute hematogenous osteomyelitis of right foot ([facility] [address] , presented to ER with No chief complaint on file. Beatriz Teresa a 63 y.o. Non-/non  male who presents with No chief complaint on file.   and is admitted to the hospital for the management of <principal problem not specified>. 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  He was therefore referred for admission for further work-up and management.  Patient has history of hypertension, dyslipidemia, chronic hepatitis C infection with resultant cirrhosis and portal hypertension and remote GI bleed.  He denied any fever or chills, nausea or vomiting.     Patient had been referred from the ID consults office for admission to the [facility] services for management of worsening right foot/toe infection. 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 MRI did confirm osteomyelitis of the sesamoid bones with multiple ligament infections. Patient swelling of the right foot had improved markedly with near total resolution. Patient is being discharged on colchicine as recommended by podiatry and p.o. doxycycline as recommended by ID consults who did the prescription. He is scheduled to follow-up with both podiatry and ID consults.   He has been discharged in a stable state with wheeled rolling walker.     Physical Examination:         General Appearance: alert, well appearing, and in no acute distress  Mental status: oriented to person, place, and time  Head: normocephalic, atraumatic  Eye: no icterus, redness, pupils equal and reactive, extraocular eye movements intact, conjunctiva clear  Ear: normal external ear, no discharge, hearing intact  Nose: no drainage noted  Mouth: mucous membranes moist  Neck: supple, no carotid bruits, thyroid not palpable  Lungs: Bilateral equal air entry, clear to ausculation, no wheezing, rales or rhonchi, normal effort  Cardiovascular: normal rate, regular rhythm, no murmur, gallop, rub  Abdomen: Soft, nontender, nondistended, normal bowel sounds, no hepatomegaly or splenomegaly  Neurologic: There are no new focal motor or sensory deficits, normal muscle tone and bulk, no abnormal sensation, normal speech, cranial nerves II through XII grossly intact  Skin: No gross lesions, rashes, bruising or bleeding on exposed skin area.  Redness involving the right foot more prominent on the right great toe  Extremities: peripheral pulses palpable, swelling of the right foot with redness spreading from the right great toe into greater half of the plantar surface with tenderness.  No calf tenderness with palpation  Psych: normal affect    Significant therapeutic interventions: As indicated in the hospital course    Significant Diagnostic Studies:   Labs / Micro:  CBC:   Lab Results   Component Value Date    WBC 10.2 03/02/2020    RBC 3.56 03/02/2020    HGB 11.8 03/02/2020    HCT 36.1 03/02/2020    .4 03/02/2020    MCH 33.1 03/02/2020    MCHC 32.7 03/02/2020    RDW 16.5 03/02/2020     03/02/2020     BMP:    Lab Results   Component Value Date    GLUCOSE 112 03/02/2020     03/02/2020    K 4.1 03/02/2020     03/02/2020    CO2 22 03/02/2020    ANIONGAP 9 03/02/2020    BUN 10 03/02/2020    CREATININE 0.57 03/02/2020    BUNCRER NOT REPORTED 03/02/2020    CALCIUM 8.5 03/02/2020    LABGLOM >60 03/02/2020    GFRAA >60 03/02/2020    GFR      03/02/2020    GFR NOT REPORTED 03/02/2020     HFP:    Lab Results   Component Value Date    PROT 6.3 02/29/2020     CMP:    Lab Results   Component Value Date    GLUCOSE 112 03/02/2020     03/02/2020    K 4.1 03/02/2020     03/02/2020    CO2 22 03/02/2020    BUN 10 03/02/2020    CREATININE 0.57 03/02/2020    ANIONGAP 9 03/02/2020    ALKPHOS 129 02/29/2020    ALT 46 02/29/2020     02/29/2020    BILITOT 4.57 02/29/2020    LABALBU 2.4 02/29/2020    ALBUMIN 0.6 02/29/2020    LABGLOM >60 03/02/2020    GFRAA >60 03/02/2020    GFR      03/02/2020    GFR NOT REPORTED 03/02/2020    PROT 6.3 02/29/2020    CALCIUM 8.5 03/02/2020     PT/INR:    Lab Results   Component Value Date    PROTIME 15.6 02/29/2020    PROTIME 12.4 10/16/2019    INR 1.5 02/29/2020     FLP:    Lab Results   Component Value Date    CHOL 174 11/01/2017    TRIG 198 11/01/2017    HDL 39 11/01/2017     U/A:    Lab Results   Component Value Date    COLORU YELLOW 02/20/2017    TURBIDITY CLEAR 02/20/2017    SPECGRAV 1.018 02/20/2017    HGBUR 3+ 02/20/2017    PHUR 5.5 02/20/2017    PROTEINU 1+ 02/20/2017    GLUCOSEU NEGATIVE 02/20/2017    KETUA NEGATIVE 02/20/2017    BILIRUBINUR  02/20/2017     Presumptive positive. Unable to confirm due to unavailability of reagent. UROBILINOGEN Normal 02/20/2017    NITRU NEGATIVE 02/20/2017    LEUKOCYTESUR TRACE 02/20/2017        Radiology:  Edger Jovi Foot Right (min 3 Views)    Result Date: 2/28/2020  No radiographic evidence of osteomyelitis or soft tissue gas. Mri Foot Right W Wo Contrast    Result Date: 2/29/2020  1. Extensive tenosynovitis of the FHL tendon extending from the 1st digit proximally to at least the level of the 1st TMT joint. Surrounding myositis in the plantar medial foot. 2. Edema and enhancement in the sesamoid bones along the margins of the FHL tendon. This could be reactive or due to acute osteomyelitis. 3. Synovitis in the 1st MTP joint without discrete effusion. Mild periarticular edema at proximal phalanx and 1st metatarsal head. Findings could be reactive or due to infection. Consultations:    Consults:     Final Specialist Recommendations/Findings:   IP CONSULT TO PODIATRY  IP CONSULT TO INFECTIOUS DISEASES  IP CONSULT TO SPIRITUAL SERVICES      The patient was seen and examined on day of discharge and this discharge summary is in conjunction with any daily progress note from day of discharge.     Discharge plan:     Disposition: Home with home care    Physician Follow Up:     Janice Lefort, DPM  [address] [address]  [phone]    On 3/3/2020      [address] [facility]  [phone]    In 3 weeks         Requiring Further Evaluation/Follow Up POST HOSPITALIZATION/Incidental Findings:      Diet: cardiac diet    Activity: As tolerated    Instructions to Patient: Compliance to medications and follow-up addressed    Discharge Medications:      Medication List      START taking these medications    amLODIPine 10 MG tablet  Commonly known as:  NORVASC  Take 1 tablet by mouth daily  Start taking on:  March 3, 2020 MD  3/2/2020  2:42 PM      Thank you REECE Simon - ALAN for the opportunity to be involved in this patient's care.

## 2020-03-02 NOTE — PLAN OF CARE
Problem: SAFETY  Goal: Free from accidental physical injury  Outcome: Ongoing
Problem: SAFETY  Goal: Free from accidental physical injury  Outcome: Ongoing     Problem: DAILY CARE  Goal: Daily care needs are met  Outcome: Ongoing     Problem: PAIN  Goal: Patient's pain/discomfort is manageable  Outcome: Ongoing     Problem: SKIN INTEGRITY  Goal: Skin integrity is maintained or improved  Outcome: Ongoing     Problem: Skin Integrity:  Goal: Demonstration of wound healing without infection will improve  Description  Demonstration of wound healing without infection will improve  Outcome: Ongoing  Goal: Complications related to intravenous access or infusion will be avoided or minimized  Description  Complications related to intravenous access or infusion will be avoided or minimized  Outcome: Ongoing     Problem: Pain:  Goal: Pain level will decrease  Description  Pain level will decrease  Outcome: Ongoing  Goal: Control of acute pain  Description  Control of acute pain  Outcome: Ongoing  Goal: Control of chronic pain  Description  Control of chronic pain  Outcome: Ongoing     Problem: Falls - Risk of:  Goal: Will remain free from falls  Description  Will remain free from falls  Outcome: Ongoing  Goal: Absence of physical injury  Description  Absence of physical injury  Outcome: Ongoing     Problem: SAFETY  Goal: Free from accidental physical injury  Outcome: Ongoing     Problem: DAILY CARE  Goal: Daily care needs are met  Outcome: Ongoing     Problem: PAIN  Goal: Patient's pain/discomfort is manageable  Outcome: Ongoing     Problem: SKIN INTEGRITY  Goal: Skin integrity is maintained or improved  Outcome: Ongoing     Problem: Skin Integrity:  Goal: Demonstration of wound healing without infection will improve  Description  Demonstration of wound healing without infection will improve  Outcome: Ongoing  Goal: Complications related to intravenous access or infusion will be avoided or minimized  Description  Complications related to intravenous access or infusion will be avoided or
falls  Description  Will remain free from falls  2/29/2020 0514 by Ammon Ceron RN  Outcome: Met This Shift  2/28/2020 1802 by Mónica Okeefe RN  Outcome: Ongoing  Goal: Absence of physical injury  Description  Absence of physical injury  2/29/2020 0514 by Ammon Ceron RN  Outcome: Met This Shift  2/28/2020 1802 by Mónica Okeefe RN  Outcome: Ongoing

## 2020-03-02 NOTE — PROGRESS NOTES
Face-to-face notes    Patient was seen and examined this morning. Because of the swelling and pain in the right great toe and involving the plantar aspect of the right foot, patient is having difficulty in ambulating. Discussed the need for a rolling walker with the patient, of which patient has agreed to utilize that to prevent fall on discharge. Patient will therefore be discharged with a rolling walker and will be following up with podiatry and primary care physician as well as ID consult. Patient is being discharged in a stable state.     Electronically signed by Amelia Ramsay MD on 3/2/20 at 2:32 PM

## 2020-03-02 NOTE — DISCHARGE INSTR - COC
at [address] ENDOSCOPY N/A 2/1/2019    EGD BIOPSY performed by Dontrell West MD at [address] [facility] N/A 8/1/2019    EGD ESOPHAGOGASTRODUODENOSCOPY performed by Balbina Silva MD at [address]  2007       Immunization History:   Immunization History   Administered Date(s) Administered    Hepatitis A Adult (Vaqta) 11/08/2018    Hepatitis B Adult (Engerix-B) 11/08/2018    Influenza Virus Vaccine 11/30/2018    Influenza, Kayla Spar, IM, (6 mo and older Fluzone, Flulaval, Fluarix and 3 yrs and older Afluria) 09/25/2019    Influenza, Kayla Spar, Recombinant, IM PF (Flublok 18 yrs and older) 11/30/2018    Pneumococcal Polysaccharide (Jvtgrulvi22) 12/14/2017    Tdap (Boostrix, Adacel) 04/16/2018       Active Problems:  Patient Active Problem List   Diagnosis Code    GI bleed K92.2    Gastrointestinal hemorrhage K92.2    Iron deficiency anemia due to chronic blood loss D50.0    Diarrhea R19.7    Melena K92.1    New onset seizure ([facility] [address] R56.9    Elevated liver enzymes R74.8    Essential hypertension I10    Gastric ulcer K25.9    Unspecified viral hepatitis C without hepatic coma B19.20    Portal hypertensive gastropathy (HCC) K76.6, K31.89    Anemia D64.9    Cirrhosis of liver without ascites (HCC) K74.60    Duodenal ulcer K26.9    Acute blood loss anemia D62    Thrombocytopenia (HCC) S55.9    Alcoholic cirrhosis of liver without ascites (HCC) K70.30    Chronic midline low back pain without sciatica M54.5, G89.29    Seizures (HCC) R56.9    Hypertension I10    Hep C w/o coma, chronic (HCC) B18.2    Chronic back pain M54.9, G89.29    History of ETOH abuse F10.11    Abdominal pain R10.9    Elevated alpha fetoprotein R77.2    Olecranon bursitis of left elbow M70.22    Septic arthritis of IP joint of toe, right (HCC) M00.9    Septic arthritis of right foot (HCC) M00.9    Foot infection L08.9    Septic arthritis (HCC) M00.9    Acute Concerns:940160447}    Impairments/Disabilities:      [address] Impairments/Disabilities:601870319}    Nutrition Therapy:  Current Nutrition Therapy:   [address] TOÑO Diet List:332482645}    Routes of Feeding: {CHP DME Other Feedings:097542888}  Liquids: {Slp liquid thickness:68803}  Daily Fluid Restriction: {CHP DME Yes amt example:766892104}  Last Modified Barium Swallow with Video (Video Swallowing Test): {Done Not Done DRCX:981952191}    Treatments at the Time of Hospital Discharge:   Respiratory Treatments: ***  Oxygen Therapy:  {Therapy; copd oxygen:24290}  Ventilator:    { CC Vent EIPL:141641483}    Rehab Therapies: {THERAPEUTIC INTERVENTION:9034778472}  Weight Bearing Status/Restrictions: [address]  Weight Bearin}  Other Medical Equipment (for information only, NOT a DME order):  {EQUIPMENT:667000071}  Other Treatments: ***    Patient's personal belongings (please select all that are sent with patient):  {P DME Belongings:530386676}    RN SIGNATURE:  {Esignature:094521474}    CASE MANAGEMENT/SOCIAL WORK SECTION    Inpatient Status Date: ***    Readmission Risk Assessment Score:  Readmission Risk              Risk of Unplanned Readmission:        22           Discharging to Facility/ Agency   · Name:   · Address:  · Phone:  · Fax:    Dialysis Facility (if applicable)   · Name:  · Address:  · Dialysis Schedule:  · Phone:  · Fax:    / signature: {Esignature:949495903}    PHYSICIAN SECTION    Prognosis: {Prognosis:0451417531}    Condition at Discharge: [address] Patient Condition:167016911}    Rehab Potential (if transferring to Rehab): {Prognosis:2480171428}    Recommended Labs or Other Treatments After Discharge: ***    Physician Certification: I certify the above information and transfer of Gray Malone  is necessary for the continuing treatment of the diagnosis listed and that he requires {Admit to Appropriate Level of Care:34014} for {GREATER/LESS:235195382} 30 days.      Update

## 2020-03-02 NOTE — PROGRESS NOTES
distal 1st, 4th,   and 5th digits. 5. Degenerative changes as detailed above. The findings were sent to the [facility] at 1:03   pm on 2/21/2020to be communicated to a licensed caregiver.         BUN:6->10  Cr:0.48->0.57     WBC:5.3-->10.2  Hb:10.9-->11.8  Plat: 106     Cultures:  Urine:  ·    Blood:  ·    Sputum :  ·    Wound:  ·    Synovial Fluid:  - 02-21: Few Neutrophils, No bacteria seen to date, no growth to date.      DISCUSSION:  · Patient with prior Rt hallux inflammation  · Cause could not ne differentiated: Crystal induced arthropathy vs infection. · Treated for both  · Presented 2-28-20 with worsening symptoms and new area of erythema in plantar area. · Also has developed facial rash, possibly from Augmentin  · Podiatry evaluated and injected joint with Celestone with benefit  · MRI with inflammatory changes which could be induced by crystal arthropathy. · No definite evidence of infection. Discussed with patient, RN.    3-2-20:          2-28-20: I have personally reviewed the past medical history, past surgical history, medications, social history, and family history, and I have updated the database accordingly.   Past Medical History:     Past Medical History:   Diagnosis Date    Bleeding ulcer 2019    Chronic back pain 2015    Gastric ulcer 10/31/2015    large 2-3 cm    Hematuria 2017    2019-RESOLVED NOW    Hep C w/o coma, chronic ([address] 2017    WAITING APPROVAL FROM INSURANCE COMPANY TO GET TREATMENT STARTED    History of blood transfusion 2019    ANEMIA R/T BLEEDING ULCERS    History of ETOH abuse     QUIT 01/2017    Hypertension 2017    ON RX    Neuropathy 2009    FEET BILAT, SHAKEY HANDS    Portal hypertensive gastropathy ([address]     Seizures ([address]     only one in 2016    Wears glasses        Past Surgical  History:     Past Surgical History:   Procedure Laterality Date    BACK SURGERY  2000    lumbar discectomy    COLONOSCOPY 2018    mod diverticulosis; internal hemorrhoids; retained stools    ELBOW DEBRIDEMENT Left 10/16/2019     ELBOW INCISION AND DRAINAGE    HERNIA REPAIR Left     lt inguinal    INCISION AND DRAINAGE Left 10/16/2019    ELBOW INCISION AND DRAINAGE, PARTIALTRICEP REPAIR performed by Sanjeev Giron DO at [address] N/A 3/14/2018    COLONOSCOPY performed by Keysha Augustin MD at  [address]  10/31/2015    large gastric ulcer    UPPER GASTROINTESTINAL ENDOSCOPY  2018    mod portal hypertensive gastrophathy    UPPER GASTROINTESTINAL ENDOSCOPY N/A 3/14/2018    EGD BIOPSY performed by Keysha Augustin MD at [address] [place] [address] N/A 2019    EGD BIOPSY performed by Alberto Wang MD at [facility] 2019    EGD BIOPSY performed by Cindy Lebron MD at [address] [place] [address] N/A 2019    EGD ESOPHAGOGASTRODUODENOSCOPY performed by Joshua Gaming MD at [facility]         Medications:   No current outpatient medications on file.      Social History:     Social History     Socioeconomic History    Marital status:      Spouse name: Not on file    Number of children: 3    Years of education: Not on file    Highest education level: Not on file   Occupational History    Not on file   Social Needs    Financial resource strain: Not on file    Food insecurity:     Worry: Not on file     Inability: Not on file    Transportation needs:     Medical: Not on file     Non-medical: Not on file   Tobacco Use    Smoking status: Former Smoker     Packs/day: 1.00     Years: 15.00     Pack years: 15.00     Last attempt to quit: 1995     Years since quittin.4    Smokeless tobacco: Former User     Types: Chew   Substance and Sexual Activity    Alcohol use: Not Currently     Comment: ocassionally    Drug use: No    Sexual activity: Yes     Partners: Female   Lifestyle    Physical activity:     Days per week: Not on file     Minutes per session: Not on file    Stress: Not on file   Relationships    Social connections:     Talks on phone: Not on file     Gets together: Not on file     Attends Restorationism service: Not on file     Active member of club or organization: Not on file     Attends meetings of clubs or organizations: Not on file     Relationship status: Not on file    Intimate partner violence:     Fear of current or ex partner: Not on file     Emotionally abused: Not on file     Physically abused: Not on file     Forced sexual activity: Not on file   Other Topics Concern    Not on file   Social History Narrative    Not on file       Family History:     Family History   Problem Relation Age of Onset    High Blood Pressure Mother     Other Father         neuropathy    Stroke Father     Prostate Cancer Father         Allergies:   Sulfa antibiotics     Review of Systems:   Constitutional: No fevers or chills. No systemic complaints  Head: No headaches  Eyes: No double vision or blurry vision. ENT: No sore throat or runny nose. . No hearing loss, tinnitus or vertigo. Cardiovascular: No chest pain or palpitations. No shortness of breath. No BE  Lung: No shortness of breath or cough. No sputum production  Abdomen: No nausea, vomiting, diarrhea, or abdominal pain. .  Genitourinary: No increased urinary frequency, or dysuria. No hematuria. No suprapubic or CVA pain  Musculoskeletal: No muscle aches or pains. No joint effusions, swelling or deformities. Has developed worsening of Rt toe edema and new plantar erythema, pain  Hematologic: No bleeding or bruising. Neurologic: No headache, weakness, numbness, or tingling.     Physical Examination :   BP (!) 152/89   Pulse 90   Temp 98.3 °F (36.8 °C) (Oral)   Resp 14   Ht 5' 10\" (1.778 m)   Wt 231 lb 1.6 oz (104.8 kg)   SpO2 97%   BMI 33.16 kg/m²    General 02/19/2020    LABALBU 2.4 02/29/2020    LABALBU 2.3 02/19/2020    BILIDIR 2.01 08/01/2019    BILIDIR 2.41 07/30/2019    IBILI 1.67 08/01/2019    IBILI 2.14 07/30/2019    BILITOT 4.57 02/29/2020    BILITOT 5.15 02/19/2020    ALKPHOS 129 02/29/2020    ALKPHOS 154 02/19/2020    ALT 46 02/29/2020    ALT 43 02/19/2020     02/29/2020     02/19/2020     No results found for: RPR  No results found for: HIV  No results found for: [facility]  Lab Results   Component Value Date    MUCUS 1+ 02/20/2017    PH 7.20 02/20/2017    RBC 3.56 03/02/2020    TRICHOMONAS NOT REPORTED 02/20/2017    WBC 10.2 03/02/2020    YEAST NOT REPORTED 02/20/2017    TURBIDITY CLEAR 02/20/2017     Lab Results   Component Value Date    CREATININE 0.57 03/02/2020    GLUCOSE 112 03/02/2020       Thank you for allowing us to participate in the care of this patient. Please call with questions.     Laura Woods MD  Pager: [phone] - Office: [phone]

## 2020-03-02 NOTE — PROGRESS NOTES
CLINICAL PHARMACY NOTE: MEDS TO [address] Select Patient?: Yes  Total # of Prescriptions Filled: 5   The following medications were delivered to the patient:  · Doxycycline 100mg  · Antacid 400-400-40  · Amlodipine 10mg  · Tramadol 50mg  · Pantoprazole 40mg  Total # of Interventions Completed: 0  Time Spent (min): 5    Additional Documentation: meds delivered to patient 3-2-2020 at 6:04pm

## 2020-03-03 ENCOUNTER — OFFICE VISIT (OUTPATIENT)
Dept: PODIATRY | Age: 60
End: 2020-03-03
Payer: MEDICARE

## 2020-03-03 ENCOUNTER — TELEPHONE (OUTPATIENT)
Dept: FAMILY MEDICINE CLINIC | Age: 60
End: 2020-03-03

## 2020-03-03 VITALS — HEIGHT: 70 IN | BODY MASS INDEX: 32.21 KG/M2 | RESPIRATION RATE: 16 BRPM | WEIGHT: 225 LBS

## 2020-03-03 LAB — INTERVENTION: NORMAL

## 2020-03-03 PROCEDURE — G8482 FLU IMMUNIZE ORDER/ADMIN: HCPCS | Performed by: PODIATRIST

## 2020-03-03 PROCEDURE — 3017F COLORECTAL CA SCREEN DOC REV: CPT | Performed by: PODIATRIST

## 2020-03-03 PROCEDURE — 1036F TOBACCO NON-USER: CPT | Performed by: PODIATRIST

## 2020-03-03 PROCEDURE — 1111F DSCHRG MED/CURRENT MED MERGE: CPT | Performed by: PODIATRIST

## 2020-03-03 PROCEDURE — 99213 OFFICE O/P EST LOW 20 MIN: CPT | Performed by: PODIATRIST

## 2020-03-03 PROCEDURE — G8417 CALC BMI ABV UP PARAM F/U: HCPCS | Performed by: PODIATRIST

## 2020-03-03 PROCEDURE — G8427 DOCREV CUR MEDS BY ELIG CLIN: HCPCS | Performed by: PODIATRIST

## 2020-03-03 RX ORDER — PREDNISONE 10 MG/1
TABLET ORAL
Qty: 20 TABLET | Refills: 0 | Status: SHIPPED | OUTPATIENT
Start: 2020-03-03 | End: 2020-05-18

## 2020-03-03 NOTE — PROGRESS NOTES
[facility] PHYSICIANS  [facility]  [address] [place] [address]  Dept: [phone]  Dept Fax: [phone]    RETURN PATIENT PROGRESS NOTE  Date of patient's visit: 3/3/2020  Patient's Name:  Eze Xiao YOB: 1960            Patient Care Team:  REECE Fowler CNP as PCP - General (Certified Nurse Practitioner)  REECE Fowler CNP as PCP - [facility]  Riky Vilchis MD as Consulting Physician (Gastroenterology)  Ren Jarvis DPM as Physician (Podiatry)  Amber Heck MD as Consulting Physician (Infectious Diseases)       Eze Xiao 63 y.o. male that presents for follow-up of   Chief Complaint   Patient presents with    Foot Pain    Nail Problem     Pt's primary care physician is REECE Adrian CNP last seen 12/30/2019  Symptoms began 3 week(s) ago and are unchanged . Patient relates pain is Present. Pain is rated 1 out of 10 and is described as constant, mild. Treatments prior to today's visit include: injection intot eh big toe. Currently denies F/C/N/V. Allergies   Allergen Reactions    Sulfa Antibiotics Rash       Past Medical History:   Diagnosis Date    Bleeding ulcer 2019    Chronic back pain 2015    Gastric ulcer 10/31/2015    large 2-3 cm    Hematuria 2017    2019-RESOLVED NOW    Hep C w/o coma, chronic ([facility] 2017    WAITING APPROVAL FROM INSURANCE COMPANY TO GET TREATMENT STARTED    History of blood transfusion 2019    ANEMIA R/T BLEEDING ULCERS    History of ETOH abuse     QUIT 01/2017    Hypertension 2017    ON RX    Neuropathy 2009    FEET BILAT, SHAKEY HANDS    Portal hypertensive gastropathy ([address]     Seizures ([address]     only one in 2016    Wears glasses        Prior to Admission medications    Medication Sig Start Date End Date Taking?  Authorizing Provider   doxycycline hyclate (VIBRA-TABS) 100 MG tablet Take 1 tablet by mouth 2 times daily for

## 2020-03-03 NOTE — TELEPHONE ENCOUNTER
Called patient to see if he was able to get Colchicine. We received one response saying it was approved and one that was denied. Patient stated he was in the hospital again and they reordered the medication but sent it through [address] [place] [address] so they can handle the prior auth.

## 2020-03-04 ENCOUNTER — TELEPHONE (OUTPATIENT)
Dept: INFECTIOUS DISEASES | Age: 60
End: 2020-03-04

## 2020-03-04 NOTE — TELEPHONE ENCOUNTER
Yung Adam MD In 3 weeks.      Specialty: Infectious Diseases   Contact information:   [address]   [address] [facility] [id number] [phone]

## 2020-03-17 ENCOUNTER — OFFICE VISIT (OUTPATIENT)
Dept: PODIATRY | Age: 60
End: 2020-03-17
Payer: MEDICARE

## 2020-03-17 VITALS — BODY MASS INDEX: 31.64 KG/M2 | RESPIRATION RATE: 16 BRPM | WEIGHT: 221 LBS | HEIGHT: 70 IN

## 2020-03-17 PROCEDURE — G8482 FLU IMMUNIZE ORDER/ADMIN: HCPCS | Performed by: PODIATRIST

## 2020-03-17 PROCEDURE — 3017F COLORECTAL CA SCREEN DOC REV: CPT | Performed by: PODIATRIST

## 2020-03-17 PROCEDURE — 1036F TOBACCO NON-USER: CPT | Performed by: PODIATRIST

## 2020-03-17 PROCEDURE — 99213 OFFICE O/P EST LOW 20 MIN: CPT | Performed by: PODIATRIST

## 2020-03-17 PROCEDURE — G8417 CALC BMI ABV UP PARAM F/U: HCPCS | Performed by: PODIATRIST

## 2020-03-17 PROCEDURE — 20600 DRAIN/INJ JOINT/BURSA W/O US: CPT | Performed by: PODIATRIST

## 2020-03-17 PROCEDURE — 1111F DSCHRG MED/CURRENT MED MERGE: CPT | Performed by: PODIATRIST

## 2020-03-17 PROCEDURE — G8427 DOCREV CUR MEDS BY ELIG CLIN: HCPCS | Performed by: PODIATRIST

## 2020-03-17 NOTE — PROGRESS NOTES
3days  Take one PO BID x 3days  Take one PO QDaily x 3days  Take 1/2 PO qdaily x 4 days 3/3/20  Yes Alize Li DPM   amLODIPine (NORVASC) 10 MG tablet Take 1 tablet by mouth daily 3/3/20  Yes Beny Acevedo MD   colchicine (COLCRYS) 0.6 MG tablet Take 1 tablet by mouth daily 3/3/20  Yes Beny Acevedo MD   aluminum & magnesium hydroxide-simethicone (MAALOX MULTI SYMPTOM MAX ST) 152-699-02 MG/5ML SUSP Take 30 mLs by mouth every 6 hours as needed (DYSPEPSIA) 3/2/20  Yes Beny Acevedo MD   pantoprazole (PROTONIX) 40 MG tablet Take 1 tablet by mouth every morning (before breakfast) 3/2/20  Yes Beny Acevedo MD   gabapentin (NEURONTIN) 600 MG tablet TAKE ONE TABLET BY MOUTH THREE TIMES A DAY 2/24/20 4/24/20 Yes REECE Hardy CNP   ferrous sulfate 325 (65 Fe) MG tablet TAKE ONE TABLET BY MOUTH DAILY WITH BREAKFAST 2/10/20  Yes REECE Hardy CNP   rOPINIRole (REQUIP) 0.5 MG tablet TAKE ONE TABLET BY MOUTH ONCE NIGHTLY 2/10/20  Yes REECE Hardy CNP   primidone (MYSOLINE) 50 MG tablet Take 2 po qhs 1/16/20  Yes Min London MD   lisinopril (PRINIVIL;ZESTRIL) 20 MG tablet TAKE ONE TABLET BY MOUTH DAILY 9/25/19  Yes REECE Hardy CNP   aspirin 81 MG chewable tablet Take 81 mg by mouth daily   Yes Historical Provider, MD   Multiple Vitamins-Minerals (THERAPEUTIC MULTIVITAMIN-MINERALS) tablet Take 1 tablet by mouth daily   Yes Historical Provider, MD       Review of Systems    Review of Systems:  History obtained from chart review and the patient  General ROS: negative for - chills, fatigue, fever, night sweats or weight gain  Constitutional: Negative for chills, diaphoresis, fatigue, fever and unexpected weight change. Musculoskeletal: Positive for arthralgias, gait problem and joint swelling. Neurological ROS: negative for - behavioral changes, confusion, headaches or seizures. Negative for weakness and numbness.    Dermatological ROS: condition. A steroid injection was performed at right great toe at the [facility] using 1% plain Lidocaine and 6 mg of Celestone. This was well tolerated. .  Verbal and written instructions given to patient. Contact office with any questions/problems/concerns. No orders of the defined types were placed in this encounter. No orders of the defined types were placed in this encounter. RTC in 2week(s).     3/17/2020      Electronically signed by Teresa Heimlich, DPM on 3/17/2020 at 12:51 PM  3/17/2020

## 2020-03-23 NOTE — TELEPHONE ENCOUNTER
Last visit: 12/30/19  Last Med refill: 2/24/20  Does patient have enough medication for 72 hours: Yes    Next Visit Date:  Future Appointments   Date Time Provider Marino Junior   3/24/2020 10:15 AM BRISEYDA Coronel Podiatry [facility]   3/26/2020  2:15 PM Behzad Torres MD INFT DISEASE [facility]   3/30/2020  8:00 AM REECE Coello - CNP [facility] [address] Maintenance   Topic Date Due    Hepatitis B vaccine (2 of 3 - CpG risk 3-dose series) 10/24/1980    Hepatitis A vaccine (2 of 2 - Risk 2-dose series) 05/08/2019    Shingles Vaccine (1 of 2) 04/09/2020 (Originally 10/24/2011)    Potassium monitoring  03/02/2021    Creatinine monitoring  03/02/2021    Lipid screen  11/01/2022    Diabetes screen  12/16/2022    Colon cancer screen colonoscopy  03/14/2023    DTaP/Tdap/Td vaccine (2 - Td) 04/16/2028    Flu vaccine  Completed    Pneumococcal 0-64 years Vaccine  Completed    HIV screen  Completed    Hib vaccine  Aged Out    Meningococcal (ACWY) vaccine  Aged Out       Hemoglobin A1C (%)   Date Value   12/16/2019 4.4   11/01/2017 5.6             ( goal A1C is < 7)   No results found for: LAB[facility]CR  LDL Cholesterol (mg/dL)   Date Value   11/01/2017 95       (goal LDL is <100)   AST (U/L)   Date Value   02/29/2020 127 (H)     ALT (U/L)   Date Value   02/29/2020 46 (H)     BUN (mg/dL)   Date Value   03/02/2020 10     BP Readings from Last 3 Encounters:   03/02/20 (!) 152/89   02/28/20 111/69   02/23/20 139/76          (goal 120/80)    All Future Testing planned in [facility]  Lab Frequency Next Occurrence   BUN & Creatinine Once 08/17/2019   Electrolyte Panel Once 08/17/2019   Hepatic Function Panel Once 08/17/2019   Sedimentation rate, automated Once 07/03/2019   Hepatitis C RNA, quantitative, PCR Once 07/03/2019   Ethanol Once 07/03/2019   EKG 12 Lead Once 08/12/2019   CBC Once 08/12/2019   Urinalysis Once 08/12/2019   Body fluid cell count Once 09/23/2019   Fungus Culture Once 09/23/2019   XR ELBOW LEFT (MIN 3 VIEWS) Once 03/13/2020               Patient Active Problem List:     GI bleed     Gastrointestinal hemorrhage     Iron deficiency anemia due to chronic blood loss     Diarrhea     Melena     New onset seizure (HCC)     Elevated liver enzymes     Essential hypertension     Gastric ulcer     Unspecified viral hepatitis C without hepatic coma     Portal hypertensive gastropathy (HCC)     Anemia     Cirrhosis of liver without ascites (HCC)     Duodenal ulcer     Acute blood loss anemia     Thrombocytopenia (HCC)     Alcoholic cirrhosis of liver without ascites (HCC)     Chronic midline low back pain without sciatica     Seizures (HCC)     Hypertension     Hep C w/o coma, chronic (HCC)     Chronic back pain     History of ETOH abuse     Abdominal pain     Elevated alpha fetoprotein     Olecranon bursitis of left elbow     Septic arthritis of IP joint of toe, right (HCC)     Septic arthritis of right foot ([address]     Foot infection     Septic arthritis (HCC)     Acute hematogenous osteomyelitis of right foot (HCC)-Involving the sesamoid bones     Tenosynovitis of right foot-right Hallux

## 2020-03-24 ENCOUNTER — OFFICE VISIT (OUTPATIENT)
Dept: PODIATRY | Age: 60
End: 2020-03-24
Payer: MEDICARE

## 2020-03-24 VITALS — BODY MASS INDEX: 31.64 KG/M2 | RESPIRATION RATE: 16 BRPM | HEIGHT: 70 IN | WEIGHT: 221 LBS

## 2020-03-24 PROCEDURE — 1111F DSCHRG MED/CURRENT MED MERGE: CPT | Performed by: PODIATRIST

## 2020-03-24 PROCEDURE — G8482 FLU IMMUNIZE ORDER/ADMIN: HCPCS | Performed by: PODIATRIST

## 2020-03-24 PROCEDURE — G8427 DOCREV CUR MEDS BY ELIG CLIN: HCPCS | Performed by: PODIATRIST

## 2020-03-24 PROCEDURE — 99213 OFFICE O/P EST LOW 20 MIN: CPT | Performed by: PODIATRIST

## 2020-03-24 PROCEDURE — G8417 CALC BMI ABV UP PARAM F/U: HCPCS | Performed by: PODIATRIST

## 2020-03-24 PROCEDURE — 1036F TOBACCO NON-USER: CPT | Performed by: PODIATRIST

## 2020-03-24 PROCEDURE — 3017F COLORECTAL CA SCREEN DOC REV: CPT | Performed by: PODIATRIST

## 2020-03-24 RX ORDER — WHEELCHAIR
1 EACH MISCELLANEOUS DAILY
Qty: 1 EACH | Refills: 0 | Status: SHIPPED | OUTPATIENT
Start: 2020-03-24

## 2020-03-24 RX ORDER — GABAPENTIN 600 MG/1
TABLET ORAL
Qty: 90 TABLET | Refills: 0 | Status: SHIPPED | OUTPATIENT
Start: 2020-03-24 | End: 2020-05-20 | Stop reason: SDUPTHER

## 2020-03-25 NOTE — PROGRESS NOTES
GASTROINTESTINAL ENDOSCOPY N/A 8/1/2019    EGD ESOPHAGOGASTRODUODENOSCOPY performed by Daryl Heath MD at [address]  2007       Medications:     Current Outpatient Medications:     doxycycline hyclate (VIBRAMYCIN) 100 MG capsule, Take 1 capsule by mouth daily, Disp: 30 capsule, Rfl: 0    gabapentin (NEURONTIN) 600 MG tablet, TAKE ONE TABLET BY MOUTH THREE TIMES A DAY, Disp: 90 tablet, Rfl: 0    Misc.  Devices [facility]) MISC, 1 Device by Does not apply route daily Standard wheelchair Current body weight: 221 lb (100.2 kg) Current patient height: 5'10 (177.8 cm) Diagnosis: right foot pain and unable to ambulate Length of need: 12 months, Disp: 1 each, Rfl: 0    predniSONE (DELTASONE) 10 MG tablet, Take one PO TID x 3days Take one PO BID x 3days Take one PO QDaily x 3days Take 1/2 PO qdaily x 4 days, Disp: 20 tablet, Rfl: 0    amLODIPine (NORVASC) 10 MG tablet, Take 1 tablet by mouth daily, Disp: 30 tablet, Rfl: 3    colchicine (COLCRYS) 0.6 MG tablet, Take 1 tablet by mouth daily, Disp: 30 tablet, Rfl: 1    aluminum & magnesium hydroxide-simethicone (MAALOX MULTI SYMPTOM MAX ST) 400-400-40 MG/5ML SUSP, Take 30 mLs by mouth every 6 hours as needed (DYSPEPSIA), Disp: 1 Bottle, Rfl: 1    pantoprazole (PROTONIX) 40 MG tablet, Take 1 tablet by mouth every morning (before breakfast), Disp: 30 tablet, Rfl: 5    ferrous sulfate 325 (65 Fe) MG tablet, TAKE ONE TABLET BY MOUTH DAILY WITH BREAKFAST, Disp: 90 tablet, Rfl: 1    rOPINIRole (REQUIP) 0.5 MG tablet, TAKE ONE TABLET BY MOUTH ONCE NIGHTLY, Disp: 90 tablet, Rfl: 1    primidone (MYSOLINE) 50 MG tablet, Take 2 po qhs, Disp: 60 tablet, Rfl: 5    lisinopril (PRINIVIL;ZESTRIL) 20 MG tablet, TAKE ONE TABLET BY MOUTH DAILY, Disp: 30 tablet, Rfl: 5    aspirin 81 MG chewable tablet, Take 81 mg by mouth daily, Disp: , Rfl:     Multiple Vitamins-Minerals (THERAPEUTIC MULTIVITAMIN-MINERALS) tablet, Take 1 tablet by mouth daily, Disp: , Rfl:      Social History:     Social History     Socioeconomic History    Marital status:      Spouse name: Not on file    Number of children: 3    Years of education: Not on file    Highest education level: Not on file   Occupational History    Not on file   Social Needs    Financial resource strain: Not on file    Food insecurity     Worry: Not on file     Inability: Not on file   [employer] needs     Medical: Not on file     Non-medical: Not on file   Tobacco Use    Smoking status: Former Smoker     Packs/day: 1.00     Years: 15.00     Pack years: 15.00     Last attempt to quit: 1995     Years since quittin.5    Smokeless tobacco: Former User     Types: Chew   Substance and Sexual Activity    Alcohol use: Not Currently     Comment: ocassionally    Drug use: No    Sexual activity: Yes     Partners: Female   Lifestyle    Physical activity     Days per week: Not on file     Minutes per session: Not on file    Stress: Not on file   Relationships    Social connections     Talks on phone: Not on file     Gets together: Not on file     Attends Restoration service: Not on file     Active member of club or organization: Not on file     Attends meetings of clubs or organizations: Not on file     Relationship status: Not on file    Intimate partner violence     Fear of current or ex partner: Not on file     Emotionally abused: Not on file     Physically abused: Not on file     Forced sexual activity: Not on file   Other Topics Concern    Not on file   Social History Narrative    Not on file       Family History:     Family History   Problem Relation Age of Onset    High Blood Pressure Mother     Other Father         neuropathy    Stroke Father     Prostate Cancer Father         Allergies:   Sulfa antibiotics     Review of Systems:   Constitutional: No fevers or chills. No systemic complaints  Head: No headaches  Eyes: No double vision or blurry vision. ENT: No sore throat or runny nose. Harrington Pander  No HCT 36.1 03/02/2020    HCT 37.4 03/01/2020     03/02/2020    PLT See Reflexed IPF Result 03/01/2020    LYMPHOPCT 18 03/02/2020    LYMPHOPCT 9 03/01/2020    MONOPCT 11 03/02/2020    MONOPCT 1 03/01/2020     BMP:   Lab Results   Component Value Date     03/02/2020     03/01/2020    K 4.1 03/02/2020    K 4.1 03/01/2020     03/02/2020     03/01/2020    CO2 22 03/02/2020    CO2 21 03/01/2020    BUN 10 03/02/2020    BUN 9 03/01/2020    CREATININE 0.57 03/02/2020    CREATININE 0.55 03/01/2020    MG 1.6 02/19/2020    MG 1.7 08/03/2019     Hepatic Function Panel:   Lab Results   Component Value Date    PROT 6.3 02/29/2020    PROT 5.6 02/19/2020    LABALBU 2.4 02/29/2020    LABALBU 2.3 02/19/2020    BILIDIR 2.01 08/01/2019    BILIDIR 2.41 07/30/2019    IBILI 1.67 08/01/2019    IBILI 2.14 07/30/2019    BILITOT 4.57 02/29/2020    BILITOT 5.15 02/19/2020    ALKPHOS 129 02/29/2020    ALKPHOS 154 02/19/2020    ALT 46 02/29/2020    ALT 43 02/19/2020     02/29/2020     02/19/2020     No results found for: RPR  No results found for: HIV  No results found for: [facility]  Lab Results   Component Value Date    MUCUS 1+ 02/20/2017    PH 7.20 02/20/2017    RBC 3.56 03/02/2020    TRICHOMONAS NOT REPORTED 02/20/2017    WBC 10.2 03/02/2020    YEAST NOT REPORTED 02/20/2017    TURBIDITY CLEAR 02/20/2017     Lab Results   Component Value Date    CREATININE 0.57 03/02/2020    GLUCOSE 112 03/02/2020       Thank you for allowing us to participate in the care of this patient. Please call with questions.     Khadijah Horne MD  Pager: [phone] - Office: [phone]

## 2020-03-26 ENCOUNTER — OFFICE VISIT (OUTPATIENT)
Dept: INFECTIOUS DISEASES | Age: 60
End: 2020-03-26
Payer: MEDICARE

## 2020-03-26 VITALS
SYSTOLIC BLOOD PRESSURE: 113 MMHG | TEMPERATURE: 97.7 F | DIASTOLIC BLOOD PRESSURE: 62 MMHG | BODY MASS INDEX: 31.78 KG/M2 | HEART RATE: 70 BPM | WEIGHT: 222 LBS | HEIGHT: 70 IN

## 2020-03-26 PROCEDURE — G8482 FLU IMMUNIZE ORDER/ADMIN: HCPCS | Performed by: INTERNAL MEDICINE

## 2020-03-26 PROCEDURE — 1036F TOBACCO NON-USER: CPT | Performed by: INTERNAL MEDICINE

## 2020-03-26 PROCEDURE — 1111F DSCHRG MED/CURRENT MED MERGE: CPT | Performed by: INTERNAL MEDICINE

## 2020-03-26 PROCEDURE — 3017F COLORECTAL CA SCREEN DOC REV: CPT | Performed by: INTERNAL MEDICINE

## 2020-03-26 PROCEDURE — 99213 OFFICE O/P EST LOW 20 MIN: CPT | Performed by: INTERNAL MEDICINE

## 2020-03-26 PROCEDURE — G8427 DOCREV CUR MEDS BY ELIG CLIN: HCPCS | Performed by: INTERNAL MEDICINE

## 2020-03-26 PROCEDURE — G8417 CALC BMI ABV UP PARAM F/U: HCPCS | Performed by: INTERNAL MEDICINE

## 2020-03-26 RX ORDER — DOXYCYCLINE HYCLATE 100 MG/1
100 CAPSULE ORAL DAILY
Qty: 30 CAPSULE | Refills: 0 | Status: SHIPPED | OUTPATIENT
Start: 2020-03-26 | End: 2020-04-25

## 2020-03-26 NOTE — LETTER
[facility] [address]  Phone: [phone]  Fax: [phone]    PCP: REECE Morgan CNP   CC providers:  REECE Munoz CNP  [address] [facility] [address]  V[address], [address]  [address] In [facility] [address]    March 26, 2020       Patient: Reyna Joiner   MR Number: [id number]   YOB: 1960   Date of Visit: 3/26/2020     Dear Rocael Rogers: Thank you for allowing me to see your patient Mr. Reyna Joiner. Below are the relevant portions of my assessment and plan of care. [facility] - Office Progress Note  Today's Date and Time: 3/26/2020, 4:38 PM    Diagnostic Impression :     1. Foot infection    2. Hep C w/o coma, chronic (HCC)    3. Alcoholic cirrhosis of liver without ascites (HCC)        Recommendations     · Start Doxycycline 100 mg po daily x 30 days for anti inflammatory effect  · ESR, CRP today  · RTO 4 weeks    Chief complaint/reason for consultation:     Chief Complaint   Patient presents with    Frequent Infections     Follow up foot infections       History of Present Illness:   Reyna Joiner is a 63y.o.-year-old  male who was evaluated on 3/26/2020. Patient seen at the request of Dr. Tamir Sy and Eduardo        INITIAL HISTORY:     Pt with PMH of chronic back pain, gastric ulcer, hematuria, Hep C, ETOH abuse, HTN, ETOH induced neuropathy, portal hypertensive gastropathy and seizures.      Pt first presented on 02- with right foot pain and swelling. He was walking in the snow when he tripped and everted his foot and since then has not been able to ambulate. Tylenol did not improve his pain but ice and Ace wrap helped slightly. Pt denies history of gout, bleeding disorders, or previous surgeries and trauma to the foot.  X-rays were No induration or significant erythema          Pertinent review of systems include: For complete review of symptoms see ROS section below. DISCUSSION:  Pt with multiple medical issues and history of Rt great toe infection. Admitted to [facility] on 2-28 with new onset of plantar redness, pain and edema. MRI showed inflammation of FHL tendon leading to hallux pain  Pt treated with a 2 week course of Doxycycline and discharged home. Rt gr toe significantly improved, but still edematous  Pt is unable to take NSAIDS due to PUD. Will prescribe Doxycycline 100 mg po daily for anti inflammatory effect      PLAN:  · Start Doxycycline 100 mg po daily x 30 days for anti inflammatory effect  · ESR, CRP today  · RTO 4 weeks    Discussed with patient. I have personally reviewed the past medical history, past surgical history, medications, social history, and family history, and I have updated the database accordingly.   Past Medical History:     Past Medical History:   Diagnosis Date    Bleeding ulcer 2019    Chronic back pain 2015    Gastric ulcer 10/31/2015    large 2-3 cm    Hematuria 2017    2019-RESOLVED NOW    Hep C w/o coma, chronic ([address] 2017    WAITING APPROVAL FROM INSURANCE COMPANY TO GET TREATMENT STARTED    History of blood transfusion 2019    ANEMIA R/T BLEEDING ULCERS    History of ETOH abuse     QUIT 01/2017    Hypertension 2017    ON RX    Neuropathy 2009    FEET BILAT, SHAKEY HANDS    Portal hypertensive gastropathy ([address]     Seizures ([address]     only one in 2016    Wears glasses        Past Surgical  History:     Past Surgical History:   Procedure Laterality Date    BACK SURGERY  2000    lumbar discectomy    COLONOSCOPY  03/14/2018    mod diverticulosis; internal hemorrhoids; retained stools    ELBOW DEBRIDEMENT Left 10/16/2019     ELBOW INCISION AND DRAINAGE    HERNIA REPAIR Left 1975    lt inguinal    INCISION AND DRAINAGE Left 10/16/2019 ELBOW INCISION AND DRAINAGE, PARTIALTRICEP REPAIR performed by Valentino Jimenez DO at [address] [facility] [address] NOT  [facility] IND N/A 3/14/2018    COLONOSCOPY performed by Saúl Fleming MD at [address] [place] [address] Street  10/31/2015    large gastric ulcer    UPPER GASTROINTESTINAL ENDOSCOPY  03/14/2018    mod portal hypertensive gastrophathy    UPPER GASTROINTESTINAL ENDOSCOPY N/A 3/14/2018    EGD BIOPSY performed by Saúl Fleming MD at [address] [place] [address] N/A 1/22/2019    EGD BIOPSY performed by Martin Bass MD at [address] [place] [address] 2/1/2019    EGD BIOPSY performed by Ray Thapa MD at [address] [place] [address] 8/1/2019    EGD ESOPHAGOGASTRODUODENOSCOPY performed by Va Giordano MD at [address] [facility]  2007       Medications:     Current Outpatient Medications:     doxycycline hyclate (VIBRAMYCIN) 100 MG capsule, Take 1 capsule by mouth daily, Disp: 30 capsule, Rfl: 0    gabapentin (NEURONTIN) 600 MG tablet, TAKE ONE TABLET BY MOUTH THREE TIMES A DAY, Disp: 90 tablet, Rfl: 0    Misc.  Devices [facility]) MISC, 1 Device by Does not apply route daily Standard wheelchair Current body weight: 221 lb (100.2 kg) Current patient height: 5'10 (177.8 cm) Diagnosis: right foot pain and unable to ambulate Length of need: 12 months, Disp: 1 each, Rfl: 0    predniSONE (DELTASONE) 10 MG tablet, Take one PO TID x 3days Take one PO BID x 3days Take one PO QDaily x 3days Take 1/2 PO qdaily x 4 days, Disp: 20 tablet, Rfl: 0    amLODIPine (NORVASC) 10 MG tablet, Take 1 tablet by mouth daily, Disp: 30 tablet, Rfl: 3    colchicine (COLCRYS) 0.6 MG tablet, Take 1 tablet by mouth daily, Disp: 30 tablet, Rfl: 1    aluminum & magnesium hydroxide-simethicone (MAALOX MULTI SYMPTOM MAX ST) 400-400-40 MG/5ML SUSP, Take 30 mLs by mouth every 6 hours as needed (DYSPEPSIA), Disp: 1 Bottle, Rfl: 1   pantoprazole (PROTONIX) 40 MG tablet, Take 1 tablet by mouth every morning (before breakfast), Disp: 30 tablet, Rfl: 5    ferrous sulfate 325 (65 Fe) MG tablet, TAKE ONE TABLET BY MOUTH DAILY WITH BREAKFAST, Disp: 90 tablet, Rfl: 1    rOPINIRole (REQUIP) 0.5 MG tablet, TAKE ONE TABLET BY MOUTH ONCE NIGHTLY, Disp: 90 tablet, Rfl: 1    primidone (MYSOLINE) 50 MG tablet, Take 2 po qhs, Disp: 60 tablet, Rfl: 5    lisinopril (PRINIVIL;ZESTRIL) 20 MG tablet, TAKE ONE TABLET BY MOUTH DAILY, Disp: 30 tablet, Rfl: 5    aspirin 81 MG chewable tablet, Take 81 mg by mouth daily, Disp: , Rfl:     Multiple Vitamins-Minerals (THERAPEUTIC MULTIVITAMIN-MINERALS) tablet, Take 1 tablet by mouth daily, Disp: , Rfl:      Social History:     Social History     Socioeconomic History    Marital status:      Spouse name: Not on file    Number of children: 3    Years of education: Not on file    Highest education level: Not on file   Occupational History    Not on file   Social Needs    Financial resource strain: Not on file    Food insecurity     Worry: Not on file     Inability: Not on file   [employer] needs     Medical: Not on file     Non-medical: Not on file   Tobacco Use    Smoking status: Former Smoker     Packs/day: 1.00     Years: 15.00     Pack years: 15.00     Last attempt to quit: 1995     Years since quittin.5    Smokeless tobacco: Former User     Types: Chew   Substance and Sexual Activity    Alcohol use: Not Currently     Comment: ocassionally    Drug use: No    Sexual activity: Yes     Partners: Female   Lifestyle    Physical activity     Days per week: Not on file     Minutes per session: Not on file    Stress: Not on file   Relationships    Social connections     Talks on phone: Not on file     Gets together: Not on file     Attends [employer] service: Not on file     Active member of club or organization: Not on file Neck:Supple, without lymphadenopathy. Thyroid normal, No bruits. Pulmonary/Chest: Clear to auscultation, without wheezes, rales, or rhonchi. No dullness to percussion. Cardiovascular: Regular rate and rhythm without murmurs, rubs, or gallops. Abdomen: Soft, non tender. Bowel sounds normal. No organomegaly  All four Extremities: No cyanosis, clubbing, edema, or effusions. Neurologic: No gross sensory or motor deficits. Skin: Warm and dry with good turgor. With signs of peripheral arterial or venous insufficiency.  Rt gr toe with continued edema, no induration or erythema    Medical Decision Making:   I have independently reviewed/ordered the following labs:    CBC with Differential:   Lab Results   Component Value Date    WBC 10.2 03/02/2020    WBC 7.0 03/01/2020    HGB 11.8 03/02/2020    HGB 12.3 03/01/2020    HCT 36.1 03/02/2020    HCT 37.4 03/01/2020     03/02/2020    PLT See Reflexed IPF Result 03/01/2020    LYMPHOPCT 18 03/02/2020    LYMPHOPCT 9 03/01/2020    MONOPCT 11 03/02/2020    MONOPCT 1 03/01/2020     BMP:   Lab Results   Component Value Date     03/02/2020     03/01/2020    K 4.1 03/02/2020    K 4.1 03/01/2020     03/02/2020     03/01/2020    CO2 22 03/02/2020    CO2 21 03/01/2020    BUN 10 03/02/2020    BUN 9 03/01/2020    CREATININE 0.57 03/02/2020    CREATININE 0.55 03/01/2020    MG 1.6 02/19/2020    MG 1.7 08/03/2019     Hepatic Function Panel:   Lab Results   Component Value Date    PROT 6.3 02/29/2020    PROT 5.6 02/19/2020    LABALBU 2.4 02/29/2020    LABALBU 2.3 02/19/2020    BILIDIR 2.01 08/01/2019    BILIDIR 2.41 07/30/2019    IBILI 1.67 08/01/2019    IBILI 2.14 07/30/2019    BILITOT 4.57 02/29/2020    BILITOT 5.15 02/19/2020    ALKPHOS 129 02/29/2020    ALKPHOS 154 02/19/2020    ALT 46 02/29/2020    ALT 43 02/19/2020     02/29/2020     02/19/2020     No results found for: RPR  No results found for: HIV  No results found for: [facility]  Lab Results Component Value Date    MUCUS 1+ 02/20/2017    PH 7.20 02/20/2017    RBC 3.56 03/02/2020    TRICHOMONAS NOT REPORTED 02/20/2017    WBC 10.2 03/02/2020    YEAST NOT REPORTED 02/20/2017    TURBIDITY CLEAR 02/20/2017     Lab Results   Component Value Date    CREATININE 0.57 03/02/2020    GLUCOSE 112 03/02/2020       Thank you for allowing us to participate in the care of this patient. Please call with questions. Asael Tello MD  Pager: [phone] - Office: [phone]        If you have questions, please do not hesitate to call me. I look forward to following Maria Fernanda Rojas along with you.     Sincerely,        Asael Tello MD

## 2020-03-31 ENCOUNTER — TELEPHONE (OUTPATIENT)
Dept: INFECTIOUS DISEASES | Age: 60
End: 2020-03-31

## 2020-03-31 NOTE — TELEPHONE ENCOUNTER
Reese Tello, AVILA 3/30/2020 11:54 AM   Elsa Opitz [birth year] seen in the office last Thursday called and said his nasal cavity and mouth are broke out (thrush?). He was started on Doxy at that visit for anti inflammatory effect. Esperanza Read 3/30/2020 12:27 PM     3/30/2020 4:12 PM   Do you want me to do anything for this? Read 3/30/2020 4:43 PM     3/30/2020 4:44 PM   Have him stop the Doxy and see if his mouth improves. The Doxy should not cause this reaction            Message   Received: Today   Message Contents   Kirby Hannah   Caller: Unspecified (Today,  9:40 AM)             Patient returned your call and stated that he would stop taking Doxy and see if things clear up.

## 2020-04-13 RX ORDER — LISINOPRIL 20 MG/1
TABLET ORAL
Qty: 30 TABLET | Refills: 5 | Status: SHIPPED | OUTPATIENT
Start: 2020-04-13 | End: 2020-10-09

## 2020-04-14 ENCOUNTER — OFFICE VISIT (OUTPATIENT)
Dept: PODIATRY | Age: 60
End: 2020-04-14
Payer: MEDICARE

## 2020-04-14 VITALS — HEIGHT: 70 IN | BODY MASS INDEX: 31.78 KG/M2 | TEMPERATURE: 97.7 F | RESPIRATION RATE: 16 BRPM | WEIGHT: 222 LBS

## 2020-04-14 PROCEDURE — 99213 OFFICE O/P EST LOW 20 MIN: CPT | Performed by: PODIATRIST

## 2020-04-14 PROCEDURE — G8427 DOCREV CUR MEDS BY ELIG CLIN: HCPCS | Performed by: PODIATRIST

## 2020-04-14 PROCEDURE — G8417 CALC BMI ABV UP PARAM F/U: HCPCS | Performed by: PODIATRIST

## 2020-04-14 PROCEDURE — 1036F TOBACCO NON-USER: CPT | Performed by: PODIATRIST

## 2020-04-14 PROCEDURE — 3017F COLORECTAL CA SCREEN DOC REV: CPT | Performed by: PODIATRIST

## 2020-04-21 RX ORDER — GABAPENTIN 600 MG/1
TABLET ORAL
Qty: 90 TABLET | Refills: 0 | OUTPATIENT
Start: 2020-04-21 | End: 2020-06-20

## 2020-05-19 ENCOUNTER — HOSPITAL ENCOUNTER (OUTPATIENT)
Dept: PREADMISSION TESTING | Age: 60
Setting detail: SPECIMEN
Discharge: HOME | End: 2020-05-23
Payer: MEDICARE

## 2020-05-19 PROCEDURE — U0004 COV-19 TEST NON-CDC HGH THRU: HCPCS

## 2020-05-20 ENCOUNTER — TELEMEDICINE (OUTPATIENT)
Dept: FAMILY MEDICINE CLINIC | Age: 60
End: 2020-05-20
Payer: MEDICARE

## 2020-05-20 LAB
SARS-COV-2, PCR: NOT DETECTED
SARS-COV-2, RAPID: NORMAL
SARS-COV-2: NORMAL
SOURCE: NORMAL

## 2020-05-20 PROCEDURE — 3017F COLORECTAL CA SCREEN DOC REV: CPT | Performed by: NURSE PRACTITIONER

## 2020-05-20 PROCEDURE — G8427 DOCREV CUR MEDS BY ELIG CLIN: HCPCS | Performed by: NURSE PRACTITIONER

## 2020-05-20 PROCEDURE — 99213 OFFICE O/P EST LOW 20 MIN: CPT | Performed by: NURSE PRACTITIONER

## 2020-05-20 RX ORDER — AMLODIPINE BESYLATE 10 MG/1
10 TABLET ORAL DAILY
Qty: 30 TABLET | Refills: 3 | Status: SHIPPED | OUTPATIENT
Start: 2020-05-20

## 2020-05-20 RX ORDER — GABAPENTIN 600 MG/1
TABLET ORAL
Qty: 90 TABLET | Refills: 2 | Status: SHIPPED | OUTPATIENT
Start: 2020-05-20 | End: 2020-10-13

## 2020-05-20 ASSESSMENT — PATIENT HEALTH QUESTIONNAIRE - PHQ9
2. FEELING DOWN, DEPRESSED OR HOPELESS: 0
SUM OF ALL RESPONSES TO PHQ9 QUESTIONS 1 & 2: 0
SUM OF ALL RESPONSES TO PHQ QUESTIONS 1-9: 0
SUM OF ALL RESPONSES TO PHQ QUESTIONS 1-9: 0
1. LITTLE INTEREST OR PLEASURE IN DOING THINGS: 0

## 2020-05-20 ASSESSMENT — ENCOUNTER SYMPTOMS
COUGH: 0
SHORTNESS OF BREATH: 0

## 2020-05-20 NOTE — PROGRESS NOTES
Olecranon bursitis of left elbow     Portal hypertensive gastropathy (HCC)     Seizure ([address] 2017    x1 per pt. possibly due to going off of pain medication per pt.     Seizures ([address]     only one in 2016    Septic arthritis ([address] 2/28/2020    Septic arthritis of IP joint of toe, right ([address] 2/18/2020    Septic arthritis of right foot ([address] 2/18/2020    Tenosynovitis of right foot-right Hallux 2/29/2020    Thrombocytopenia ([address] 3/29/2019    Unspecified viral hepatitis C without hepatic coma 1/30/2019    Wears glasses    ,   Past Surgical History:   Procedure Laterality Date    BACK SURGERY  2000    lumbar discectomy    COLONOSCOPY  03/14/2018    mod diverticulosis; internal hemorrhoids; retained stools    ELBOW DEBRIDEMENT Left 10/16/2019     ELBOW INCISION AND DRAINAGE    HERNIA REPAIR Left 1975    lt inguinal    INCISION AND DRAINAGE Left 10/16/2019    ELBOW INCISION AND DRAINAGE, PARTIALTRICEP REPAIR performed by Nicol Matt DO at [address] [place] [address] [address] [place] [address] N/A 3/14/2018    COLONOSCOPY performed by Avery Bello MD at [facility]  10/31/2015    large gastric ulcer    UPPER GASTROINTESTINAL ENDOSCOPY  03/14/2018    mod portal hypertensive gastrophathy    UPPER GASTROINTESTINAL ENDOSCOPY N/A 3/14/2018    EGD BIOPSY performed by Avery Bello MD at [facility] 1/22/2019    EGD BIOPSY performed by Colin Renee MD at [facility] 2/1/2019    EGD BIOPSY performed by Kelsy Brown MD at [facility] N/A 8/1/2019    EGD ESOPHAGOGASTRODUODENOSCOPY performed by Lula Baumgarten, MD at [address] [facility]  2007             [] OTHER:    Constitutional: [x] Appears well-developed and well-nourished [] No apparent distress                            [] Abnormal-   Mental status  [x] Alert and awake  [x] Oriented to person/place/time [x]Able to follow commands       Eyes:  EOM    [x]  Normal  [] Abnormal-  Sclera  [x]  Normal  [] Abnormal -         Discharge [x]  None visible  [] Abnormal -     HENT:   [x] Normocephalic, atraumatic. [] Abnormal   [] Mouth/Throat: Mucous membranes are moist.      External Ears [x] Normal  [] Abnormal-      Neck: [x] No visualized mass      Pulmonary/Chest: [x] Respiratory effort normal.  [] No visualized signs of difficulty breathing or respiratory distress        [] Abnormal-      Musculoskeletal:   [] Normal gait with no signs of ataxia         [x] Normal range of motion of neck        [] Abnormal-   [] Motor grossly intact in visible upper extremities    [] Motor grossly intact in visible lower extremities        Neurological:        [x] No Facial Asymmetry (Cranial nerve 7 motor function) (limited exam to video visit)                       [x] No gaze palsy        [] Abnormal-         Skin:                     [x] No significant exanthematous lesions or discoloration noted on facial skin         [] Abnormal-                                  Psychiatric:           [x] Normal Affect [] No Hallucinations        [] Abnormal- [] Normal Mood  [] Anxious appearing    [] Depressed appearing  [] Confused appearing      Due to this being a TeleHealth encounter, evaluation of the following organ systems is limited: Vitals/Constitutional/EENT/Resp/CV/GI//MS/Neuro/Skin/Heme-Lymph-Imm. ASSESSMENT/PLAN:  Encounter Diagnoses   Name Primary?  Neuropathy     Seizures (HCC)     Portal hypertension (HCC)        Orders Placed This Encounter   Medications    gabapentin (NEURONTIN) 600 MG tablet     Sig: TAKE ONE TABLET BY MOUTH THREE TIMES A DAY     Dispense:  90 tablet     Refill:  2    amLODIPine (NORVASC) 10 MG tablet     Sig: Take 1 tablet by mouth daily     Dispense:  30 tablet     Refill:  3         No follow-ups on file.   The time that was spent with the family/patient addressing care on this video call and chart review was 15 minutes. An  electronic signature was used to authenticate this note. --REECE Low CNP on 5/20/2020 at 8:59 AM        Pursuant to the emergency declaration under the [address] [place] [address], [facility] waiver authority and the [employer] Act, this Virtual  Visit was conducted, with patient's consent, to reduce the patient's risk of exposure to COVID-19 and provide continuity of care for an established patient. Services were provided through a telephone discussion virtually to substitute for in-person clinic visit.

## 2020-05-21 ENCOUNTER — TELEPHONE (OUTPATIENT)
Dept: INTERNAL MEDICINE | Age: 60
End: 2020-05-21

## 2020-05-22 ENCOUNTER — ANESTHESIA (OUTPATIENT)
Dept: OPERATING ROOM | Age: 60
End: 2020-05-22
Payer: MEDICARE

## 2020-05-22 ENCOUNTER — HOSPITAL ENCOUNTER (OUTPATIENT)
Age: 60
Setting detail: OUTPATIENT SURGERY
Discharge: HOME | End: 2020-05-22
Attending: PODIATRIST | Admitting: PODIATRIST
Payer: MEDICARE

## 2020-05-22 ENCOUNTER — ANESTHESIA EVENT (OUTPATIENT)
Dept: OPERATING ROOM | Age: 60
End: 2020-05-22
Payer: MEDICARE

## 2020-05-22 VITALS — OXYGEN SATURATION: 100 % | SYSTOLIC BLOOD PRESSURE: 97 MMHG | DIASTOLIC BLOOD PRESSURE: 61 MMHG

## 2020-05-22 VITALS
OXYGEN SATURATION: 97 % | RESPIRATION RATE: 10 BRPM | HEART RATE: 43 BPM | SYSTOLIC BLOOD PRESSURE: 105 MMHG | DIASTOLIC BLOOD PRESSURE: 55 MMHG | BODY MASS INDEX: 30.78 KG/M2 | HEIGHT: 70 IN | WEIGHT: 215 LBS | TEMPERATURE: 96.8 F

## 2020-05-22 PROCEDURE — 6360000002 HC RX W HCPCS: Performed by: NURSE ANESTHETIST, CERTIFIED REGISTERED

## 2020-05-22 PROCEDURE — 2709999900 HC NON-CHARGEABLE SUPPLY: Performed by: PODIATRIST

## 2020-05-22 PROCEDURE — 7100000011 HC PHASE II RECOVERY - ADDTL 15 MIN: Performed by: PODIATRIST

## 2020-05-22 PROCEDURE — 28315 REMOVAL OF SESAMOID BONE: CPT | Performed by: PODIATRIST

## 2020-05-22 PROCEDURE — 7100000010 HC PHASE II RECOVERY - FIRST 15 MIN: Performed by: PODIATRIST

## 2020-05-22 PROCEDURE — 3700000000 HC ANESTHESIA ATTENDED CARE: Performed by: PODIATRIST

## 2020-05-22 PROCEDURE — 7100000001 HC PACU RECOVERY - ADDTL 15 MIN: Performed by: PODIATRIST

## 2020-05-22 PROCEDURE — 3700000001 HC ADD 15 MINUTES (ANESTHESIA): Performed by: PODIATRIST

## 2020-05-22 PROCEDURE — 2580000003 HC RX 258: Performed by: NURSE ANESTHETIST, CERTIFIED REGISTERED

## 2020-05-22 PROCEDURE — 2500000003 HC RX 250 WO HCPCS: Performed by: PODIATRIST

## 2020-05-22 PROCEDURE — 2580000003 HC RX 258: Performed by: ANESTHESIOLOGY

## 2020-05-22 PROCEDURE — 3600000002 HC SURGERY LEVEL 2 BASE: Performed by: PODIATRIST

## 2020-05-22 PROCEDURE — 3600000012 HC SURGERY LEVEL 2 ADDTL 15MIN: Performed by: PODIATRIST

## 2020-05-22 PROCEDURE — 7100000000 HC PACU RECOVERY - FIRST 15 MIN: Performed by: PODIATRIST

## 2020-05-22 PROCEDURE — 2500000003 HC RX 250 WO HCPCS: Performed by: NURSE ANESTHETIST, CERTIFIED REGISTERED

## 2020-05-22 PROCEDURE — 6360000002 HC RX W HCPCS: Performed by: PODIATRIST

## 2020-05-22 RX ORDER — MIDAZOLAM HYDROCHLORIDE 1 MG/ML
INJECTION INTRAMUSCULAR; INTRAVENOUS PRN
Status: DISCONTINUED | OUTPATIENT
Start: 2020-05-22 | End: 2020-05-22 | Stop reason: SDUPTHER

## 2020-05-22 RX ORDER — LIDOCAINE HYDROCHLORIDE 10 MG/ML
INJECTION, SOLUTION EPIDURAL; INFILTRATION; INTRACAUDAL; PERINEURAL PRN
Status: DISCONTINUED | OUTPATIENT
Start: 2020-05-22 | End: 2020-05-22 | Stop reason: ALTCHOICE

## 2020-05-22 RX ORDER — BUPIVACAINE HYDROCHLORIDE 5 MG/ML
INJECTION, SOLUTION EPIDURAL; INTRACAUDAL PRN
Status: DISCONTINUED | OUTPATIENT
Start: 2020-05-22 | End: 2020-05-22 | Stop reason: ALTCHOICE

## 2020-05-22 RX ORDER — FENTANYL CITRATE 50 UG/ML
25 INJECTION, SOLUTION INTRAMUSCULAR; INTRAVENOUS EVERY 5 MIN PRN
Status: DISCONTINUED | OUTPATIENT
Start: 2020-05-22 | End: 2020-05-22 | Stop reason: HOSPADM

## 2020-05-22 RX ORDER — FENTANYL CITRATE 50 UG/ML
INJECTION, SOLUTION INTRAMUSCULAR; INTRAVENOUS PRN
Status: DISCONTINUED | OUTPATIENT
Start: 2020-05-22 | End: 2020-05-22 | Stop reason: SDUPTHER

## 2020-05-22 RX ORDER — CEFAZOLIN SODIUM 2 G/50ML
2 SOLUTION INTRAVENOUS ONCE
Status: COMPLETED | OUTPATIENT
Start: 2020-05-22 | End: 2020-05-22

## 2020-05-22 RX ORDER — LIDOCAINE HYDROCHLORIDE 20 MG/ML
INJECTION, SOLUTION INFILTRATION; PERINEURAL PRN
Status: DISCONTINUED | OUTPATIENT
Start: 2020-05-22 | End: 2020-05-22 | Stop reason: SDUPTHER

## 2020-05-22 RX ORDER — OXYCODONE HYDROCHLORIDE AND ACETAMINOPHEN 5; 325 MG/1; MG/1
1 TABLET ORAL EVERY 6 HOURS PRN
Qty: 28 TABLET | Refills: 0 | Status: SHIPPED | OUTPATIENT
Start: 2020-05-22 | End: 2020-05-29

## 2020-05-22 RX ORDER — SODIUM CHLORIDE, SODIUM LACTATE, POTASSIUM CHLORIDE, CALCIUM CHLORIDE 600; 310; 30; 20 MG/100ML; MG/100ML; MG/100ML; MG/100ML
INJECTION, SOLUTION INTRAVENOUS CONTINUOUS
Status: DISCONTINUED | OUTPATIENT
Start: 2020-05-22 | End: 2020-05-22 | Stop reason: HOSPADM

## 2020-05-22 RX ORDER — SODIUM CHLORIDE, SODIUM LACTATE, POTASSIUM CHLORIDE, CALCIUM CHLORIDE 600; 310; 30; 20 MG/100ML; MG/100ML; MG/100ML; MG/100ML
INJECTION, SOLUTION INTRAVENOUS CONTINUOUS PRN
Status: DISCONTINUED | OUTPATIENT
Start: 2020-05-22 | End: 2020-05-22 | Stop reason: SDUPTHER

## 2020-05-22 RX ORDER — ONDANSETRON 2 MG/ML
4 INJECTION INTRAMUSCULAR; INTRAVENOUS
Status: DISCONTINUED | OUTPATIENT
Start: 2020-05-22 | End: 2020-05-22 | Stop reason: HOSPADM

## 2020-05-22 RX ORDER — PROPOFOL 10 MG/ML
INJECTION, EMULSION INTRAVENOUS CONTINUOUS PRN
Status: DISCONTINUED | OUTPATIENT
Start: 2020-05-22 | End: 2020-05-22 | Stop reason: SDUPTHER

## 2020-05-22 RX ORDER — FENTANYL CITRATE 50 UG/ML
50 INJECTION, SOLUTION INTRAMUSCULAR; INTRAVENOUS EVERY 5 MIN PRN
Status: DISCONTINUED | OUTPATIENT
Start: 2020-05-22 | End: 2020-05-22 | Stop reason: HOSPADM

## 2020-05-22 RX ORDER — OXYCODONE HYDROCHLORIDE AND ACETAMINOPHEN 5; 325 MG/1; MG/1
1 TABLET ORAL
Status: DISCONTINUED | OUTPATIENT
Start: 2020-05-22 | End: 2020-05-22 | Stop reason: HOSPADM

## 2020-05-22 RX ADMIN — Medication 50 MCG: at 13:56

## 2020-05-22 RX ADMIN — CEFAZOLIN SODIUM 2 G: 2 SOLUTION INTRAVENOUS at 13:59

## 2020-05-22 RX ADMIN — SODIUM CHLORIDE, POTASSIUM CHLORIDE, SODIUM LACTATE AND CALCIUM CHLORIDE: 600; 310; 30; 20 INJECTION, SOLUTION INTRAVENOUS at 13:50

## 2020-05-22 RX ADMIN — MIDAZOLAM 2 MG: 1 INJECTION INTRAMUSCULAR; INTRAVENOUS at 13:50

## 2020-05-22 RX ADMIN — SODIUM CHLORIDE, POTASSIUM CHLORIDE, SODIUM LACTATE AND CALCIUM CHLORIDE: 600; 310; 30; 20 INJECTION, SOLUTION INTRAVENOUS at 12:25

## 2020-05-22 RX ADMIN — PROPOFOL 150 MCG/KG/MIN: 10 INJECTION, EMULSION INTRAVENOUS at 13:50

## 2020-05-22 RX ADMIN — Medication 50 MCG: at 13:54

## 2020-05-22 RX ADMIN — LIDOCAINE HYDROCHLORIDE 50 MG: 20 INJECTION, SOLUTION INFILTRATION; PERINEURAL at 13:54

## 2020-05-22 ASSESSMENT — PULMONARY FUNCTION TESTS
PIF_VALUE: 1
PIF_VALUE: 0
PIF_VALUE: 1

## 2020-05-22 ASSESSMENT — PAIN SCALES - GENERAL
PAINLEVEL_OUTOF10: 0

## 2020-05-22 ASSESSMENT — ENCOUNTER SYMPTOMS: SHORTNESS OF BREATH: 0

## 2020-05-22 ASSESSMENT — PAIN - FUNCTIONAL ASSESSMENT: PAIN_FUNCTIONAL_ASSESSMENT: 0-10

## 2020-05-22 ASSESSMENT — PAIN DESCRIPTION - DESCRIPTORS: DESCRIPTORS: CONSTANT;DISCOMFORT

## 2020-05-22 NOTE — H&P
in no acute distress  Mental status: oriented to person, place, and time with normal affect  Head:  normocephalic, atraumatic. Eye: no icterus, redness, pupils equal and reactive, extraocular eye movements intact, conjunctiva clear  Ear: normal external ear, no discharge, hearing intact  Nose:  no drainage noted  Mouth: mucous membranes moist  Neck: supple, no carotid bruits, thyroid not palpable  Lungs: Bilateral equal air entry, clear to ausculation, no wheezing, rales or rhonchi, normal effort  Cardiovascular: HR 41  Marked bradycardic rate and regular rhythm at present w/o symptoms  Anesthesiology Dr Patten Sers informed and evaluated patient  No murmur, gallop, rub. Abdomen: Soft, nontender, nondistended, normal bowel sounds, no hepatomegaly or splenomegaly  Neurologic: There are no new focal motor or sensory deficits, normal muscle tone and bulk, no abnormal sensation, normal speech, cranial nerves II through XII grossly intact  Skin: No gross lesions, rashes, bruising or bleeding on exposed skin area  Extremities: Skin intact right foot w/o open lesions, redness or warmth   peripheral pulses palpable, no pedal edema or calf pain with palpation  Psych: normal affect     Investigations:      Laboratory Testing:  No results found for this or any previous visit (from the past 24 hour(s)). No results for input(s): HGB, HCT, WBC, MCV, PLATELET, NA, K, CL, CO2, BUN, CREATININE, GLUCOSE, INR, PROTIME, APTT, AST, ALT, LABALBU, HCG in the last 720 hours. Recent Labs     05/19/20  1510   COVID19 Not Detected                 Imaging/Diagnostics:    Diagnosis:      1. Right sesamoiditis    Plans:     1.  Tibial right sesamoidectomy       REECE Smallwood - CNP  5/22/2020  12:17 PM

## 2020-05-22 NOTE — ANESTHESIA PRE PROCEDURE
Department of Anesthesiology  Preprocedure Note       Name:  Alesia Goldstein   Age:  63 y.o.  :  1960                                          MRN:  [id number]         Date:  2020      Surgeon: Yohan Henriquez):  Alyssa Lay DPM    Procedure: Procedure(s):  TIBIAL SESAMOIDECTOMY RIGHT    Medications prior to admission:   Prior to Admission medications    Medication Sig Start Date End Date Taking? Authorizing Provider   gabapentin (NEURONTIN) 600 MG tablet TAKE ONE TABLET BY MOUTH THREE TIMES A DAY 20 Yes REECE Angulo - CNP   amLODIPine (NORVASC) 10 MG tablet Take 1 tablet by mouth daily 20  Yes REECE Angulo - CNP   lisinopril (PRINIVIL;ZESTRIL) 20 MG tablet TAKE ONE TABLET BY MOUTH DAILY 20  Yes REECE Angulo - CNP   pantoprazole (PROTONIX) 40 MG tablet Take 1 tablet by mouth every morning (before breakfast) 3/2/20  Yes Kathy Charles MD   ferrous sulfate 325 (65 Fe) MG tablet TAKE ONE TABLET BY MOUTH DAILY WITH BREAKFAST 2/10/20  Yes REECE Angulo - CNP   rOPINIRole (REQUIP) 0.5 MG tablet TAKE ONE TABLET BY MOUTH ONCE NIGHTLY 2/10/20  Yes REECE Angulo CNP   primidone (MYSOLINE) 50 MG tablet Take 2 po qhs 20  Yes Alok Wasserman MD   aspirin 81 MG chewable tablet Take 81 mg by mouth daily   Yes Historical Provider, MD   Multiple Vitamins-Minerals (THERAPEUTIC MULTIVITAMIN-MINERALS) tablet Take 1 tablet by mouth daily   Yes Historical Provider, MD   Misc.  Devices [facility]) MISC 1 Device by Does not apply route daily Standard wheelchair  Current body weight: 221 lb (100.2 kg)  Current patient height: 5'10 (177.8 cm)  Diagnosis: right foot pain and unable to ambulate  Length of need: 12 months 3/24/20   Alyssa Lay DPM       Current medications:    Current Facility-Administered Medications   Medication Dose Route Frequency Provider Last Rate Last Dose    ceFAZolin (ANCEF) 2 g in dextrose 3 % 50 mL IVPB (duplex)  2 g Intravenous Once Roel Cedillo, DPM        lactated ringers infusion   Intravenous Continuous Soheila Tinsley MD           Allergies:     Allergies   Allergen Reactions    Sulfa Antibiotics Rash       Problem List:    Patient Active Problem List   Diagnosis Code    GI bleed K92.2    Gastrointestinal hemorrhage K92.2    Iron deficiency anemia due to chronic blood loss D50.0    Diarrhea R19.7    Melena K92.1    New onset seizure ([facility] R56.9    Elevated liver enzymes R74.8    Essential hypertension I10    Gastric ulcer K25.9    Unspecified viral hepatitis C without hepatic coma B19.20    Portal hypertensive gastropathy (HCC) K76.6, K31.89    Anemia D64.9    Cirrhosis of liver without ascites (HCC) K74.60    Duodenal ulcer K26.9    Acute blood loss anemia D62    Thrombocytopenia (HCC) H95.7    Alcoholic cirrhosis of liver without ascites (HCC) K70.30    Chronic midline low back pain without sciatica M54.5, G89.29    Seizures (HCC) R56.9    Hypertension I10    Hep C w/o coma, chronic (HCC) B18.2    Chronic back pain M54.9, G89.29    History of ETOH abuse F10.11    Abdominal pain R10.9    Elevated alpha fetoprotein R77.2    Olecranon bursitis of left elbow M70.22    Septic arthritis of IP joint of toe, right (HCC) M00.9    Septic arthritis of right foot (HCC) M00.9    Foot infection L08.9    Septic arthritis (HCC) M00.9    Acute hematogenous osteomyelitis of right foot (HCC)-Involving the sesamoid bones M86.071    Tenosynovitis of right foot-right Hallux M65.9       Past Medical History:        Diagnosis Date    Abdominal pain     Acute blood loss anemia 2/27/2019    Acute hematogenous osteomyelitis of right foot (HCC)-Involving the sesamoid bones 0/16/8571    Alcoholic cirrhosis of liver without ascites ([address] 3/29/2019    Anemia     Bleeding ulcer 2019    Chronic back pain 2015    Chronic midline low back pain without sciatica 3/29/2019    Cirrhosis of liver without ascites (HCC)     Diarrhea     Duodenal ulcer 2/2/2019    Elevated alpha fetoprotein     Elevated liver enzymes 1/30/2019    Essential hypertension 1/30/2019    Foot infection 2019    Gastric ulcer 10/31/2015    large 2-3 cm    Gastrointestinal hemorrhage     GI bleed 10/30/2015    Hematuria 2017    2019-RESOLVED NOW    Hep C w/o coma, chronic ([address] 2017    WAITING APPROVAL FROM INSURANCE COMPANY TO GET TREATMENT STARTED    Hepatitis C 2018    History of blood transfusion 2019    ANEMIA R/T BLEEDING ULCERS    History of ETOH abuse     QUIT 01/2017    Hypertension 2017    ON RX    Iron deficiency anemia due to chronic blood loss     Melena     Neuropathy 2009    FEET BILAT, SHAKEY HANDS    New onset seizure ([address]     Olecranon bursitis of left elbow     Portal hypertensive gastropathy ([address]     Seizure ([address] 2017    x1 per pt. possibly due to going off of pain medication per pt.     Seizures ([address]     only one in 2016    Septic arthritis ([address] 2/28/2020    Septic arthritis of IP joint of toe, right ([address] 2/18/2020    Septic arthritis of right foot ([address] 2/18/2020    Tenosynovitis of right foot-right Hallux 2/29/2020    Thrombocytopenia ([address] 3/29/2019    Unspecified viral hepatitis C without hepatic coma 1/30/2019    Wears glasses        Past Surgical History:        Procedure Laterality Date    BACK SURGERY  2000    lumbar discectomy    COLONOSCOPY  03/14/2018    mod diverticulosis; internal hemorrhoids; retained stools    ELBOW DEBRIDEMENT Left 10/16/2019     ELBOW INCISION AND DRAINAGE    HERNIA REPAIR Left 1975    lt inguinal    INCISION AND DRAINAGE Left 10/16/2019    ELBOW INCISION AND DRAINAGE, PARTIALTRICEP REPAIR performed by Annie Gregory DO at [address] [place] [address] Se [address]  [address] IND N/A 3/14/2018    [facility]  10/31/2015    large gastric

## 2020-05-22 NOTE — ANESTHESIA POSTPROCEDURE EVALUATION
Department of Anesthesiology  Postprocedure Note    Patient: Jake Zheng  MRN: [id number]  [place]: 1960  Date of evaluation: 5/22/2020  Time:  3:22 PM     Procedure Summary     Date:  05/22/20 Room / Location:  [address] [place] [address] - INPATIENT    Anesthesia Start:  1350 Anesthesia Stop:  2231    Procedure:  TIBIAL SESAMOIDECTOMY RIGHT (Right Foot) Diagnosis:  (DX SESMOIDITIS RIGHT)    Surgeon:  Shawna Dunne DPM Responsible Provider:  Magdalena Barnhart MD    Anesthesia Type:  MAC, general ASA Status:  3          Anesthesia Type: MAC, general    Randolph Phase I: Randolph Score: 9    Randolph Phase II:      Last vitals: Reviewed and per EMR flowsheets.        Anesthesia Post Evaluation    Patient location during evaluation: PACU  Level of consciousness: awake and alert  Complications: no

## 2020-05-28 ENCOUNTER — OFFICE VISIT (OUTPATIENT)
Dept: PODIATRY | Age: 60
End: 2020-05-28

## 2020-05-28 VITALS — TEMPERATURE: 97.9 F | HEIGHT: 70 IN | RESPIRATION RATE: 18 BRPM | BODY MASS INDEX: 31.78 KG/M2 | WEIGHT: 222 LBS

## 2020-05-28 PROCEDURE — 99024 POSTOP FOLLOW-UP VISIT: CPT | Performed by: PODIATRIST

## 2020-05-28 NOTE — PROGRESS NOTES
forefoot. Pt allowed to use his heel and crutches. Sutures out next week. Verbal and written instructions given to patient. Orders: No orders of the defined types were placed in this encounter. Contact office with any questions/problems/concerns. RTC in 1week(s).      Electronically signed by Roel Cedillo DPM on 5/28/2020 at 9:29 AM  5/28/2020

## 2020-06-04 ENCOUNTER — OFFICE VISIT (OUTPATIENT)
Dept: PODIATRY | Age: 60
End: 2020-06-04

## 2020-06-04 VITALS — RESPIRATION RATE: 16 BRPM | HEIGHT: 70 IN | TEMPERATURE: 98 F | BODY MASS INDEX: 29.49 KG/M2 | WEIGHT: 206 LBS

## 2020-06-04 PROCEDURE — 99024 POSTOP FOLLOW-UP VISIT: CPT | Performed by: PODIATRIST

## 2020-06-04 NOTE — PROGRESS NOTES
[facility]IC[facility]  [facility]  [address] [place] [address]  Dept: [phone]  Dept Fax: [phone]    POST-OP PROGRESS NOTE  Date of patient's visit: 6/4/2020  Patient's Name:  Alesia Goldstein YOB: 1960            Patient Care Team:  REECE Angulo CNP as PCP - General (Certified Nurse Practitioner)  REECE Angulo CNP as PCP - [facility]  Urbano Browning MD as Consulting Physician (Gastroenterology)  Alyssa Lay DPM as Physician (Podiatry)  Gayatri Pedro MD as Consulting Physician (Infectious Diseases)        Chief Complaint   Patient presents with    Post-Op Check    Foot Pain       Pt's primary care physician is REECE Matamoros CNP last seen 5/21/20    Subjective: Alesia Golsdtein is a 63 y.o. male who presents to the office today 2week(s)  S/P right tibial sesemodectomy for correction of   Problem List Items Addressed This Visit     None      Visit Diagnoses     Post-operative pain    -  Primary    Trouble walking          . Patient relates pain is Absent  and . Pain is rated 0 out of 10 and is described as none. Currently denies F/C/N/V. Is patient taking pain medications as prescribed and is controlling pain    Physical Examination:  Incision is coapted, sutures/steri-strips are intact. Minimal bleeding post operatively. Edema present. No erythema. No Pus. Operative correction is satisfactory. Radiographs: none      Assessment: Alesia Goldstein is status post as above  Normal post operative course. Doing well          ICD-10-CM    1. Post-operative pain G89.18    2. Trouble walking R26.2          Plan:  Patient examined and evaluated. Current condition and treatment options discussed in detail. Advised pt to his treatment plans. Patient presents for suture removal. The wound is well healed without signs of infection. The sutures are removed.  Wound care and activity instructions given. Pt to weightbear as tolearated. Verbal and written instructions given to patient. Orders: No orders of the defined types were placed in this encounter. Contact office with any questions/problems/concerns. RTC in 2month(s).      Electronically signed by Ventura Carranza DPM on 6/4/2020 at 9:38 AM  6/4/2020

## 2020-07-16 ENCOUNTER — OFFICE VISIT (OUTPATIENT)
Dept: ORTHOPEDIC SURGERY | Age: 60
End: 2020-07-16
Payer: MEDICARE

## 2020-07-16 VITALS — BODY MASS INDEX: 31.21 KG/M2 | HEIGHT: 70 IN | WEIGHT: 218 LBS

## 2020-07-16 PROCEDURE — G8417 CALC BMI ABV UP PARAM F/U: HCPCS | Performed by: STUDENT IN AN ORGANIZED HEALTH CARE EDUCATION/TRAINING PROGRAM

## 2020-07-16 PROCEDURE — 1036F TOBACCO NON-USER: CPT | Performed by: STUDENT IN AN ORGANIZED HEALTH CARE EDUCATION/TRAINING PROGRAM

## 2020-07-16 PROCEDURE — 3017F COLORECTAL CA SCREEN DOC REV: CPT | Performed by: STUDENT IN AN ORGANIZED HEALTH CARE EDUCATION/TRAINING PROGRAM

## 2020-07-16 PROCEDURE — G8427 DOCREV CUR MEDS BY ELIG CLIN: HCPCS | Performed by: STUDENT IN AN ORGANIZED HEALTH CARE EDUCATION/TRAINING PROGRAM

## 2020-07-16 PROCEDURE — 99213 OFFICE O/P EST LOW 20 MIN: CPT | Performed by: STUDENT IN AN ORGANIZED HEALTH CARE EDUCATION/TRAINING PROGRAM

## 2020-07-16 PROCEDURE — 20610 DRAIN/INJ JOINT/BURSA W/O US: CPT | Performed by: STUDENT IN AN ORGANIZED HEALTH CARE EDUCATION/TRAINING PROGRAM

## 2020-07-16 RX ORDER — BUPIVACAINE HYDROCHLORIDE 2.5 MG/ML
2 INJECTION, SOLUTION INFILTRATION; PERINEURAL ONCE
Status: COMPLETED | OUTPATIENT
Start: 2020-07-16 | End: 2020-07-16

## 2020-07-16 RX ORDER — METHYLPREDNISOLONE ACETATE 80 MG/ML
80 INJECTION, SUSPENSION INTRA-ARTICULAR; INTRALESIONAL; INTRAMUSCULAR; SOFT TISSUE ONCE
Status: COMPLETED | OUTPATIENT
Start: 2020-07-16 | End: 2020-07-16

## 2020-07-16 RX ADMIN — METHYLPREDNISOLONE ACETATE 80 MG: 80 INJECTION, SUSPENSION INTRA-ARTICULAR; INTRALESIONAL; INTRAMUSCULAR; SOFT TISSUE at 12:57

## 2020-07-16 RX ADMIN — BUPIVACAINE HYDROCHLORIDE 5 MG: 2.5 INJECTION, SOLUTION INFILTRATION; PERINEURAL at 12:57

## 2020-07-16 NOTE — PROGRESS NOTES
[facility]  [facility]  [address] [facility] [address]  Dept: [phone]  Dept Fax: [phone]        Ambulatory Follow Up    Subjective:   Ashley Parsons is a 63y.o. year old male who presents to our office today for routine followup regarding:   his   1. Arthritis of right knee    2. Right knee pain, unspecified chronicity    . Chief Complaint   Patient presents with    Knee Pain     right       HPI  Patient is a 23-year-old male who is here for evaluation of his right knee pain. Patient mentions that he sustained an injury to his right knee in June 2019 where he stepped into a hole in the yard and twisted his right knee. He has had right knee pain since this event. He was seen in our office roughly 1 year ago and had received an MRI which had showed a posterior root medial meniscal tear as well as lateral meniscal extrusion of the right knee. He received an intra-articular steroid injection on 8/12/2019 which he states provided him near 100% improvement in his symptoms up until the past few months. He has been utilizing a standard right knee brace for relief of his symptoms. Since the original injury he states no other new injuries or traumas to the right knee. He has been utilizing heat and has been restricting his activity which she seems to feel helps with his knee pain. He notes specifically the pain is to the lateral aspect of the knee. He admits to clicking and popping of the knee but denies any locking or catching. He has done physical therapy in the past but is not currently enrolled. The pain is worse with activity. He currently takes gabapentin for his neuropathy. He is currently not working and does yardwork/housework around the house daily. He is unable to take Advil or any anti-inflammatory medications due to stomach ulcers.   He does have a past medical history of an L4-L5 disc removal surgery and he also states he gets occasional right sciatic nerve pain. Review of Systems:   General: Negative for fever and chills. Cardiovascular: Negative for chest pain and palpitations. Musculoskeletal: Positive for joint pain (right knee). Neurological: Negative for numbness, tingling, and weakness. 10 remaining systems reviewed and negative. I have reviewed the CC, HPI, ROS, PMH, FHX, Social History. I agree with the documentation provided by other staff, residents, and/or medical students and have reviewed their documentation prior to providing my signature indicating agreement. Objective :   General: AAOx3, NAD, appears appropriate stated age  Ortho Exam  RLE: No significant effusion of the right knee. Skin is intact without any evidence of erythema or open lacerations. Tender to palpation at the lateral aspect of the knee. Compartments are soft and compressible. EHL/FHL/TA/GS complex motor intact with 5/5 strength. Sural, saphenous, superificial/deep peroneal, and plantar nerve distribution SILT. Dorsalis pedis/posterior tibial pulses 2+ with BCR. Negative anterior/Posterior Drawer knee exam.  Negative Lachman's test.  Negative varus/valgus instability test.  Positive Brady's to the lateral aspect of the knee with pain. Knee ROM 0-120degrees. CV: no obvious JVD, no dependent edema, distal pulses 2+  Respiratory: chest rise symmetric, unlabored respirations, no audible wheezing  Skin: warm, well perfused, no obvious rashes or lesions  Psych: Patient displays understanding of exam, diagnosis, and plan. Radiology:   History:   Right knee pain    Comparison:   12/26/2018    Findings:   4 WB xray views of the right knee demonstrates degenerative changes within the knee joint most prominently in the lateral compartment as evidenced by subchondral sclerosis and joint space narrowing. Tricompartmental osteophytosis noted. No evidence any acute fractures, dislocations, subluxations.     Impression:  Right knee degenerative joint Patient was advised to ice the area for 15-20 minutes to relieve any injection site related pain. Patient was advised to contact nurse if area becomes swollen, hot, erythematous, or painful, or to go to the emergency room after business hours. Follow up:Return if symptoms worsen or fail to improve. Orders Placed This Encounter   Medications    methylPREDNISolone acetate (DEPO-MEDROL) injection 80 mg    bupivacaine (MARCAINE) 0.25 % injection 5 mg    Misc. Devices MISC     Sig: Right knee lateral  brace. Dispense:  1 Device     Refill:  0    Misc.  Devices MISC     Sig: Right knee lateral un- brace     Dispense:  1 Device     Refill:  0          Orders Placed This Encounter   Procedures    90616 - DRAIN/INJECT LARGE JOINT/BURSA       Ady Godinez DO  Orthopedic Surgery Resident, PGY-2  [facility], [address] [place] [address]

## 2020-08-10 RX ORDER — ROPINIROLE 0.5 MG/1
TABLET, FILM COATED ORAL
Qty: 30 TABLET | Refills: 5 | Status: SHIPPED | OUTPATIENT
Start: 2020-08-10 | End: 2020-10-20 | Stop reason: SDUPTHER

## 2020-08-10 RX ORDER — FERROUS SULFATE 325(65) MG
TABLET ORAL
Qty: 30 TABLET | Refills: 5 | Status: SHIPPED | OUTPATIENT
Start: 2020-08-10

## 2020-08-10 NOTE — TELEPHONE ENCOUNTER
Last visit: 5/20/20  Last Med refill: 2/10/20  Does patient have enough medication for 72 hours: Yes    Next Visit Date:  No future appointments.     Health Maintenance   Topic Date Due    Shingles Vaccine (1 of 2) 10/24/2011    Hepatitis B vaccine (2 of 3 - Risk 3-dose series) 12/06/2018    Hepatitis A vaccine (2 of 2 - Risk 2-dose series) 05/08/2019    Flu vaccine (1) 09/01/2020    Potassium monitoring  03/02/2021    Creatinine monitoring  03/02/2021    Lipid screen  11/01/2022    Diabetes screen  12/16/2022    Colon cancer screen colonoscopy  03/14/2023    DTaP/Tdap/Td vaccine (2 - Td) 04/16/2028    Pneumococcal 0-64 years Vaccine  Completed    HIV screen  Completed    Hib vaccine  Aged Out    Meningococcal (ACWY) vaccine  Aged Out       Hemoglobin A1C (%)   Date Value   12/16/2019 4.4   11/01/2017 5.6             ( goal A1C is < 7)   No results found for: LABMICR  LDL Cholesterol (mg/dL)   Date Value   11/01/2017 95       (goal LDL is <100)   AST (U/L)   Date Value   02/29/2020 127 (H)     ALT (U/L)   Date Value   02/29/2020 46 (H)     BUN (mg/dL)   Date Value   03/02/2020 10     BP Readings from Last 3 Encounters:   05/22/20 (!) 105/55   05/22/20 97/61   03/26/20 113/62          (goal 120/80)    All Future Testing planned in [facility]  Lab Frequency Next Occurrence   Body fluid cell count Once 09/23/2019   Fungus Culture Once 09/23/2019   Sedimentation Rate Once 03/26/2020   C-Reactive Protein Once 06/03/2020               Patient Active Problem List:     GI bleed     Gastrointestinal hemorrhage     Iron deficiency anemia due to chronic blood loss     Diarrhea     Melena     New onset seizure (HCC)     Elevated liver enzymes     Essential hypertension     Gastric ulcer     Unspecified viral hepatitis C without hepatic coma     Portal hypertensive gastropathy (HCC)     Anemia     Cirrhosis of liver without ascites (HCC)     Duodenal ulcer     Acute blood loss anemia     Thrombocytopenia (HCC) Alcoholic cirrhosis of liver without ascites (HCC)     Chronic midline low back pain without sciatica     Seizures (HCC)     Hypertension     Hep C w/o coma, chronic (HCC)     Chronic back pain     History of ETOH abuse     Abdominal pain     Elevated alpha fetoprotein     Olecranon bursitis of left elbow     Septic arthritis of IP joint of toe, right (HCC)     Septic arthritis of right foot (HCC)     Foot infection     Septic arthritis (HCC)     Acute hematogenous osteomyelitis of right foot (HCC)-Involving the sesamoid bones     Tenosynovitis of right foot-right Hallux     Sesamoiditis     Acute post-operative pain     Right foot pain

## 2020-08-20 ENCOUNTER — OFFICE VISIT (OUTPATIENT)
Dept: ORTHOPEDIC SURGERY | Age: 60
End: 2020-08-20
Payer: MEDICARE

## 2020-08-20 VITALS — WEIGHT: 215 LBS | BODY MASS INDEX: 30.78 KG/M2 | HEIGHT: 70 IN

## 2020-08-20 PROCEDURE — G8427 DOCREV CUR MEDS BY ELIG CLIN: HCPCS | Performed by: ORTHOPAEDIC SURGERY

## 2020-08-20 PROCEDURE — 1036F TOBACCO NON-USER: CPT | Performed by: ORTHOPAEDIC SURGERY

## 2020-08-20 PROCEDURE — 3017F COLORECTAL CA SCREEN DOC REV: CPT | Performed by: ORTHOPAEDIC SURGERY

## 2020-08-20 PROCEDURE — 99213 OFFICE O/P EST LOW 20 MIN: CPT | Performed by: ORTHOPAEDIC SURGERY

## 2020-08-20 PROCEDURE — G8417 CALC BMI ABV UP PARAM F/U: HCPCS | Performed by: ORTHOPAEDIC SURGERY

## 2020-08-20 NOTE — PROGRESS NOTES
[facility]  [facility]  [facility] [address] [facility]  Dept: [phone]  Dept Fax: [phone]        Ambulatory Follow Up      Subjective:     Chief Complaint   Patient presents with    Follow-up     Right knee pain        Nataly Salgado is a 63y.o. year old male who presents to our office today for routine followup regarding his   1. Arthritis of right knee    . HPI  Patient is a 81-year-old male here today for follow-up regarding his right knee osteoarthritis. He underwent a right knee corticosteroid injection at his last visit on 7/16/2020. Patient states that he did not receive any relief from the injection. Patient is also undergone treatment with a lateral  brace. He states that this occasionally helps but overall does not give him the relief he is looking for. He has pain at rest and while ambulating. Pain is worse with range of motion and weightbearing. He ambulates with a walker. He also states that he is been mobilizing with a wheelchair. Patient has a complex medical history including alcohol abuse, hepatitis C, cirrhosis, hypertension, neuropathy in bilateral feet, history of septic arthritis and tenosynovitis of his right foot in February 2020, history of olecranon bursitis of the left elbow, and history of gastric ulcers. Patient has been unable to take NSAIDs due to the gastric ulcers and Tylenol due to the cirrhosis. He is not currently take anything by mouth for pain control. Of note, the patient has been using ice for pain control. The other day he applied ice directly to his skin and sustained a superficial burn. His friend is a wound care nurse and has been helping take care of it. Picture is in the media tab today. Patient denies any fevers chills. Denies any other orthopedic complaints at this time. ROS:   Constitutional: Negative for fever and chills.     Respiratory: Negative for cough, shortness of breath and actually reflects the content of the visit, the document can still have some errors including those of syntax and sound-a-like substitutions which may escape proof reading. In such instances, actual meaning can be extrapolated by context.

## 2020-08-24 ENCOUNTER — PATIENT MESSAGE (OUTPATIENT)
Dept: FAMILY MEDICINE CLINIC | Age: 60
End: 2020-08-24

## 2020-08-24 ENCOUNTER — E-VISIT (OUTPATIENT)
Dept: FAMILY MEDICINE CLINIC | Age: 60
End: 2020-08-24

## 2020-08-25 ENCOUNTER — TELEMEDICINE (OUTPATIENT)
Dept: FAMILY MEDICINE CLINIC | Age: 60
End: 2020-08-25
Payer: MEDICARE

## 2020-08-25 PROCEDURE — 3017F COLORECTAL CA SCREEN DOC REV: CPT | Performed by: NURSE PRACTITIONER

## 2020-08-25 PROCEDURE — 1036F TOBACCO NON-USER: CPT | Performed by: NURSE PRACTITIONER

## 2020-08-25 PROCEDURE — 99214 OFFICE O/P EST MOD 30 MIN: CPT | Performed by: NURSE PRACTITIONER

## 2020-08-25 PROCEDURE — G8427 DOCREV CUR MEDS BY ELIG CLIN: HCPCS | Performed by: NURSE PRACTITIONER

## 2020-08-25 PROCEDURE — G8417 CALC BMI ABV UP PARAM F/U: HCPCS | Performed by: NURSE PRACTITIONER

## 2020-08-25 RX ORDER — SULFAMETHOXAZOLE AND TRIMETHOPRIM 800; 160 MG/1; MG/1
1 TABLET ORAL 2 TIMES DAILY
Qty: 20 TABLET | Refills: 0 | Status: SHIPPED | OUTPATIENT
Start: 2020-08-25 | End: 2020-09-04

## 2020-08-25 ASSESSMENT — ENCOUNTER SYMPTOMS
SHORTNESS OF BREATH: 0
COUGH: 0

## 2020-08-25 NOTE — PROGRESS NOTES
tablet by mouth daily Yes Arpan Charlton, APRN - CNP   lisinopril (PRINIVIL;ZESTRIL) 20 MG tablet TAKE ONE TABLET BY MOUTH DAILY Yes Arpan Charlton, APRN - CNP   aspirin 81 MG chewable tablet Take 81 mg by mouth daily Yes Historical Provider, MD   Multiple Vitamins-Minerals (THERAPEUTIC MULTIVITAMIN-MINERALS) tablet Take 1 tablet by mouth daily Yes Historical Provider, MD   Misc. Devices MISC Right knee lateral  brace. [facility], DO   [facility]. Devices MISC Right knee lateral un- brace  Darshan Garza, DO   Misc.  Devices [facility]) MISC 1 Device by Does not apply route daily Standard wheelchair  Current body weight: 221 lb (100.2 kg)  Current patient height: 5'10 (177.8 cm)  Diagnosis: right foot pain and unable to ambulate  Length of need: 12 months  Saranya Mendiola DPM   pantoprazole (PROTONIX) 40 MG tablet Take 1 tablet by mouth every morning (before breakfast)  Patient not taking: Reported on 8/25/2020  Alyssa Lawrence MD   primidone (MYSOLINE) 50 MG tablet Take 2 po qhs  Patient not taking: Reported on 8/25/2020  Kalyan Gallardo MD     Social- none    Past Medical History:   Diagnosis Date    Abdominal pain     Acute blood loss anemia 2/27/2019    Acute hematogenous osteomyelitis of right foot (HCC)-Involving the sesamoid bones 2/58/2634    Alcoholic cirrhosis of liver without ascites ([address] 3/29/2019    Anemia     Bleeding ulcer 2019    Chronic back pain 2015    Chronic midline low back pain without sciatica 3/29/2019    Cirrhosis of liver without ascites ([address]     Diarrhea     Duodenal ulcer 2/2/2019    Elevated alpha fetoprotein     Elevated liver enzymes 1/30/2019    Essential hypertension 1/30/2019    Foot infection 2019    Gastric ulcer 10/31/2015    large 2-3 cm    Gastrointestinal hemorrhage     GI bleed 10/30/2015    Hematuria 2017 2019-RESOLVED NOW    Hep C w/o coma, chronic (HCC) 2017    WAITING APPROVAL FROM INSURANCE COMPANY TO GET TREATMENT STARTED    Hepatitis C 2018    History of blood transfusion 2019    ANEMIA R/T BLEEDING ULCERS    History of ETOH abuse     QUIT 01/2017    Hypertension 2017    ON RX    Iron deficiency anemia due to chronic blood loss     Melena     Neuropathy 2009    FEET BILAT, SHAKEY HANDS    New onset seizure ([address]     Olecranon bursitis of left elbow     Portal hypertensive gastropathy ([address]     Seizure ([address] 2017    x1 per pt. possibly due to going off of pain medication per pt.     Seizures ([address]     only one in 2016    Septic arthritis ([address] 2/28/2020    Septic arthritis of IP joint of toe, right ([address] 2/18/2020    Septic arthritis of right foot ([address] 2/18/2020    Tenosynovitis of right foot-right Hallux 2/29/2020    Thrombocytopenia ([address] 3/29/2019    Unspecified viral hepatitis C without hepatic coma 1/30/2019    Wears glasses    ,   Past Surgical History:   Procedure Laterality Date    BACK SURGERY  2000    lumbar discectomy    COLONOSCOPY  03/14/2018    mod diverticulosis; internal hemorrhoids; retained stools    ELBOW DEBRIDEMENT Left 10/16/2019     ELBOW INCISION AND DRAINAGE    HERNIA REPAIR Left 1975    lt inguinal    INCISION AND DRAINAGE Left 10/16/2019    ELBOW INCISION AND DRAINAGE, PARTIALTRICEP REPAIR performed by Dianna Fleischer, DO at [address] [place] [address] [address] [place] [address] N/A 3/14/2018    COLONOSCOPY performed by Tamiko Flores MD at [facility] Right 5/22/2020    TIBIAL SESAMOIDECTOMY RIGHT performed by Iain Pink DPM at [address] [place] [address]  10/31/2015    large gastric ulcer    UPPER GASTROINTESTINAL ENDOSCOPY  03/14/2018    mod portal hypertensive gastrophathy    UPPER GASTROINTESTINAL ENDOSCOPY N/A 3/14/2018    EGD BIOPSY performed by Tamiko Flores MD at [address] N/A 1/22/2019    EGD BIOPSY performed by Christi Dunbar MD at [place] [address] ASSESSMENT/PLAN:  Encounter Diagnosis   Name Primary?  Infected olecranon bursa, left Yes       Orders Placed This Encounter   Medications    sulfamethoxazole-trimethoprim (BACTRIM DS) 800-160 MG per tablet     Sig: Take 1 tablet by mouth 2 times daily for 10 days     Dispense:  20 tablet     Refill:  0     Sánchez area of redness and watch for spreading. Pt is going to call ortho when he gets off video with me and notify them of what's going on and our plan. No follow-ups on file. The time that was spent with the family/patient addressing care on this video call and chart review was 25 minutes. An  electronic signature was used to authenticate this note. --REECE Jaimes CNP on 8/25/2020 at 9:55 AM        Pursuant to the emergency declaration under the [facility], [facility] waiver authority and the [employer] and [employer] Act, this Virtual  Visit was conducted, with patient's consent, to reduce the patient's risk of exposure to COVID-19 and provide continuity of care for an established patient. Services were provided through a telephone discussion virtually to substitute for in-person clinic visit.

## 2020-08-25 NOTE — PROGRESS NOTES
Patient is present complaining of bump on left elbow x2 days  Patient states he hit his elbow and states the bump is getting bigger  States this has happened in the past where it had to be drained

## 2020-08-27 ENCOUNTER — OFFICE VISIT (OUTPATIENT)
Dept: ORTHOPEDIC SURGERY | Age: 60
End: 2020-08-27
Payer: MEDICARE

## 2020-08-27 VITALS — WEIGHT: 215 LBS | HEIGHT: 70 IN | BODY MASS INDEX: 30.78 KG/M2

## 2020-08-27 PROCEDURE — 99213 OFFICE O/P EST LOW 20 MIN: CPT | Performed by: ORTHOPAEDIC SURGERY

## 2020-08-27 NOTE — PROGRESS NOTES
[facility]  [address], [address] [facility] [address]  Dept: [phone]    Orthopedic Clinic Follow Up    Subjective:     Chief Complaint   Patient presents with    Follow-up     left elbow pain        History of Present Illness:  Josie Metcalf is a 63y.o. year old male who presents as a follow up for left elbow pain. Patient has a history of a left septic olecranon bursitis treated operatively with irrigation and debridement on 10/16/2019. Patient states that on Friday, 8/21/2020, patient was reaching up to the top shelf and then as he came down he hit his left elbow on the fridge. He felt acute pain at that time. He had acute swelling to the left elbow. Around Sunday night, he picked at a scab and a gush of fluid drained from the left elbow olecranon bursa. He describes the fluid as somewhat clear, bloody with possible purulence. Denies numbness, tingling, fever, chills. He says that his PCP called in a prescription for Bactrim and he is now on day 4 of 5. Review of Systems:   Constitutional: Negative for fever and chills. HENT: Negative for congestion. Eyes: Negative for blurred vision and double vision. Respiratory: Negative for cough, shortness of breath. Cardiovascular: Negative for chest pain and palpitations. Gastrointestinal: Negative for nausea. Negative for vomiting. Musculoskeletal: Positive for left elbow pain, drainage. Skin: Negative for itching and rash. Neurological: Negative for numbness, tingling, weakness. Psychiatric/Behavioral: Negative for depression and suicidal ideas. Objective :   Physical Exam:  General: AAOx3, NAD, sitting comfortably in the room. CV: No dependent edema, regular rate. Pulm: Respirations unlabored and regular. Neuro: Alert. Oriented. Psych: Patient has good fund of knowledge and displays understanging of exam, diagnosis, and plan.   Ortho exam:  LUE: 2 small poke holes that appear to have mild serous sanguinous

## 2020-08-31 ENCOUNTER — TELEPHONE (OUTPATIENT)
Dept: FAMILY MEDICINE CLINIC | Age: 60
End: 2020-08-31

## 2020-08-31 NOTE — TELEPHONE ENCOUNTER
Noris Lange was given Bactrim -160 mg on 08/25/2020. He is allergic to sulfa. He states has stooped taking the medication but does has redness all over his face, around his eyes both sides of nose and goes down to his chin. I suggested Benadryl 25 mg for the rash and itching. virtual visit for elbow swelling. States he hit the left elbow on the fridge door on Saturday. It felt like a bruise. A few days later the elbow swelled up similarly to how it swelled last year at this time with his olecranon bursitis. It is painful.   He has full rom of elbow

## 2020-09-01 NOTE — TELEPHONE ENCOUNTER
Patient called in stating that when they found out he was allergic to bactrim they changed his medication to suflax.  Pt just wants to know if the antibiotic can be changed

## 2020-09-01 NOTE — TELEPHONE ENCOUNTER
Please see my note to rachel esquivel didn't seen any infection and he was almost done with the antibiotic. Does he think it is now infected?

## 2020-09-02 NOTE — TELEPHONE ENCOUNTER
Called patient to inform him to call ortho. Patient stated this is not what it is about. States he was given an antibiotic that he was allergic to. Patient was very irate and yelling, and asked patient to please not yell. Patient states no and stated this is malpractice. Placed patient on hold on to speak to manager. Patient then hung up. Patient did mention before hanging up that he had an appt tomorrow with griselda.

## 2020-09-03 ENCOUNTER — OFFICE VISIT (OUTPATIENT)
Dept: ORTHOPEDIC SURGERY | Age: 60
End: 2020-09-03
Payer: MEDICARE

## 2020-09-03 VITALS — WEIGHT: 215 LBS | BODY MASS INDEX: 30.78 KG/M2 | HEIGHT: 70 IN

## 2020-09-03 PROCEDURE — 99213 OFFICE O/P EST LOW 20 MIN: CPT | Performed by: STUDENT IN AN ORGANIZED HEALTH CARE EDUCATION/TRAINING PROGRAM

## 2020-09-03 NOTE — PROGRESS NOTES
[facility]  [facility]  [address] [facility] [address]  Dept: [phone]  Dept Fax: [phone]        Ambulatory Follow-Up    Subjective:   Callie Renteria is a 63y.o. year old male who presents to our office today for routine followup regarding:   his   1. Right knee pain, unspecified chronicity    . Chief Complaint   Patient presents with    Knee Pain     right     HPI  Mr. Sebastian Frias presents to clinic today for follow-up regarding right knee pain. Patient was last seen in clinic regarding his right knee on 8/20/2020. At that time patient was provided a referral to pain management to consider possible genicular nerve blocks to help with pain control given the fact that he cannot take Tylenol for history of cirrhosis and NSAIDs due to history of gastric ulcers. At that clinic visit he had superficial burns to his right lateral knee when he fell asleep on an ice pack. No topical anti-inflammatories were prescribed at that time due to having the wound, but as of clinic presentation today it has healed well. Patient continues to endorse dull, aching knee pain that is worse with going up stairs and prolonged weightbearing. He denies any falls or injuries. He denies any episodes of instability. He denies any numbness or tingling. Denies any significant weakness. Regarding his left olecranon bursitis, he was recently prescribed some antibiotics by his primary care physician due to some redness and swelling after hitting his elbow on something a couple weeks ago. Since starting the antibiotics this is all completely resolved. Review of Systems   Constitutional: Negative for fever and chills. HENT: Negative for congestion. Eyes: Negative for blurred vision and double vision. Respiratory: Negative for cough, shortness of breath and wheezing. Cardiovascular: Negative for chest pain and palpitations. Gastrointestinal: Negative for nausea.  Negative for vomiting. Musculoskeletal: Positive for myalgias, pain, and limited motion right knee. Skin: Negative for itching and rash. Neurological: Negative for dizziness, sensory change and headaches. Psychiatric/Behavioral: Negative for depression and suicidal ideas. I have reviewed the CC, HPI, ROS, PMH, FHX, Social History. I agree with the documentation provided by other staff, residents, and/or medical students and have reviewed their documentation prior to providing my signature indicating agreement. Objective :   Ht 5' 10\" (1.778 m)   Wt 215 lb (97.5 kg)   BMI 30.85 kg/m²   General: AAOx3, NAD, appears stated age  Ortho Exam  MSK-R knee: Well-healing superficial thermal injury to right lateral knee just inferior to the joint line. It has well epithelialized at this time. No active drainage or signs concerning of infection. Global tenderness palpation about the knee particularly the medial joint line and patellar retinacula. Positive patellar grind. Active range of motion 2 to 3degrees to 120 degrees. Stable to varus and valgus stress. Sensation intact light touch about knee and distally. Leg warm well perfused, 2+ popliteal pulse. CV: no obvious JVD, no dependent edema, distal pulses 2+  Respiratory: chest rise symmetric, unlabored respirations, no audible wheezing  Skin: warm, well perfused, no obvious rashes or lesions  Psych: patient displays understanding of exam, diagnosis, and plan. Radiology:   No new imaging obtained in office today. Assessment:      1. Right knee pain, unspecified chronicity       Plan:      -Regarding the patient's right knee pain plan will be for topical NSAIDs. Thermal injury to his right lateral knee has healed well at this time and we feel as though proceeding with topical medications is safe at this point.  Prescription for topical NSAID serum filled out today to be supplied by compounding pharmacy.  -Continue with as needed ice application and home therapy exercises.  -As patient recently had a corticosteroid injection that failed to provide significant relief and cannot take Tylenol due to history of cirrhosis or by mouth NSAIDs due to history of GI bleeds no other interventions planned in office today.  -Follow-up in 2 months time. May consider repeat steroid injection versus viscosupplementation injection at that time. Follow up:Return in about 2 months (around 11/3/2020). No orders of the defined types were placed in this encounter. No orders of the defined types were placed in this encounter.     Electronically signed by Esa Em DO on 9/3/2020 at 2:38 PM

## 2020-09-09 ENCOUNTER — TELEPHONE (OUTPATIENT)
Dept: PAIN MANAGEMENT | Age: 60
End: 2020-09-09

## 2020-09-10 ENCOUNTER — INITIAL CONSULT (OUTPATIENT)
Dept: PAIN MANAGEMENT | Age: 60
End: 2020-09-10
Payer: MEDICARE

## 2020-09-10 VITALS — TEMPERATURE: 97.9 F | WEIGHT: 215 LBS | HEIGHT: 70 IN | BODY MASS INDEX: 30.78 KG/M2

## 2020-09-10 PROCEDURE — 99244 OFF/OP CNSLTJ NEW/EST MOD 40: CPT | Performed by: PAIN MEDICINE

## 2020-09-10 RX ORDER — LISINOPRIL 20 MG/1
TABLET ORAL
Qty: 90 TABLET | Refills: 4 | OUTPATIENT
Start: 2020-09-10

## 2020-09-10 ASSESSMENT — ENCOUNTER SYMPTOMS
BOWEL INCONTINENCE: 0
COUGH: 0
EYE DISCHARGE: 0

## 2020-09-10 NOTE — PROGRESS NOTES
HPI: Knee Pain    The incident occurred more than 1 week ago. The pain is present in the right knee. The quality of the pain is described as stabbing, burning, aching, cramping and shooting. The pain is at a severity of 5/10. The pain is moderate. The pain has been worsening since onset. Associated symptoms include an inability to bear weight, a loss of sensation and muscle weakness. He reports no foreign bodies present. The symptoms are aggravated by weight bearing. He has tried ice, non-weight bearing, acetaminophen, rest and elevation for the symptoms. The treatment provided mild relief. Chronic knee pain. Not interested in intra-articular injections. X-ray degenerative changes. Seen orthopedic surgery and nothing surgical at this time. Also history of alcohol abuse. He has seen infectious disease for recurrent infections in the past.  History of left olecranon bursitis as well as foot infection. Cannot take NSAIDs due to ulcer. Patient denies any new neurological symptoms. No bowel or bladder incontinence, no weakness, and no falling. Review of OARRS does not show any aberrant prescription behavior.      Past Medical History:   Diagnosis Date    Abdominal pain     Acute blood loss anemia 2/27/2019    Acute hematogenous osteomyelitis of right foot (HCC)-Involving the sesamoid bones 8/26/1531    Alcoholic cirrhosis of liver without ascites ([address] 3/29/2019    Anemia     Bleeding ulcer 2019    Chronic back pain 2015    Chronic midline low back pain without sciatica 3/29/2019    Cirrhosis of liver without ascites ([address]     Diarrhea     Duodenal ulcer 2/2/2019    Elevated alpha fetoprotein     Elevated liver enzymes 1/30/2019    Essential hypertension 1/30/2019    Foot infection 2019    Gastric ulcer 10/31/2015    large 2-3 cm    Gastrointestinal hemorrhage     GI bleed 10/30/2015    Hematuria 2017    2019-RESOLVED NOW    Hep C w/o coma, chronic ([address] 2017    WAITING APPROVAL FROM INSURANCE COMPANY TO GET TREATMENT STARTED    Hepatitis C 2018    History of blood transfusion 2019    ANEMIA R/T BLEEDING ULCERS    History of ETOH abuse     QUIT 01/2017    Hypertension 2017    ON RX    Iron deficiency anemia due to chronic blood loss     Melena     Neuropathy 2009    FEET BILAT, SHAKEY HANDS    New onset seizure ([address]     Olecranon bursitis of left elbow     Portal hypertensive gastropathy ([address]     Seizure ([address] 2017    x1 per pt. possibly due to going off of pain medication per pt.     Seizures ([address]     only one in 2016    Septic arthritis ([address] 2/28/2020    Septic arthritis of IP joint of toe, right ([address] 2/18/2020    Septic arthritis of right foot ([address] 2/18/2020    Tenosynovitis of right foot-right [place] 2/29/2020    Thrombocytopenia ([address] 3/29/2019    Unspecified viral hepatitis C without hepatic coma 1/30/2019    Wears glasses        Past Surgical History:   Procedure Laterality Date    BACK SURGERY  2000    lumbar discectomy    COLONOSCOPY  03/14/2018    mod diverticulosis; internal hemorrhoids; retained stools    ELBOW DEBRIDEMENT Left 10/16/2019     ELBOW INCISION AND DRAINAGE    HERNIA REPAIR Left 1975    lt inguinal    INCISION AND DRAINAGE Left 10/16/2019    ELBOW INCISION AND DRAINAGE, PARTIALTRICEP REPAIR performed by Jeannie Marshall DO at [address] [facility] NOT  [address] [place] [address] N/A 3/14/2018    COLONOSCOPY performed by Adithya Bianchi MD at [facility] 5/22/2020    TIBIAL SESAMOIDECTOMY RIGHT performed by Agustín Boyce DPM at [facility]  10/31/2015    large gastric ulcer    UPPER GASTROINTESTINAL ENDOSCOPY  03/14/2018    mod portal hypertensive gastrophathy    UPPER GASTROINTESTINAL ENDOSCOPY N/A 3/14/2018    EGD BIOPSY performed by Adithya Bianchi MD at [address] [facility] N/A 1/22/2019    EGD BIOPSY performed by Adonis Bryant Musculoskeletal: Positive for joint pain. Gastrointestinal: Negative for bowel incontinence. Genitourinary: Negative for bladder incontinence. Psychiatric/Behavioral: Negative for thoughts of violence. Physical Exam:  Temp 97.9 °F (36.6 °C) (Temporal)   Ht 5' 10\" (1.778 m)   Wt 215 lb (97.5 kg)   BMI 30.85 kg/m²     Physical Exam  Vitals signs reviewed. Constitutional:       General: He is awake. Appearance: He is not ill-appearing or diaphoretic. HENT:      Right Ear: External ear normal.      Left Ear: External ear normal.   Eyes:      General:         Right eye: No discharge. Left eye: No discharge. Conjunctiva/sclera: Conjunctivae normal.   Neck:      Thyroid: No thyromegaly. Trachea: No tracheal deviation. Pulmonary:      Effort: Pulmonary effort is normal. No respiratory distress. Breath sounds: No stridor. Musculoskeletal:      Right knee: Tenderness found. Skin:     General: Skin is warm and dry. Neurological:      Mental Status: He is alert and oriented to person, place, and time. Gait: Gait normal.      Comments: He is in a wheelchair. Psychiatric:         Attention and Perception: Attention and perception normal.         Behavior: Behavior normal.         Record/Diagnostics Review:    As above, I did review the imaging      Assessment:  1. Chronic pain of right knee    2. Septic arthritis, due to unspecified organism, septic arthritis of unspecified location ([facility]    3. Hep C w/o coma, chronic (HCC)    4. H/O ETOH abuse        Treatment Plan:  DISCUSSION: Treatment options discussed with patient and all questions answered to patient's satisfaction. OARRS Review: Reviewed and acceptable for medications prescribed. TREATMENT OPTIONS:     Discussed different treatment options including continued conservative care such as physical therapy, chiropractic care, acupuncture.   Discussed different interventional options such as genicular nerve blocks   Also discussed surgical evaluation. He has seen a surgeon, they reluctant to consider joint replacement at this time due to his infection history as well as medical comorbidities, would like him to exhaust other modalities first.  Try compounding cream to the affected area. No benefit from diagnostic genicular nerve block and if beneficial would be a candidate for radiofrequency ablation. He has had infection of the olecranon bursa as well as his right foot, genicular nerve block/RFA is low risk procedure but will obtain infectious disease clearance nonetheless. Devendra Smith M.D. I have reviewed the chief complaint and history of present illness (including ROS and PFSH) and vital documentation by my staff and I agree with their documentation and have added where applicable.

## 2020-10-01 ENCOUNTER — OFFICE VISIT (OUTPATIENT)
Dept: ORTHOPEDIC SURGERY | Age: 60
End: 2020-10-01
Payer: MEDICARE

## 2020-10-01 VITALS — WEIGHT: 215 LBS | HEIGHT: 70 IN | BODY MASS INDEX: 30.78 KG/M2

## 2020-10-01 PROCEDURE — G8484 FLU IMMUNIZE NO ADMIN: HCPCS | Performed by: ORTHOPAEDIC SURGERY

## 2020-10-01 PROCEDURE — 3017F COLORECTAL CA SCREEN DOC REV: CPT | Performed by: ORTHOPAEDIC SURGERY

## 2020-10-01 PROCEDURE — G8428 CUR MEDS NOT DOCUMENT: HCPCS | Performed by: ORTHOPAEDIC SURGERY

## 2020-10-01 PROCEDURE — 1036F TOBACCO NON-USER: CPT | Performed by: ORTHOPAEDIC SURGERY

## 2020-10-01 PROCEDURE — 99213 OFFICE O/P EST LOW 20 MIN: CPT | Performed by: ORTHOPAEDIC SURGERY

## 2020-10-01 PROCEDURE — G8417 CALC BMI ABV UP PARAM F/U: HCPCS | Performed by: ORTHOPAEDIC SURGERY

## 2020-10-01 NOTE — PROGRESS NOTES
[facility]  [address], [address] [facility] [address]  Dept: [phone]    Orthopedic Clinic Follow Up    Subjective:     Chief Complaint   Patient presents with    Knee Pain     right       History of Present Illness:  Katharina Baumgarten is a 63y.o. year old male who presents as a follow up for his right knee pain. Pain has been progressing for several years. He is now at the point where he is debilitated in his daily activities and is restricted to using a wheel chair in order to mobilize. His wife at home is disabled and needs to be taken care of, but his knee pain limits him from doing so. He has pain to his opposite knee as well, but not as bad. He endorses stiffness throughout the day, with constant achy pain with associated swelling. He denies numbness, tingling, fever, and chills. He has tried cortisone injections, a lateral  brace, and pain management with no improvement in his symptoms. He has not tried physical therapy but would be unable to participate in it due to pain. Review of Systems:   Constitutional: Negative for fever and chills. HENT: Negative for congestion. Eyes: Negative for blurred vision and double vision. Respiratory: Negative for cough, shortness of breath. Cardiovascular: Negative for chest pain and palpitations. Gastrointestinal: Negative for nausea. Negative for vomiting. Musculoskeletal: Positive for right knee pain and swelling. Skin: Negative for itching and rash. Neurological: Negative for numbness, tingling, weakness. Psychiatric/Behavioral: Negative for depression and suicidal ideas. Objective :   Physical Exam:  General: AAOx3, NAD, sitting comfortably in the room. CV: No dependent edema, regular rate. Pulm: Respirations unlabored and regular. Neuro: Alert. Oriented. Psych: Patient has good fund of knowledge and displays understanging of exam, diagnosis, and plan. Ortho exam:  RLE: TTP over the lateral joint line. procedure  and lend to a higher morbidity and/or mortality risk. All material risks, benefits, and reasonable alternatives including postponing the procedure were discussed. The patient does wish to proceed with the procedure at this time. No orders of the defined types were placed in this encounter. No orders of the defined types were placed in this encounter.         Electronically signed by Jordon Foster DO on 10/1/2020 at 5:32 PM

## 2020-10-06 ENCOUNTER — TELEPHONE (OUTPATIENT)
Dept: ORTHOPEDIC SURGERY | Age: 60
End: 2020-10-06

## 2020-10-13 ENCOUNTER — HOSPITAL ENCOUNTER (OUTPATIENT)
Dept: GENERAL RADIOLOGY | Age: 60
Discharge: HOME | End: 2020-10-15
Payer: MEDICARE

## 2020-10-13 ENCOUNTER — HOSPITAL ENCOUNTER (OUTPATIENT)
Dept: PREADMISSION TESTING | Age: 60
Discharge: HOME | End: 2020-10-17
Payer: MEDICARE

## 2020-10-13 VITALS
WEIGHT: 226.25 LBS | OXYGEN SATURATION: 98 % | HEIGHT: 70 IN | RESPIRATION RATE: 20 BRPM | SYSTOLIC BLOOD PRESSURE: 116 MMHG | DIASTOLIC BLOOD PRESSURE: 71 MMHG | TEMPERATURE: 97.3 F | HEART RATE: 73 BPM | BODY MASS INDEX: 32.39 KG/M2

## 2020-10-13 LAB
-: NORMAL
ALBUMIN SERPL-MCNC: 3.4 G/DL (ref 3.5–5.2)
ALBUMIN/GLOBULIN RATIO: 0.9 (ref 1–2.5)
ALP BLD-CCNC: 168 U/L (ref 40–129)
ALT SERPL-CCNC: 80 U/L (ref 5–41)
AMORPHOUS: NORMAL
ANION GAP SERPL CALCULATED.3IONS-SCNC: 10 MMOL/L (ref 9–17)
AST SERPL-CCNC: 193 U/L
BACTERIA: NORMAL
BILIRUB SERPL-MCNC: 3.59 MG/DL (ref 0.3–1.2)
BILIRUBIN URINE: ABNORMAL
BUN BLDV-MCNC: 11 MG/DL (ref 6–20)
BUN/CREAT BLD: ABNORMAL (ref 9–20)
CALCIUM SERPL-MCNC: 8.9 MG/DL (ref 8.6–10.4)
CASTS UA: NORMAL /LPF (ref 0–8)
CHLORIDE BLD-SCNC: 106 MMOL/L (ref 98–107)
CO2: 22 MMOL/L (ref 20–31)
COLOR: ABNORMAL
COMMENT UA: ABNORMAL
CREAT SERPL-MCNC: 0.65 MG/DL (ref 0.7–1.2)
CRYSTALS, UA: NORMAL /HPF
EPITHELIAL CELLS UA: NORMAL /HPF (ref 0–5)
ESTIMATED AVERAGE GLUCOSE: 74 MG/DL
GFR AFRICAN AMERICAN: >60 ML/MIN
GFR NON-AFRICAN AMERICAN: >60 ML/MIN
GFR SERPL CREATININE-BSD FRML MDRD: ABNORMAL ML/MIN/{1.73_M2}
GFR SERPL CREATININE-BSD FRML MDRD: ABNORMAL ML/MIN/{1.73_M2}
GLUCOSE BLD-MCNC: 105 MG/DL (ref 70–99)
GLUCOSE URINE: NEGATIVE
HBA1C MFR BLD: 4.2 % (ref 4–6)
HCT VFR BLD CALC: 40.1 % (ref 40.7–50.3)
HEMOGLOBIN: 13.1 G/DL (ref 13–17)
KETONES, URINE: NEGATIVE
LEUKOCYTE ESTERASE, URINE: ABNORMAL
MCH RBC QN AUTO: 31.6 PG (ref 25.2–33.5)
MCHC RBC AUTO-ENTMCNC: 32.7 G/DL (ref 28.4–34.8)
MCV RBC AUTO: 96.9 FL (ref 82.6–102.9)
MUCUS: NORMAL
NITRITE, URINE: NEGATIVE
NRBC AUTOMATED: 0 PER 100 WBC
OTHER OBSERVATIONS UA: NORMAL
PDW BLD-RTO: 16.4 % (ref 11.8–14.4)
PH UA: 5.5 (ref 5–8)
PLATELET # BLD: ABNORMAL K/UL (ref 138–453)
PLATELET, FLUORESCENCE: 77 K/UL (ref 138–453)
PLATELET, IMMATURE FRACTION: 10.1 % (ref 1.1–10.3)
PMV BLD AUTO: ABNORMAL FL (ref 8.1–13.5)
POTASSIUM SERPL-SCNC: 3.8 MMOL/L (ref 3.7–5.3)
PROTEIN UA: NEGATIVE
RBC # BLD: 4.14 M/UL (ref 4.21–5.77)
RBC UA: NORMAL /HPF (ref 0–4)
RENAL EPITHELIAL, UA: NORMAL /HPF
SODIUM BLD-SCNC: 138 MMOL/L (ref 135–144)
SPECIFIC GRAVITY UA: 1.02 (ref 1–1.03)
TOTAL PROTEIN: 7 G/DL (ref 6.4–8.3)
TRICHOMONAS: NORMAL
TURBIDITY: CLEAR
URINE HGB: ABNORMAL
UROBILINOGEN, URINE: NORMAL
WBC # BLD: 5.5 K/UL (ref 3.5–11.3)
WBC UA: NORMAL /HPF (ref 0–5)
YEAST: NORMAL

## 2020-10-13 PROCEDURE — 36415 COLL VENOUS BLD VENIPUNCTURE: CPT

## 2020-10-13 PROCEDURE — 85027 COMPLETE CBC AUTOMATED: CPT

## 2020-10-13 PROCEDURE — 71046 X-RAY EXAM CHEST 2 VIEWS: CPT

## 2020-10-13 PROCEDURE — 81001 URINALYSIS AUTO W/SCOPE: CPT

## 2020-10-13 PROCEDURE — 87641 MR-STAPH DNA AMP PROBE: CPT

## 2020-10-13 PROCEDURE — 93005 ELECTROCARDIOGRAM TRACING: CPT

## 2020-10-13 PROCEDURE — 83036 HEMOGLOBIN GLYCOSYLATED A1C: CPT

## 2020-10-13 PROCEDURE — 85055 RETICULATED PLATELET ASSAY: CPT

## 2020-10-13 PROCEDURE — 80053 COMPREHEN METABOLIC PANEL: CPT

## 2020-10-13 RX ORDER — LISINOPRIL 20 MG/1
TABLET ORAL
Qty: 30 TABLET | Refills: 5 | Status: ON HOLD
Start: 2020-10-13 | End: 2020-10-30 | Stop reason: HOSPADM

## 2020-10-13 RX ORDER — GABAPENTIN 600 MG/1
TABLET ORAL
COMMUNITY

## 2020-10-13 RX ORDER — SODIUM CHLORIDE, SODIUM LACTATE, POTASSIUM CHLORIDE, CALCIUM CHLORIDE 600; 310; 30; 20 MG/100ML; MG/100ML; MG/100ML; MG/100ML
1000 INJECTION, SOLUTION INTRAVENOUS CONTINUOUS
Status: CANCELLED | OUTPATIENT
Start: 2020-10-13

## 2020-10-13 ASSESSMENT — PAIN DESCRIPTION - ORIENTATION: ORIENTATION: RIGHT

## 2020-10-13 ASSESSMENT — PAIN DESCRIPTION - LOCATION: LOCATION: KNEE

## 2020-10-13 ASSESSMENT — PAIN DESCRIPTION - DIRECTION: RADIATING_TOWARDS: RIGHT HIP

## 2020-10-13 ASSESSMENT — PAIN SCALES - GENERAL: PAINLEVEL_OUTOF10: 5

## 2020-10-13 ASSESSMENT — PAIN DESCRIPTION - ONSET: ONSET: ON-GOING

## 2020-10-13 ASSESSMENT — PAIN DESCRIPTION - DESCRIPTORS: DESCRIPTORS: CONSTANT;ACHING;SHARP

## 2020-10-13 ASSESSMENT — PAIN DESCRIPTION - PROGRESSION: CLINICAL_PROGRESSION: GRADUALLY WORSENING

## 2020-10-13 ASSESSMENT — PAIN DESCRIPTION - FREQUENCY: FREQUENCY: CONTINUOUS

## 2020-10-13 ASSESSMENT — PAIN - FUNCTIONAL ASSESSMENT: PAIN_FUNCTIONAL_ASSESSMENT: PREVENTS OR INTERFERES WITH ALL ACTIVE AND SOME PASSIVE ACTIVITIES

## 2020-10-13 ASSESSMENT — PAIN DESCRIPTION - PAIN TYPE: TYPE: CHRONIC PAIN

## 2020-10-13 NOTE — H&P (VIEW-ONLY)
History and Physical    Pt Name: Frances Diana  MRN: [id number]  YOB: 1960  Date of evaluation: 10/13/2020    SUBJECTIVE:   History of Chief Complaint:    Patient complains of right knee DJD/OA. He has had progressive symptoms over the years despite conservative treatment. He currently has been using a motorized scooter for longer distances. He has been following with ortho, scheduled for right total knee arthroplasty. Past Medical History    has a past medical history of Acute blood loss anemia, Acute hematogenous osteomyelitis of right foot (HCC)-Involving the sesamoid bones, Alcoholic cirrhosis of liver without ascites (HCC), Chronic midline low back pain without sciatica, Cirrhosis of liver without ascites ([address] Diarrhea, Duodenal ulcer, Elevated alpha fetoprotein, Elevated liver enzymes, Foot infection, Gastric ulcer, GERD (gastroesophageal reflux disease), GI bleed, Hep C w/o coma, chronic (HCC), History of blood transfusion, History of ETOH abuse, Hyperlipidemia, Hypertension, Iron deficiency anemia due to chronic blood loss, Melena, Neuropathy, New onset seizure (HCC), Olecranon bursitis of left elbow, Portal hypertensive gastropathy ([address] Right knee pain, Septic arthritis ([address] Septic arthritis of right foot ([address] Thrombocytopenia ([address] Wears glasses, and Wheelchair dependence. Past Surgical History   has a past surgical history that includes hernia repair (Left, 1975); Upper gastrointestinal endoscopy (10/31/2015); Tonsillectomy (1970); Upper gastrointestinal endoscopy (03/14/2018); Colonoscopy (03/14/2018); pr colon ca scrn not hi rsk ind (N/A, 3/14/2018); Upper gastrointestinal endoscopy (N/A, 3/14/2018); Upper gastrointestinal endoscopy (N/A, 1/22/2019); Upper gastrointestinal endoscopy (N/A, 2/1/2019); Upper gastrointestinal endoscopy (N/A, 8/1/2019); Vasectomy (2007);  Elbow Debridement (Left, 10/16/2019); incision and drainage (Left, 10/16/2019); SESAMOIDECTOMY FIRST TOE (Right, 5/22/2020); and Lumbar spine surgery (2000). Medications  Prior to Admission medications    Medication Sig Start Date End Date Taking? Authorizing Provider   lisinopril (PRINIVIL;ZESTRIL) 20 MG tablet TAKE ONE TABLET BY MOUTH DAILY  Patient taking differently: Take 20 mg by mouth daily TAKE ONE TABLET BY MOUTH DAILY 10/13/20  Yes REECE Engle CNP   gabapentin (NEURONTIN) 600 MG tablet Take 1,200 mg by mouth nightly. Yes Historical Provider, MD   ferrous sulfate (IRON 325) 325 (65 Fe) MG tablet TAKE ONE TABLET BY MOUTH DAILY WITH BREAKFAST  Patient taking differently: Take 325 mg by mouth daily (with breakfast)  8/10/20  Yes REECE Engle CNP   rOPINIRole (REQUIP) 0.5 MG tablet TAKE ONE TABLET BY MOUTH ONCE NIGHTLY  Patient taking differently: Take 0.5 mg by mouth nightly TAKE ONE TABLET BY MOUTH ONCE NIGHTLY 8/10/20  Yes REECE Engle CNP   gabapentin (NEURONTIN) 600 MG tablet TAKE ONE TABLET BY MOUTH THREE TIMES A DAY  Patient taking differently: Take 600 mg by mouth 2 times daily. TAKE ONE TABLET BY MOUTH TWICE DAILY AND 12,00 MG AT BEDTIME 5/20/20 10/13/20 Yes REECE Engle CNP   amLODIPine (NORVASC) 10 MG tablet Take 1 tablet by mouth daily 5/20/20  Yes REECE Engle CNP   pantoprazole (PROTONIX) 40 MG tablet Take 1 tablet by mouth every morning (before breakfast)  Patient taking differently: Take 40 mg by mouth daily as needed  3/2/20  Yes Iza Sagastume MD   aspirin 81 MG chewable tablet Take 81 mg by mouth daily PCP SUSAN ESTRADA NP TOLD PATIENT TO TAKE THIS MEDICATION. Yes Historical Provider, MD   Multiple Vitamins-Minerals (THERAPEUTIC MULTIVITAMIN-MINERALS) tablet Take 1 tablet by mouth daily   Yes Historical Provider, MD   Alcohol, USP (ETHYL ALCOHOL) 95 % SOLN Apply topically 4 times daily as needed 9/16/20   Historical Provider, MD [place] Devices MISC Right knee lateral  brace.  7/16/20   Dickie Goldmann, DO Misc. Devices MISC Right knee lateral un- brace 7/16/20   Chago Weiss DO   [facility] Devices [facility]) MISC 1 Device by Does not apply route daily Standard wheelchair  Current body weight: 221 lb (100.2 kg)  Current patient height: 5'10 (177.8 cm)  Diagnosis: right foot pain and unable to ambulate  Length of need: 12 months 3/24/20   Ana HerronBRISEYDA     Allergies  is allergic to nsaids and sulfa antibiotics. Family History  family history includes High Blood Pressure in his mother; Other in his father; Prostate Cancer in his father; Stroke in his father. Social History   reports that he quit smoking about 25 years ago. His smoking use included cigarettes. He has a 15.00 pack-year smoking history. He quit smokeless tobacco use about 2 years ago. His smokeless tobacco use included chew.  1 PPD for 15 years, quit 1995. reports previous alcohol use. reports no history of drug use. Marital Status   Occupation self-employed    OBJECTIVE:   VITALS:  height is 5' 10\" (1.778 m) and weight is 226 lb 4 oz (102.6 kg). His temporal temperature is 97.3 °F (36.3 °C). His blood pressure is 116/71 and his pulse is 73. His respiration is 20 and oxygen saturation is 98%. CONSTITUTIONAL:alert & oriented x 3, no acute distress. Very pleasant. Uses a motorized scooter. Fine tremor noted. SKIN:  Warm and dry, no rashes on exposed areas of skin. HEAD:  Normocephalic, atraumatic   EYES: PERRL. EOMs intact. Wearing glasses. EARS:  Hearing grossly WNL. NOSE:  Nares patent. No rhinorrhea. MOUTH/THROAT:  benign  NECK:supple, no lymphadenopathy  LUNGS: Clear to auscultation bilaterally, no wheezes. CARDIOVASCULAR: Heart sounds are normal.  Regular rate and rhythm. ABDOMEN: soft, non tender, non distended. EXTREMITIES: no edema bilateral lower extremities. Testing:   EKG: 10/13/20  Labs pending: drawn 10/13/2020   Chest XRay:  10/13/20    IMPRESSIONS:   1.  Right knee DJD/OA  2.  has a past medical history of Acute blood loss anemia (2/27/2019), Acute hematogenous osteomyelitis of right foot (HCC)-Involving the sesamoid bones (5/80/7507), Alcoholic cirrhosis of liver without ascites ([address] (3/29/2019), Chronic midline low back pain without sciatica (3/29/2019), Cirrhosis of liver without ascites ([address] Diarrhea, Duodenal ulcer (2/2/2019), Elevated alpha fetoprotein, Elevated liver enzymes (1/30/2019), Foot infection (2019), Gastric ulcer (10/31/2015), GERD (gastroesophageal reflux disease), GI bleed (10/30/2015), Hep C w/o coma, chronic ([address] (2017), History of blood transfusion (2019), History of ETOH abuse, Hyperlipidemia, Hypertension (2017), Iron deficiency anemia due to chronic blood loss, Melena, Neuropathy (2009), New onset seizure ([address] Olecranon bursitis of left elbow, Portal hypertensive gastropathy ([address] Right knee pain, Septic arthritis ([address] (2/28/2020), Septic arthritis of right foot ([address] (2/18/2020), Thrombocytopenia ([address] (3/29/2019), Wears glasses, and Wheelchair dependence. PLANS:   1.  Right knee total arthroplasty    DESIREE STANFORD PA-C  Electronically signed 10/13/2020 at 11:57 AM

## 2020-10-13 NOTE — H&P
History and Physical    Pt Name: Kelin Nuñez  MRN: [id number]  YOB: 1960  Date of evaluation: 10/13/2020    SUBJECTIVE:   History of Chief Complaint:    Patient complains of right knee DJD/OA. He has had progressive symptoms over the years despite conservative treatment. He currently has been using a motorized scooter for longer distances. He has been following with ortho, scheduled for right total knee arthroplasty. Past Medical History    has a past medical history of Acute blood loss anemia, Acute hematogenous osteomyelitis of right foot (HCC)-Involving the sesamoid bones, Alcoholic cirrhosis of liver without ascites (HCC), Chronic midline low back pain without sciatica, Cirrhosis of liver without ascites ([address] Diarrhea, Duodenal ulcer, Elevated alpha fetoprotein, Elevated liver enzymes, Foot infection, Gastric ulcer, GERD (gastroesophageal reflux disease), GI bleed, Hep C w/o coma, chronic (HCC), History of blood transfusion, History of ETOH abuse, Hyperlipidemia, Hypertension, Iron deficiency anemia due to chronic blood loss, Melena, Neuropathy, New onset seizure (HCC), Olecranon bursitis of left elbow, Portal hypertensive gastropathy ([address] Right knee pain, Septic arthritis ([address] Septic arthritis of right foot ([address] Thrombocytopenia ([address] Wears glasses, and Wheelchair dependence. Past Surgical History   has a past surgical history that includes hernia repair (Left, 1975); Upper gastrointestinal endoscopy (10/31/2015); Tonsillectomy (1970); Upper gastrointestinal endoscopy (03/14/2018); Colonoscopy (03/14/2018); pr colon ca scrn not hi rsk ind (N/A, 3/14/2018); Upper gastrointestinal endoscopy (N/A, 3/14/2018); Upper gastrointestinal endoscopy (N/A, 1/22/2019); Upper gastrointestinal endoscopy (N/A, 2/1/2019); Upper gastrointestinal endoscopy (N/A, 8/1/2019); Vasectomy (2007);  Elbow Debridement (Left, 10/16/2019); incision and drainage (Left, 10/16/2019); SESAMOIDECTOMY FIRST TOE Misc. Devices MISC Right knee lateral un- brace 7/16/20   Jose Galvez DO   [facility] [place] [facility]) MISC 1 Device by Does not apply route daily Standard wheelchair  Current body weight: 221 lb (100.2 kg)  Current patient height: 5'10 (177.8 cm)  Diagnosis: right foot pain and unable to ambulate  Length of need: 12 months 3/24/20   Jakub Reinoso DPM     Allergies  is allergic to nsaids and sulfa antibiotics. Family History  family history includes High Blood Pressure in his mother; Other in his father; Prostate Cancer in his father; Stroke in his father. Social History   reports that he quit smoking about 25 years ago. His smoking use included cigarettes. He has a 15.00 pack-year smoking history. He quit smokeless tobacco use about 2 years ago. His smokeless tobacco use included chew.  1 PPD for 15 years, quit 1995. reports previous alcohol use. reports no history of drug use. Marital Status   Occupation self-employed    OBJECTIVE:   VITALS:  height is 5' 10\" (1.778 m) and weight is 226 lb 4 oz (102.6 kg). His temporal temperature is 97.3 °F (36.3 °C). His blood pressure is 116/71 and his pulse is 73. His respiration is 20 and oxygen saturation is 98%. CONSTITUTIONAL:alert & oriented x 3, no acute distress. Very pleasant. Uses a motorized scooter. Fine tremor noted. SKIN:  Warm and dry, no rashes on exposed areas of skin. HEAD:  Normocephalic, atraumatic   EYES: PERRL. EOMs intact. Wearing glasses. EARS:  Hearing grossly WNL. NOSE:  Nares patent. No rhinorrhea. MOUTH/THROAT:  benign  NECK:supple, no lymphadenopathy  LUNGS: Clear to auscultation bilaterally, no wheezes. CARDIOVASCULAR: Heart sounds are normal.  Regular rate and rhythm. ABDOMEN: soft, non tender, non distended. EXTREMITIES: no edema bilateral lower extremities. Testing:   EKG: 10/13/20  Labs pending: drawn 10/13/2020   Chest XRay:  10/13/20    IMPRESSIONS:   1.  Right knee DJD/OA  2.  has a past medical history of Acute blood loss anemia (2/27/2019), Acute hematogenous osteomyelitis of right foot (HCC)-Involving the sesamoid bones (7/76/9341), Alcoholic cirrhosis of liver without ascites ([address] (3/29/2019), Chronic midline low back pain without sciatica (3/29/2019), Cirrhosis of liver without ascites ([address] Diarrhea, Duodenal ulcer (2/2/2019), Elevated alpha fetoprotein, Elevated liver enzymes (1/30/2019), Foot infection (2019), Gastric ulcer (10/31/2015), GERD (gastroesophageal reflux disease), GI bleed (10/30/2015), Hep C w/o coma, chronic ([address] (2017), History of blood transfusion (2019), History of ETOH abuse, Hyperlipidemia, Hypertension (2017), Iron deficiency anemia due to chronic blood loss, Melena, Neuropathy (2009), New onset seizure ([address] Olecranon bursitis of left elbow, Portal hypertensive gastropathy ([address] Right knee pain, Septic arthritis ([address] (2/28/2020), Septic arthritis of right foot ([address] (2/18/2020), Thrombocytopenia ([address] (3/29/2019), Wears glasses, and Wheelchair dependence. PLANS:   1.  Right knee total arthroplasty    DESIREE STANFORD PA-C  Electronically signed 10/13/2020 at 11:57 AM

## 2020-10-13 NOTE — PROGRESS NOTES
Anesthesia Focused Assessment    STOP-BANG Sleep Apnea Questionnaire    SNORE loudly (heard through closed doors)? Yes  TIRED, fatigued, sleepy during daytime? No  OBSERVED stopping breathing during sleep? No  High blood PRESSURE being treated? Yes    BMI over 35? No  AGE over 48? Yes  NECK circumference over 16\"? No  GENDER (male)? Yes             Total 4  High risk 5-8  Intermediate risk 3-4  Low risk 0-2    Obstructive Sleep Apnea: snores but denies apnea  If YES, machine used: no     Type 1 DM:   no  T2DM:  no    Coronary Artery Disease:  no  Hypertension:  yes    Active smoker:  1 ppd for 15 years, quit 1995. Drinks Alcohol:  Quit 2018. Dentition: benign    Defib / AICD / Pacemaker: no      Renal Failure/dialysis:  no    Patient was evaluated in PAT & anesthesia guidelines were applied. NPO guidelines, medication instructions and scheduled arrival time were reviewed with patient. Hx of anesthesia complications:  no  Family hx of anesthesia complications:  no                                                                                                                     Anesthesia contacted:     Medical or cardiac clearance ordered: PCP clearance pending.     Hattie Mak PA-C  10/13/20  10:54 AM

## 2020-10-14 LAB
DIRECT EXAM: NORMAL
EKG ATRIAL RATE: 66 BPM
EKG P AXIS: 65 DEGREES
EKG P-R INTERVAL: 144 MS
EKG Q-T INTERVAL: 442 MS
EKG QRS DURATION: 102 MS
EKG QTC CALCULATION (BAZETT): 463 MS
EKG R AXIS: 55 DEGREES
EKG T AXIS: 58 DEGREES
EKG VENTRICULAR RATE: 66 BPM
Lab: NORMAL
SPECIMEN DESCRIPTION: NORMAL

## 2020-10-14 PROCEDURE — 93010 ELECTROCARDIOGRAM REPORT: CPT | Performed by: INTERNAL MEDICINE

## 2020-10-20 ENCOUNTER — TELEMEDICINE (OUTPATIENT)
Dept: FAMILY MEDICINE CLINIC | Age: 60
End: 2020-10-20
Payer: MEDICARE

## 2020-10-20 PROBLEM — G61.0 GUILLAIN-BARRE SYNDROME (HCC): Status: ACTIVE | Noted: 2020-10-20

## 2020-10-20 LAB
BILIRUBIN, POC: ABNORMAL
BLOOD URINE, POC: ABNORMAL
CLARITY, POC: CLEAR
COLOR, POC: ABNORMAL
GLUCOSE URINE, POC: ABNORMAL
KETONES, POC: ABNORMAL
LEUKOCYTE EST, POC: ABNORMAL
NITRITE, POC: ABNORMAL
PH, POC: 6.5
PROTEIN, POC: ABNORMAL
SPECIFIC GRAVITY, POC: 1.01
UROBILINOGEN, POC: 4

## 2020-10-20 PROCEDURE — 99396 PREV VISIT EST AGE 40-64: CPT | Performed by: NURSE PRACTITIONER

## 2020-10-20 PROCEDURE — G8484 FLU IMMUNIZE NO ADMIN: HCPCS | Performed by: NURSE PRACTITIONER

## 2020-10-20 PROCEDURE — 81002 URINALYSIS NONAUTO W/O SCOPE: CPT | Performed by: NURSE PRACTITIONER

## 2020-10-20 RX ORDER — ROPINIROLE 1 MG/1
1 TABLET, FILM COATED ORAL NIGHTLY
Qty: 30 TABLET | Refills: 2 | Status: SHIPPED | OUTPATIENT
Start: 2020-10-20

## 2020-10-20 NOTE — PROGRESS NOTES
VISIT LOCATION: Clinic    TELEHEALTH EVALUATION -- Audio/Visual (During WXQAP-56 public health emergency)    Due to COVID 23 outbreak, patient's office visit was converted to a virtual visit. Patient was contacted and agreed to proceed with a virtual visit via [address] Visit  The risks and benefits of converting to a virtual visit were discussed in light of the current infectious disease epidemic. Patient also understood that insurance coverage and co-pays are up to their individual insurance plans. Anais Babcock (:  1960) has requested an audio/video evaluation for the following concern(s):      Preoperative Consultation      Anais Babcock  YOB: 1960    Date of Service:  10/20/2020    There were no vitals filed for this visit. Wt Readings from Last 2 Encounters:   10/13/20 226 lb 4 oz (102.6 kg)   10/01/20 215 lb (97.5 kg)     BP Readings from Last 3 Encounters:   10/13/20 116/71   20 (!) 105/55   20 97/61        Chief Complaint   Patient presents with   Osiris See Pre-op Exam     Allergies   Allergen Reactions    Nsaids Other (See Comments)     PATIENT HAS A HISTORY OF BLEEDING ULCER.  Sulfa Antibiotics Rash     Outpatient Medications Marked as Taking for the 10/20/20 encounter (Telemedicine) with REECE Stanford - CNP   Medication Sig Dispense Refill    rOPINIRole (REQUIP) 1 MG tablet Take 1 tablet by mouth nightly TAKE ONE TABLET BY MOUTH ONCE NIGHTLY 30 tablet 2    lisinopril (PRINIVIL;ZESTRIL) 20 MG tablet TAKE ONE TABLET BY MOUTH DAILY (Patient taking differently: Take 20 mg by mouth daily TAKE ONE TABLET BY MOUTH DAILY) 30 tablet 5    gabapentin (NEURONTIN) 600 MG tablet Take by mouth.  Indications: 600 in the am, 600 in the afternoon, and 1200 in pm-from neuro       Alcohol, USP (ETHYL ALCOHOL) 95 % SOLN Apply topically 4 times daily as needed      ferrous sulfate (IRON 325) 325 (65 Fe) MG tablet TAKE ONE TABLET BY MOUTH DAILY WITH BREAKFAST (Patient taking differently: Take 325 mg by mouth daily (with breakfast) ) 30 tablet 5    Misc. Devices MISC Right knee lateral  brace. 1 Device 0    Misc. Devices MISC Right knee lateral un- brace 1 Device 0    amLODIPine (NORVASC) 10 MG tablet Take 1 tablet by mouth daily 30 tablet 3    Misc. Devices [facility]) MISC 1 Device by Does not apply route daily Standard wheelchair  Current body weight: 221 lb (100.2 kg)  Current patient height: 5'10 (177.8 cm)  Diagnosis: right foot pain and unable to ambulate  Length of need: 12 months 1 each 0    pantoprazole (PROTONIX) 40 MG tablet Take 1 tablet by mouth every morning (before breakfast) (Patient taking differently: Take 40 mg by mouth daily as needed ) 30 tablet 5    Multiple Vitamins-Minerals (THERAPEUTIC MULTIVITAMIN-MINERALS) tablet Take 1 tablet by mouth daily         This patient presents to the office today for a preoperative consultation at the request of surgeon, Dr. Alex Tavarez, who plans on performing left knee [facility] on October [facility]. The current problem began many years ago, and symptoms have been worsening with time. Conservative therapy: Yes: see emr, which has been ineffective. .    Planned anesthesia: General   Known anesthesia problems: None   Bleeding risk: No recent or remote history of abnormal bleeding  Personal or FH of DVT/PE: No    Patient objection to receiving blood products: No    Patient Active Problem List   Diagnosis    GI bleed    Gastrointestinal hemorrhage    Iron deficiency anemia due to chronic blood loss    Diarrhea    Melena    New onset seizure (HCC)    Elevated liver enzymes    Essential hypertension    Gastric ulcer    Unspecified viral hepatitis C without hepatic coma    Portal hypertensive gastropathy (HCC)    Anemia    Cirrhosis of liver without ascites (HCC)    Duodenal ulcer    Acute blood loss anemia    Thrombocytopenia (HCC)    Alcoholic cirrhosis of liver without ascites (HCC)    Chronic midline low back pain without sciatica    Seizures (HCC)    Hypertension    Hep C w/o coma, chronic (HCC)    Chronic back pain    History of ETOH abuse    Abdominal pain    Elevated alpha fetoprotein    Olecranon bursitis of left elbow    Septic arthritis of IP joint of toe, right (HCC)    Septic arthritis of right foot ([address]    Foot infection    Septic arthritis ([address]    Acute hematogenous osteomyelitis of right foot (HCC)-Involving the sesamoid bones    Tenosynovitis of right foot-right Hallux    Sesamoiditis    Acute post-operative pain    Right foot pain       Past Medical History:   Diagnosis Date    Acute blood loss anemia 2/27/2019    Acute hematogenous osteomyelitis of right foot (HCC)-Involving the sesamoid bones 6/72/4008    Alcoholic cirrhosis of liver without ascites ([address] 3/29/2019    Chronic midline low back pain without sciatica 3/29/2019    Cirrhosis of liver without ascites ([address]     Diarrhea     Duodenal ulcer 2/2/2019    Elevated alpha fetoprotein     Elevated liver enzymes 1/30/2019    Foot infection 2019    Gastric ulcer 10/31/2015    large 2-3 cm    GERD (gastroesophageal reflux disease)     GI bleed 10/30/2015    Hep C w/o coma, chronic ([address] 2017    WAITING APPROVAL FROM INSURANCE COMPANY TO GET TREATMENT STARTED    History of blood transfusion 2019    ANEMIA R/T BLEEDING ULCERS. NO REACTION.  History of ETOH abuse     QUIT 01/2017    History of stress test 2016    Hyperlipidemia     Hypertension 2017    ON RX    Iron deficiency anemia due to chronic blood loss     Melena     Neuropathy 2009    FEET BILAT, SHAKEY HANDS    New onset seizure ([address]     ONLY 1  SEIZURE 2017.   SPECULATED IT OCCURRED FROM DRUG WITHDRAWL    Olecranon bursitis of left elbow     Portal hypertensive gastropathy ([address]     Right knee pain     Septic arthritis ([address] 2/28/2020    Septic arthritis of right foot ([address] 2/18/2020    Thrombocytopenia ([address] 3/29/2019    Wears glasses     Wheelchair dependence     POWERED.      Past Surgical History:   Procedure Laterality Date    COLONOSCOPY  03/14/2018    mod diverticulosis; internal hemorrhoids; retained stools    ELBOW DEBRIDEMENT Left 10/16/2019     ELBOW INCISION AND DRAINAGE    HERNIA REPAIR Left 1975    lt inguinal    INCISION AND DRAINAGE Left 10/16/2019    ELBOW INCISION AND DRAINAGE, PARTIALTRICEP REPAIR performed by Kelsi Fermin DO at [address]    lumbar discectomy    AK COLON CA SCRN NOT  [address] IND N/A 3/14/2018    COLONOSCOPY performed by Hal Romberg, MD at [address] [facility] TOE Right 5/22/2020    TIBIAL SESAMOIDECTOMY RIGHT performed by Ally Snider DPM at [address] [facility]  10/31/2015    large gastric ulcer    UPPER GASTROINTESTINAL ENDOSCOPY  03/14/2018    mod portal hypertensive gastrophathy    UPPER GASTROINTESTINAL ENDOSCOPY N/A 3/14/2018    EGD BIOPSY performed by Hal Romberg, MD at \[facility]"" 14. N/A 1/22/2019    EGD BIOPSY performed by Brittani Mallory MD at \[facility]\"" 14. N/A 2/1/2019    EGD BIOPSY performed by Vernell Cooper MD at \[facility]\"" 14. N/A 8/1/2019    EGD ESOPHAGOGASTRODUODENOSCOPY performed by Eulalio Leija MD at [address]  2007     Family History   Problem Relation Age of Onset    High Blood Pressure Mother     Other Father         neuropathy    Stroke Father     Prostate Cancer Father      Social History     Socioeconomic History    Marital status:      Spouse name: Not on file    Number of children: 3    Years of education: Not on file    Highest education level: Not on file   Occupational History    Not on file   Social Needs    Financial resource strain: Not on file    Food insecurity     Worry: Not on file     Inability: Not on file    Transportation needs     Medical: Not on file     Non-medical: Not on file   Tobacco Use    Smoking status: Former Smoker     Packs/day: 1.00     Years: 15.00     Pack years: 15.00     Types: Cigarettes     Last attempt to quit: 1995     Years since quittin.0    Smokeless tobacco: Former User     Types: Chew     Quit date:    Substance and Sexual Activity    Alcohol use: Not Currently     Frequency: Never     Comment: stopped 2018    Drug use: No    Sexual activity: Yes     Partners: Female   Lifestyle    Physical activity     Days per week: Not on file     Minutes per session: Not on file    Stress: Not on file   Relationships    Social connections     Talks on phone: Not on file     Gets together: Not on file     Attends [employer] service: Not on file     Active member of club or organization: Not on file     Attends meetings of clubs or organizations: Not on file     Relationship status: Not on file    Intimate partner violence     Fear of current or ex partner: Not on file     Emotionally abused: Not on file     Physically abused: Not on file     Forced sexual activity: Not on file   Other Topics Concern    Not on file   Social History Narrative    Not on file       Review of Systems  A comprehensive review of systems was negative except for what was noted in the HPI. [] OTHER:    Constitutional: [x] Appears well-developed and well-nourished [] No apparent distress                            [] Abnormal-   Mental status  [x] Alert and awake  [x] Oriented to person/place/time [x]Able to follow commands       Eyes:  EOM    [x]  Normal  [] Abnormal-  Sclera  [x]  Normal  [] Abnormal -         Discharge [x]  None visible  [] Abnormal -     HENT:   [x] Normocephalic, atraumatic.   [] Abnormal   [] Mouth/Throat: Mucous membranes are moist.      External Ears [x] Normal  [] Abnormal-      Neck: [x] No visualized mass      Pulmonary/Chest: [x] Respiratory effort normal.  [] No visualized signs of difficulty breathing or respiratory distress        [] Abnormal-      Musculoskeletal:   [] Normal gait with no signs of ataxia         [x] Normal range of motion of neck        [] Abnormal-   [] Motor grossly intact in visible upper extremities    [] Motor grossly intact in visible lower extremities        Neurological:        [x] No Facial Asymmetry (Cranial nerve 7 motor function) (limited exam to video visit)                       [x] No gaze palsy        [] Abnormal-         Skin:                     [x] No significant exanthematous lesions or discoloration noted on facial skin         [] Abnormal-                                  Psychiatric:           [x] Normal Affect [] No Hallucinations        [] Abnormal- [] Normal Mood  [] Anxious appearing    [] Depressed appearing  [] Confused appearing      Due to this being a TeleHealth encounter, evaluation of the following organ systems is limited: Vitals/Constitutional/EENT/Resp/CV/GI//MS/Neuro/Skin/Heme-Lymph-Imm      EKG Interpretation:  normal EKG, normal sinus rhythm.     Lab Review   Hospital Outpatient Visit on 10/13/2020   Component Date Value    Color, UA 10/13/2020 DARK YELLOW*    Turbidity UA 10/13/2020 CLEAR     Glucose, Ur 10/13/2020 NEGATIVE     Bilirubin Urine 10/13/2020 NEGATIVE  Verified by ictotest.*    Ketones, Urine 10/13/2020 NEGATIVE     Specific [place], UA 10/13/2020 1.017     Urine Hgb 10/13/2020 LARGE*    pH, UA 10/13/2020 5.5     Protein, UA 10/13/2020 NEGATIVE     Urobilinogen, Urine 10/13/2020 Normal     Nitrite, Urine 10/13/2020 NEGATIVE     Leukocyte Esterase, Urine 10/13/2020 TRACE*    Urinalysis Comments 10/13/2020 NOT REPORTED     WBC 10/13/2020 5.5     RBC 10/13/2020 4.14*    Hemoglobin 10/13/2020 13.1     Hematocrit 10/13/2020 40.1*    MCV 10/13/2020 96.9     MCH 10/13/2020 31.6     MCHC 10/13/2020 32.7     RDW 10/13/2020 16.4*    Platelets 26/18/2080 See Reflexed IPF Result     MPV 10/13/2020 NOT REPORTED     NRBC Automated 10/13/2020 0.0     Glucose 10/13/2020 105*    BUN 10/13/2020 11     CREATININE 10/13/2020 0.65*    Bun/Cre Ratio 10/13/2020 NOT REPORTED     Calcium 10/13/2020 8.9     Sodium 10/13/2020 138     Potassium 10/13/2020 3.8     Chloride 10/13/2020 106     CO2 10/13/2020 22     Anion Gap 10/13/2020 10     Alkaline Phosphatase 10/13/2020 168*    ALT 10/13/2020 80*    AST 10/13/2020 193*    Total Bilirubin 10/13/2020 3.59*    Total Protein 10/13/2020 7.0     Alb 10/13/2020 3.4*    Albumin/Globulin Ratio 10/13/2020 0.9*    GFR Non- 10/13/2020 >60     GFR  10/13/2020 >60     GFR Comment 10/13/2020          GFR Staging 10/13/2020 NOT REPORTED     Ventricular Rate 10/13/2020 66     Atrial Rate 10/13/2020 66     P-R Interval 10/13/2020 144     QRS Duration 10/13/2020 102     Q-T Interval 10/13/2020 442     QTc Calculation (Bazett) 10/13/2020 463     P Axis 10/13/2020 65     R Axis 10/13/2020 55     T Axis 10/13/2020 58     Hemoglobin A1C 10/13/2020 4.2     Estimated Avg Glucose 10/13/2020 74     Platelet, Immature Fract* 10/13/2020 10.1     Platelet, Fluorescence 10/13/2020 77*    - 10/13/2020          WBC, UA 10/13/2020 5 TO 10     RBC, UA 10/13/2020 20 TO 50     Casts UA 10/13/2020 2 TO 5 HYALINE Reference range defined for non-centrifuged specimen.  Crystals, UA 10/13/2020 NOT REPORTED     Epithelial Cells UA 10/13/2020 None     Renal Epithelial, UA 10/13/2020 NOT REPORTED     Bacteria, UA 10/13/2020 NOT REPORTED     Mucus, UA 10/13/2020 NOT REPORTED     Trichomonas, UA 10/13/2020 NOT REPORTED     Amorphous, UA 10/13/2020 NOT REPORTED     Other Observations UA 10/13/2020 NOT REPORTED     Yeast, UA 10/13/2020 NOT REPORTED     Specimen Description 10/13/2020 . NASAL SWAB     Special Requests 10/13/2020 NOT REPORTED     Direct Exam 10/13/2020 NEGATIVE:  MRSA DNA not detected by nucleic acid amplification. Results should be used as an adjunct to nosocomial control efforts to identify patients needing enhanced precautions. The test is not intended to identify patients with staphylococcal infections. Results should not be used to guide or monitor treatment for MRSA infections. Assessment:       63 y.o. patient with planned surgery as above. Known risk factors for perioperative complications: Hypertension, Liver disease  Current medications which may produce withdrawal symptoms if withheld perioperatively: none      Plan:     ASSESSMENT/PLAN:  1. Pre-op evaluation      2. RLS (restless legs syndrome)    - rOPINIRole (REQUIP) 1 MG tablet; Take 1 tablet by mouth nightly TAKE ONE TABLET BY MOUTH ONCE NIGHTLY  Dispense: 30 tablet; Refill: 2    3. Abnormal urinalysis    - POCT Urinalysis no Micro          No follow-ups on file. The time that was spent with the family/patient addressing care on this video call and chart review was 30 minutes. An  electronic signature was to authenticate this note. --REECE Gayle - CNP on 10/20/2020 at 11:04 AM        Pursuant to the emergency declaration under the [facility], [facility] waiver authority and the [employer] Act, this Virtual  Visit was conducted, with patient's consent, to reduce the patient's risk of exposure to COVID-19 and provide continuity of care for an established patient. Services were provided through a telephone discussion virtually to substitute for in-person clinic visit. 1. Preoperative workup as follows: none  2. Change in medication regimen before surgery: Hold all medications on morning of surgery  3.  Prophylaxis for cardiac events with perioperative beta-blockers: Not indicated  ACC/AHA indications for pre-operative beta-blocker use:    · Vascular surgery with history of postitive stress test  · Intermediate or high risk surgery with history of CAD   · Intermediate or high risk surgery with multiple clinical predictors of CAD- 2 of the following: history of compensated or prior heart failure, history of cerebrovascular disease, DM, or renal insufficiency    Routine administration of higher-dose, long-acting metoprolol in beta-blocker-naïve patients on the day of surgery, and in the absence of dose titration is associated with an overall increase in mortality. Beta-blockers should be started days to weeks prior to surgery and titrated to pulse < 70.  4. Deep vein thrombosis prophylaxis: regimen to be chosen by surgical team  5.  No contraindications to planned surgery

## 2020-10-23 ENCOUNTER — HOSPITAL ENCOUNTER (OUTPATIENT)
Dept: PREADMISSION TESTING | Age: 60
Discharge: HOME | End: 2020-10-27
Payer: MEDICARE

## 2020-10-23 PROCEDURE — U0003 INFECTIOUS AGENT DETECTION BY NUCLEIC ACID (DNA OR RNA); SEVERE ACUTE RESPIRATORY SYNDROME CORONAVIRUS 2 (SARS-COV-2) (CORONAVIRUS DISEASE [COVID-19]), AMPLIFIED PROBE TECHNIQUE, MAKING USE OF HIGH THROUGHPUT TECHNOLOGIES AS DESCRIBED BY CMS-2020-01-R: HCPCS

## 2020-10-24 LAB
SARS-COV-2, RAPID: NORMAL
SARS-COV-2: NORMAL
SARS-COV-2: NOT DETECTED
SOURCE: NORMAL

## 2020-10-27 ENCOUNTER — APPOINTMENT (OUTPATIENT)
Dept: GENERAL RADIOLOGY | Age: 60
DRG: 326 | End: 2020-10-27
Attending: ORTHOPAEDIC SURGERY
Payer: MEDICARE

## 2020-10-27 ENCOUNTER — ANESTHESIA (OUTPATIENT)
Dept: OPERATING ROOM | Age: 60
DRG: 326 | End: 2020-10-27
Payer: MEDICARE

## 2020-10-27 ENCOUNTER — ANESTHESIA EVENT (OUTPATIENT)
Dept: OPERATING ROOM | Age: 60
DRG: 326 | End: 2020-10-27
Payer: MEDICARE

## 2020-10-27 ENCOUNTER — HOSPITAL ENCOUNTER (INPATIENT)
Age: 60
LOS: 1 days | Discharge: HOME HEALTH | DRG: 326 | End: 2020-10-30
Attending: ORTHOPAEDIC SURGERY | Admitting: ORTHOPAEDIC SURGERY
Payer: MEDICARE

## 2020-10-27 VITALS — TEMPERATURE: 96 F | DIASTOLIC BLOOD PRESSURE: 82 MMHG | OXYGEN SATURATION: 68 % | SYSTOLIC BLOOD PRESSURE: 119 MMHG

## 2020-10-27 PROBLEM — R31.9 HEMATURIA: Chronic | Status: ACTIVE | Noted: 2020-10-27

## 2020-10-27 PROBLEM — Z96.651 HISTORY OF TOTAL KNEE ARTHROPLASTY, RIGHT: Status: ACTIVE | Noted: 2020-10-27

## 2020-10-27 LAB
ALBUMIN SERPL-MCNC: 3.4 G/DL (ref 3.5–5.2)
ALBUMIN/GLOBULIN RATIO: 0.9 (ref 1–2.5)
ALP BLD-CCNC: 128 U/L (ref 40–129)
ALT SERPL-CCNC: 57 U/L (ref 5–41)
ANION GAP SERPL CALCULATED.3IONS-SCNC: 20 MMOL/L (ref 9–17)
AST SERPL-CCNC: 110 U/L
BILIRUB SERPL-MCNC: 3.3 MG/DL (ref 0.3–1.2)
BUN BLDV-MCNC: 16 MG/DL (ref 6–20)
BUN/CREAT BLD: ABNORMAL (ref 9–20)
CALCIUM SERPL-MCNC: 8.8 MG/DL (ref 8.6–10.4)
CHLORIDE BLD-SCNC: 100 MMOL/L (ref 98–107)
CO2: 15 MMOL/L (ref 20–31)
CREAT SERPL-MCNC: 1.15 MG/DL (ref 0.7–1.2)
GFR AFRICAN AMERICAN: >60 ML/MIN
GFR NON-AFRICAN AMERICAN: >60 ML/MIN
GFR SERPL CREATININE-BSD FRML MDRD: ABNORMAL ML/MIN/{1.73_M2}
GFR SERPL CREATININE-BSD FRML MDRD: ABNORMAL ML/MIN/{1.73_M2}
GLUCOSE BLD-MCNC: 170 MG/DL (ref 70–99)
POTASSIUM SERPL-SCNC: 4.7 MMOL/L (ref 3.7–5.3)
SODIUM BLD-SCNC: 135 MMOL/L (ref 135–144)
TOTAL PROTEIN: 7 G/DL (ref 6.4–8.3)

## 2020-10-27 PROCEDURE — 2500000003 HC RX 250 WO HCPCS: Performed by: NURSE ANESTHETIST, CERTIFIED REGISTERED

## 2020-10-27 PROCEDURE — 2580000003 HC RX 258: Performed by: ORTHOPAEDIC SURGERY

## 2020-10-27 PROCEDURE — 73564 X-RAY EXAM KNEE 4 OR MORE: CPT

## 2020-10-27 PROCEDURE — 6370000000 HC RX 637 (ALT 250 FOR IP): Performed by: ORTHOPAEDIC SURGERY

## 2020-10-27 PROCEDURE — 3700000001 HC ADD 15 MINUTES (ANESTHESIA): Performed by: ORTHOPAEDIC SURGERY

## 2020-10-27 PROCEDURE — 64448 NJX AA&/STRD FEM NRV NFS IMG: CPT | Performed by: ANESTHESIOLOGY

## 2020-10-27 PROCEDURE — 3700000000 HC ANESTHESIA ATTENDED CARE: Performed by: ORTHOPAEDIC SURGERY

## 2020-10-27 PROCEDURE — 7100000001 HC PACU RECOVERY - ADDTL 15 MIN: Performed by: ORTHOPAEDIC SURGERY

## 2020-10-27 PROCEDURE — 0SRC0J9 REPLACEMENT OF RIGHT KNEE JOINT WITH SYNTHETIC SUBSTITUTE, CEMENTED, OPEN APPROACH: ICD-10-PCS | Performed by: ORTHOPAEDIC SURGERY

## 2020-10-27 PROCEDURE — 97530 THERAPEUTIC ACTIVITIES: CPT

## 2020-10-27 PROCEDURE — 76942 ECHO GUIDE FOR BIOPSY: CPT | Performed by: ANESTHESIOLOGY

## 2020-10-27 PROCEDURE — 6360000002 HC RX W HCPCS

## 2020-10-27 PROCEDURE — 6360000002 HC RX W HCPCS: Performed by: NURSE ANESTHETIST, CERTIFIED REGISTERED

## 2020-10-27 PROCEDURE — C1776 JOINT DEVICE (IMPLANTABLE): HCPCS | Performed by: ORTHOPAEDIC SURGERY

## 2020-10-27 PROCEDURE — C1713 ANCHOR/SCREW BN/BN,TIS/BN: HCPCS | Performed by: ORTHOPAEDIC SURGERY

## 2020-10-27 PROCEDURE — 2500000003 HC RX 250 WO HCPCS: Performed by: ANESTHESIOLOGY

## 2020-10-27 PROCEDURE — 3600000004 HC SURGERY LEVEL 4 BASE: Performed by: ORTHOPAEDIC SURGERY

## 2020-10-27 PROCEDURE — 97162 PT EVAL MOD COMPLEX 30 MIN: CPT

## 2020-10-27 PROCEDURE — 51798 US URINE CAPACITY MEASURE: CPT

## 2020-10-27 PROCEDURE — 2580000003 HC RX 258: Performed by: ANESTHESIOLOGY

## 2020-10-27 PROCEDURE — 2580000003 HC RX 258: Performed by: STUDENT IN AN ORGANIZED HEALTH CARE EDUCATION/TRAINING PROGRAM

## 2020-10-27 PROCEDURE — 2500000003 HC RX 250 WO HCPCS: Performed by: STUDENT IN AN ORGANIZED HEALTH CARE EDUCATION/TRAINING PROGRAM

## 2020-10-27 PROCEDURE — 2709999900 HC NON-CHARGEABLE SUPPLY: Performed by: ORTHOPAEDIC SURGERY

## 2020-10-27 PROCEDURE — 51701 INSERT BLADDER CATHETER: CPT

## 2020-10-27 PROCEDURE — 6360000002 HC RX W HCPCS: Performed by: STUDENT IN AN ORGANIZED HEALTH CARE EDUCATION/TRAINING PROGRAM

## 2020-10-27 PROCEDURE — 2720000010 HC SURG SUPPLY STERILE: Performed by: ORTHOPAEDIC SURGERY

## 2020-10-27 PROCEDURE — 6360000002 HC RX W HCPCS: Performed by: ORTHOPAEDIC SURGERY

## 2020-10-27 PROCEDURE — 6370000000 HC RX 637 (ALT 250 FOR IP): Performed by: STUDENT IN AN ORGANIZED HEALTH CARE EDUCATION/TRAINING PROGRAM

## 2020-10-27 PROCEDURE — 97535 SELF CARE MNGMENT TRAINING: CPT

## 2020-10-27 PROCEDURE — 3600000014 HC SURGERY LEVEL 4 ADDTL 15MIN: Performed by: ORTHOPAEDIC SURGERY

## 2020-10-27 PROCEDURE — 97166 OT EVAL MOD COMPLEX 45 MIN: CPT

## 2020-10-27 PROCEDURE — 6360000002 HC RX W HCPCS: Performed by: ANESTHESIOLOGY

## 2020-10-27 PROCEDURE — 7100000000 HC PACU RECOVERY - FIRST 15 MIN: Performed by: ORTHOPAEDIC SURGERY

## 2020-10-27 PROCEDURE — 80053 COMPREHEN METABOLIC PANEL: CPT

## 2020-10-27 DEVICE — CEMENT BONE 40GM HI VISC PALACOS R: Type: IMPLANTABLE DEVICE | Site: KNEE | Status: FUNCTIONAL

## 2020-10-27 DEVICE — EVOLUTION®MP FEM CS/CR NON-POR SIZE 6 PRIMARY RIGHT
Type: IMPLANTABLE DEVICE | Site: KNEE | Status: FUNCTIONAL
Brand: EVOLUTION

## 2020-10-27 DEVICE — ADVANCE® ONLAY ALL-POLY PATELLA 35MM TRI-PEG
Type: IMPLANTABLE DEVICE | Site: KNEE | Status: FUNCTIONAL
Brand: ADVANCE®

## 2020-10-27 DEVICE — EVOLUTION® MP™ CS INSERT SIZE 6 STANDARD 10MM RIGHT
Type: IMPLANTABLE DEVICE | Site: KNEE | Status: FUNCTIONAL
Brand: EVOLUTION

## 2020-10-27 DEVICE — EVOLUTION® MP TIB KEELED NONPOR SIZE 6+ RIGHT
Type: IMPLANTABLE DEVICE | Site: KNEE | Status: FUNCTIONAL
Brand: EVOLUTION

## 2020-10-27 DEVICE — COMPONENT TOT KNEE CAPPED PRIMARY K2MICROPORT] MICROPORT ORTHOPEDICS]: Type: IMPLANTABLE DEVICE | Status: FUNCTIONAL

## 2020-10-27 RX ORDER — SODIUM CHLORIDE 0.9 % (FLUSH) 0.9 %
10 SYRINGE (ML) INJECTION EVERY 12 HOURS SCHEDULED
Status: DISCONTINUED | OUTPATIENT
Start: 2020-10-27 | End: 2020-10-30 | Stop reason: HOSPADM

## 2020-10-27 RX ORDER — MIDAZOLAM HYDROCHLORIDE 1 MG/ML
2 INJECTION INTRAMUSCULAR; INTRAVENOUS ONCE
Status: COMPLETED | OUTPATIENT
Start: 2020-10-27 | End: 2020-10-27

## 2020-10-27 RX ORDER — ONDANSETRON 2 MG/ML
4 INJECTION INTRAMUSCULAR; INTRAVENOUS EVERY 6 HOURS PRN
Status: DISCONTINUED | OUTPATIENT
Start: 2020-10-27 | End: 2020-10-30 | Stop reason: HOSPADM

## 2020-10-27 RX ORDER — POLYETHYLENE GLYCOL 3350 17 G/17G
17 POWDER, FOR SOLUTION ORAL DAILY PRN
Status: DISCONTINUED | OUTPATIENT
Start: 2020-10-27 | End: 2020-10-30 | Stop reason: HOSPADM

## 2020-10-27 RX ORDER — SODIUM CHLORIDE, SODIUM LACTATE, POTASSIUM CHLORIDE, CALCIUM CHLORIDE 600; 310; 30; 20 MG/100ML; MG/100ML; MG/100ML; MG/100ML
INJECTION, SOLUTION INTRAVENOUS CONTINUOUS
Status: DISCONTINUED | OUTPATIENT
Start: 2020-10-27 | End: 2020-10-27

## 2020-10-27 RX ORDER — PROPOFOL 10 MG/ML
INJECTION, EMULSION INTRAVENOUS PRN
Status: DISCONTINUED | OUTPATIENT
Start: 2020-10-27 | End: 2020-10-27 | Stop reason: SDUPTHER

## 2020-10-27 RX ORDER — MAGNESIUM HYDROXIDE 1200 MG/15ML
LIQUID ORAL CONTINUOUS PRN
Status: COMPLETED | OUTPATIENT
Start: 2020-10-27 | End: 2020-10-27

## 2020-10-27 RX ORDER — NEOSTIGMINE METHYLSULFATE 5 MG/5 ML
SYRINGE (ML) INTRAVENOUS PRN
Status: DISCONTINUED | OUTPATIENT
Start: 2020-10-27 | End: 2020-10-27 | Stop reason: SDUPTHER

## 2020-10-27 RX ORDER — ROPINIROLE 1 MG/1
1 TABLET, FILM COATED ORAL NIGHTLY
Status: DISCONTINUED | OUTPATIENT
Start: 2020-10-27 | End: 2020-10-30 | Stop reason: HOSPADM

## 2020-10-27 RX ORDER — GABAPENTIN 800 MG/1
800 TABLET ORAL ONCE
Status: CANCELLED | OUTPATIENT
Start: 2020-10-27 | End: 2020-10-27

## 2020-10-27 RX ORDER — ASPIRIN 81 MG/1
81 TABLET, CHEWABLE ORAL 2 TIMES DAILY
Status: DISCONTINUED | OUTPATIENT
Start: 2020-10-28 | End: 2020-10-30 | Stop reason: HOSPADM

## 2020-10-27 RX ORDER — SCOLOPAMINE TRANSDERMAL SYSTEM 1 MG/1
1 PATCH, EXTENDED RELEASE TRANSDERMAL ONCE
Status: CANCELLED | OUTPATIENT
Start: 2020-10-27 | End: 2020-10-27

## 2020-10-27 RX ORDER — FENTANYL CITRATE 50 UG/ML
100 INJECTION, SOLUTION INTRAMUSCULAR; INTRAVENOUS ONCE
Status: COMPLETED | OUTPATIENT
Start: 2020-10-27 | End: 2020-10-27

## 2020-10-27 RX ORDER — PANTOPRAZOLE SODIUM 40 MG/1
40 TABLET, DELAYED RELEASE ORAL DAILY
Status: DISCONTINUED | OUTPATIENT
Start: 2020-10-28 | End: 2020-10-30 | Stop reason: HOSPADM

## 2020-10-27 RX ORDER — SODIUM CHLORIDE, SODIUM LACTATE, POTASSIUM CHLORIDE, CALCIUM CHLORIDE 600; 310; 30; 20 MG/100ML; MG/100ML; MG/100ML; MG/100ML
1000 INJECTION, SOLUTION INTRAVENOUS CONTINUOUS
Status: DISCONTINUED | OUTPATIENT
Start: 2020-10-27 | End: 2020-10-27

## 2020-10-27 RX ORDER — WHEELCHAIR
1 EACH MISCELLANEOUS DAILY
Status: DISCONTINUED | OUTPATIENT
Start: 2020-10-27 | End: 2020-10-27

## 2020-10-27 RX ORDER — GABAPENTIN 300 MG/1
300 CAPSULE ORAL NIGHTLY
Status: DISCONTINUED | OUTPATIENT
Start: 2020-10-27 | End: 2020-10-28

## 2020-10-27 RX ORDER — OXYCODONE HYDROCHLORIDE 5 MG/1
10 TABLET ORAL EVERY 4 HOURS PRN
Status: DISCONTINUED | OUTPATIENT
Start: 2020-10-27 | End: 2020-10-30 | Stop reason: HOSPADM

## 2020-10-27 RX ORDER — DEXAMETHASONE SODIUM PHOSPHATE 4 MG/ML
10 INJECTION, SOLUTION INTRA-ARTICULAR; INTRALESIONAL; INTRAMUSCULAR; INTRAVENOUS; SOFT TISSUE ONCE
Status: CANCELLED | OUTPATIENT
Start: 2020-10-27 | End: 2020-10-27

## 2020-10-27 RX ORDER — LIDOCAINE HYDROCHLORIDE 10 MG/ML
INJECTION, SOLUTION EPIDURAL; INFILTRATION; INTRACAUDAL; PERINEURAL PRN
Status: DISCONTINUED | OUTPATIENT
Start: 2020-10-27 | End: 2020-10-27 | Stop reason: SDUPTHER

## 2020-10-27 RX ORDER — GLYCOPYRROLATE 1 MG/5 ML
SYRINGE (ML) INTRAVENOUS PRN
Status: DISCONTINUED | OUTPATIENT
Start: 2020-10-27 | End: 2020-10-27 | Stop reason: SDUPTHER

## 2020-10-27 RX ORDER — ONDANSETRON 2 MG/ML
INJECTION INTRAMUSCULAR; INTRAVENOUS PRN
Status: DISCONTINUED | OUTPATIENT
Start: 2020-10-27 | End: 2020-10-27 | Stop reason: SDUPTHER

## 2020-10-27 RX ORDER — LISINOPRIL 20 MG/1
20 TABLET ORAL DAILY
Status: DISCONTINUED | OUTPATIENT
Start: 2020-10-28 | End: 2020-10-28

## 2020-10-27 RX ORDER — MIDAZOLAM HYDROCHLORIDE 1 MG/ML
INJECTION INTRAMUSCULAR; INTRAVENOUS
Status: COMPLETED
Start: 2020-10-27 | End: 2020-10-27

## 2020-10-27 RX ORDER — OXYCODONE HYDROCHLORIDE 5 MG/1
5 TABLET ORAL EVERY 4 HOURS PRN
Status: DISCONTINUED | OUTPATIENT
Start: 2020-10-27 | End: 2020-10-30 | Stop reason: HOSPADM

## 2020-10-27 RX ORDER — ACETAMINOPHEN 500 MG
1000 TABLET ORAL EVERY 6 HOURS SCHEDULED
Status: DISCONTINUED | OUTPATIENT
Start: 2020-10-27 | End: 2020-10-28

## 2020-10-27 RX ORDER — TRAMADOL HYDROCHLORIDE 50 MG/1
50 TABLET ORAL ONCE
Status: COMPLETED | OUTPATIENT
Start: 2020-10-27 | End: 2020-10-27

## 2020-10-27 RX ORDER — FENTANYL CITRATE 50 UG/ML
INJECTION, SOLUTION INTRAMUSCULAR; INTRAVENOUS
Status: COMPLETED
Start: 2020-10-27 | End: 2020-10-27

## 2020-10-27 RX ORDER — ROCURONIUM BROMIDE 10 MG/ML
INJECTION, SOLUTION INTRAVENOUS PRN
Status: DISCONTINUED | OUTPATIENT
Start: 2020-10-27 | End: 2020-10-27 | Stop reason: SDUPTHER

## 2020-10-27 RX ORDER — PROMETHAZINE HYDROCHLORIDE 12.5 MG/1
12.5 TABLET ORAL EVERY 6 HOURS PRN
Status: DISCONTINUED | OUTPATIENT
Start: 2020-10-27 | End: 2020-10-30 | Stop reason: HOSPADM

## 2020-10-27 RX ORDER — SODIUM CHLORIDE 9 MG/ML
INJECTION, SOLUTION INTRAVENOUS CONTINUOUS
Status: DISCONTINUED | OUTPATIENT
Start: 2020-10-27 | End: 2020-10-28

## 2020-10-27 RX ORDER — FENTANYL CITRATE 50 UG/ML
INJECTION, SOLUTION INTRAMUSCULAR; INTRAVENOUS PRN
Status: DISCONTINUED | OUTPATIENT
Start: 2020-10-27 | End: 2020-10-27

## 2020-10-27 RX ORDER — FENTANYL CITRATE 50 UG/ML
INJECTION, SOLUTION INTRAMUSCULAR; INTRAVENOUS PRN
Status: DISCONTINUED | OUTPATIENT
Start: 2020-10-27 | End: 2020-10-27 | Stop reason: SDUPTHER

## 2020-10-27 RX ORDER — SODIUM CHLORIDE 0.9 % (FLUSH) 0.9 %
10 SYRINGE (ML) INJECTION PRN
Status: DISCONTINUED | OUTPATIENT
Start: 2020-10-27 | End: 2020-10-30 | Stop reason: HOSPADM

## 2020-10-27 RX ORDER — AMLODIPINE BESYLATE 10 MG/1
10 TABLET ORAL DAILY
Status: DISCONTINUED | OUTPATIENT
Start: 2020-10-28 | End: 2020-10-30 | Stop reason: HOSPADM

## 2020-10-27 RX ORDER — TRAMADOL HYDROCHLORIDE 50 MG/1
50 TABLET ORAL EVERY 6 HOURS SCHEDULED
Status: DISCONTINUED | OUTPATIENT
Start: 2020-10-27 | End: 2020-10-30 | Stop reason: HOSPADM

## 2020-10-27 RX ADMIN — MIDAZOLAM HYDROCHLORIDE 2 MG: 1 INJECTION, SOLUTION INTRAMUSCULAR; INTRAVENOUS at 08:22

## 2020-10-27 RX ADMIN — ACETAMINOPHEN 1000 MG: 500 TABLET ORAL at 17:57

## 2020-10-27 RX ADMIN — HYDROMORPHONE HYDROCHLORIDE 0.25 MG: 1 INJECTION, SOLUTION INTRAMUSCULAR; INTRAVENOUS; SUBCUTANEOUS at 11:50

## 2020-10-27 RX ADMIN — SODIUM CHLORIDE, POTASSIUM CHLORIDE, SODIUM LACTATE AND CALCIUM CHLORIDE: 600; 310; 30; 20 INJECTION, SOLUTION INTRAVENOUS at 10:45

## 2020-10-27 RX ADMIN — Medication 3.5 MG: at 10:44

## 2020-10-27 RX ADMIN — DEXTROSE MONOHYDRATE 2 G: 50 INJECTION, SOLUTION INTRAVENOUS at 23:45

## 2020-10-27 RX ADMIN — HYDROMORPHONE HYDROCHLORIDE 0.25 MG: 1 INJECTION, SOLUTION INTRAMUSCULAR; INTRAVENOUS; SUBCUTANEOUS at 12:00

## 2020-10-27 RX ADMIN — Medication 0.6 MG: at 10:42

## 2020-10-27 RX ADMIN — FENTANYL CITRATE 100 MCG: 50 INJECTION, SOLUTION INTRAMUSCULAR; INTRAVENOUS at 08:22

## 2020-10-27 RX ADMIN — SODIUM CHLORIDE, POTASSIUM CHLORIDE, SODIUM LACTATE AND CALCIUM CHLORIDE: 600; 310; 30; 20 INJECTION, SOLUTION INTRAVENOUS at 08:36

## 2020-10-27 RX ADMIN — ONDANSETRON 4 MG: 2 INJECTION, SOLUTION INTRAMUSCULAR; INTRAVENOUS at 10:31

## 2020-10-27 RX ADMIN — CEFAZOLIN 2 G: 10 INJECTION, POWDER, FOR SOLUTION INTRAVENOUS at 08:47

## 2020-10-27 RX ADMIN — PROPOFOL 170 MG: 10 INJECTION, EMULSION INTRAVENOUS at 08:42

## 2020-10-27 RX ADMIN — TRANEXAMIC ACID 1 G: 100 INJECTION, SOLUTION INTRAVENOUS at 10:30

## 2020-10-27 RX ADMIN — DEXTROSE MONOHYDRATE 2 G: 50 INJECTION, SOLUTION INTRAVENOUS at 16:21

## 2020-10-27 RX ADMIN — HYDROMORPHONE HYDROCHLORIDE 0.25 MG: 1 INJECTION, SOLUTION INTRAMUSCULAR; INTRAVENOUS; SUBCUTANEOUS at 11:44

## 2020-10-27 RX ADMIN — FENTANYL CITRATE 50 MCG: 50 INJECTION INTRAMUSCULAR; INTRAVENOUS at 09:51

## 2020-10-27 RX ADMIN — SODIUM CHLORIDE: 9 INJECTION, SOLUTION INTRAVENOUS at 13:40

## 2020-10-27 RX ADMIN — FENTANYL CITRATE 50 MCG: 50 INJECTION INTRAMUSCULAR; INTRAVENOUS at 11:00

## 2020-10-27 RX ADMIN — Medication 500 ML: at 12:34

## 2020-10-27 RX ADMIN — ACETAMINOPHEN 1000 MG: 500 TABLET ORAL at 13:40

## 2020-10-27 RX ADMIN — OXYCODONE HYDROCHLORIDE 10 MG: 5 TABLET ORAL at 21:17

## 2020-10-27 RX ADMIN — TRANEXAMIC ACID 1 G: 100 INJECTION, SOLUTION INTRAVENOUS at 09:02

## 2020-10-27 RX ADMIN — MIDAZOLAM HYDROCHLORIDE 2 MG: 1 INJECTION INTRAMUSCULAR; INTRAVENOUS at 08:22

## 2020-10-27 RX ADMIN — HYDROMORPHONE HYDROCHLORIDE 0.5 MG: 1 INJECTION, SOLUTION INTRAMUSCULAR; INTRAVENOUS; SUBCUTANEOUS at 11:12

## 2020-10-27 RX ADMIN — Medication 40 ML: at 08:33

## 2020-10-27 RX ADMIN — FENTANYL CITRATE 50 MCG: 50 INJECTION INTRAMUSCULAR; INTRAVENOUS at 10:51

## 2020-10-27 RX ADMIN — TRAMADOL HYDROCHLORIDE 50 MG: 50 TABLET, COATED ORAL at 17:57

## 2020-10-27 RX ADMIN — TRAMADOL HYDROCHLORIDE 50 MG: 50 TABLET, COATED ORAL at 23:45

## 2020-10-27 RX ADMIN — HYDROMORPHONE HYDROCHLORIDE 0.5 MG: 1 INJECTION, SOLUTION INTRAMUSCULAR; INTRAVENOUS; SUBCUTANEOUS at 11:34

## 2020-10-27 RX ADMIN — TRAMADOL HYDROCHLORIDE 50 MG: 50 TABLET, FILM COATED ORAL at 07:45

## 2020-10-27 RX ADMIN — SODIUM CHLORIDE, POTASSIUM CHLORIDE, SODIUM LACTATE AND CALCIUM CHLORIDE 1000 ML: 600; 310; 30; 20 INJECTION, SOLUTION INTRAVENOUS at 07:53

## 2020-10-27 RX ADMIN — FENTANYL CITRATE 50 MCG: 50 INJECTION INTRAMUSCULAR; INTRAVENOUS at 10:45

## 2020-10-27 RX ADMIN — ROCURONIUM BROMIDE 50 MG: 10 INJECTION, SOLUTION INTRAVENOUS at 08:42

## 2020-10-27 RX ADMIN — FENTANYL CITRATE 100 MCG: 50 INJECTION INTRAMUSCULAR; INTRAVENOUS at 09:15

## 2020-10-27 RX ADMIN — TRAMADOL HYDROCHLORIDE 50 MG: 50 TABLET, COATED ORAL at 13:39

## 2020-10-27 RX ADMIN — GABAPENTIN 300 MG: 300 CAPSULE ORAL at 21:17

## 2020-10-27 RX ADMIN — ROPINIROLE HYDROCHLORIDE 1 MG: 1 TABLET, FILM COATED ORAL at 21:17

## 2020-10-27 RX ADMIN — ACETAMINOPHEN 1000 MG: 500 TABLET ORAL at 23:45

## 2020-10-27 RX ADMIN — LIDOCAINE HYDROCHLORIDE 50 MG: 10 INJECTION, SOLUTION EPIDURAL; INFILTRATION; INTRACAUDAL; PERINEURAL at 08:42

## 2020-10-27 RX ADMIN — HYDROMORPHONE HYDROCHLORIDE 0.25 MG: 1 INJECTION, SOLUTION INTRAMUSCULAR; INTRAVENOUS; SUBCUTANEOUS at 11:55

## 2020-10-27 ASSESSMENT — PULMONARY FUNCTION TESTS
PIF_VALUE: 20
PIF_VALUE: 19
PIF_VALUE: 0
PIF_VALUE: 16
PIF_VALUE: 19
PIF_VALUE: 3
PIF_VALUE: 21
PIF_VALUE: 20
PIF_VALUE: 21
PIF_VALUE: 20
PIF_VALUE: 18
PIF_VALUE: 0
PIF_VALUE: 23
PIF_VALUE: 25
PIF_VALUE: 20
PIF_VALUE: 19
PIF_VALUE: 3
PIF_VALUE: 20
PIF_VALUE: 1
PIF_VALUE: 20
PIF_VALUE: 20
PIF_VALUE: 4
PIF_VALUE: 4
PIF_VALUE: 17
PIF_VALUE: 16
PIF_VALUE: 19
PIF_VALUE: 20
PIF_VALUE: 16
PIF_VALUE: 19
PIF_VALUE: 17
PIF_VALUE: 21
PIF_VALUE: 19
PIF_VALUE: 19
PIF_VALUE: 4
PIF_VALUE: 17
PIF_VALUE: 21
PIF_VALUE: 19
PIF_VALUE: 19
PIF_VALUE: 20
PIF_VALUE: 3
PIF_VALUE: 20
PIF_VALUE: 21
PIF_VALUE: 20
PIF_VALUE: 0
PIF_VALUE: 16
PIF_VALUE: 19
PIF_VALUE: 4
PIF_VALUE: 17
PIF_VALUE: 19
PIF_VALUE: 20
PIF_VALUE: 18
PIF_VALUE: 20
PIF_VALUE: 19
PIF_VALUE: 3
PIF_VALUE: 22
PIF_VALUE: 17
PIF_VALUE: 21
PIF_VALUE: 20
PIF_VALUE: 20
PIF_VALUE: 18
PIF_VALUE: 1
PIF_VALUE: 3
PIF_VALUE: 18
PIF_VALUE: 20
PIF_VALUE: 20
PIF_VALUE: 4
PIF_VALUE: 3
PIF_VALUE: 19
PIF_VALUE: 21
PIF_VALUE: 20
PIF_VALUE: 4
PIF_VALUE: 0
PIF_VALUE: 21
PIF_VALUE: 19
PIF_VALUE: 18
PIF_VALUE: 17
PIF_VALUE: 20
PIF_VALUE: 20
PIF_VALUE: 21
PIF_VALUE: 3
PIF_VALUE: 4
PIF_VALUE: 19
PIF_VALUE: 4
PIF_VALUE: 19
PIF_VALUE: 1
PIF_VALUE: 20
PIF_VALUE: 2
PIF_VALUE: 22
PIF_VALUE: 18
PIF_VALUE: 1
PIF_VALUE: 18
PIF_VALUE: 18
PIF_VALUE: 19
PIF_VALUE: 16
PIF_VALUE: 20
PIF_VALUE: 3
PIF_VALUE: 19
PIF_VALUE: 1
PIF_VALUE: 17
PIF_VALUE: 19
PIF_VALUE: 3
PIF_VALUE: 19
PIF_VALUE: 19
PIF_VALUE: 17
PIF_VALUE: 17
PIF_VALUE: 3
PIF_VALUE: 3
PIF_VALUE: 16
PIF_VALUE: 21
PIF_VALUE: 20
PIF_VALUE: 18
PIF_VALUE: 20
PIF_VALUE: 28
PIF_VALUE: 1
PIF_VALUE: 17
PIF_VALUE: 20
PIF_VALUE: 17
PIF_VALUE: 19
PIF_VALUE: 0
PIF_VALUE: 22
PIF_VALUE: 18
PIF_VALUE: 19
PIF_VALUE: 18
PIF_VALUE: 19
PIF_VALUE: 19
PIF_VALUE: 4
PIF_VALUE: 19
PIF_VALUE: 20
PIF_VALUE: 21
PIF_VALUE: 19
PIF_VALUE: 20
PIF_VALUE: 3
PIF_VALUE: 20
PIF_VALUE: 19
PIF_VALUE: 22
PIF_VALUE: 21
PIF_VALUE: 16
PIF_VALUE: 21
PIF_VALUE: 18
PIF_VALUE: 3
PIF_VALUE: 19

## 2020-10-27 ASSESSMENT — ENCOUNTER SYMPTOMS
SHORTNESS OF BREATH: 0
ABDOMINAL DISTENTION: 1
SORE THROAT: 1
BLOOD IN STOOL: 0
COLOR CHANGE: 0
VOMITING: 0
NAUSEA: 0
COUGH: 0
TROUBLE SWALLOWING: 0
CONSTIPATION: 0
DIARRHEA: 0

## 2020-10-27 ASSESSMENT — PAIN DESCRIPTION - PAIN TYPE
TYPE: SURGICAL PAIN

## 2020-10-27 ASSESSMENT — PAIN SCALES - GENERAL
PAINLEVEL_OUTOF10: 10
PAINLEVEL_OUTOF10: 6
PAINLEVEL_OUTOF10: 6
PAINLEVEL_OUTOF10: 5
PAINLEVEL_OUTOF10: 4
PAINLEVEL_OUTOF10: 7
PAINLEVEL_OUTOF10: 7
PAINLEVEL_OUTOF10: 4
PAINLEVEL_OUTOF10: 5
PAINLEVEL_OUTOF10: 6
PAINLEVEL_OUTOF10: 5
PAINLEVEL_OUTOF10: 6
PAINLEVEL_OUTOF10: 5
PAINLEVEL_OUTOF10: 10
PAINLEVEL_OUTOF10: 6
PAINLEVEL_OUTOF10: 6
PAINLEVEL_OUTOF10: 5
PAINLEVEL_OUTOF10: 6

## 2020-10-27 ASSESSMENT — PAIN DESCRIPTION - PROGRESSION
CLINICAL_PROGRESSION: NOT CHANGED
CLINICAL_PROGRESSION: NOT CHANGED

## 2020-10-27 ASSESSMENT — PAIN DESCRIPTION - ORIENTATION
ORIENTATION: RIGHT

## 2020-10-27 ASSESSMENT — PAIN DESCRIPTION - LOCATION
LOCATION: KNEE

## 2020-10-27 ASSESSMENT — PAIN DESCRIPTION - DESCRIPTORS: DESCRIPTORS: ACHING;DISCOMFORT

## 2020-10-27 ASSESSMENT — PAIN - FUNCTIONAL ASSESSMENT: PAIN_FUNCTIONAL_ASSESSMENT: 0-10

## 2020-10-27 ASSESSMENT — PAIN DESCRIPTION - ONSET
ONSET: ON-GOING
ONSET: ON-GOING

## 2020-10-27 ASSESSMENT — PAIN DESCRIPTION - FREQUENCY: FREQUENCY: CONTINUOUS

## 2020-10-27 NOTE — BRIEF OP NOTE
Brief Postoperative Note      Patient: Chito Villa  YOB: 1960  MRN: [id number]    Date of Procedure: 10/27/2020    Pre-Op Diagnosis: OSTEOARTHRITIS RIGHT KNEE    Post-Op Diagnosis: OA right knee        Procedure(s):  TOTAL KNEE ARTHROPLASTY MICROPORT STANDARD - Right knee    Surgeon(s):  Matt Escobedo DO    Assistant:  Resident: Lorena Ba DO    Anesthesia: General    Estimated Blood Loss (mL): 300 mL    Fluids: 1100 mL crystalloids    Complications: None    Specimens:   * No specimens in log *    Implants:  Implant Name Type Inv.  Item Serial No.  Lot No. LRB No. Used Action   IMPL CEMENT PALACOS R Cement IMPL CEMENT [facility] [id number] Right 1 Implanted   IMPL CEMENT PALACOS R Cement IMPL CEMENT PALACOS R  [facility] [id number] Right 1 Implanted   IMPL KNEE PATELLA ADV ALL-POLY SZ 35MM  8MM THICK Knee IMPL KNEE PATELLA ADV ALL-POLY SZ 35MM  8MM THICK  [employer] [id number] Right 1 Implanted   IMPL KNEE COMP FEM SZ6 RT Knee IMPL KNEE COMP FEM SZ6 RT  [employer] [id number] Right 1 Implanted   IMPL KNEE BASE TIB KEELED SZ6 RT Knee IMPL KNEE BASE TIB KEELED SZ6 RT  [employer] [id number] Right 1 Implanted   IMPL KNEE INSERT TIBIAL SZ6 RT Knee IMPL KNEE INSERT TIBIAL SZ6 RT  [employer] [id number] Right 1 Implanted         Drains: * No LDAs found *    Findings: see op note    Electronically signed by Lorena Ba DO on 10/27/2020 at 10:55 AM

## 2020-10-27 NOTE — PROGRESS NOTES
Physical Therapy    Facility/Department: [address] ORTHO/MED SURG  Initial Assessment    NAME: Jarad Herr  : 1960  MRN: [id number]    Date of Service: 10/27/2020    Discharge Recommendations: Further therapy recommended at discharge. PT Equipment Recommendations  Equipment Needed: No(pt has RW)    Assessment   Body structures, Functions, Activity limitations: Decreased functional mobility ; Decreased balance;Decreased ROM; Decreased strength;Decreased endurance; Increased pain  Assessment: The pt ambulated 8ft with RW and CGA, no buckling noted. Recommend continued therapy to progress mobility and maximize safety. Prognosis: Good  Decision Making: Medium Complexity  PT Education: Goals;PT Role;Plan of Care; Functional Mobility Training  REQUIRES PT FOLLOW UP: Yes  Activity Tolerance  Activity Tolerance: Patient Tolerated treatment well;Patient limited by endurance  Activity Tolerance: pt c/o mild light headedness following ambulation, BP WNL       Patient Diagnosis(es): There were no encounter diagnoses. has a past medical history of Acute blood loss anemia, Acute hematogenous osteomyelitis of right foot (HCC)-Involving the sesamoid bones, Alcoholic cirrhosis of liver without ascites (HCC), Chronic midline low back pain without sciatica, Cirrhosis of liver without ascites ([address] Diarrhea, Duodenal ulcer, Elevated alpha fetoprotein, Elevated liver enzymes, Foot infection, Gastric ulcer, GERD (gastroesophageal reflux disease), GI bleed, Hep C w/o coma, chronic (HCC), History of blood transfusion, History of ETOH abuse, History of stress test, Hyperlipidemia, Hypertension, Iron deficiency anemia due to chronic blood loss, Melena, Neuropathy, New onset seizure (HCC), Olecranon bursitis of left elbow, Portal hypertensive gastropathy ([address] Right knee pain, Septic arthritis ([address] Septic arthritis of right foot ([address] Thrombocytopenia ([address] Wears glasses, and Wheelchair dependence.    has a past surgical history that includes hernia repair (Left, 1975); Upper gastrointestinal endoscopy (10/31/2015); Tonsillectomy (1970); Upper gastrointestinal endoscopy (03/14/2018); Colonoscopy (03/14/2018); pr colon ca scrn not hi rsk ind (N/A, 3/14/2018); Upper gastrointestinal endoscopy (N/A, 3/14/2018); Upper gastrointestinal endoscopy (N/A, 1/22/2019); Upper gastrointestinal endoscopy (N/A, 2/1/2019); Upper gastrointestinal endoscopy (N/A, 8/1/2019); Vasectomy (2007); incision and drainage (Left, 10/16/2019); SESAMOIDECTOMY FIRST TOE (Right, 5/22/2020); Lumbar spine surgery (2000); and Total knee arthroplasty (Right, 10/27/2020). Restrictions  Restrictions/Precautions  Restrictions/Precautions: Weight Bearing, Fall Risk, Femoral Block  Required Braces or Orthoses?: No  Lower Extremity Weight Bearing Restrictions  Right Lower Extremity Weight Bearing: Weight Bearing As Tolerated  Position Activity Restriction  Other position/activity restrictions: R TKA 10/27  Vision/Hearing  Vision: Impaired  Vision Exceptions: Wears glasses at all times  Hearing: Within functional limits     Subjective  General  Patient assessed for rehabilitation services?: Yes  Response To Previous Treatment: Not applicable  Family / Caregiver Present: No  Follows Commands: Within Functional Limits  Subjective  Subjective: RN and pt agreeable to PT. Pt supine in bed upon arrival, pleasant and cooperative throughout.   Pain Screening  Patient Currently in Pain: Yes  Pain Assessment  Pain Assessment: 0-10  Pain Level: 6  Pain Type: Surgical pain  Pain Location: Knee  Pain Orientation: Right  Pain Descriptors: Aching;Discomfort  Pain Frequency: Continuous  Pain Onset: On-going  Clinical Progression: Not changed  Non-Pharmaceutical Pain Intervention(s): Ambulation/Increased Activity  Response to Pain Intervention: Patient Satisfied  Vital Signs  Patient Currently in Pain: Yes       Orientation  Orientation  Overall Orientation Status: Within Functional Limits  Social/Functional History  Social/Functional History  Lives With: Spouse(reports wife has MS and power w/c bound; reports helping her with getting in and out of shower and IADLt asks)  Type of Home: House  Home Layout: One level  Home Access: Stairs to enter without rails, Ramped entrance  Entrance Stairs - Number of Steps: 1  Entrance Stairs - Rails: Both  Bathroom Shower/Tub: Walk-in shower  Bathroom Toilet: Standard  Bathroom Equipment: Grab bars in shower, Shower chair, Grab bars around toilet  Home Equipment: [employer], Rolling walker(reports using the w/c within the home and out in the community; reports walking short distances within the home)  ADL Assistance: [address]: Independent  Homemaking Responsibilities: Yes  Meal Prep Responsibility: Primary  Laundry Responsibility: Primary  Cleaning Responsibility: Primary  Shopping Responsibility: Primary(reports delivery services for groceries)  Ambulation Assistance: Independent  Transfer Assistance: Independent  Active : No  Mode of Transportation: Family(uber)  Occupation: On disability  Type of occupation: Autobody repair  Leisure & Hobbies: woodworking, indoor plants  IADL Comments: reports limited support to provide assistance d/t son being in the [employer] and not home at all times  Additional Comments: reports breaking the R foot in Feb of 2020 and had sx in the R foot this year, has been using the w/c since breaking his foot; prior to injury patient was independent with all mobility tasks w/o a LRD  Cognition   Cognition  Overall Cognitive Status: WFL    Objective          Joint Mobility  ROM RLE: AROM R knee 4-86 degrees, hip and ankle WFL  ROM LLE: AROM WFL  ROM RUE: AROM WFL  ROM LUE: AROM WFL  Strength RLE  Strength RLE: WFL  Strength LLE  Strength LLE: WFL  Strength RUE  Strength RUE: WFL  Strength LUE  Strength LUE: WFL  Tone RLE  RLE Tone: Normotonic  Tone LLE  LLE Tone: Normotonic  Motor goal 1: Perform bed mobility and functional transfers independently  Short term goal 2: Ambulate 200ft with RW and supervision  Short term goal 3: Demo Good- dynamic standing balance to decrease risk of falls  Short term goal 4: Ascend/descend 1 step with HR and CGA  Short term goal 5:  Independently perform HEP       Therapy Time   Individual Concurrent Group Co-treatment   Time In 1411         Time Out 1446         Minutes 35         Timed Code Treatment Minutes: 9 Minutes       Maciej Vasquez, PT

## 2020-10-27 NOTE — PLAN OF CARE
PROTOCOLS  NURSING IMPLEMENTED    TOTAL JOINT DVT/PE  VENOUS THROMBOEMBOLISM PROPHYLAXIS  (Nursing Automatically Implement)    [facility]  [id number]  [unfilled]  10/27/20    YES DVT RISK FACTOR SCORE YES MAJOR BLEEDING RISK FACTORS SCORE     [x] 48years old or greater (1)   [] Hx. Easy Bleeding (1)      [] Heart failure (2)   [] NSAID Use in Last 5 Days (2)      [] Varicose veins - Hx. (1)   [] Gastrointestinal or Genitourinary bleeding in Last 14 Days (2)      [] Myocardial Infarction - Hx. (1)         [] Cancer - Hx. (2)         [] Atrial fibrillation - Hx. (1)         [] Ischemic Stroke - Hx. (1)         [] Diabetes Mellitus - Hx. (1)         [] Previous DVT/PE - Hx.  (2)         [] Hormone Replacement Therapy (1)         [x] Obesity (1)         [] Paralysis (1)         [] Pregnancy (1)         [] Smoking (1)                   [] Thromophilia (1)   []   Mild to Moderate Bleeding (2)      [] Total Hip Arthroplasty (1)   [] Active Bleeding (4)      [] Family history of PE or DVT? (4) (Consider the following labs to test for presence of inhibitor deficiency state:) Factor V Leiden, Prothrombin Gene Mutation, Protein S Deficiency, Protien C Deficiency, Antithrombin Deficiency   [] Malignant Hypertension (2)        [] Thrombocytopenia 20k to 100k (2)        [] Thrombocytopenia less than 20k (4)        [] Bleeding Diathesis (4)        [] \"Bloody Stick\" Epidural or Spinal (2)     TOTAL DVT SCORE   TOTAL BLEEDING SCORE      [] CLASS A   Standard Risk DVT (0-3)    [] CLASS X Standard Risk Bleeding (0-4)      [] CLASS B Elevated Risk DVT (greater than 3)    [] CLASS Y High Risk Bleeding (greater than 4)     FINAL MATRIX (e.g. AY)       *If allergic to ASA use Warfarin  *BY patient consider no treatment  **Consider venous filter with high risk PE  **If on Coumadin pre-op, then restart night of surgery      [x]  DVT Prophylaxis: Class AX, AY    Ecotrin 81 mg by mouth BID starting day of surgery for 6 weeks for all total  joints. (If allergic to aspirin, give eliquis 2.5mg BID X 14 days (knees) or 35 days  (hips) starting first day postop at 0600). []  DVT Prophylaxis:  Class BX (richard choice)    []Eliquis 2.5mg BID x 14 days (knees) or 35 days (hips) starting first day postop      at 0600      [] Lovenox 40 mg subcu daily starting first day postop at 0600 x 14 days                             (knees) or 35 days (hips)    Ecotrin 81 mg PO BID-start when Lovenox is finished. (Teach injection prior to discharge.)       [] Xarelto 10 mg by mouth daily starting first day postop at 0600     14 days (knees) or 35 days (hips). If creatinine clearance less than 30 mL/min, give Lovenox 30 mg subcu   Daily starting first day postop at 0600. Ecotrin 81 mg PO BID-starting   when Lovenox is finished. (Teach injection prior to      discharge.)  (For discharge fill throughout the 10 mg daily with no    refills.)      []  DVT Prophylaxis:  Class BY (richard choice)               []  Eliquis 2.5mg BID x 14 days (knees) or 35 days (hips) starting first day                              postop at 0600        []  Ecotrin 81 mg PO BID x 6weeks (all joints)      []  Lovenox 40mg SQ daily to start at 0600 first day post-Op day. 14 days for knees, 35 days for hips    Ecotrin 81 mg PO BID - start when Lovenox is finished. (Teach injection prior to discharge.)       [] Xarelto 10 mg PO daily starting first day Post-Op at 0600    14 days (knees) or 35 days (hips)    If Creatinine Clearance less than 30ml/min, give Lovenox 30 mg    SQ daily starting first day Post-Op at 0600 x 14 days (knees) or 35   days (hips). Ecotrin 81 mg PO BID - start when Lovenox is finished.     (Teach injection prior to discharge.)  (For discharge fill throughout the   10 mg daily #32 with no refills.)      Electronically signed by Madhu Abad DO on 10/27/2020 at 7:26 AM

## 2020-10-27 NOTE — ANESTHESIA PROCEDURE NOTES
Peripheral Block    Patient location during procedure: pre-op  Start time: 10/27/2020 8:15 AM  End time: 10/27/2020 8:30 AM  Staffing  Anesthesiologist: Kayla Cole MD  Performed: anesthesiologist   Preanesthetic Checklist  Completed: patient identified, site marked, surgical consent, pre-op evaluation, timeout performed, IV checked, risks and benefits discussed, monitors and equipment checked, anesthesia consent given, oxygen available and patient being monitored  Peripheral Block  Patient position: supine  Prep: ChloraPrep  Patient monitoring: cardiac monitor, continuous pulse ox, frequent blood pressure checks and IV access  Block type: iPacks  Laterality: right  Injection technique: single-shot  Procedures: ultrasound guided  Local infiltration: lidocaine  Infiltration strength: 1 %  Dose: 3 mL  Provider prep: mask and sterile gloves  Local infiltration: lidocaine  Needle  Needle gauge: 21 G  Needle length: 10 cm  Needle localization: ultrasound guidance  Test dose: negative  Assessment  Injection assessment: negative aspiration for heme, no paresthesia on injection and local visualized surrounding nerve on ultrasound  Paresthesia pain: none  Slow fractionated injection: yes  Hemodynamics: stable  Additional Notes  U/S [id number].  (1) Under ultrasound guidance, a 21 gauge needle was inserted .  (2) Ultrasound was also used to visualize the spread of the anesthetic in close proximity to the nerve being blocked. (3) The nerve appeared anatomically normal, and (4 there were no apparent abnormal pathological findings on the image that were readily visible and related to the nerve being blocked. (5) A permanent ultrasound image was saved in the patient's record.         Reason for block: post-op pain management and at surgeon's request

## 2020-10-27 NOTE — ANESTHESIA PRE PROCEDURE
Department of Anesthesiology  Preprocedure Note       Name:  Jaime Parekh   Age:  62 y.o.  :  1960                                          MRN:  [id number]         Date:  10/27/2020      Surgeon: Blane Mathew):  Shelby Lees DO    Procedure: Procedure(s):  TOTAL KNEE ARTHROPLASTY (SUPINE) MICROPORT, 3080 TABLE, NSA=ADDUCTOR AND I-JETHRO PREOP BLOCK, STANDARD    Medications prior to admission:   Prior to Admission medications    Medication Sig Start Date End Date Taking? Authorizing Provider   rOPINIRole (REQUIP) 1 MG tablet Take 1 tablet by mouth nightly TAKE ONE TABLET BY MOUTH ONCE NIGHTLY 10/20/20  Yes REECE Beverly CNP   lisinopril (PRINIVIL;ZESTRIL) 20 MG tablet TAKE ONE TABLET BY MOUTH DAILY  Patient taking differently: Take 20 mg by mouth daily TAKE ONE TABLET BY MOUTH DAILY 10/13/20  Yes REECE Beverly CNP   gabapentin (NEURONTIN) 600 MG tablet Take by mouth. Indications: 600 in the am, 600 in the afternoon, and 1200 in pm-from neuro    Yes Historical Provider, MD   ferrous sulfate (IRON 325) 325 (65 Fe) MG tablet TAKE ONE TABLET BY MOUTH DAILY WITH BREAKFAST  Patient taking differently: Take 325 mg by mouth daily (with breakfast)  8/10/20  Yes REECE Beverly CNP. Devices MISC Right knee lateral  brace. 20  Yes Darshan Garza DO   amLODIPine (NORVASC) 10 MG tablet Take 1 tablet by mouth daily 20  Yes REECE Beverly CNP   Misc. Devices [facility]) MISC 1 Device by Does not apply route daily Standard wheelchair  Current body weight: 221 lb (100.2 kg)  Current patient height: 5'10 (177.8 cm)  Diagnosis: right foot pain and unable to ambulate  Length of need: 12 months 3/24/20  Yes Reji Pro DPM   aspirin 81 MG chewable tablet Take 81 mg by mouth daily PCP SUSAN ESTRADA NP TOLD PATIENT TO TAKE THIS MEDICATION.    Yes Historical Provider, MD   Multiple Vitamins-Minerals (THERAPEUTIC MULTIVITAMIN-MINERALS)  Anemia D64.9    Cirrhosis of liver without ascites (HCC) K74.60    Duodenal ulcer K26.9    Acute blood loss anemia D62    Thrombocytopenia (HCC) C40.9    Alcoholic cirrhosis of liver without ascites (HCC) K70.30    Chronic midline low back pain without sciatica M54.5, G89.29    Seizures (HCC) R56.9    Hypertension I10    Hep C w/o coma, chronic (HCC) B18.2    Chronic back pain M54.9, G89.29    History of ETOH abuse F10.11    Abdominal pain R10.9    Elevated alpha fetoprotein R77.2    Olecranon bursitis of left elbow M70.22    Septic arthritis of IP joint of toe, right (HCC) M00.9    Septic arthritis of right foot (HCC) M00.9    Foot infection L08.9    Septic arthritis (HCC) M00.9    Acute hematogenous osteomyelitis of right foot (HCC)-Involving the sesamoid bones M86.071    Tenosynovitis of right foot-right Hallux M65.9    Sesamoiditis M25.80    Acute post-operative pain G89.18    Right foot pain M79.671       Past Medical History:        Diagnosis Date    Acute blood loss anemia 2/27/2019    Acute hematogenous osteomyelitis of right foot (HCC)-Involving the sesamoid bones 7/94/3890    Alcoholic cirrhosis of liver without ascites ([address] 3/29/2019    Chronic midline low back pain without sciatica 3/29/2019    Cirrhosis of liver without ascites ([address]     Diarrhea     Duodenal ulcer 2/2/2019    Elevated alpha fetoprotein     Elevated liver enzymes 1/30/2019    Foot infection 2019    Gastric ulcer 10/31/2015    large 2-3 cm    GERD (gastroesophageal reflux disease)     GI bleed 10/30/2015    Hep C w/o coma, chronic ([address] 2017    WAITING APPROVAL FROM INSURANCE COMPANY TO GET TREATMENT STARTED    History of blood transfusion 2019    ANEMIA R/T BLEEDING ULCERS. NO REACTION.     History of ETOH abuse     QUIT 01/2017    History of stress test 2016    Hyperlipidemia     Hypertension 2017    ON RX    Iron deficiency anemia due to chronic blood loss     Melena     Neuropathy 2009 1995     Years since quittin.1    Smokeless tobacco: Former User     Types: Chew     Quit date:    Substance Use Topics    Alcohol use: Not Currently     Frequency: Never     Comment: stopped 2018                                Counseling given: Not Answered      Vital Signs (Current):   Vitals:    10/27/20 0717 10/27/20 0739   BP:  131/76   Pulse:  (!) 47   Resp:  16   Temp:  97.3 °F (36.3 °C)   TempSrc:  Temporal   SpO2:  100%   Weight: 222 lb 10.6 oz (101 kg)    Height: 5' 10\" (1.778 m)                                               BP Readings from Last 3 Encounters:   10/27/20 131/76   10/13/20 116/71   20 (!) 105/55       NPO Status: Time of last liquid consumption:                         Time of last solid consumption:                         Date of last liquid consumption: 10/26/20                        Date of last solid food consumption: 10/26/20    BMI:   Wt Readings from Last 3 Encounters:   10/27/20 222 lb 10.6 oz (101 kg)   10/13/20 226 lb 4 oz (102.6 kg)   10/01/20 215 lb (97.5 kg)     Body mass index is 31.95 kg/m². CBC:   Lab Results   Component Value Date    WBC 5.5 10/13/2020    RBC 4.14 10/13/2020    HGB 13.1 10/13/2020    HCT 40.1 10/13/2020    MCV 96.9 10/13/2020    RDW 16.4 10/13/2020    PLT See Reflexed IPF Result 10/13/2020       CMP:   Lab Results   Component Value Date     10/13/2020    K 3.8 10/13/2020     10/13/2020    CO2 22 10/13/2020    BUN 11 10/13/2020    CREATININE 0.65 10/13/2020    GFRAA >60 10/13/2020    LABGLOM >60 10/13/2020    GLUCOSE 105 10/13/2020    PROT 7.0 10/13/2020    CALCIUM 8.9 10/13/2020    BILITOT 3.59 10/13/2020    ALKPHOS 168 10/13/2020     10/13/2020    ALT 80 10/13/2020       POC Tests: No results for input(s): POCGLU, POCNA, POCK, POCCL, POCBUN, POCHEMO, POCHCT in the last 72 hours.     Coags:   Lab Results   Component Value Date    PROTIME 15.6 2020    PROTIME 12.4 10/16/2019    INR 1.5 2020 APTT 31.1 01/30/2019       HCG (If Applicable): No results found for: PREGTESTUR, PREGSERUM, HCG, HCGQUANT     ABGs: No results found for: PHART, PO2ART, LCU3GPP, CYL7APR, BEART, X2FSOUWM     Type & Screen (If Applicable):  No results found for: LABABO, LABRH    Drug/Infectious Status (If Applicable):  Lab Results   Component Value Date    HEPCAB REACTIVE 07/02/2018       COVID-19 Screening (If Applicable):   Lab Results   Component Value Date    COVID19 Not Detected 10/23/2020    COVID19 Not Detected 05/19/2020         Anesthesia Evaluation  Patient summary reviewed and Nursing notes reviewed no history of anesthetic complications:   Airway: Mallampati: II  TM distance: >3 FB   Neck ROM: full  Mouth opening: > = 3 FB Dental: normal exam         Pulmonary:Negative Pulmonary ROS and normal exam                               Cardiovascular:    (+) hypertension:,     (-) past MI, CAD, CABG/stent, dysrhythmias,  angina,  CHF and orthopnea    ECG reviewed  Rhythm: regular  Rate: normal  Echocardiogram reviewed         Beta Blocker:  Not on Beta Blocker         Neuro/Psych:   (+) seizures: well controlled,             GI/Hepatic/Renal:   (+) GERD:, PUD, hepatitis: C, liver disease:,           Endo/Other: Negative Endo/Other ROS   (+) blood dyscrasia: thrombocytopenia:., .                 Abdominal:           Vascular:                                        Anesthesia Plan      general and regional     ASA 3     (GETA)  Induction: intravenous. MIPS: Postoperative opioids intended and Prophylactic antiemetics administered. Anesthetic plan and risks discussed with patient. Use of blood products discussed with patient whom consented to blood products.    Plan discussed with CRNA and surgical team.                  Dee Parson MD   10/27/2020

## 2020-10-27 NOTE — ANESTHESIA POSTPROCEDURE EVALUATION
Department of Anesthesiology  Postprocedure Note    Patient: Mauro Miranda  MRN: [id number]  YOB: 1960  Date of evaluation: 10/27/2020  Time:  2:33 PM     Procedure Summary     Date:  10/27/20 Room / Location:  [address]    Anesthesia Start:  6877 Anesthesia Stop:  1104    Procedure:  TOTAL KNEE ARTHROPLASTY MICROPORT STANDARD (Right Knee) Diagnosis:  (OSTEOARTHRITIS RIGHT KNEE)    Surgeon:  Cesario Harry DO Responsible Provider:  Dino Logan MD    Anesthesia Type:  general, regional ASA Status:  3          Anesthesia Type: general, regional    Randolph Phase I: Randolph Score: 10    Randolph Phase II:      Last vitals: Reviewed and per EMR flowsheets.        Anesthesia Post Evaluation    Patient location during evaluation: PACU  Patient participation: complete - patient participated  Level of consciousness: awake and alert  Pain score: 4  Airway patency: patent  Nausea & Vomiting: no nausea and no vomiting  Complications: no  Cardiovascular status: hemodynamically stable  Respiratory status: room air  Hydration status: euvolemic

## 2020-10-27 NOTE — CONSULTS
[facility]  Office: [address], DO, Leilani Conn, DO, Shira Bernal, DO, José Antonio Jernigan, DO, Chery Gamez MD, James Augustin MD, Michelle Galan MD, Chantal Castro MD, Jessica Babb MD, Mayuri Reeves MD, Ileana Braga MD, Marina Malone MD, Jose Luis Gifford MD, Carmelo Hernandez, DO, Rose Fried MD, Alma Palmer MD, Sarmad Powers DO, Hermilo Simon MD,  Daija Ferrera, DO, Inga Walter MD, Neelam Diggs MD, Reji Garcia, [facility], [facility], [facility], Helen Ant, CNP, Rosalba Arreaga, CNS, Jelani Brumfield, CNP, Juan Pierre, CNP, Cecilio Pradhan, CNP, Dionicia Frankel, [facility], Ronaldo Nicholson, CNP, Nahomy Lujan PA-C, Rosendo Le, [facility], Rosibel Singh, CNP, Jung Guerin, CNP, Juan Moscoso, CNP, Peter Lara, CNP, Kashif Tellez, [facility]   [facility] / HISTORY AND PHYSICAL EXAMINATION            Date:   10/27/2020  Patient name:  Naz Quintero  Date of admission:  10/27/2020  6:07 AM  MRN:   [id number]  Account:  [de-identified]  YOB: 1960  PCP:    REECE Owusu CNP  Room:   [address] [place] [address]  Code Status:    Full Code    Physician Requesting Consult: Peace Arguello DO    Reason for Consult:  Medical Management sp Right total knee arthroplasty    Chief Complaint:     Rt knee pain    History Obtained From:     patient, electronic medical record    History of Present Illness:     Naz Quintero is a 62 yr old male with a hx of DJD/OA who has progressive symptoms despite conservative treatment. Pt uses a motorized scooter for long distances. He has a hx of alcoholic cirrhosis, GIB due to gastric ulcer, hepatitis C and elevated liver enzymes. Pt is in the process of being approved for tx of Hep C.  He stopped drinking over 6 mo ago. LFT's are higher compared with Feb '20.   Pt also has a hx of hematuria since '17 which has been evaluated by urology without known source. He is on Protonix 40 mg QD prn and takes Norvasc 10 mg QD and Lisinopril 20 mg QD for HTN. There was concern for systolic murmur on today's exam.  He has trace bilat edema. There are no cardiac echos on file. He was treated for a septic arthritis/osteo of the rt foot this spring. Pt was on doxycycline x 30 days and then had tibial sesamoidectomy 5/22/20. Anesthesia included a peripheral block and general.  Blood loss was 300 ml. He required straight cath post op. Past Medical History:     Past Medical History:   Diagnosis Date    Acute blood loss anemia 2/27/2019    Acute hematogenous osteomyelitis of right foot (HCC)-Involving the sesamoid bones 5/39/7776    Alcoholic cirrhosis of liver without ascites ([address] 3/29/2019    Chronic midline low back pain without sciatica 3/29/2019    Cirrhosis of liver without ascites ([address]     Diarrhea     Duodenal ulcer 2/2/2019    Elevated alpha fetoprotein     Elevated liver enzymes 1/30/2019    Foot infection 2019    Gastric ulcer 10/31/2015    large 2-3 cm    GERD (gastroesophageal reflux disease)     GI bleed 10/30/2015    Hep C w/o coma, chronic ([address] 2017    WAITING APPROVAL FROM INSURANCE COMPANY TO GET TREATMENT STARTED    History of blood transfusion 2019    ANEMIA R/T BLEEDING ULCERS. NO REACTION.  History of ETOH abuse     QUIT 01/2017    History of stress test 2016    Hyperlipidemia     Hypertension 2017    ON RX    Iron deficiency anemia due to chronic blood loss     Melena     Neuropathy 2009    FEET BILAT, SHAKEY HANDS    New onset seizure ([address]     ONLY 1  SEIZURE 2017.  SPECULATED IT OCCURRED FROM DRUG WITHDRAWL    Olecranon bursitis of left elbow     Portal hypertensive gastropathy ([address]     Right knee pain     Septic arthritis ([address] 2/28/2020    Septic arthritis of right foot ([address] 2/18/2020    Thrombocytopenia ([address] 3/29/2019    Wears glasses     Wheelchair dependence     POWERED. Past Surgical History:     Past Surgical History:   Procedure Laterality Date    COLONOSCOPY  03/14/2018    mod diverticulosis; internal hemorrhoids; retained stools    HERNIA REPAIR Left 1975    lt inguinal    INCISION AND DRAINAGE Left 10/16/2019    ELBOW INCISION AND DRAINAGE, PARTIALTRICEP REPAIR performed by Carol Jorge DO at [facility]    lumbar discectomy    CO COLON CA SCRN NOT  [address] IND N/A 3/14/2018    COLONOSCOPY performed by Braden Christina MD at [facility] Right 5/22/2020    TIBIAL SESAMOIDECTOMY RIGHT performed by Annie Euceda DPM at [address] [place] [address] Right 10/27/2020    TOTAL KNEE ARTHROPLASTY MICROPORT STANDARD    UPPER GASTROINTESTINAL ENDOSCOPY  10/31/2015    large gastric ulcer    UPPER GASTROINTESTINAL ENDOSCOPY  03/14/2018    mod portal hypertensive gastrophathy    UPPER GASTROINTESTINAL ENDOSCOPY N/A 3/14/2018    EGD BIOPSY performed by Braden Christina MD at [facility] 1/22/2019    EGD BIOPSY performed by Go Hammer MD at [facility] 2/1/2019    EGD BIOPSY performed by Deandra Bruno MD at [place] [address] N/A 8/1/2019    EGD ESOPHAGOGASTRODUODENOSCOPY performed by Hortencai Bateman MD at [facility]  2007        Medications Prior to Admission:     Prior to Admission medications    Medication Sig Start Date End Date Taking? Authorizing Provider   rOPINIRole (REQUIP) 1 MG tablet Take 1 tablet by mouth nightly TAKE ONE TABLET BY MOUTH ONCE NIGHTLY 10/20/20  Yes REECE Carlin CNP   lisinopril (PRINIVIL;ZESTRIL) 20 MG tablet TAKE ONE TABLET BY MOUTH DAILY  Patient taking differently: Take 20 mg by mouth daily TAKE ONE TABLET BY MOUTH DAILY 10/13/20  Yes REECE Carlin CNP   gabapentin (NEURONTIN) 600 MG tablet Take by mouth.  Indications: chew.  Alcohol:      reports previous alcohol use. Drug Use:  reports no history of drug use. Family History:     Family History   Problem Relation Age of Onset    High Blood Pressure Mother     Other Father         neuropathy    Stroke Father     Prostate Cancer Father        Review of Systems:     Positive and Negative as described in HPI. Review of Systems   Constitutional: Negative for appetite change, chills, fatigue and fever. HENT: Positive for sore throat (due to intubation). Negative for trouble swallowing. Respiratory: Negative for cough and shortness of breath. Gastrointestinal: Positive for abdominal distention (normal for him). Negative for blood in stool, constipation, diarrhea, nausea and vomiting. Genitourinary: Positive for hematuria (since  intermittent. Has seen urology and no source identified. ). Musculoskeletal: Positive for arthralgias (rt knee and rt foot). Skin: Negative for color change and rash. All other systems reviewed and are negative. Physical Exam:     BP (!) 156/87   Pulse 117   Temp 98.3 °F (36.8 °C) (Oral)   Resp 16   Ht 5' 10\" (1.778 m)   Wt 222 lb 10.6 oz (101 kg)   SpO2 98%   BMI 31.95 kg/m²   Temp (24hrs), Av.9 °F (35.5 °C), Min:95.6 °F (35.3 °C), Max:98.3 °F (36.8 °C)    No results for input(s): POCGLU in the last 72 hours. Intake/Output Summary (Last 24 hours) at 10/27/2020 2105  Last data filed at 10/27/2020 1103  Gross per 24 hour   Intake 1100 ml   Output 450 ml   Net 650 ml       Physical Exam  Constitutional:       General: He is not in acute distress. Appearance: He is obese. HENT:      Head: Normocephalic. Mouth/Throat:      Mouth: Mucous membranes are moist.   Eyes:      General: No scleral icterus. Pupils: Pupils are equal, round, and reactive to light. Neck:      Musculoskeletal: No muscular tenderness. Cardiovascular:      Rate and Rhythm: Tachycardia present.       Heart sounds: No murmur (pt's 10/27/2020 Yes          Plan:     1. Repeat UA, may need straight cath  2. Continue Norvasc and Zestril  3. CMP, CBC in am  4. Pain management and PT/OT per ortho  5. Consider cardiac echo as an outpt  6.  Pt updated and agreeable to plan    Consultations:   [address] [employer], REECE - [facility]  10/27/2020  9:05 PM    Copy sent to Dr. Calos Paredes, APRN - CNP

## 2020-10-27 NOTE — CARE COORDINATION
Case Management Initial Discharge Plan  Stuart Doherty,             Met with:patient to discuss discharge plans. Information verified: address, contacts, phone number, , insurance Yes    Emergency Contact/Next of Kin name & number: Severa Maul    PCP: REECE Maddox - CNP  Date of last visit: Tuesday    Insurance Provider: [employer]    Discharge Planning    Living Arrangements:  Spouse/Significant Other   Support Systems:  Spouse/Significant Other    Home has 1 stories  1 stairs to climb to get into front door, stairs to climb to reach second floor  Location of bedroom/bathroom in home     Patient able to perform ADL's:Independent    Current Services (outpatient & in home) none  DME equipment: Ramp W/C Rw   DME provider:     Receiving oral anticoagulation therapy? Yes asa 81mg    If indicated:   Physician managing anticoagulation treatment:   Where does patient obtain lab work for ATC treatment? Potential Assistance Needed:  Home Care, Outpatient PT/OT    Patient agreeable to home care: Yes  Freedom of choice provided:  yes list provided    Prior SNF/Rehab Placement and Facility: none  Agreeable to SNF/Rehab: No  [place] of choice provided: n/a     Evaluation: no    Expected Discharge date:  10/28/20    Patient expects to be discharged to:  home  Follow Up Appointment: Best Day/ Time: Wednesday AM    Transportation provider: cary Kareen Telles arrangements needed for discharge: No    Readmission Risk              Risk of Unplanned Readmission:        0             Does patient have a readmission risk score greater than 14?: No  If yes, follow-up appointment must be made within 7 days of discharge. Goals of Care: resume adls      Discharge Plan: goal is home vs home w/HC has RW walk in shower. He is caregiver for his wife who is in a wheelchair they are working on through her medicaid to get here [address] [place] [address].   He states he does not have anyone else to help           Electronically signed by Maribeth Mejia RN on 10/27/20 at 4:10 PM EDT

## 2020-10-27 NOTE — INTERVAL H&P NOTE
Pt Name: Mauro Miranda  MRN: [id number]  YOB: 1960  Date of evaluation: 10/27/2020    I have reviewed the patient's history and physical examination completed in pre-admission testing on 10/13/20. No changes to history or on examination today, unless noted below. Negative covid-19 screening on 10/23/20. Possible systolic murmur heard on exam, without any clinical symptoms. No carotid bruit. No cardiac echoes on file (anesthesia notified). Denies cardiac complaints. Trace edema BLE. Medical clearance on file.          JULIET NEWELL-CNP  10/27/20  8:01 AM

## 2020-10-27 NOTE — PLAN OF CARE
Problem: Skin Integrity:  Goal: Will show no infection signs and symptoms  Description: Will show no infection signs and symptoms  Outcome: Ongoing  Goal: Absence of new skin breakdown  Description: Absence of new skin breakdown  Outcome: Ongoing     Problem: Falls - Risk of:  Goal: Will remain free from falls  Description: Will remain free from falls  Outcome: Ongoing  Goal: Absence of physical injury  Description: Absence of physical injury  Outcome: Ongoing     Problem: Safety:  Goal: Free from accidental physical injury  Description: Free from accidental physical injury  Outcome: Ongoing  Goal: Free from intentional harm  Description: Free from intentional harm  Outcome: Ongoing     Problem: Daily Care:  Goal: Daily care needs are met  Description: Daily care needs are met  Outcome: Ongoing     Problem: Pain:  Goal: Patient's pain/discomfort is manageable  Description: Patient's pain/discomfort is manageable  Outcome: Ongoing     Problem: Skin Integrity:  Goal: Skin integrity will stabilize  Description: Skin integrity will stabilize  Outcome: Ongoing     Problem: Discharge Planning:  Goal: Patients continuum of care needs are met  Description: Patients continuum of care needs are met  Outcome: Ongoing

## 2020-10-27 NOTE — ANESTHESIA PROCEDURE NOTES
Peripheral Block    Patient location during procedure: pre-op  Start time: 10/27/2020 8:15 AM  End time: 10/27/2020 8:30 AM  Staffing  Anesthesiologist: Meeta Campos MD  Performed: anesthesiologist   Preanesthetic Checklist  Completed: patient identified, site marked, surgical consent, pre-op evaluation, timeout performed, IV checked, risks and benefits discussed, monitors and equipment checked, anesthesia consent given, oxygen available and patient being monitored  Peripheral Block  Patient position: supine  Prep: ChloraPrep  Patient monitoring: cardiac monitor, continuous pulse ox, frequent blood pressure checks and IV access  Block type: Femoral  Laterality: right  Injection technique: catheter  Procedures: ultrasound guided  Local infiltration: lidocaine  Infiltration strength: 1 %  Dose: 3 mL  Approach to block: Low Femoral.  Provider prep: mask and sterile gloves  Local infiltration: lidocaine  Needle  Needle type: Tuohy   Needle gauge: 18 G  Needle length: 10 cm  Needle localization: ultrasound guidance  Catheter type: open end  Catheter size: 20 G  Test dose: negative  Assessment  Injection assessment: negative aspiration for heme, no paresthesia on injection and local visualized surrounding nerve on ultrasound  Paresthesia pain: none  Slow fractionated injection: yes  Hemodynamics: stable  Additional Notes  U/S 51742.  (1) Under ultrasound guidance, a 18 gauge needle was inserted and placed in close proximity to the adductor canal nerve.  (2) Ultrasound was also used to visualize the spread of the anesthetic in close proximity to the nerve being blocked. (3) The nerve appeared anatomically normal, and (4 there were no apparent abnormal pathological findings on the image that were readily visible and related to the nerve being blocked. (5) A permanent ultrasound image was saved in the patient's record.             Reason for block: post-op pain management and at surgeon's request

## 2020-10-27 NOTE — PROGRESS NOTES
Occupational Therapy   Occupational Therapy Initial Assessment  Date: 10/27/2020   Patient Name: Olimpia Vilchis  MRN: [id number]     : 1960    Date of Service: 10/27/2020    Discharge Recommendations:  Patient would benefit from continued therapy after discharge     Assessment   Performance deficits / Impairments: Decreased functional mobility ; Decreased endurance;Decreased ADL status; Decreased balance;Decreased high-level IADLs;Decreased fine motor control  Assessment: Patient demonstrates decreased endurance and strength with new TKA affecting balance and engagement in functional tasks and ADLs. Pt demonstrates tremors in bilateral UE's affecting coordination and safety with ADL tasks. Pt able to complete bed mobility at [facility] A to sit EOB and complete sit to stand transfer at [address] using RW at [facility] for functional mobility tasks. OT services are warranted to address functional deficits impacting performance and safety with ADLs to promote functional outcomes for safe return to PLOF. Prognosis: Good  Decision Making: Medium Complexity  Patient Education: OT role, OT POC, purpose of evaluation, importance of OOB activity - good return  REQUIRES OT FOLLOW UP: Yes  Activity Tolerance  Activity Tolerance: Patient Tolerated treatment well;Patient limited by fatigue  Safety Devices  Safety Devices in place: Yes  Type of devices: All fall risk precautions in place; Left in bed;Nurse notified;Call light within reach;Gait belt  Restraints  Initially in place: No         Patient Diagnosis(es): There were no encounter diagnoses.      has a past medical history of Acute blood loss anemia, Acute hematogenous osteomyelitis of right foot (HCC)-Involving the sesamoid bones, Alcoholic cirrhosis of liver without ascites (HCC), Chronic midline low back pain without sciatica, Cirrhosis of liver without ascites ([facility] Diarrhea, Duodenal ulcer, Elevated alpha fetoprotein, Elevated liver enzymes, Foot infection, Gastric ulcer, GERD (gastroesophageal reflux disease), GI bleed, Hep C w/o coma, chronic ([facility] History of blood transfusion, History of ETOH abuse, History of stress test, Hyperlipidemia, Hypertension, Iron deficiency anemia due to chronic blood loss, Melena, Neuropathy, New onset seizure (HCC), Olecranon bursitis of left elbow, Portal hypertensive gastropathy ([address] Right knee pain, Septic arthritis ([address] Septic arthritis of right foot ([address] Thrombocytopenia ([address] Wears glasses, and Wheelchair dependence. has a past surgical history that includes hernia repair (Left, 1975); Upper gastrointestinal endoscopy (10/31/2015); Tonsillectomy (1970); Upper gastrointestinal endoscopy (03/14/2018); Colonoscopy (03/14/2018); pr colon ca scrn not hi rsk ind (N/A, 3/14/2018); Upper gastrointestinal endoscopy (N/A, 3/14/2018); Upper gastrointestinal endoscopy (N/A, 1/22/2019); Upper gastrointestinal endoscopy (N/A, 2/1/2019); Upper gastrointestinal endoscopy (N/A, 8/1/2019); Vasectomy (2007); incision and drainage (Left, 10/16/2019); SESAMOIDECTOMY FIRST TOE (Right, 5/22/2020); Lumbar spine surgery (2000); and Total knee arthroplasty (Right, 10/27/2020). Restrictions  Restrictions/Precautions  Restrictions/Precautions: Weight Bearing, Fall Risk, Femoral Block  Required Braces or Orthoses?: No  Lower Extremity Weight Bearing Restrictions  Right Lower Extremity Weight Bearing: Weight Bearing As Tolerated  Position Activity Restriction  Other position/activity restrictions: R TKA 10/27    Subjective   General  Patient assessed for rehabilitation services?: Yes  Family / Caregiver Present: No  General Comment  Comments: RN ok'd patient for OT/PT evaluation. Pt pleasant and cooperative throughout.   Patient Currently in Pain: Yes  Pain Assessment  Pain Assessment: Faces  Pain Level: 6  Pain Type: Surgical pain  Pain Location: Knee  Pain Orientation: Right  Pain Descriptors: Aching;Discomfort  Pain Frequency: Continuous  Pain Onset: On-going  Clinical Progression: Not changed  Non-Pharmaceutical Pain Intervention(s): Emotional support; Therapeutic presence; Ambulation/Increased Activity  Response to Pain Intervention: Patient Satisfied  Vital Signs  Pulse: 106  Resp: 15  BP: 137/82  Level of Consciousness: Alert  MEWS Score: 2  Patient Currently in Pain: Yes  Oxygen Therapy  SpO2: 93 %  O2 Device: None (Room air)    Social/Functional History  Social/Functional History  Lives With: Spouse(reports wife has MS and power w/c bound; reports helping her with getting in and out of shower and IADLt asks)  Type of Home: House  Home Layout: One level  Home Access: Stairs to enter without rails, Ramped entrance  Entrance Stairs - Number of Steps: 1  Entrance Stairs - Rails: Both  Bathroom Shower/Tub: Walk-in shower  Bathroom Toilet: Standard  Bathroom Equipment: Grab bars in shower, Shower chair, Grab bars around toilet  Home Equipment: [address], Rolling walker(reports using the w/c within the home and out in the community; reports walking short distances within the home)  ADL Assistance: [address] [place] [address] Avenue: Independent  Homemaking Responsibilities: Yes  Meal Prep Responsibility: Primary  Laundry Responsibility: Primary  Cleaning Responsibility: Primary  Shopping Responsibility: Primary(reports delivery services for groceries)  Ambulation Assistance: Independent  Transfer Assistance: Independent  Active : No  Mode of Transportation: Family(uber)  Occupation: On disability  Type of occupation: Autobody repair  Leisure & Hobbies: woodworking, indoor plants  IADL Comments: reports limited support to provide assistance d/t son being in the [employer] and not home at all times  Additional Comments: reports breaking the R foot in Feb of 2020 and had sx in the R foot this year, has been using the w/c since breaking his foot; prior to injury patient was independent with all mobility tasks w/o a LRD       Objective   Vision: Impaired  Vision Exceptions: Wears glasses at all times  Hearing: Within functional limits          Balance  Sitting Balance: Contact guard assistance(posterior lean at times; EOB for 8-9 min)  Standing Balance: Contact guard assistance  Standing Balance  Time: 2 min  Activity: EOB with RW  Functional Mobility  Functional - Mobility Device: Rolling Walker  Activity: Other  Assist Level: Contact guard assistance  Functional Mobility Comments: side steps to [facility] and steps forward and back  ADL  Feeding: Independent  Grooming: Modified independent   UE Bathing: Increased time to complete;Stand by assistance  LE Bathing: Minimal assistance; Increased time to complete  UE Dressing: Increased time to complete;Stand by assistance  LE Dressing: Minimal assistance; Increased time to complete  Toileting: Minimal assistance; Increased time to complete  Tone RUE  RUE Tone: Normotonic  Tone LUE  LUE Tone: Normotonic  Coordination  Movements Are Fluid And Coordinated: No  Coordination and Movement description: Tremors; Left UE;Right UE;Fine motor impairments  Quality of Movement Other  Comment: L > R     Bed mobility  Supine to Sit: Minimal assistance(using log rolling tech per patient request)  Sit to Supine: Contact guard assistance  Scooting: Contact guard assistance  Transfers  Sit to stand: Minimal assistance  Stand to sit: Contact guard assistance     Cognition  Overall Cognitive Status: WFL        Sensation  Overall Sensation Status: Impaired(Chronic \"electrical charge\" in bilateral feet)        LUE AROM : WFL  Left Hand AROM: WFL  RUE AROM : WFL  Right Hand AROM: WFL  LUE Strength  Gross LUE Strength: WFL  L Hand General: 4+/5  RUE Strength  Gross RUE Strength: WFL  R Hand General: 4+/5           Plan   Plan  Times per week: 5-7x/wk  Current Treatment Recommendations: Strengthening, Patient/Caregiver Education & Training, Equipment Evaluation, Education, & procurement, Balance Training, Functional Mobility Training, Endurance Training, Safety Education & Training, Self-Care / ADL    AM-PAC Score        AM-PAC Inpatient Daily Activity Raw Score: 20 (10/27/20 1517)  AM-PAC Inpatient ADL T-Scale Score : 42.03 (10/27/20 1517)  ADL Inpatient CMS 0-100% Score: 38.32 (10/27/20 1517)  ADL Inpatient CMS G-Code Modifier : Erwin Francis (10/27/20 1517)    Goals  Short term goals  Time Frame for Short term goals: Patient will, by discharge  Short term goal 1: demo UB ADLs at [facility]  Short term goal 2: demo LB ADLs at [facility] using AE PRN  Short term goal 3: demo functional transfers/mobility using LRD at [facility] to engage in ADLs safely  Short term goal 4: demo 10+ min of dynamic standing balance at [facility] using LRD to engage in ADLs safely  Short term goal 5: demo 5+ min of [address] activity to increase functional use of B UE for ADL tasks       Therapy Time   Individual Concurrent Group Co-treatment   Time In 1410         Time Out 1446         Minutes 36         Timed Code Treatment Minutes: 12 Minutes       Tresia Sic, OTR/L

## 2020-10-27 NOTE — OP NOTE
Operative Note    Patient: Juliann Clayton  YOB: 1960  MRN: [id number]    Date of Procedure: 10/27/2020    Pre-Op Diagnosis: OSTEOARTHRITIS RIGHT KNEE    Post-Op Diagnosis: OA right knee        Procedure(s):  TOTAL KNEE ARTHROPLASTY MICROPORT STANDARD - Right knee    Surgeon(s):  Doreen Titus DO    Assistant:  Resident: Volodymyr Mandujano DO    Anesthesia: General    Estimated Blood Loss (mL): 300 mL    Fluids: 1100 mL crystalloids    Complications: None    Specimens:   * No specimens in log *    Implants:  Implant Name Type Inv. Item Serial No.  Lot No. LRB No. Used Action   IMPL CEMENT PALACOS R Cement IMPL CEMENT [employer] [id number] Right 1 Implanted   IMPL CEMENT PALACOS R Cement IMPL CEMENT [employer] [id number] Right 1 Implanted   IMPL KNEE PATELLA ADV ALL-POLY SZ 35MM  8MM THICK Knee IMPL KNEE PATELLA ADV ALL-POLY SZ 35MM  8MM THICK  [employer] [id number] Right 1 Implanted   IMPL KNEE COMP FEM SZ6 RT Knee IMPL KNEE COMP FEM SZ6 RT  [employer] [id number] Right 1 Implanted   IMPL KNEE BASE TIB KEELED SZ6 RT Knee IMPL KNEE BASE TIB KEELED SZ6 RT  [employer] [id number] Right 1 Implanted   IMPL KNEE INSERT TIBIAL SZ6 RT Knee IMPL KNEE INSERT TIBIAL SZ6 RT  [employer] [id number] Right 1 Implanted         Drains: * No LDAs found *    Implants:   Microport medial pivoting total knee Knee: Size 6 femur, size 6 plus tibia base plate, size  6 tibia insert, 35x8 mm patella     Surgical Indications: Juliann Clayton is a 62 y.o. old male who presented with end-stage right knee osteoarthritis. Having failed extensive conservative management and following a discussion with the patient regarding both non-operative and operative treatment options, he elected to forgo continued attempts at conservative management and consented to proceed with a right total knee arthroplasty.  he came to this decision after demonstrating an understanding of our discussion regarding details of the procedure, risks and benefits, expected outcome, and postoperative course. Operative technique: Following appropriate identification of the patient and his operative extremity, consent was reviewed with the patient and his operative extremity was signed. Patient had a peripheral block performed by anesthesia. he was wheeled to the operating room where he finished a course of pre-operative antibiotic prophylaxis by way of anc. The anesthesia service administered a general anesthetic and his airway was established and secured using an endotracheal tube. A well-padded tourniquet was applied to proximal aspect of the patient's right thigh. All bony prominences were appropriately padded and the patient was secured to the operative table in a supine position. The patient's operative extremity was prepped and draped in a standard sterile fashion and a time out was performed during which the correct patient, operative extremity, and procedure were verified. A standard anterior midline incision was made sharply through skin and dissection was carried down to the level of the capsule. A medial parapatellar arthrotomy was performed. Subperiosteal soft tissue elevation was carried around anteromedial and medial border of proximal tibia, taking care to protect the superficial MCL. I then turned my attention to the patella and measured the patellar to be 26 mm at its thickest point. Once the patella was preliminarily sized I resected approximately 8 mm of bone. I re-measured the size of the patella and using a size 35 mm guide in appropriate rotation drilled peg holes for the final patellar button. A trial patellar button was placed to recreate his native patella thickness. A  hole for the femoral medullary canal was drilled an an intramedullary femoral guide was assembled. The proximal femoral resection guide for the right knee was placed in appropriate position on the distal femur and tightened in place. Using a rivas saw, the distal femur cut was made through the guide with appropriate protection in place for the MCL and LCL. I measured the thickness of the the cut which closely matched preoperative planning. Based on preoperative templating/planning the patient's femur had been determined to be a size 6. Using a size 6 distal femur cutting block, anterior, posterior, and chamfer cuts of the distal femur were performed again protecting ligamentous and neurovascular structures with well placed retractors. A  hole for the tibial medullary canal was drilled, and an intra-medullary tibial guide and proximal tibial cutting block was assembled on the tibia. The block has a built in posterior slope of 3 degrees. The block was pinned in place and once appropriate varus/valgus alignment was confirmed with an alignment erika, measured resection of the proximal tibia was performed taking care to protect neurovascular and ligamentous structures. The patient's knee demonstrated equal flexion-extension gaps at this point with a 10 mm spacer. Using the appropriate guide a box cut was made in the femoral notch of the  knee. The patient's tibia was sized to a size 6 plus and trial femoral and tibial implants inserted. his knee was assessed for appropriate flexion-extension gaps and stability, and demonstrated both with full knee extension and good flexion. The patella tracked appropriately with a no hands technique. All trial implants were removed and then I finished preparation of the patient's proximal tibia using the appropriate reamer and punch. The cut bone surfaces were then thoroughly irrigated and dried. Once the bone surfaces were appropriately dried, cement mixed on the back table was applied to the cut bone surfaces and the undersurfaces of the final implants which were then inserted. Excess cement was removed.  his knee was held in full extension while the cement cured. A final 10 mm polyethylene tibia was inserted. his knee was irrigated with copious amounts of sterile saline and irrisept. Closure was then performed using running bidirectional #1 stratafix PDS suture for capsular repair, running bidirectional 2-0 Stratafix PDS for deep subcutaneous tissue, and running  Bidirectional 3-0 stratafix for superficial subcutaneous closure. Dermabond was placed on the skin. Sterile dressings were applied to the patient's incision using optifoam.  he was awoken, transferred to his bed, and wheeled to the PACU in stable condition.       Dr. Marcia Love was present for all critical aspects of the case    Electronically signed by Leonarda Manning DO on 10/27/2020 at 10:55 AM

## 2020-10-28 ENCOUNTER — APPOINTMENT (OUTPATIENT)
Dept: GENERAL RADIOLOGY | Age: 60
DRG: 326 | End: 2020-10-28
Attending: ORTHOPAEDIC SURGERY
Payer: MEDICARE

## 2020-10-28 PROBLEM — R33.9 URINARY RETENTION: Status: ACTIVE | Noted: 2020-10-28

## 2020-10-28 PROBLEM — K59.00 CONSTIPATION: Status: ACTIVE | Noted: 2020-10-28

## 2020-10-28 LAB
-: NORMAL
AMORPHOUS: NORMAL
ANION GAP SERPL CALCULATED.3IONS-SCNC: 13 MMOL/L (ref 9–17)
BACTERIA: NORMAL
BILIRUBIN URINE: NEGATIVE
BUN BLDV-MCNC: 20 MG/DL (ref 6–20)
BUN/CREAT BLD: ABNORMAL (ref 9–20)
CALCIUM SERPL-MCNC: 8.5 MG/DL (ref 8.6–10.4)
CASTS UA: NORMAL /LPF (ref 0–8)
CHLORIDE BLD-SCNC: 97 MMOL/L (ref 98–107)
CO2: 18 MMOL/L (ref 20–31)
COLOR: YELLOW
COMMENT UA: ABNORMAL
CREAT SERPL-MCNC: 1.01 MG/DL (ref 0.7–1.2)
CRYSTALS, UA: NORMAL /HPF
EPITHELIAL CELLS UA: NORMAL /HPF (ref 0–5)
GFR AFRICAN AMERICAN: >60 ML/MIN
GFR NON-AFRICAN AMERICAN: >60 ML/MIN
GFR SERPL CREATININE-BSD FRML MDRD: ABNORMAL ML/MIN/{1.73_M2}
GFR SERPL CREATININE-BSD FRML MDRD: ABNORMAL ML/MIN/{1.73_M2}
GLUCOSE BLD-MCNC: 112 MG/DL (ref 75–110)
GLUCOSE BLD-MCNC: 118 MG/DL (ref 70–99)
GLUCOSE URINE: NEGATIVE
HCT VFR BLD CALC: 35.2 % (ref 40.7–50.3)
HEMOGLOBIN: 11.4 G/DL (ref 13–17)
KETONES, URINE: NEGATIVE
LEUKOCYTE ESTERASE, URINE: NEGATIVE
MCH RBC QN AUTO: 30.9 PG (ref 25.2–33.5)
MCHC RBC AUTO-ENTMCNC: 32.4 G/DL (ref 28.4–34.8)
MCV RBC AUTO: 95.4 FL (ref 82.6–102.9)
MUCUS: NORMAL
NITRITE, URINE: NEGATIVE
NRBC AUTOMATED: 0 PER 100 WBC
OTHER OBSERVATIONS UA: NORMAL
PDW BLD-RTO: 15.3 % (ref 11.8–14.4)
PH UA: 5 (ref 5–8)
PLATELET # BLD: 123 K/UL (ref 138–453)
PMV BLD AUTO: 12 FL (ref 8.1–13.5)
POTASSIUM SERPL-SCNC: 4.4 MMOL/L (ref 3.7–5.3)
PROCALCITONIN: 0.18 NG/ML
PROTEIN UA: NEGATIVE
RBC # BLD: 3.69 M/UL (ref 4.21–5.77)
RBC UA: NORMAL /HPF (ref 0–4)
RENAL EPITHELIAL, UA: NORMAL /HPF
SODIUM BLD-SCNC: 128 MMOL/L (ref 135–144)
SPECIFIC GRAVITY UA: 1.01 (ref 1–1.03)
TRICHOMONAS: NORMAL
TURBIDITY: CLEAR
URINE HGB: ABNORMAL
UROBILINOGEN, URINE: NORMAL
WBC # BLD: 15.1 K/UL (ref 3.5–11.3)
WBC UA: NORMAL /HPF (ref 0–5)
YEAST: NORMAL

## 2020-10-28 PROCEDURE — 84145 PROCALCITONIN (PCT): CPT

## 2020-10-28 PROCEDURE — 36415 COLL VENOUS BLD VENIPUNCTURE: CPT

## 2020-10-28 PROCEDURE — 85027 COMPLETE CBC AUTOMATED: CPT

## 2020-10-28 PROCEDURE — 97116 GAIT TRAINING THERAPY: CPT

## 2020-10-28 PROCEDURE — 51701 INSERT BLADDER CATHETER: CPT

## 2020-10-28 PROCEDURE — 27447 TOTAL KNEE ARTHROPLASTY: CPT | Performed by: ORTHOPAEDIC SURGERY

## 2020-10-28 PROCEDURE — 51702 INSERT TEMP BLADDER CATH: CPT

## 2020-10-28 PROCEDURE — 82947 ASSAY GLUCOSE BLOOD QUANT: CPT

## 2020-10-28 PROCEDURE — 2580000003 HC RX 258: Performed by: ORTHOPAEDIC SURGERY

## 2020-10-28 PROCEDURE — 6370000000 HC RX 637 (ALT 250 FOR IP): Performed by: FAMILY MEDICINE

## 2020-10-28 PROCEDURE — 6370000000 HC RX 637 (ALT 250 FOR IP): Performed by: ORTHOPAEDIC SURGERY

## 2020-10-28 PROCEDURE — 80048 BASIC METABOLIC PNL TOTAL CA: CPT

## 2020-10-28 PROCEDURE — 81001 URINALYSIS AUTO W/SCOPE: CPT

## 2020-10-28 PROCEDURE — 97530 THERAPEUTIC ACTIVITIES: CPT

## 2020-10-28 PROCEDURE — 99232 SBSQ HOSP IP/OBS MODERATE 35: CPT | Performed by: FAMILY MEDICINE

## 2020-10-28 PROCEDURE — 71045 X-RAY EXAM CHEST 1 VIEW: CPT

## 2020-10-28 PROCEDURE — 51798 US URINE CAPACITY MEASURE: CPT

## 2020-10-28 PROCEDURE — 97535 SELF CARE MNGMENT TRAINING: CPT

## 2020-10-28 PROCEDURE — 87086 URINE CULTURE/COLONY COUNT: CPT

## 2020-10-28 PROCEDURE — 97110 THERAPEUTIC EXERCISES: CPT

## 2020-10-28 RX ORDER — ACETAMINOPHEN 500 MG
1000 TABLET ORAL EVERY 8 HOURS
Status: DISCONTINUED | OUTPATIENT
Start: 2020-10-28 | End: 2020-10-30 | Stop reason: HOSPADM

## 2020-10-28 RX ORDER — LISINOPRIL 20 MG/1
20 TABLET ORAL ONCE
Status: COMPLETED | OUTPATIENT
Start: 2020-10-28 | End: 2020-10-28

## 2020-10-28 RX ORDER — POLYETHYLENE GLYCOL 3350 17 G/17G
17 POWDER, FOR SOLUTION ORAL 2 TIMES DAILY
Status: DISCONTINUED | OUTPATIENT
Start: 2020-10-28 | End: 2020-10-30 | Stop reason: HOSPADM

## 2020-10-28 RX ORDER — ASPIRIN 81 MG/1
81 TABLET ORAL 2 TIMES DAILY
Qty: 84 TABLET | Refills: 0 | Status: SHIPPED | OUTPATIENT
Start: 2020-10-28 | End: 2020-12-09

## 2020-10-28 RX ORDER — GABAPENTIN 600 MG/1
600 TABLET ORAL 2 TIMES DAILY
Status: DISCONTINUED | OUTPATIENT
Start: 2020-10-28 | End: 2020-10-30 | Stop reason: HOSPADM

## 2020-10-28 RX ORDER — TAMSULOSIN HYDROCHLORIDE 0.4 MG/1
0.4 CAPSULE ORAL DAILY
Status: DISCONTINUED | OUTPATIENT
Start: 2020-10-28 | End: 2020-10-30 | Stop reason: HOSPADM

## 2020-10-28 RX ORDER — OXYCODONE HYDROCHLORIDE AND ACETAMINOPHEN 5; 325 MG/1; MG/1
1-2 TABLET ORAL EVERY 6 HOURS PRN
Qty: 56 TABLET | Refills: 0 | Status: SHIPPED | OUTPATIENT
Start: 2020-10-28 | End: 2020-11-04

## 2020-10-28 RX ORDER — GABAPENTIN 600 MG/1
1200 TABLET ORAL NIGHTLY
Status: DISCONTINUED | OUTPATIENT
Start: 2020-10-28 | End: 2020-10-30 | Stop reason: HOSPADM

## 2020-10-28 RX ORDER — LISINOPRIL 20 MG/1
40 TABLET ORAL DAILY
Status: DISCONTINUED | OUTPATIENT
Start: 2020-10-29 | End: 2020-10-30 | Stop reason: HOSPADM

## 2020-10-28 RX ADMIN — GABAPENTIN 600 MG: 600 TABLET ORAL at 11:44

## 2020-10-28 RX ADMIN — ACETAMINOPHEN 1000 MG: 500 TABLET ORAL at 11:44

## 2020-10-28 RX ADMIN — PANTOPRAZOLE SODIUM 40 MG: 40 TABLET, DELAYED RELEASE ORAL at 08:25

## 2020-10-28 RX ADMIN — ASPIRIN 81 MG: 81 TABLET, CHEWABLE ORAL at 21:54

## 2020-10-28 RX ADMIN — LISINOPRIL 20 MG: 20 TABLET ORAL at 15:44

## 2020-10-28 RX ADMIN — ACETAMINOPHEN 1000 MG: 500 TABLET ORAL at 05:51

## 2020-10-28 RX ADMIN — POLYETHYLENE GLYCOL 3350 17 G: 17 POWDER, FOR SOLUTION ORAL at 21:54

## 2020-10-28 RX ADMIN — ROPINIROLE HYDROCHLORIDE 1 MG: 1 TABLET, FILM COATED ORAL at 21:54

## 2020-10-28 RX ADMIN — LISINOPRIL 20 MG: 20 TABLET ORAL at 08:25

## 2020-10-28 RX ADMIN — ACETAMINOPHEN 1000 MG: 500 TABLET ORAL at 18:28

## 2020-10-28 RX ADMIN — ASPIRIN 81 MG: 81 TABLET, CHEWABLE ORAL at 08:25

## 2020-10-28 RX ADMIN — SODIUM CHLORIDE, PRESERVATIVE FREE 10 ML: 5 INJECTION INTRAVENOUS at 21:58

## 2020-10-28 RX ADMIN — OXYCODONE HYDROCHLORIDE 5 MG: 5 TABLET ORAL at 20:31

## 2020-10-28 RX ADMIN — POLYETHYLENE GLYCOL 3350 17 G: 17 POWDER, FOR SOLUTION ORAL at 15:43

## 2020-10-28 RX ADMIN — TAMSULOSIN HYDROCHLORIDE 0.4 MG: 0.4 CAPSULE ORAL at 15:44

## 2020-10-28 RX ADMIN — TRAMADOL HYDROCHLORIDE 50 MG: 50 TABLET, COATED ORAL at 05:50

## 2020-10-28 RX ADMIN — GABAPENTIN 1200 MG: 600 TABLET ORAL at 21:54

## 2020-10-28 RX ADMIN — AMLODIPINE BESYLATE 10 MG: 10 TABLET ORAL at 08:25

## 2020-10-28 ASSESSMENT — PAIN DESCRIPTION - PROGRESSION
CLINICAL_PROGRESSION: NOT CHANGED

## 2020-10-28 ASSESSMENT — PAIN SCALES - GENERAL
PAINLEVEL_OUTOF10: 5
PAINLEVEL_OUTOF10: 4
PAINLEVEL_OUTOF10: 5
PAINLEVEL_OUTOF10: 4

## 2020-10-28 ASSESSMENT — PAIN DESCRIPTION - FREQUENCY
FREQUENCY: CONTINUOUS
FREQUENCY: CONTINUOUS

## 2020-10-28 ASSESSMENT — PAIN DESCRIPTION - LOCATION
LOCATION: KNEE

## 2020-10-28 ASSESSMENT — PAIN DESCRIPTION - DESCRIPTORS
DESCRIPTORS: ACHING
DESCRIPTORS: DISCOMFORT
DESCRIPTORS: ACHING;DISCOMFORT;SORE
DESCRIPTORS: ACHING

## 2020-10-28 ASSESSMENT — PAIN DESCRIPTION - PAIN TYPE
TYPE: SURGICAL PAIN;ACUTE PAIN
TYPE: ACUTE PAIN;SURGICAL PAIN
TYPE: SURGICAL PAIN
TYPE: ACUTE PAIN

## 2020-10-28 ASSESSMENT — PAIN DESCRIPTION - ORIENTATION
ORIENTATION: RIGHT

## 2020-10-28 ASSESSMENT — PAIN DESCRIPTION - ONSET: ONSET: ON-GOING

## 2020-10-28 ASSESSMENT — PAIN - FUNCTIONAL ASSESSMENT: PAIN_FUNCTIONAL_ASSESSMENT: ACTIVITIES ARE NOT PREVENTED

## 2020-10-28 NOTE — DISCHARGE INSTR - COC
Continuity of Care Form    Patient Name: Trey Peters   :  1960  MRN:  [id number]    Admit date:  10/27/2020  Discharge date:  10/30/2020    Code Status Order: Full Code   Advance Directives:   Advance Care Flowsheet Documentation       Date/Time Healthcare Directive Type of Healthcare Directive Copy in [address] Agent's Name Healthcare Agent's Phone Number    10/27/20 1315  No, patient does not have an advance directive for healthcare treatment -- -- -- -- --    10/27/20 0720  No, patient does not have an advance directive for healthcare treatment -- -- -- -- --    10/13/20 1115  No, patient does not have an advance directive for healthcare treatment -- -- -- -- --            Admitting Physician:  Romana Stoddard DO  PCP: REECE Apodaca - CNP    [facility]Room[facility] 6534/7570-01  Discharging Unit Phone Number: [phone]    Emergency Contact:   Extended Emergency Contact Information  Primary Emergency Contact: Laura Ferrari  Address: [address] Phone: [phone]  Work Phone: [phone]  Mobile Phone: [phone]  Relation: Spouse    Past Surgical History:  Past Surgical History:   Procedure Laterality Date    COLONOSCOPY  2018    mod diverticulosis; internal hemorrhoids; retained stools    HERNIA REPAIR Left     lt inguinal    INCISION AND DRAINAGE Left 10/16/2019    ELBOW INCISION AND DRAINAGE, PARTIALTRICEP REPAIR performed by Melisa Euceda DO at [facility]    lumbar discectomy    VA COLON CA SCRN NOT  [address] [place] [address] IND N/A 3/14/2018    COLONOSCOPY performed by Rodger Morales MD at [facility] Right 2020    TIBIAL SESAMOIDECTOMY RIGHT performed by Shalini Oh DPM at [address] [place] [address] Right 10/27/2020    TOTAL KNEE ARTHROPLASTY MICROPORT STANDARD    UPPER GASTROINTESTINAL ENDOSCOPY  10/31/2015    large gastric ulcer    UPPER GASTROINTESTINAL ENDOSCOPY  03/14/2018    mod portal hypertensive gastrophathy    UPPER GASTROINTESTINAL ENDOSCOPY N/A 3/14/2018    EGD BIOPSY performed by Kayla King MD at [facility] N/A 1/22/2019    EGD BIOPSY performed by Leon Davis MD at [facility] N/A 2/1/2019    EGD BIOPSY performed by Danisha Banks MD at [facility] N/A 8/1/2019    EGD ESOPHAGOGASTRODUODENOSCOPY performed by Whitney Newsome MD at [address] [facility]  2007       Immunization History:   Immunization History   Administered Date(s) Administered    Hepatitis A Adult (Vaqta) 11/08/2018    Hepatitis B Adult (Engerix-B) 11/08/2018    Influenza Virus Vaccine 11/30/2018    Influenza, Ryan Reasons, IM, (6 mo and older Fluzone, Flulaval, Fluarix and 3 yrs and older Afluria) 09/25/2019    Influenza, Ryan Reasons, Recombinant, IM PF (Flublok 18 yrs and older) 11/30/2018    Pneumococcal Polysaccharide ([id number]) 12/14/2017    Tdap (Boostrix, Adacel) 04/16/2018       Active Problems:  Patient Active Problem List   Diagnosis Code    GI bleed K92.2    Gastrointestinal hemorrhage K92.2    Iron deficiency anemia due to chronic blood loss D50.0    Diarrhea R19.7    Melena K92.1    New onset seizure ([facility] [address] R56.9    Elevated liver enzymes R74.8    Essential hypertension I10    Gastric ulcer K25.9    Unspecified viral hepatitis C without hepatic coma B19.20    Portal hypertensive gastropathy (HCC) K76.6, K31.89    Anemia D64.9    Cirrhosis of liver without ascites (HCC) K74.60    Duodenal ulcer K26.9    Acute blood loss anemia D62    Thrombocytopenia (HCC) D26.0    Alcoholic cirrhosis of liver without ascites (HCC) K70.30    Chronic midline low back pain without sciatica M54.5, G89.29    Seizures (HCC) R56.9    Hypertension I10    Hep C w/o coma, chronic (HCC) B18.2    Chronic back pain M54.9, G89.29    History of ETOH abuse F10.11    Abdominal pain R10.9    Elevated alpha fetoprotein R77.2    Olecranon bursitis of left elbow M70.22    Septic arthritis of IP joint of toe, right (HCC) M00.9    Septic arthritis of right foot (HCC) M00.9    Foot infection L08.9    Septic arthritis (HCC) M00.9    Acute hematogenous osteomyelitis of right foot (HCC)-Involving the sesamoid bones M86.071    Tenosynovitis of right foot-right Hallux M65.9    Sesamoiditis M25.80    Acute post-operative pain G89.18    Right foot pain M79.671    History of total knee arthroplasty, right Z96.651    Hematuria R31.9    Arthritis of right knee M17.11       Isolation/Infection:   Isolation            No Isolation          Patient Infection Status       Infection Onset Added Last Indicated Last Indicated By Review Planned Expiration Resolved Resolved By    None active    Resolved    COVID-19 Rule Out 10/23/20 10/23/20 10/23/20 COVID-19 (Ordered)   10/24/20 Rule-Out Test Resulted    COVID-19 Rule Out 05/18/20 05/18/20 05/19/20 COVID-19 (Ordered)   05/20/20 Rule-Out Test Resulted            Nurse Assessment:  Last Vital Signs: BP (!) 165/77   Pulse 105   Temp 98.1 °F (36.7 °C) (Oral)   Resp 16   Ht 5' 10\" (1.778 m)   Wt 222 lb 10.6 oz (101 kg)   SpO2 98%   BMI 31.95 kg/m²     Last documented pain score (0-10 scale): Pain Level: 4  Last Weight:   Wt Readings from Last 1 Encounters:   10/27/20 222 lb 10.6 oz (101 kg)     Mental Status:  oriented and alert    IV Access:  - None    Nursing Mobility/ADLs:  Walking   Assisted  Transfer  Assisted  Bathing  Assisted  Dressing  Assisted  Toileting  Assisted  Feeding  Independent  Med Admin  Independent  Med Delivery   whole    Wound Care Documentation and Therapy:        Elimination:  Continence:   · Bowel:  Yes  · Bladder: Yes  Urinary Catheter: None   Colostomy/Ileostomy/Ileal Conduit: Yes       Date of Last BM: ***    Intake/Output Summary (Last 24 hours) at 10/28/2020 0936  Last data filed at 10/28/2020 0551  Gross per 24 hour   Intake 2254 ml   Output 2099 ml   Net 155 ml     I/O last 3 completed shifts: In: 2254 [I.V.:2254]  Out: 2099 [Urine:1799; Blood:300]    Safety Concerns: At Risk for Falls    Impairments/Disabilities:      None    Nutrition Therapy:  Current Nutrition Therapy:   - Oral Diet:  General    Routes of Feeding: Oral  Liquids: No Restrictions  Daily Fluid Restriction: yes - amount 1200  Last Modified Barium Swallow with Video (Video Swallowing Test): not done    Treatments at the Time of Hospital Discharge:   Respiratory Treatments: ***  Oxygen Therapy:  is not on home oxygen therapy. Ventilator:    - No ventilator support    Rehab Therapies: Physical Therapy and Occupational Therapy  Weight Bearing Status/Restrictions: No weight bearing restirctions  Other Medical Equipment (for information only, NOT a DME order):  walker  Other Treatments: Follow directions to return Ropivacaine pain pump when infusion complete. Straight cath per Urology instructions.     Patient's personal belongings (please select all that are sent with patient):  None    RN SIGNATURE:  Electronically signed by Yvette Heller RN on 10/30/20 at 8:04 AM EDT    CASE MANAGEMENT/SOCIAL WORK SECTION    Inpatient Status Date:     Readmission Risk Assessment Score:  Readmission Risk              Risk of Unplanned Readmission:        0           Discharging to Facility/ Agency   Name:   [address] [place] [address]       Phone: [phone]       Fax: [phone]        ·   · Address:  · Phone:  · Fax:    Dialysis Facility (if applicable)   · Name:  · Address:  · Dialysis Schedule:  · Phone:  · Fax:    / signature: Electronically signed by Juana Gomez RN on 10/28/20 at 5:40 PM EDT    PHYSICIAN SECTION    Prognosis: Good    Condition at Discharge: Stable    Rehab Potential (if transferring to Rehab): Good    Recommended Labs or Other Treatments After Discharge: PT/OT    Physician Certification: I certify the above information and transfer of Chito Villa  is necessary for the continuing treatment of the diagnosis listed and that he requires [address] for less 30 days.      Update Admission H&P: No change in H&P    PHYSICIAN SIGNATURE:  Electronically signed by Matt Escobedo DO on 10/28/20 at 10:42 AM EDT

## 2020-10-28 NOTE — PROGRESS NOTES
Physical Therapy  Facility/Department: [address] ORTHO/MED SURG  Daily Treatment Note  NAME: Genesis Suh  : 1960  MRN: [id number]    Date of Service: 10/28/2020  Discharge Recommendations:    Further therapy recommended at discharge. PT Equipment Recommendations  Equipment Needed: No(pt has RW)    Assessment   Body structures, Functions, Activity limitations: Decreased functional mobility ; Decreased balance;Decreased ROM; Decreased strength;Decreased endurance; Increased pain  Assessment: The pt ambulated 80ft with RW and CGA, no buckling noted. Mild difficulty noted with sequencing throughout evaluation. Recommend continued therapy to progress mobility and maximize safety. Prognosis: Good  Decision Making: Medium Complexity  PT Education: Goals;PT Role;Plan of Care; Functional Mobility Training  Patient Education: Total knee exercises; step to gait pattern; decrease fall risk within home  REQUIRES PT FOLLOW UP: Yes  Activity Tolerance  Activity Tolerance: Patient Tolerated treatment well;Patient limited by endurance  Activity Tolerance: Pt reports mild shortness of breath following ambulation with increase fatigue. Patient Diagnosis(es): The encounter diagnosis was Post-op pain.      has a past medical history of Acute blood loss anemia, Acute hematogenous osteomyelitis of right foot (HCC)-Involving the sesamoid bones, Alcoholic cirrhosis of liver without ascites (HCC), Chronic midline low back pain without sciatica, Cirrhosis of liver without ascites ([address] Diarrhea, Duodenal ulcer, Elevated alpha fetoprotein, Elevated liver enzymes, Foot infection, Gastric ulcer, GERD (gastroesophageal reflux disease), GI bleed, Hep C w/o coma, chronic (HCC), History of blood transfusion, History of ETOH abuse, History of stress test, Hyperlipidemia, Hypertension, Iron deficiency anemia due to chronic blood loss, Melena, Neuropathy, New onset seizure ([address] Olecranon bursitis of left elbow, Portal hypertensive gastropathy ([facility] [address] Right knee pain, Septic arthritis ([facility] [address] Septic arthritis of right foot ([facility] [address] Thrombocytopenia ([facility] [address] Wears glasses, and Wheelchair dependence. has a past surgical history that includes hernia repair (Left, 1975); Upper gastrointestinal endoscopy (10/31/2015); Tonsillectomy (1970); Upper gastrointestinal endoscopy (03/14/2018); Colonoscopy (03/14/2018); pr colon ca scrn not hi rsk ind (N/A, 3/14/2018); Upper gastrointestinal endoscopy (N/A, 3/14/2018); Upper gastrointestinal endoscopy (N/A, 1/22/2019); Upper gastrointestinal endoscopy (N/A, 2/1/2019); Upper gastrointestinal endoscopy (N/A, 8/1/2019); Vasectomy (2007); incision and drainage (Left, 10/16/2019); SESAMOIDECTOMY FIRST TOE (Right, 5/22/2020); Lumbar spine surgery (2000); and Total knee arthroplasty (Right, 10/27/2020). Restrictions  Restrictions/Precautions  Restrictions/Precautions: Weight Bearing, Fall Risk, Femoral Block  Required Braces or Orthoses?: No  Lower Extremity Weight Bearing Restrictions  Right Lower Extremity Weight Bearing: Weight Bearing As Tolerated  Position Activity Restriction  Other position/activity restrictions: R TKA 10/27  Subjective   General  Response To Previous Treatment: Patient with no complaints from previous session.   Family / Caregiver Present: No  Subjective  Subjective: RN and pt in agreement for PT treatment; pt supine in bed upon PT arrival, pt pleasant and cooperative throughout session  Pain Screening  Patient Currently in Pain: Yes  Pain Assessment  Pain Assessment: 0-10  Pain Level: 4  Pain Type: Acute pain;Surgical pain  Pain Location: Knee  Pain Orientation: Right  Pain Descriptors: Discomfort  Non-Pharmaceutical Pain Intervention(s): Ambulation/Increased Activity;Repositioned  Response to Pain Intervention: Patient Satisfied  Multiple Pain Sites: No  Vital Signs  Patient Currently in Pain: Yes       Orientation  Orientation  Overall Orientation Status: Within Functional Limits  Cognition   Cognition  Overall Cognitive Status: WFL  Objective   Bed mobility  Supine to Sit: Minimal assistance(assist provided for RLE progression)  Sit to Supine: (Did not assess- pt seated in bedside chair upon writer's exit)  Scooting: Contact guard assistance  Comment: HOB elevated to ~45 degrees  Transfers  Sit to Stand: Contact guard assistance  Stand to sit: Contact guard assistance  Comment: Pt required verbal cueing for hand placement with good return demo. Ambulation  Ambulation?: Yes  Ambulation 1  Surface: level tile  Device: Rolling Walker  Assistance: Contact guard assistance  Quality of Gait: instructed pt in step-to gait with good return, no buckling noted, high reliance on RW  Gait Deviations: Slow Elham; Increased REJI  Distance: 80ft  Comments: Mild difficulty noted with sequencing and progression of RW. Pt required verbal cues to maintain RW on ground with progression. Pt ambulates with foot in supinated position requiring verbal cueing for foot flat/ proper heel-toe gait pattern. Stairs/Curb  Stairs?: No     Balance  Posture: Fair  Sitting - Static: Good  Sitting - Dynamic: Good;-  Standing - Static: Fair;+  Standing - Dynamic: Fair  Comments: standing balance assessed with RW      Joint Mobility  ROM RLE: AROM R knee 4-84 degrees, hip and ankle WFL  ROM LLE: AROM WFL  ROM RUE: AROM WFL  ROM LUE: AROM WFL      Total Knee Arthroplasty Exercise Program: Quad sets, glut sets, ankle pumps, heel slides, short arc quads, straight leg raise. Reps: x10    Goals  Short term goals  Time Frame for Short term goals: 14 visits  Short term goal 1: Perform bed mobility and functional transfers independently  Short term goal 2: Ambulate 200ft with RW and supervision  Short term goal 3: Demo Good- dynamic standing balance to decrease risk of falls  Short term goal 4: Ascend/descend 1 step with HR and CGA  Short term goal 5:  Independently perform HEP    Plan    Plan  Times per week: BID  Current Treatment Recommendations: Strengthening, ROM, Balance Training, Functional Mobility Training, Transfer Training, Gait Training, Stair training, Endurance Training, Home Exercise Program, Safety Education & Training, Patient/Caregiver Education & Training  Safety Devices  Type of devices: Nurse notified, Call light within reach, Gait belt, Left in chair, Chair alarm in place, Patient at risk for falls(RN present upon writer's exit)  Restraints  Initially in place: No     Therapy Time   Individual Concurrent Group Co-treatment   Time In 0828         Time Out 0910         Minutes 42         Timed Code Treatment Minutes: 729 Matti Sheth, PT

## 2020-10-28 NOTE — CONSULTS
Malone Flow Laurine Romberg, MD [facility]    Urology Consultation    Patient:  Jorge Mendoza  MRN: [id number]  YOB: 1960    CHIEF COMPLAINT:  Urinary retention    HISTORY OF PRESENT ILLNESS:   The patient is a 62 y.o. male who is POD1 from TKA with Dr Alex Tavarez. He was not able to void overnight and straight cathd for 300mL after bladder scan of 638. Straight cath again at 1pm today after bladder scan 999mL for 600 mL and then dunn placed after 3pm for 600mL. Pt has no prior urology notes in chart but mention of hematuria from unknown source in 2017. He says cleared on own, never saw urologist or had cystoscopy. History of prior back surgeries but now  Doing well. He is not diabetic. Last BM was Monday night. He is up and ambulating. Was started on flomax today. Creatinine 1.01 from 1.15 yesterday. UA and culture pending. No  imaging. Prior CT in 2012 had small left kidney calcification. Patient's old records, notes and chart reviewed and summarized above. Past Medical History:    Past Medical History:   Diagnosis Date    Acute blood loss anemia 2/27/2019    Acute hematogenous osteomyelitis of right foot (HCC)-Involving the sesamoid bones 9/34/9104    Alcoholic cirrhosis of liver without ascites ([address] 3/29/2019    Chronic midline low back pain without sciatica 3/29/2019    Cirrhosis of liver without ascites ([address]     Diarrhea     Duodenal ulcer 2/2/2019    Elevated alpha fetoprotein     Elevated liver enzymes 1/30/2019    Foot infection 2019    Gastric ulcer 10/31/2015    large 2-3 cm    GERD (gastroesophageal reflux disease)     GI bleed 10/30/2015    Hep C w/o coma, chronic ([address] 2017    WAITING APPROVAL FROM INSURANCE COMPANY TO GET TREATMENT STARTED    History of blood transfusion 2019    ANEMIA R/T BLEEDING ULCERS. NO REACTION.     History of ETOH abuse     QUIT 01/2017    History of stress test 2016    Hyperlipidemia     Hypertension 2017    ON RX    Iron deficiency anemia due to chronic blood loss     Melena     Neuropathy 2009    FEET BILAT, SHAKEY HANDS    New onset seizure ([address]     ONLY 1  SEIZURE 2017. DR SPECULATED IT OCCURRED FROM DRUG WITHDRAWL    Olecranon bursitis of left elbow     Portal hypertensive gastropathy ([address]     Right knee pain     Septic arthritis ([address] 2/28/2020    Septic arthritis of right foot ([address] 2/18/2020    Thrombocytopenia ([address] 3/29/2019    Wears glasses     Wheelchair dependence     POWERED.        Past Surgical History:    Previous  surgery: none   Past Surgical History:   Procedure Laterality Date    COLONOSCOPY  03/14/2018    mod diverticulosis; internal hemorrhoids; retained stools    HERNIA REPAIR Left 1975    lt inguinal    INCISION AND DRAINAGE Left 10/16/2019    ELBOW INCISION AND DRAINAGE, PARTIALTRICEP REPAIR performed by August Heller DO at [facility]    lumbar discectomy    [place] COLON CA SCRN NOT  [address]  IND N/A 3/14/2018    COLONOSCOPY performed by Sav Rey MD at [facility] Right 5/22/2020    TIBIAL SESAMOIDECTOMY RIGHT performed by Terrell Carrillo DPM at [address] [place] [address] Right 10/27/2020    TOTAL KNEE ARTHROPLASTY MICROPORT STANDARD    TOTAL KNEE ARTHROPLASTY Right 10/27/2020    TOTAL KNEE ARTHROPLASTY MICROPORT STANDARD performed by Matt Escobedo DO at [place] [address]  10/31/2015    large gastric ulcer    UPPER GASTROINTESTINAL ENDOSCOPY  03/14/2018    mod portal hypertensive gastrophathy    UPPER GASTROINTESTINAL ENDOSCOPY N/A 3/14/2018    EGD BIOPSY performed by Sav Rey MD at [address] [place] [address] 1/22/2019    EGD BIOPSY performed by Kaykay Noland MD at [address] [place] [address] 2/1/2019    EGD BIOPSY performed by Irsi Elmore MD at [address] [place] [address] 8/1/2019    EGD Social connections     Talks on phone: Not on file     Gets together: Not on file     Attends [employer] service: Not on file     Active member of club or organization: Not on file     Attends meetings of clubs or organizations: Not on file     Relationship status: Not on file    Intimate partner violence     Fear of current or ex partner: Not on file     Emotionally abused: Not on file     Physically abused: Not on file     Forced sexual activity: Not on file   Other Topics Concern    Not on file   Social History Narrative    Not on file       Family History:    Previous Urologic Family history: none  Family History   Problem Relation Age of Onset    High Blood Pressure Mother     Other Father         neuropathy    Stroke Father     Prostate Cancer Father          REVIEW OF SYSTEMS:  Constitutional: negative  Eyes: negative  Respiratory: negative  Cardiovascular: negative  Gastrointestinal: negative  Genitourinary: see HPI  Musculoskeletal: positive for  pain  Skin: negative   Neurological: negative  Hematological/Lymphatic: negative  Psychological: negative    Physical Exam:    This a 62 y.o. male   Patient Vitals for the past 24 hrs:   BP Temp Temp src Pulse Resp SpO2   10/28/20 1144 (!) 143/78 97.8 °F (36.6 °C) Oral 70 -- 94 %   10/28/20 0731 (!) 165/77 98.1 °F (36.7 °C) Oral 105 -- 98 %   10/28/20 0700 (!) 165/77 -- -- -- -- --   10/27/20 2345 (!) 163/90 -- -- -- -- --   10/27/20 2343 (!) 163/90 98.3 °F (36.8 °C) Oral 103 16 96 %   10/27/20 1953 (!) 156/87 98.3 °F (36.8 °C) Oral 117 16 98 %       Constitutional: Patient in no acute distress  Neuro: Alert and oriented to person place and time  Psych: Mood and affect normal  Skin: Normal no bruising or rashes  Lungs: Respiratory effort normal  Cardiovascular:  Normal peripheral pulses  Abdomen: Soft, non-tender, non-distended with no hepatosplenomegaly or hernia.  CVA tenderness none  Bladder: Non-tender and not distended  Lymphatics: No palpable lymphadenopathy    Gu:   Penis normal and circumcised, 16 fr dunn draining yellow urine  Urethral meatus normal  Scrotal exam normal  Testicles normal bilaterally  Epididymis normal bilaterally  No evidence of inguinal hernia  Rectal exam deferred     LABS:  Recent Labs     10/28/20  0555   WBC 15.1*   HGB 11.4*   HCT 35.2*   MCV 95.4   *     Recent Labs     10/27/20  2130 10/28/20  1421    128*   K 4.7 4.4    97*   CO2 15* 18*   BUN 16 20   CREATININE 1.15 1.01     No results found for: PSA    Additional labs:    Urinalysis: No results for input(s): COLORU, PHUR, LABCAST, WBCUA, RBCUA, MUCUS, TRICHOMONAS, YEAST, BACTERIA, CLARITYU, SPECGRAV, LEUKOCYTESUR, UROBILINOGEN, BILIRUBINUR, BLOODU in the last 72 hours. Invalid input(s): Mikala Boles     Culture Results:  10/28/20 pending   -----------------------------------------------------------------  Imaging Results:  No results found. Assessment and Plan   Impression:  63 yo male POD1 from total knee arthroplasty     problem list -   Post-operative urinary retention  Hx hematuria - unknown source     Plan: Dunn was placed for 600mL  Recommend switching back to CIC every 6 hours prn to prevent CAUTI  Follow up on urine culture  Flomax/tamsulosin 0.4 mg po QD  Encourage ambulation as WB status allows  Avoid narcotics, anticholinergics or other medications contributing to retention  Bowel regimen for regular Bms  Call for void trial when anticipating discharge  Call with questions    Aureliano Thomas PA-C  Urology Service  3:52 PM 10/28/2020     I have reviewed the history above and agree. I have repeated the key portions of the physical exam and concur with the resident's findings. I have reviewed all laboratory findings and imaging reports/films. I agree with the plan as noted above.     Electronically signed by Cookie Spatz, MD on 10/29/2020 at 1:23 PM

## 2020-10-28 NOTE — PROGRESS NOTES
Dr Womack Dire made aware of bladder scan and internal medicine recommendation for urology consult.  Awaiting his response via [facility]

## 2020-10-28 NOTE — PROGRESS NOTES
cirrhosis, GIB due to gastric ulcer, hepatitis C and elevated liver enzymes. Pt is in the process of being approved for tx of Hep C.  He stopped drinking over 6 mo ago. LFT's are higher compared with Feb '20. Pt also has a hx of hematuria since '17 which has been evaluated by urology without known source. He is on Protonix 40 mg QD prn and takes Norvasc 10 mg QD and Lisinopril 20 mg QD for HTN. There was concern for systolic murmur on today's exam.  He has trace bilat edema. There are no cardiac echos on file. He was treated for a septic arthritis/osteo of the rt foot this spring. Pt was on doxycycline x 30 days and then had tibial sesamoidectomy 5/22/20. Review of Systems:     Constitutional:  negative for chills, fevers, sweats  Respiratory:  negative for cough, dyspnea on exertion, shortness of breath, wheezing  Cardiovascular:  negative for chest pain, chest pressure/discomfort, lower extremity edema, palpitations  Gastrointestinal:  negative for abdominal pain, constipation, diarrhea, nausea, vomiting  Neurological:  negative for dizziness, headache    Medications: Allergies: Allergies   Allergen Reactions    Nsaids Other (See Comments)     PATIENT HAS A HISTORY OF BLEEDING ULCER.     Sulfa Antibiotics Rash       Current Meds:   Scheduled Meds:    gabapentin  1,200 mg Oral Nightly    gabapentin  600 mg Oral BID- 8&2    tamsulosin  0.4 mg Oral Daily    polyethylene glycol  17 g Oral BID    acetaminophen  1,000 mg Oral Q8H    lisinopril  20 mg Oral Once    [START ON 10/29/2020] lisinopril  40 mg Oral Daily    tranexamic acid (CYCLOKAPRON) IVPB  1,000 mg Intravenous Once    amLODIPine  10 mg Oral Daily    pantoprazole  40 mg Oral Daily    rOPINIRole  1 mg Oral Nightly    sodium chloride flush  10 mL Intravenous 2 times per day    aspirin  81 mg Oral BID    traMADol  50 mg Oral 4 times per day     Continuous Infusions:    bupivacaine 0.125% 500 mL (10/27/20 1234)     PRN Meds: sodium chloride flush, polyethylene glycol, promethazine **OR** ondansetron, oxyCODONE, oxyCODONE    Data:     Past Medical History:   has a past medical history of Acute blood loss anemia, Acute hematogenous osteomyelitis of right foot (HCC)-Involving the sesamoid bones, Alcoholic cirrhosis of liver without ascites (HCC), Chronic midline low back pain without sciatica, Cirrhosis of liver without ascites ([facility] Diarrhea, Duodenal ulcer, Elevated alpha fetoprotein, Elevated liver enzymes, Foot infection, Gastric ulcer, GERD (gastroesophageal reflux disease), GI bleed, Hep C w/o coma, chronic ([facility] History of blood transfusion, History of ETOH abuse, History of stress test, Hyperlipidemia, Hypertension, Iron deficiency anemia due to chronic blood loss, Melena, Neuropathy, New onset seizure (HCC), Olecranon bursitis of left elbow, Portal hypertensive gastropathy ([facility] Right knee pain, Septic arthritis ([facility] Septic arthritis of right foot ([facility] Thrombocytopenia ([facility] Wears glasses, and Wheelchair dependence. Social History:   reports that he quit smoking about 25 years ago. His smoking use included cigarettes. He has a 15.00 pack-year smoking history. He quit smokeless tobacco use about 2 years ago. His smokeless tobacco use included chew. He reports previous alcohol use. He reports that he does not use drugs. Family History:   Family History   Problem Relation Age of Onset    High Blood Pressure Mother     Other Father         neuropathy    Stroke Father     Prostate Cancer Father        Vitals:  BP (!) 143/78   Pulse 70   Temp 97.8 °F (36.6 °C) (Oral)   Resp 16   Ht 5' 10\" (1.778 m)   Wt 222 lb 10.6 oz (101 kg)   SpO2 94%   BMI 31.95 kg/m²   Temp (24hrs), Av.1 °F (36.7 °C), Min:97.8 °F (36.6 °C), Max:98.3 °F (36.8 °C)    No results for input(s): POCGLU in the last 72 hours. I/O (24Hr):     Intake/Output Summary (Last 24 hours) at 10/28/2020 1332  Last data filed at 10/28/2020 1308  Gross per 24 hour   Intake 1874 ml   Output 2965 ml   Net -1091 ml       Labs:  Hematology:  Recent Labs     10/28/20  0555   WBC 15.1*   RBC 3.69*   HGB 11.4*   HCT 35.2*   MCV 95.4   MCH 30.9   MCHC 32.4   RDW 15.3*   *   MPV 12.0     Chemistry:  Recent Labs     10/27/20  2130      K 4.7      CO2 15*   GLUCOSE 170*   BUN 16   CREATININE 1.15   ANIONGAP 20*   LABGLOM >60   GFRAA >60   CALCIUM 8.8     Recent Labs     10/27/20  2130   PROT 7.0   LABALBU 3.4*   *   ALT 57*   ALKPHOS 128   BILITOT 3.30*     ABG:  Lab Results   Component Value Date    PH 7.20 02/20/2017    PCO2 33 02/20/2017    PO2 39 02/20/2017    HCO3 12.9 02/20/2017    O2SAT 63 02/20/2017    FIO2 NOT REPORTED 02/20/2017     Lab Results   Component Value Date/Time    SPECIAL NOT REPORTED 10/13/2020 02:23 PM     Lab Results   Component Value Date/Time    CULTURE NO GROWTH 5 DAYS 02/21/2020 05:00 PM       Radiology:  Xr Knee Right (min 4 Views)    Result Date: 10/27/2020  Right total knee arthroplasty in satisfactory alignment with expected early postoperative soft tissue findings.        Physical Examination:        General appearance:  alert, cooperative and no distress  Mental Status:  oriented to person, place and time and normal affect  Lungs:  clear to auscultation bilaterally, normal effort  Heart:  regular rate and rhythm, no murmur  Abdomen:  soft, nontender, nondistended, normal bowel sounds, no masses, hepatomegaly, splenomegaly  Extremities:  no edema, redness, tenderness in the calves  Skin:  no gross lesions, rashes, induration    Assessment:        Hospital Problems           Last Modified POA    Elevated liver enzymes 10/27/2020 Yes    Essential hypertension 10/27/2020 Yes    Cirrhosis of liver without ascites ([address] 10/27/2020 Yes    Thrombocytopenia ([address] 44/40/2953 Yes    Alcoholic cirrhosis of liver without ascites ([facility] 10/28/2020 Yes    Hep C w/o coma, chronic ([address] 10/27/2020 Yes    Tenosynovitis of right foot-right Hall[place] 10/27/2020 Yes    History of total knee arthroplasty, right 10/27/2020 Yes    Hematuria (Chronic) 10/27/2020 Yes    Arthritis of right knee 10/28/2020 Yes    Urinary retention 10/28/2020 No    Constipation 10/28/2020 Yes          Plan:        Chronic transaminitis and elevated alkaline phosphatase related to his alcoholic liver disease, patient in the process of getting hepatitis C treatment following up with GI as an outpatient. Urine retention: Continue CIC and bladder scans, start Flomax send urine for analysis    Leukocytosis: Likely reactive, rule out infection, will scan urine get chest x-ray and check procalcitonin.     Constipation: Bowel regimen    Right TKA: Management per primary    Discussed with the patient     Chantal Castro MD  10/28/2020  1:32 PM

## 2020-10-28 NOTE — PROGRESS NOTES
Physical Therapy  Facility/Department: [address] ORTHO/MED SURG  Daily Treatment Note  NAME: Anais Babcock  : 1960  MRN: [id number]  S/p R TKA 10/27    Date of Service: 10/28/2020    Discharge Recommendations:    Further therapy recommended at discharge for upper level balance, strengthening and ROM training. PT Equipment Recommendations  Equipment Needed: No(pt has RW)    Assessment   Body structures, Functions, Activity limitations: Decreased functional mobility ; Decreased balance;Decreased ROM; Decreased strength;Decreased endurance; Increased pain  Assessment: The pt ambulated 80ft with RW and CGA, no buckling noted. Pt improveing with sequencing gait without need for verbal cueing. Pt would benefit from continued PT serives to address functional and ROM deficits. Prognosis: Good  Decision Making: Medium Complexity  PT Education: Goals;PT Role;Plan of Care; Functional Mobility Training  Patient Education: Use of stairs  Barriers to Learning: none  REQUIRES PT FOLLOW UP: Yes  Activity Tolerance  Activity Tolerance: Patient Tolerated treatment well;Patient limited by endurance; Patient limited by fatigue  Activity Tolerance: Pt reports fatigue upon return to room     Patient Diagnosis(es): The encounter diagnosis was Post-op pain.      has a past medical history of Acute blood loss anemia, Acute hematogenous osteomyelitis of right foot (HCC)-Involving the sesamoid bones, Alcoholic cirrhosis of liver without ascites (HCC), Chronic midline low back pain without sciatica, Cirrhosis of liver without ascites ([address] Diarrhea, Duodenal ulcer, Elevated alpha fetoprotein, Elevated liver enzymes, Foot infection, Gastric ulcer, GERD (gastroesophageal reflux disease), GI bleed, Hep C w/o coma, chronic (HCC), History of blood transfusion, History of ETOH abuse, History of stress test, Hyperlipidemia, Hypertension, Iron deficiency anemia due to chronic blood loss, Melena, Neuropathy, New onset seizure ([address] Olecranon bursitis of left elbow, Portal hypertensive gastropathy (HCC), Right knee pain, Septic arthritis ([address] Septic arthritis of right foot ([address] Thrombocytopenia ([address] Wears glasses, and Wheelchair dependence. has a past surgical history that includes hernia repair (Left, 1975); Upper gastrointestinal endoscopy (10/31/2015); Tonsillectomy (1970); Upper gastrointestinal endoscopy (03/14/2018); Colonoscopy (03/14/2018); pr colon ca scrn not hi rsk ind (N/A, 3/14/2018); Upper gastrointestinal endoscopy (N/A, 3/14/2018); Upper gastrointestinal endoscopy (N/A, 1/22/2019); Upper gastrointestinal endoscopy (N/A, 2/1/2019); Upper gastrointestinal endoscopy (N/A, 8/1/2019); Vasectomy (2007); incision and drainage (Left, 10/16/2019); SESAMOIDECTOMY FIRST TOE (Right, 5/22/2020); Lumbar spine surgery (2000); Total knee arthroplasty (Right, 10/27/2020); and Total knee arthroplasty (Right, 10/27/2020).     Restrictions  Restrictions/Precautions  Restrictions/Precautions: Weight Bearing, Fall Risk, Femoral Block  Required Braces or Orthoses?: No  Lower Extremity Weight Bearing Restrictions  Right Lower Extremity Weight Bearing: Weight Bearing As Tolerated  Position Activity Restriction  Other position/activity restrictions: R TKA 10/27  Subjective   General  Family / Caregiver Present: No  Subjective  Subjective: RN and pt in agreement for PT treatment; pt supine in bed upon arrival, pt pleasant and cooperative throughout  Pain Screening  Patient Currently in Pain: Yes  Pain Assessment  Pain Assessment: 0-10  Pain Level: 4  Pain Type: Surgical pain  Pain Location: Knee  Pain Orientation: Right  Pain Descriptors: Aching  Non-Pharmaceutical Pain Intervention(s): Ambulation/Increased Activity;Repositioned  Response to Pain Intervention: Patient Satisfied  Vital Signs  Patient Currently in Pain: Yes       Orientation  Orientation  Overall Orientation Status: Within Functional Limits  Cognition   Cognition  Overall Cognitive Status: WFL  Objective   Bed mobility  Supine to Sit: Stand by assistance  Sit to Supine: Stand by assistance  Scooting: Stand by assistance(seated EOB)  Comment: HOB elevated ~30 degrees. use of bed rail  Transfers  Sit to Stand: Contact guard assistance  Stand to sit: Contact guard assistance  Comment: Pt cued for hand and foot placement with good return demo  Ambulation  Ambulation?: Yes  Ambulation 1  Surface: level tile  Device: Rolling Walker  Assistance: Contact guard assistance  Quality of Gait: Good carryover of step to pattern. Gait Deviations: Slow Elham; Increased REJI  Distance: 80ft  Comments: Pt demonstrates proper sequencing for RW use. Verbal cues to maintain RW on ground w progresison. Stairs/Curb  Stairs?: Yes  Stairs  # Steps : 1  Stairs Height: 6\"  Device: Rolling walker  Assistance: Contact guard assistance  Comment: Pt educated on stair strategies with good return demo     Balance  Posture: Fair  Sitting - Static: Good  Sitting - Dynamic: Good;-  Standing - Static: Fair  Standing - Dynamic: Fair  Comments: standing balance assessed with RW      Exercises: ankle pumps, quad sets, LAQ (x10)   Joint Mobility  ROM RLE: AROM R knee 8-91 degrees, hip and ankle WFL  ROM LLE: AROM WFL  ROM RUE: AROM WFL  ROM LUE: AROM WFL                AM-PAC Score             Goals  Short term goals  Time Frame for Short term goals: 14 visits  Short term goal 1: Perform bed mobility and functional transfers independently  Short term goal 2: Ambulate 200ft with RW and supervision  Short term goal 3: Demo Good- dynamic standing balance to decrease risk of falls  Short term goal 4: Ascend/descend 1 step with HR and CGA  Short term goal 5:  Independently perform HEP    Plan    Plan  Times per week: BID  Current Treatment Recommendations: Strengthening, ROM, Balance Training, Functional Mobility Training, Transfer Training, Gait Training, Stair training, Endurance Training, Home Exercise Program, Safety Education & Training, Patient/Caregiver Education & Training  Safety Devices  Type of devices: Nurse notified, Call light within reach, Gait belt, Left in bed, Bed alarm in place  Restraints  Initially in place: No     Therapy Time   Individual Concurrent Group Co-treatment   Time In 1325         Time Out 1351         Minutes 26         Timed Code Treatment Minutes: [address] performed by Student PT under the supervision of co-signing PT who agrees with all evaluation/treatment and documentation.

## 2020-10-28 NOTE — CARE COORDINATION
Transitional Planning  Perfect served Ortho resident need arpit signed for pt's home care. Pt has chosen [facility] for home care  Jagdeep Long spoke with Anjali Lipscomb she states she will need to get referral approved because of staffing. The will call tomorrow to let cm know if they can accept.  Pt has RW and shower seat with walk in shower no DME's needed

## 2020-10-28 NOTE — PROGRESS NOTES
Orthopedic Progress Note    Patient:  Nina Figueroa  YOB: 1960     62 y.o. male    Subjective:  Patient seen and examined this morning. Walked 8 feet with therapy. Would like to go home today pending PT. Straight cathed twice since surgery. Pain controlled. No new complaints or concerns at this time. Has walker, bedside commode and shower chair at home. House has rails and handicap accessibility. Denies fever, HA, CP, SOB, N/V, dysuria, numbness or tingling. -BM/-flatus. Tolerating PO intake. Vitals reviewed, afebrile    Objective:   Vitals:    10/27/20 2343   BP: (!) 163/90   Pulse: 103   Resp: 16   Temp: 98.3 °F (36.8 °C)   SpO2: 96%     Gen: NAD, cooperative  Cardiovascular: Regular rate, no dependent edema, distal pulses 2+  Respiratory: Chest symmetric, no accessory muscle use, normal respirations, no audible wheezes    MSK: RLE: Dressing clean, dry, intact. Thigh and leg compartments soft and compressible. TA/EHL/FHL/GS motor intact. Deep and Superficial Peroneal/Saphenous/Sural SILT. 2+ DP/PT pulse. Recent Labs     10/27/20  2130      K 4.7   BUN 16   CREATININE 1.15   GLUCOSE 170*      Meds: ASA, Ancef   See rec for complete list    Impression/plan: 62 y.o. male s/p right TKA, POD#1    -WB status: Weight bear as tolerated right lower extremity.  -Complete post op Ancef.  -Pain control PO/IV Medication. Attempt to Wean IV medications. -DVT ppx: ASA. -Ice as needed for pain and swelling (20 minutes per hour). -Encourage Incentive Spirometry use and deep breathing. -PT/OT to evaluate and treat. -Nothing placed under operative knee to promote maximal knee extension/prevent flexion contracture. -Maintain pillows under foot to promote knee extension while in bed.  -Follow up with Dr. Mini Ashford in 10 - 14 days  -Please page ortho with any questions. Patient seen and examined.   Discussed the need for medical equipment to assist with their recovery during the post-operative period.   Based on the patient's current physical condition and post-operative restrictions, we have determined that they will need: bedside commode, shower chair and wheeled walker, home care with therapy.     ----------------------------------------  Debbie Baker DO  PGY-3, Department [facility] [address], [place], [place]

## 2020-10-28 NOTE — PROGRESS NOTES
Occupational Therapy  Facility/Department: [address] ORTHO/MED SURG  Daily Treatment Note  NAME: Jarad Herr  : 1960  MRN: [id number]    Date of Service: 10/28/2020    Discharge Recommendations:  Patient would benefit from continued therapy after discharge       Assessment   Performance deficits / Impairments: Decreased functional mobility ; Decreased endurance;Decreased ADL status; Decreased balance;Decreased high-level IADLs  Prognosis: Good  Patient Education: OT POC, transfer/walker safety, importance of participation in therapy, femoral block, use of incentive spirometer, weight bearing precautions, LB dressing - pt verbalized/demo understanding. REQUIRES OT FOLLOW UP: Yes  Activity Tolerance  Activity Tolerance: Patient Tolerated treatment well  Safety Devices  Safety Devices in place: Yes  Type of devices: Call light within reach; Chair alarm in place;Gait belt;Patient at risk for falls; Left in chair;Nurse notified         Patient Diagnosis(es): The encounter diagnosis was Post-op pain. has a past medical history of Acute blood loss anemia, Acute hematogenous osteomyelitis of right foot (HCC)-Involving the sesamoid bones, Alcoholic cirrhosis of liver without ascites (HCC), Chronic midline low back pain without sciatica, Cirrhosis of liver without ascites ([address] Diarrhea, Duodenal ulcer, Elevated alpha fetoprotein, Elevated liver enzymes, Foot infection, Gastric ulcer, GERD (gastroesophageal reflux disease), GI bleed, Hep C w/o coma, chronic (HCC), History of blood transfusion, History of ETOH abuse, History of stress test, Hyperlipidemia, Hypertension, Iron deficiency anemia due to chronic blood loss, Melena, Neuropathy, New onset seizure (HCC), Olecranon bursitis of left elbow, Portal hypertensive gastropathy ([address] Right knee pain, Septic arthritis ([address] Septic arthritis of right foot ([address] Thrombocytopenia ([address] Wears glasses, and Wheelchair dependence.    has a past surgical history that includes hernia repair (Left, 1975); Upper gastrointestinal endoscopy (10/31/2015); Tonsillectomy (1970); Upper gastrointestinal endoscopy (03/14/2018); Colonoscopy (03/14/2018); pr colon ca scrn not hi rsk ind (N/A, 3/14/2018); Upper gastrointestinal endoscopy (N/A, 3/14/2018); Upper gastrointestinal endoscopy (N/A, 1/22/2019); Upper gastrointestinal endoscopy (N/A, 2/1/2019); Upper gastrointestinal endoscopy (N/A, 8/1/2019); Vasectomy (2007); incision and drainage (Left, 10/16/2019); SESAMOIDECTOMY FIRST TOE (Right, 5/22/2020); Lumbar spine surgery (2000); Total knee arthroplasty (Right, 10/27/2020); and Total knee arthroplasty (Right, 10/27/2020). Restrictions  Restrictions/Precautions  Restrictions/Precautions: Weight Bearing, Fall Risk, Femoral Block  Required Braces or Orthoses?: No  Lower Extremity Weight Bearing Restrictions  Right Lower Extremity Weight Bearing: Weight Bearing As Tolerated  Position Activity Restriction  Other position/activity restrictions: R TKA 10/27  Subjective   General  Patient assessed for rehabilitation services?: Yes  Family / Caregiver Present: No  General Comment  Pain Assessment  Pain Assessment: 0-10  Pain Level: 4(pain initially 4/10, standing at sink after 4 minutes 6/10 asking to return to seated. Pain level returned to 4/10 once seated.)  Pain Type: Surgical pain;Acute pain  Pain Location: Knee  Pain Orientation: Right  Pain Descriptors: Aching;Discomfort; Sore  Pain Frequency: Continuous  Non-Pharmaceutical Pain Intervention(s): Pain ball relief;Repositioned;Rest;Therapeutic presence  Pre Treatment Pain Screening  Comments / Details: Pt experiencing lowwer back during standing activity d/t compensating for bad R knee for so long prior to surgery. Vital Signs  Patient Currently in Pain: Yes   Orientation  Orientation  Overall Orientation Status: Within Functional Limits  Objective    ADL  Feeding: Setup; Independent  Grooming: Setup;Modified independent (Oral care standing at sink.)  UE Bathing: Setup;Supervision(Mod A for back seated in chair.)  LE Bathing: Setup;Supervision(Hips to knees seated in chair.)  UE Dressing: Contact guard assistance(To manage gown.)  LE Dressing: Minimal assistance  Toileting: Setup;Contact guard assistance(Pericare completed standing at sink.)  Additional Comments: Pt completed ADL care standing at sink, seated in chair, surgical soap used appropriately. Pt unable to stand at sink for entire ADL session d/t lower back pain. Pt displayed intermittent tremors BUE, prior condition. Balance  Sitting Balance: Supervision(Seated in chair.)  Standing Balance: Contact guard assistance  Standing Balance  Time: 4 minutes  Activity: Functional mobility to/from bathroom using RW, ADL care standing at sink. Functional Mobility  Functional - Mobility Device: Rolling Walker  Activity: To/from bathroom  Assist Level: Contact guard assistance  Functional Mobility Comments: Verbal cues for walker placement with good return. Pt placing walker too far out in front during functional mobility.   Transfers  Sit to stand: Contact guard assistance  Stand to sit: Stand by assistance  Cognition  Overall Cognitive Status: [facility]  Plan   Plan  Times per week: 5-7x/wk  Current Treatment Recommendations: Strengthening, Patient/Caregiver Education & Training, Equipment Evaluation, Education, & procurement, Balance Training, Functional Mobility Training, Endurance Training, Safety Education & Training, Self-Care / ADL  Goals  Short term goals  Time Frame for Short term goals: Patient will, by discharge  Short term goal 1: demo UB ADLs at Mod I  Short term goal 2: demo LB ADLs at SBA using AE PRN  Short term goal 3: demo functional transfers/mobility using LRD at Supervision to engage in ADLs safely  Short term goal 4: demo 10+ min of dynamic standing balance at Supervision using LRD to engage in ADLs safely  Short term goal 5: demo 5+ min of [facility] activity to increase functional use of B UE for ADL tasks       Therapy Time   Individual Concurrent Group Co-treatment   Time In 0930         Time Out 1034         Minutes 64         Timed Code Treatment Minutes: 59 Minutes     Pt seated in chair upon therapist arrival. Pleasant and agreeable to therapy. See above for LOF for all tasks. Pt retired to seated in chair at end of session with chair alarm activated and call light within reach.     VERÓNICA Judd/ABEBA

## 2020-10-28 NOTE — PROGRESS NOTES
Department of Anesthesiology   Acute Pain Progress Note    Patient's Name: Linda Wilson  Surgeon: No name on file. Date: 10/28/2020    Pt Awake, Alert    Last Pain Score: (Charted in Doc Flowsheet)  Pain Level: 4    Vital Signs (Current)   Vitals:    10/28/20 0731   BP: (!) 165/77   Pulse: 105   Resp:    Temp: 98.1 °F (36.7 °C)   SpO2: 98%     Vital Signs Statistics (for past 48 hrs)     Temp  Av.9 °F (35.5 °C)  Min: 95.6 °F (35.3 °C)   Min taken time: 10/27/20 0940  Max: 98.3 °F (36.8 °C)   Max taken time: 10/27/20 2343  Pulse  Av.6  Min: 52   Min taken time: 10/27/20 0739  Max: 117   Max taken time: 10/27/20 1953  Resp  Av.4  Min: 0   Min taken time: 10/27/20 1101  Max: 45   Max taken time: 10/27/20 0918  BP  Min: 94/60   Min taken time: 10/27/20 1034  Max: 165/77   Max taken time: 10/28/20 0731  MAP (mmHg)  Av.8  Min: 71   Min taken time: 10/27/20 1025  Max: 104   Max taken time: 10/27/20 2345  SpO2  Av.4 %  Min: 68 %   Min taken time: 10/27/20 1100  Max: 100 %   Max taken time: 10/27/20 1054    BP Readings from Last 3 Encounters:   10/28/20 (!) 165/77   10/27/20 119/82   10/13/20 116/71       Allergies   Allergen Reactions    Nsaids Other (See Comments)     PATIENT HAS A HISTORY OF BLEEDING ULCER.     Sulfa Antibiotics Rash     Patient Active Problem List   Diagnosis    GI bleed    Gastrointestinal hemorrhage    Iron deficiency anemia due to chronic blood loss    Diarrhea    Melena    New onset seizure (HCC)    Elevated liver enzymes    Essential hypertension    Gastric ulcer    Unspecified viral hepatitis C without hepatic coma    Portal hypertensive gastropathy (HCC)    Anemia    Cirrhosis of liver without ascites (HCC)    Duodenal ulcer    Acute blood loss anemia    Thrombocytopenia (HCC)    Alcoholic cirrhosis of liver without ascites (HCC)    Chronic midline low back pain without sciatica    Seizures (HCC)    Hypertension    Hep C w/o coma, chronic [facility]    Chronic back pain    History of ETOH abuse    Abdominal pain    Elevated alpha fetoprotein    Olecranon bursitis of left elbow    Septic arthritis of IP joint of toe, right (HCC)    Septic arthritis of right foot (HCC)    Foot infection    Septic arthritis (HCC)    Acute hematogenous osteomyelitis of right foot (HCC)-Involving the sesamoid bones    Tenosynovitis of right foot-right Hallux    Sesamoiditis    Acute post-operative pain    Right foot pain    History of total knee arthroplasty, right    Hematuria    Arthritis of right knee       Current Medications  gabapentin (NEURONTIN) tablet 1,200 mg, Nightly  gabapentin (NEURONTIN) tablet 600 mg, BID- 8&2  tranexamic acid (CYKLOKAPRON) 1,000 mg in dextrose 5 % 100 mL IVPB, Once  bupivacaine 0.125% (MARCAINE) in sodium chloride 0.9% infusion 500 mL, Continuous  amLODIPine (NORVASC) tablet 10 mg, Daily  lisinopril (PRINIVIL;ZESTRIL) tablet 20 mg, Daily  pantoprazole (PROTONIX) tablet 40 mg, Daily  rOPINIRole (REQUIP) tablet 1 mg, Nightly  0.9 % sodium chloride infusion, Continuous  sodium chloride flush 0.9 % injection 10 mL, 2 times per day  sodium chloride flush 0.9 % injection 10 mL, PRN  polyethylene glycol (GLYCOLAX) packet 17 g, Daily PRN  promethazine (PHENERGAN) tablet 12.5 mg, Q6H PRN    Or  ondansetron (ZOFRAN) injection 4 mg, Q6H PRN  aspirin chewable tablet 81 mg, BID  acetaminophen (TYLENOL) tablet 1,000 mg, 4 times per day  oxyCODONE (ROXICODONE) immediate release tablet 10 mg, Q4H PRN  oxyCODONE (ROXICODONE) immediate release tablet 5 mg, Q4H PRN  traMADol (ULTRAM) tablet 50 mg, 4 times per day        PCA Flow Sheet                       Labs  CBC:   Lab Results   Component Value Date    WBC 15.1 10/28/2020    RBC 3.69 10/28/2020    HGB 11.4 10/28/2020    HCT 35.2 10/28/2020    MCV 95.4 10/28/2020    RDW 15.3 10/28/2020     10/28/2020     CMP:    Lab Results   Component Value Date     10/27/2020    K 4.7 10/27/2020  10/27/2020    CO2 15 10/27/2020    BUN 16 10/27/2020    CREATININE 1.15 10/27/2020    GFRAA >60 10/27/2020    LABGLOM >60 10/27/2020    GLUCOSE 170 10/27/2020    PROT 7.0 10/27/2020    CALCIUM 8.8 10/27/2020    BILITOT 3.30 10/27/2020    ALKPHOS 128 10/27/2020     10/27/2020    ALT 57 10/27/2020     BMP:    Lab Results   Component Value Date     10/27/2020    K 4.7 10/27/2020     10/27/2020    CO2 15 10/27/2020    BUN 16 10/27/2020    CREATININE 1.15 10/27/2020    CALCIUM 8.8 10/27/2020    GFRAA >60 10/27/2020    LABGLOM >60 10/27/2020    GLUCOSE 170 10/27/2020     COAGS:    Lab Results   Component Value Date    PROTIME 15.6 02/29/2020    PROTIME 12.4 10/16/2019    INR 1.5 02/29/2020    APTT 31.1 01/30/2019       REVIEW OF SYSTEMS:    CONSTITUTIONAL:  negative  EYES: negative  HEENT:  negative  RESPIRATORY:  negative  CARDIOVASCULAR:  negative  MUSCULOSKELETAL:  Per HPI  NEUROLOGICAL:  negative    PHYSICAL EXAM:    CONSTITUTIONAL:  awake, alert, cooperative, no apparent distress, and appears stated age  LUNGS:  No increased work of breathing, good air exchange, clear to auscultation bilaterally, no crackles or wheezing  CARDIOVASCULAR:  Normal apical impulse, regular rate and rhythm, normal S1 and S2, no S3 or S4, and no murmur noted  ABDOMEN:  soft, non-distended, non-tender, no masses palpated, MUSCULOSKELETAL: WNR  NEUROLOGIC:  Motor is 5out of 5       A/P: Stuart Doherty is a 62y.o. year-old s/p TKA now POD# 1 with good pain control      Plan: Keep Femoral Pain Pump Catheter same rate, no change. Site Clean Intact. Patient can Keep the Femoral pain catheter for pain control if he going home today. Will follow.        -Thank you for opportunity to participate in this patient's care.     Electronically signed by Jani Ogden MD on 10/28/2020 at 11:01 AM

## 2020-10-28 NOTE — PLAN OF CARE
Problem: Skin Integrity:  Goal: Will show no infection signs and symptoms  Description: Will show no infection signs and symptoms  10/28/2020 0307 by Judithann Gosselin, RN  Outcome: Ongoing     Problem: Falls - Risk of:  Goal: Will remain free from falls  Description: Will remain free from falls  10/28/2020 0307 by Judithann Gosselin, RN  Outcome: Ongoing     Problem: Falls - Risk of:  Goal: Absence of physical injury  Description: Absence of physical injury  10/28/2020 0307 by Judithann Gosselin, RN  Outcome: Ongoing

## 2020-10-28 NOTE — PROGRESS NOTES
Patient requesting dunn to stay in throughout the night. Perfectserved PA with urology. Awaiting response.     Residents to see patient in AM to discuss dunn removal.

## 2020-10-29 PROBLEM — Z96.651 STATUS POST TOTAL KNEE REPLACEMENT USING CEMENT, RIGHT: Status: ACTIVE | Noted: 2020-10-29

## 2020-10-29 PROBLEM — E87.1 HYPONATREMIA: Status: ACTIVE | Noted: 2020-10-29

## 2020-10-29 LAB
ABSOLUTE EOS #: 0.12 K/UL (ref 0–0.4)
ABSOLUTE IMMATURE GRANULOCYTE: 0 K/UL (ref 0–0.3)
ABSOLUTE LYMPH #: 1.35 K/UL (ref 1–4.8)
ABSOLUTE MONO #: 0.12 K/UL (ref 0.1–0.8)
ANION GAP SERPL CALCULATED.3IONS-SCNC: 8 MMOL/L (ref 9–17)
BASOPHILS # BLD: 0 % (ref 0–2)
BASOPHILS ABSOLUTE: 0 K/UL (ref 0–0.2)
BUN BLDV-MCNC: 15 MG/DL (ref 6–20)
BUN/CREAT BLD: ABNORMAL (ref 9–20)
CALCIUM SERPL-MCNC: 8.9 MG/DL (ref 8.6–10.4)
CHLORIDE BLD-SCNC: 98 MMOL/L (ref 98–107)
CO2: 21 MMOL/L (ref 20–31)
CREAT SERPL-MCNC: 0.64 MG/DL (ref 0.7–1.2)
CULTURE: NO GROWTH
DIFFERENTIAL TYPE: ABNORMAL
EOSINOPHILS RELATIVE PERCENT: 1 % (ref 1–4)
GFR AFRICAN AMERICAN: >60 ML/MIN
GFR NON-AFRICAN AMERICAN: >60 ML/MIN
GFR SERPL CREATININE-BSD FRML MDRD: ABNORMAL ML/MIN/{1.73_M2}
GFR SERPL CREATININE-BSD FRML MDRD: ABNORMAL ML/MIN/{1.73_M2}
GLUCOSE BLD-MCNC: 108 MG/DL (ref 75–110)
GLUCOSE BLD-MCNC: 144 MG/DL (ref 70–99)
HCT VFR BLD CALC: 32.5 % (ref 40.7–50.3)
HEMOGLOBIN: 10.1 G/DL (ref 13–17)
IMMATURE GRANULOCYTES: 0 %
LYMPHOCYTES # BLD: 11 % (ref 24–44)
Lab: NORMAL
MCH RBC QN AUTO: 31.2 PG (ref 25.2–33.5)
MCHC RBC AUTO-ENTMCNC: 31.1 G/DL (ref 28.4–34.8)
MCV RBC AUTO: 100.3 FL (ref 82.6–102.9)
MONOCYTES # BLD: 1 % (ref 1–7)
MORPHOLOGY: ABNORMAL
NRBC AUTOMATED: 0 PER 100 WBC
PDW BLD-RTO: 15.7 % (ref 11.8–14.4)
PLATELET # BLD: 113 K/UL (ref 138–453)
PLATELET ESTIMATE: ABNORMAL
PMV BLD AUTO: 12.5 FL (ref 8.1–13.5)
POTASSIUM SERPL-SCNC: 5 MMOL/L (ref 3.7–5.3)
RBC # BLD: 3.24 M/UL (ref 4.21–5.77)
RBC # BLD: ABNORMAL 10*6/UL
SEG NEUTROPHILS: 87 % (ref 36–66)
SEGMENTED NEUTROPHILS ABSOLUTE COUNT: 10.71 K/UL (ref 1.8–7.7)
SODIUM BLD-SCNC: 127 MMOL/L (ref 135–144)
SPECIMEN DESCRIPTION: NORMAL
WBC # BLD: 12.3 K/UL (ref 3.5–11.3)
WBC # BLD: ABNORMAL 10*3/UL

## 2020-10-29 PROCEDURE — 36415 COLL VENOUS BLD VENIPUNCTURE: CPT

## 2020-10-29 PROCEDURE — 51701 INSERT BLADDER CATHETER: CPT

## 2020-10-29 PROCEDURE — 6370000000 HC RX 637 (ALT 250 FOR IP): Performed by: FAMILY MEDICINE

## 2020-10-29 PROCEDURE — 97110 THERAPEUTIC EXERCISES: CPT

## 2020-10-29 PROCEDURE — 2580000003 HC RX 258: Performed by: ORTHOPAEDIC SURGERY

## 2020-10-29 PROCEDURE — 6370000000 HC RX 637 (ALT 250 FOR IP): Performed by: ORTHOPAEDIC SURGERY

## 2020-10-29 PROCEDURE — 51702 INSERT TEMP BLADDER CATH: CPT

## 2020-10-29 PROCEDURE — 97530 THERAPEUTIC ACTIVITIES: CPT

## 2020-10-29 PROCEDURE — 51798 US URINE CAPACITY MEASURE: CPT

## 2020-10-29 PROCEDURE — 99232 SBSQ HOSP IP/OBS MODERATE 35: CPT | Performed by: FAMILY MEDICINE

## 2020-10-29 PROCEDURE — 1200000000 HC SEMI PRIVATE

## 2020-10-29 PROCEDURE — 80048 BASIC METABOLIC PNL TOTAL CA: CPT

## 2020-10-29 PROCEDURE — 85025 COMPLETE CBC W/AUTO DIFF WBC: CPT

## 2020-10-29 PROCEDURE — 97535 SELF CARE MNGMENT TRAINING: CPT

## 2020-10-29 PROCEDURE — 82947 ASSAY GLUCOSE BLOOD QUANT: CPT

## 2020-10-29 RX ORDER — PHENAZOPYRIDINE HYDROCHLORIDE 100 MG/1
200 TABLET, FILM COATED ORAL
Status: DISCONTINUED | OUTPATIENT
Start: 2020-10-29 | End: 2020-10-30 | Stop reason: HOSPADM

## 2020-10-29 RX ORDER — PHENAZOPYRIDINE HYDROCHLORIDE 100 MG/1
200 TABLET, FILM COATED ORAL
Status: DISCONTINUED | OUTPATIENT
Start: 2020-10-29 | End: 2020-10-29

## 2020-10-29 RX ORDER — SODIUM CHLORIDE 1000 MG
1 TABLET, SOLUBLE MISCELLANEOUS ONCE
Status: COMPLETED | OUTPATIENT
Start: 2020-10-29 | End: 2020-10-29

## 2020-10-29 RX ADMIN — POLYETHYLENE GLYCOL 3350 17 G: 17 POWDER, FOR SOLUTION ORAL at 07:37

## 2020-10-29 RX ADMIN — LISINOPRIL 40 MG: 20 TABLET ORAL at 07:37

## 2020-10-29 RX ADMIN — OXYCODONE HYDROCHLORIDE 5 MG: 5 TABLET ORAL at 03:56

## 2020-10-29 RX ADMIN — ASPIRIN 81 MG: 81 TABLET, CHEWABLE ORAL at 20:10

## 2020-10-29 RX ADMIN — SODIUM CHLORIDE TAB 1 GM 1 G: 1 TAB at 16:10

## 2020-10-29 RX ADMIN — ACETAMINOPHEN 1000 MG: 500 TABLET ORAL at 11:51

## 2020-10-29 RX ADMIN — AMLODIPINE BESYLATE 10 MG: 10 TABLET ORAL at 07:38

## 2020-10-29 RX ADMIN — GABAPENTIN 600 MG: 600 TABLET ORAL at 07:38

## 2020-10-29 RX ADMIN — ACETAMINOPHEN 1000 MG: 500 TABLET ORAL at 18:46

## 2020-10-29 RX ADMIN — SODIUM CHLORIDE, PRESERVATIVE FREE 10 ML: 5 INJECTION INTRAVENOUS at 20:09

## 2020-10-29 RX ADMIN — TAMSULOSIN HYDROCHLORIDE 0.4 MG: 0.4 CAPSULE ORAL at 07:38

## 2020-10-29 RX ADMIN — PANTOPRAZOLE SODIUM 40 MG: 40 TABLET, DELAYED RELEASE ORAL at 07:38

## 2020-10-29 RX ADMIN — GABAPENTIN 600 MG: 600 TABLET ORAL at 14:26

## 2020-10-29 RX ADMIN — ASPIRIN 81 MG: 81 TABLET, CHEWABLE ORAL at 07:38

## 2020-10-29 RX ADMIN — SODIUM CHLORIDE, PRESERVATIVE FREE 10 ML: 5 INJECTION INTRAVENOUS at 07:54

## 2020-10-29 RX ADMIN — ROPINIROLE HYDROCHLORIDE 1 MG: 1 TABLET, FILM COATED ORAL at 21:57

## 2020-10-29 RX ADMIN — GABAPENTIN 1200 MG: 600 TABLET ORAL at 20:09

## 2020-10-29 ASSESSMENT — PAIN DESCRIPTION - LOCATION
LOCATION: KNEE

## 2020-10-29 ASSESSMENT — PAIN DESCRIPTION - PAIN TYPE: TYPE: SURGICAL PAIN

## 2020-10-29 ASSESSMENT — PAIN DESCRIPTION - PROGRESSION
CLINICAL_PROGRESSION: NOT CHANGED

## 2020-10-29 ASSESSMENT — PAIN DESCRIPTION - DESCRIPTORS: DESCRIPTORS: ACHING;DISCOMFORT;SORE

## 2020-10-29 ASSESSMENT — PAIN DESCRIPTION - ORIENTATION
ORIENTATION: RIGHT

## 2020-10-29 ASSESSMENT — PAIN SCALES - GENERAL
PAINLEVEL_OUTOF10: 3
PAINLEVEL_OUTOF10: 4
PAINLEVEL_OUTOF10: 3
PAINLEVEL_OUTOF10: 3

## 2020-10-29 ASSESSMENT — PAIN DESCRIPTION - FREQUENCY: FREQUENCY: CONTINUOUS

## 2020-10-29 NOTE — PROGRESS NOTES
Occupational Therapy  Facility/Department: [address] ORTHO/MED SURG  Daily Treatment Note  NAME: Mauro Miranda  : 1960  MRN: [id number]    Date of Service: 10/29/2020    Discharge Recommendations:  Patient would benefit from continued therapy after discharge       Assessment   Performance deficits / Impairments: Decreased functional mobility ; Decreased endurance;Decreased ADL status; Decreased balance;Decreased high-level IADLs  Prognosis: Good  Patient Education: OT POC, transfer/walker safety, importance of participation in therapy, femoral block, use of incentive spirometer, weight bearing precautions, LB dressing - pt verbalized/demo understanding. REQUIRES OT FOLLOW UP: Yes  Activity Tolerance  Activity Tolerance: Patient Tolerated treatment well  Safety Devices  Safety Devices in place: Yes  Type of devices: Call light within reach; Chair alarm in place;Gait belt;Patient at risk for falls; Left in chair;Nurse notified         Patient Diagnosis(es): The encounter diagnosis was Post-op pain. has a past medical history of Acute blood loss anemia, Acute hematogenous osteomyelitis of right foot (HCC)-Involving the sesamoid bones, Alcoholic cirrhosis of liver without ascites (HCC), Chronic midline low back pain without sciatica, Cirrhosis of liver without ascites ([address] Diarrhea, Duodenal ulcer, Elevated alpha fetoprotein, Elevated liver enzymes, Foot infection, Gastric ulcer, GERD (gastroesophageal reflux disease), GI bleed, Hep C w/o coma, chronic (HCC), History of blood transfusion, History of ETOH abuse, History of stress test, Hyperlipidemia, Hypertension, Iron deficiency anemia due to chronic blood loss, Melena, Neuropathy, New onset seizure (HCC), Olecranon bursitis of left elbow, Portal hypertensive gastropathy ([address] Right knee pain, Septic arthritis ([address] Septic arthritis of right foot ([address] Thrombocytopenia ([address] Wears glasses, and Wheelchair dependence.    has a past surgical history that includes hernia repair (Left, 1975); Upper gastrointestinal endoscopy (10/31/2015); Tonsillectomy (1970); Upper gastrointestinal endoscopy (03/14/2018); Colonoscopy (03/14/2018); pr colon ca scrn not hi rsk ind (N/A, 3/14/2018); Upper gastrointestinal endoscopy (N/A, 3/14/2018); Upper gastrointestinal endoscopy (N/A, 1/22/2019); Upper gastrointestinal endoscopy (N/A, 2/1/2019); Upper gastrointestinal endoscopy (N/A, 8/1/2019); Vasectomy (2007); incision and drainage (Left, 10/16/2019); SESAMOIDECTOMY FIRST TOE (Right, 5/22/2020); Lumbar spine surgery (2000); Total knee arthroplasty (Right, 10/27/2020); and Total knee arthroplasty (Right, 10/27/2020). Restrictions  Restrictions/Precautions  Restrictions/Precautions: Weight Bearing, Fall Risk, Femoral Block  Required Braces or Orthoses?: No  Lower Extremity Weight Bearing Restrictions  Right Lower Extremity Weight Bearing: Weight Bearing As Tolerated  Position Activity Restriction  Other position/activity restrictions: R TKA 10/27  Subjective   General  Patient assessed for rehabilitation services?: Yes  Family / Caregiver Present: No  General Comment  Pain Assessment  Pain Assessment: 0-10  Pain Level: 3  Pain Type: Surgical pain  Pain Location: Knee  Pain Orientation: Right  Pain Descriptors: Aching;Discomfort; Sore  Pain Frequency: Continuous  Non-Pharmaceutical Pain Intervention(s): Pain ball relief;Repositioned;Rest;Therapeutic presence  Vital Signs  Patient Currently in Pain: Yes   Orientation  Orientation  Overall Orientation Status: Within Functional Limits  Objective    ADL  Feeding: Setup; Independent  Grooming: Setup;Modified independent (Oral care standing at sink.)  UE Bathing: Setup;Supervision(Mod A for back standing at sink.)  LE Bathing: Setup;Supervision  UE Dressing: Contact guard assistance(To manage gown.)  LE Dressing: Minimal assistance  Toileting: Setup;Contact guard assistance(Pericare/bottom care standing at sink.)  Additional Comments: Pt completed ADL care standing at sink, surgical soap used appropriately. Balance  Sitting Balance: Supervision(Seated in chair/on toilet.)  Standing Balance: Stand by assistance  Standing Balance  Time: 15 minutes  Activity: Functional mobility to/from bathroom using RW, ADL care standing at sink, toilet transfer. Functional Mobility  Functional - Mobility Device: Rolling Walker  Activity: To/from bathroom  Assist Level: Stand by assistance  Functional Mobility Comments: Good walker placement and step pattern. Toilet Transfers  Toilet - Technique: Ambulating  Equipment Used: Grab bars  Toilet Transfer: Stand by assistance  Transfers  Sit to stand: Stand by assistance  Stand to sit: Stand by assistance  Cognition  Overall Cognitive Status: [address]  Times per week: 5-7x/wk  Current Treatment Recommendations: Strengthening, Patient/Caregiver Education & Training, Equipment Evaluation, Education, & procurement, Balance Training, Functional Mobility Training, Endurance Training, Safety Education & Training, Self-Care / ADL  Goals  Short term goals  Time Frame for Short term goals: Patient will, by discharge  Short term goal 1: demo UB ADLs at [address] term goal 2: demo LB ADLs at SBA using AE PRN  Short term goal 3: demo functional transfers/mobility using LRD at Supervision to engage in ADLs safely  Short term goal 4: demo 10+ min of dynamic standing balance at Supervision using LRD to engage in ADLs safely  Short term goal 5: demo 5+ min of [facility] activity to increase functional use of B UE for ADL tasks       Therapy Time   Individual Concurrent Group Co-treatment   Time In 1015         Time Out 1055         Minutes 40         Timed Code Treatment Minutes: 40 Minutes     Pt seated in chair upon therapist arrival. Pleasant and agreeable to therapy. See above for LOF for all tasks. Pt retired to seated in chair at end of session with chair alarm activated and call light within reach.     ToniKsplice, SANCHES/L

## 2020-10-29 NOTE — PROGRESS NOTES
Nieves Villalobos MD FACS    Urology - Progress Note        Subjective: No acute events overnight. Denies F/C/CP/SOA/N/V. Ambulating with assist.  Had a bowel movement this morning. Tolerating Ge catheter. Urine output 4166 cc over 24 hours. Patient Vitals for the past 24 hrs:   BP Temp Temp src Pulse Resp SpO2   10/28/20 2000 (!) 165/69 98.5 °F (36.9 °C) Oral 85 17 97 %   10/28/20 1828 (!) 155/65 -- -- -- -- --   10/28/20 1500 (!) 144/82 98.8 °F (37.1 °C) Oral 82 16 --   10/28/20 1144 (!) 143/78 97.8 °F (36.6 °C) Oral 70 -- 94 %       Intake/Output Summary (Last 24 hours) at 10/29/2020 0733  Last data filed at 10/29/2020 0400  Gross per 24 hour   Intake 1200 ml   Output 4166 ml   Net -2966 ml       Recent Labs     10/28/20  0555   WBC 15.1*   HGB 11.4*   HCT 35.2*   MCV 95.4   *     Recent Labs     10/27/20  2130 10/28/20  1421    128*   K 4.7 4.4    97*   CO2 15* 18*   BUN 16 20   CREATININE 1.15 1.01       Recent Labs     10/28/20  1609   COLORU YELLOW   PHUR 5.0   WBCUA 2 TO 5   RBCUA 10 TO 20   MUCUS NOT REPORTED   TRICHOMONAS NOT REPORTED   YEAST NOT REPORTED   BACTERIA NOT REPORTED   SPECGRAV 1.015   LEUKOCYTESUR NEGATIVE   UROBILINOGEN Normal   BILIRUBINUR NEGATIVE       Additional Lab/culture results: UA negative    Physical Exam:   NAD, AOx3  NLB, equal chest rise B/L  RRR, 2+ radial pulses  ABD S/ND/NT, no CVAT  Ge draining clear yellow urine  Warm extremities, no calf tenderness    Interval Imaging Findings: None     Assessment:  Stuart Doherty is a 62 y.o. male who presents with:   Postoperative urinary retention    Plan:   Remove Ge this morning to prevent CAUTI, reinitiate CIC regimen as needed  Continue Flomax  UA not suspicious for infection  Bowel regimen  Avoid narcotics, anticholinergics as able    Chencho Wilson MD  PGY-4, [address] Department of Urology   7:33 AM 10/29/2020     I have reviewed the history above and agree.   I have repeated the key portions of the physical exam and concur with the resident's findings. I have reviewed all laboratory findings and imaging reports/films. I agree with the plan as noted above.     Electronically signed by Chris Hurt MD on 10/29/2020 at 1:23 PM

## 2020-10-29 NOTE — PROGRESS NOTES
Physical Therapy  Facility/Department: [address] ORTHO/MED SURG  Daily Treatment Note  NAME: Brissa Blake  : 1960  MRN: [id number]    Date of Service: 10/29/2020    Discharge Recommendations:  Patient would benefit from continued therapy after discharge   Assessment   Body structures, Functions, Activity limitations: Decreased functional mobility ; Decreased balance;Decreased ROM; Decreased strength;Decreased endurance  Assessment: The pt [id number] ft with RW and CGA, no buckling noted. Pt improving with sequencing gait without need for verbal cueing. Pt would benefit from continued PT serives to address functional and ROM deficits. Prognosis: Good  Decision Making: Medium Complexity  PT Education: Goals;PT Role;Plan of Care  REQUIRES PT FOLLOW UP: Yes  Activity Tolerance  Activity Tolerance: Patient Tolerated treatment well;Patient limited by endurance; Patient limited by fatigue   Patient Diagnosis(es): The encounter diagnosis was Post-op pain. has a past medical history of Acute blood loss anemia, Acute hematogenous osteomyelitis of right foot (HCC)-Involving the sesamoid bones, Alcoholic cirrhosis of liver without ascites (HCC), Chronic midline low back pain without sciatica, Cirrhosis of liver without ascites ([address] Diarrhea, Duodenal ulcer, Elevated alpha fetoprotein, Elevated liver enzymes, Foot infection, Gastric ulcer, GERD (gastroesophageal reflux disease), GI bleed, Hep C w/o coma, chronic (HCC), History of blood transfusion, History of ETOH abuse, History of stress test, Hyperlipidemia, Hypertension, Iron deficiency anemia due to chronic blood loss, Melena, Neuropathy, New onset seizure (HCC), Olecranon bursitis of left elbow, Portal hypertensive gastropathy ([address] Right knee pain, Septic arthritis ([address] Septic arthritis of right foot ([address] Thrombocytopenia ([address] Wears glasses, and Wheelchair dependence. has a past surgical history that includes hernia repair (Left, );  Upper sets, heel slides, LAQ's x 10  AROM R knee 6-80 degrees knee flx  Comments: Pt demonstrates proper sequencing for RW use. Verbal cues to maintain RW on ground w progresison. Balance  Posture: Good  Sitting - Static: Good  Sitting - Dynamic: Good  Standing - Static: Fair  Standing - Dynamic: Fair  Comments: standing balance assessed with RW    G-Code     OutComes Score       AM-PAC Score     Goals  Short term goals  Time Frame for Short term goals: 14 visits  Short term goal 1: Perform bed mobility and functional transfers independently  Short term goal 2: Ambulate 200ft with RW and supervision  Short term goal 3: Demo Good- dynamic standing balance to decrease risk of falls  Short term goal 4: Ascend/descend 1 step with HR and CGA  Short term goal 5:  Independently perform HEP    Plan    Plan  Times per week: BID  Current Treatment Recommendations: Strengthening, ROM, Balance Training, Functional Mobility Training, Transfer Training, Gait Training, Stair training, Endurance Training, Home Exercise Program, Safety Education & Training, Patient/Caregiver Education & Training  Safety Devices  Type of devices: Nurse notified, Call light within reach, Gait belt, Bed alarm in place, Chair alarm in place, Left in chair  Restraints  Initially in place: No     Therapy Time   Individual Concurrent Group Co-treatment   Time In 0910         Time Out 0933         Minutes [address] [facility], PT

## 2020-10-29 NOTE — CARE COORDINATION
Transition planning  [facility]. normal

## 2020-10-29 NOTE — PROGRESS NOTES
[facility]  Office: [address], DO, Jacquelyn Lassiter, DO, Lizeth Croft, DO, Clarisse Benitez Blood, DO, Rhonda Hankins MD, Malik Magana MD, Taran Oakes MD, Fabio Gonsales MD, Grace Bass MD, Adina Carrillo MD, Yoanna Cardoso MD, Lesia Lutz MD, Jose Luis Ramos MD, Kem Gonzalez, DO, Sabino Gilliland MD, Caroline Pardo MD, Sabino Marshall, DO, Nicholas Kang MD,  Gregory Woodson DO, Monse Alcantar MD, Saman Larsen MD, Good Matt, CNP, [facility] Norman, CNP, Rachel Irving, CNP, Martell Hannah, CNS, Siobhan Boles, CNP, Miranda Abbott, CNP, Shelly Ferrari, CNP, Brian Dow, CNP, Malka Peters, CNP, Rafa Garcia PA-C, Shahzad Lester, [facility], Brenden Pardo, CNP, Yuridia Reyna, CNP, Parisa Taylor, CNP, Sweta Milner, CNP, Kendell Vallejo, CNP         [facility]   [address] [facility]    Progress Note    10/29/2020    12:10 PM    Name:   Ebenezer Warren  MRN:     [id number]     Acct:      [de-identified]   Room:   6313/9672-02   Day:  0  Admit Date:  10/27/2020  6:07 AM    PCP:   REECE Lu CNP  Code Status:  Full Code    Subjective:     C/C:  Urinary retention/ BP control/ transamenitis   Interval History Status: improved. Patient seen and examined at bedside, improved, voiding better  BP better  Afebrile overnight  Had a BM  Na dropped  Blood pressure is on the  higher side  Patient denies any chest pain, shortness of breath, chills, fevers, nausea or vomiting. Patient vitals, labs and all providers notes were reviewed,from overnight shift and morning updates were noted and discussed with the nurse    Brief History:     Per my NP  Ebenezer Warren is a 62 yr old male with a hx of DJD/OA who has progressive symptoms despite conservative treatment. Pt uses a motorized scooter for long distances. He has a hx of alcoholic cirrhosis, GIB due to gastric ulcer, hepatitis C and elevated liver enzymes.  Pt is in the process of history of Acute blood loss anemia, Acute hematogenous osteomyelitis of right foot (HCC)-Involving the sesamoid bones, Alcoholic cirrhosis of liver without ascites (HCC), Chronic midline low back pain without sciatica, Cirrhosis of liver without ascites ([address] Diarrhea, Duodenal ulcer, Elevated alpha fetoprotein, Elevated liver enzymes, Foot infection, Gastric ulcer, GERD (gastroesophageal reflux disease), GI bleed, Hep C w/o coma, chronic (HCC), History of blood transfusion, History of ETOH abuse, History of stress test, Hyperlipidemia, Hypertension, Iron deficiency anemia due to chronic blood loss, Melena, Neuropathy, New onset seizure (HCC), Olecranon bursitis of left elbow, Portal hypertensive gastropathy ([address] Right knee pain, Septic arthritis ([address] Septic arthritis of right foot ([address] Thrombocytopenia ([address] Wears glasses, and Wheelchair dependence. Social History:   reports that he quit smoking about 25 years ago. His smoking use included cigarettes. He has a 15.00 pack-year smoking history. He quit smokeless tobacco use about 2 years ago. His smokeless tobacco use included chew. He reports previous alcohol use. He reports that he does not use drugs. Family History:   Family History   Problem Relation Age of Onset    High Blood Pressure Mother     Other Father         neuropathy    Stroke Father     Prostate Cancer Father        Vitals:  BP (!) 146/88   Pulse 91   Temp 97.7 °F (36.5 °C) (Oral)   Resp 17   Ht 5' 10\" (1.778 m)   Wt 222 lb 10.6 oz (101 kg)   SpO2 97%   BMI 31.95 kg/m²   Temp (24hrs), Av.3 °F (36.8 °C), Min:97.7 °F (36.5 °C), Max:98.8 °F (37.1 °C)    Recent Labs     10/28/20  2017 10/29/20  0631   POCGLU 112* 108       I/O (24Hr):     Intake/Output Summary (Last 24 hours) at 10/29/2020 1210  Last data filed at 10/29/2020 1208  Gross per 24 hour   Intake 960 ml   Output 5285 ml   Net -4325 ml       Labs:  Hematology:  Recent Labs     10/28/20  0555   WBC 15.1*   RBC 3.69* Tenosynovitis of right foot-right Hallux 10/27/2020 Yes    History of total knee arthroplasty, right 10/27/2020 Yes    Hematuria (Chronic) 10/27/2020 Yes    Arthritis of right knee 10/28/2020 Yes    Urinary retention 10/28/2020 No    Constipation 10/28/2020 Yes    Hyponatremia 10/29/2020 No          Plan:        Chronic transaminitis and elevated alkaline phosphatase related to his alcoholic liver disease, patient in the process of getting hepatitis C treatment following up with GI as an outpatient.     Urine retention: Continue CIC and bladder scans, start Flomax send urine for analysis ( no infection) , voiding improving, urology consulted    Leukocytosis: Likely reactive, rule out infection, will scan urine get chest x-ray and check pro calcitonin, work up was unremarkable    Hyponatremia : patient with chronic liver disease , will recheck sodium    Constipation: Bowel regimen, improved    Right TKA: Management per primary    Discussed with the patient     Michael Dickey MD  10/29/2020  12:10 PM

## 2020-10-29 NOTE — PROGRESS NOTES
Orthopedic Progress Note    Patient:  Dallin Baker  YOB: 1960     62 y.o. male    Subjective:  Patient seen and examined  Diabetic neuropathy causing pain with ambulation; otherwise no complaints. Patient experiencing urinary retention; dunn in place. Pain controlled  Denies fever, HA, CP, SOB, N/V, dysuria  +BM/+flatus/+urination  Walked 160 ft with PT. Vitals reviewed, afebrile    Objective:   Vitals:    10/28/20 2000   BP: (!) 165/69   Pulse: 85   Resp: 17   Temp: 98.5 °F (36.9 °C)   SpO2: 97%     General: NAD, cooperative     Musculoskeletal:  Right lower extremity: Optifoam on: clean, dry and intact. Compartments soft. EHL/FHL/TA/GS complex motor intact. Sural, saphenous, superificial/deep peroneal, and plantar nerve distribution sensation is intact although diminished. Dorsalis pedis/posterior tibial pulses 2+ with BCR. Recent Labs     10/28/20  0555 10/28/20  1421   WBC 15.1*  --    HGB 11.4*  --    HCT 35.2*  --    *  --    NA  --  128*   K  --  4.4   BUN  --  20   CREATININE  --  1.01   GLUCOSE  --  118*      Meds: ASA   See rec for complete list    Impression 62 y.o. male s/p right total knee arthroplasty on 10/27/20, POD#2    Plan  - Post-op antibiotics completed  - WB status: weight bear as tolerated right lower extremity  - Pain control PO/IV Medication. Attempt to Wean IV medications. - Optifoam on; clean, dry and intact. Okay to reinforce per nursing. Page ortho if saturated  - DVT ppx: ASA 81mg BID  - Ice for 20 minutes an hour and keep elevated to reduce swelling and throbbing pain  - Encourage incentive spirometry use  - PT/OT to see and evaluate while in-house  - Consults: Urology on for urinary rentention. IM on for medical management  - Dispo: pending resolution of urinary retention and safety  - F/u with Dr. Meagan Wiggins in 10-14 days  - Please page ortho with any questions    Patient seen and examined.   Discussed the need for medical equipment

## 2020-10-29 NOTE — PROGRESS NOTES
Physical Therapy  Facility/Department: [address] ORTHO/MED SURG  Daily Treatment Note  NAME: Ceci Estrada  : 1960  MRN: [id number]    Date of Service: 10/29/2020    Discharge Recommendations:  Patient would benefit from continued therapy after discharge   Assessment   Body structures, Functions, Activity limitations: Decreased functional mobility ; Decreased balance;Decreased ROM; Decreased strength;Decreased endurance  Assessment: The pt [id number] ft with RW and CGA, no buckling noted. Pt improving with sequencing gait without need for verbal cueing. Pt would benefit from continued PT serives to address functional and ROM deficits. Prognosis: Good  Decision Making: Medium Complexity  PT Education: Goals;PT Role;Plan of Care  REQUIRES PT FOLLOW UP: Yes  Activity Tolerance  Activity Tolerance: Patient Tolerated treatment well;Patient limited by endurance; Patient limited by fatigue   Patient Diagnosis(es): The encounter diagnosis was Post-op pain. has a past medical history of Acute blood loss anemia, Acute hematogenous osteomyelitis of right foot (HCC)-Involving the sesamoid bones, Alcoholic cirrhosis of liver without ascites (HCC), Chronic midline low back pain without sciatica, Cirrhosis of liver without ascites ([address] Diarrhea, Duodenal ulcer, Elevated alpha fetoprotein, Elevated liver enzymes, Foot infection, Gastric ulcer, GERD (gastroesophageal reflux disease), GI bleed, Hep C w/o coma, chronic (HCC), History of blood transfusion, History of ETOH abuse, History of stress test, Hyperlipidemia, Hypertension, Iron deficiency anemia due to chronic blood loss, Melena, Neuropathy, New onset seizure (HCC), Olecranon bursitis of left elbow, Portal hypertensive gastropathy ([address] Right knee pain, Septic arthritis ([address] Septic arthritis of right foot ([address] Thrombocytopenia ([address] Wears glasses, and Wheelchair dependence. has a past surgical history that includes hernia repair (Left, );  Upper gastrointestinal endoscopy (10/31/2015); Tonsillectomy (1970); Upper gastrointestinal endoscopy (03/14/2018); Colonoscopy (03/14/2018); pr colon ca scrn not hi rsk ind (N/A, 3/14/2018); Upper gastrointestinal endoscopy (N/A, 3/14/2018); Upper gastrointestinal endoscopy (N/A, 1/22/2019); Upper gastrointestinal endoscopy (N/A, 2/1/2019); Upper gastrointestinal endoscopy (N/A, 8/1/2019); Vasectomy (2007); incision and drainage (Left, 10/16/2019); SESAMOIDECTOMY FIRST TOE (Right, 5/22/2020); Lumbar spine surgery (2000); Total knee arthroplasty (Right, 10/27/2020); and Total knee arthroplasty (Right, 10/27/2020). Restrictions  Restrictions/Precautions  Restrictions/Precautions: Weight Bearing, Fall Risk, Femoral Block  Required Braces or Orthoses?: No  Lower Extremity Weight Bearing Restrictions  Right Lower Extremity Weight Bearing: Weight Bearing As Tolerated  Position Activity Restriction  Other position/activity restrictions: R TKA 10/27  Subjective   General  Response To Previous Treatment: Patient with no complaints from previous session. Family / Caregiver Present: No  Subjective  Subjective: RN and pt in agreement for PT treatment; pt up in chair upon arrival, pt pleasant and cooperative throughout  Pain Screening  Patient Currently in Pain: Yes  Pain Assessment  Pain Assessment: 0-10  Pain Level: 3  Pain Location: Knee  Pain Orientation: Right  Vital Signs  Patient Currently in Pain: Yes  Orientation  Orientation  Overall Orientation Status: Within Normal Limits  Cognition   Objective   Bed mobility  Supine to Sit: Stand by assistance  Sit to Supine: Stand by assistance  Scooting: Stand by assistance  Transfers  Sit to Stand: Contact guard assistance  Stand to sit: Contact guard assistance  Ambulation  Ambulation?: Yes  Ambulation 1  Surface: level tile  Device: Rolling Walker  Assistance: Contact guard assistance  Gait Deviations: Slow Elham; Increased REJI  Distance: amb 100 ft with a RW x CGA with WBAT R LE  R knee ROM 8-98 degrees knee flx  Comments: Pt demonstrates proper sequencing for RW use. Verbal cues to maintain RW on ground w progresison. Stairs  Device: Rolling walker  Assistance: Contact guard assistance  Balance  Posture: Good  Sitting - Static: Good  Sitting - Dynamic: Good  Standing - Static: Fair  Standing - Dynamic: Fair  Comments: standing balance assessed with RW  Exercises  Hamstring Sets: x 10 R LE  Quad Sets: x 10 R LE  Heelslides: x 10 R LE  Knee Long Arc Quad: x 10 R LE  Ankle Pumps: x 10 R LE    G-Code  OutComes Score  AM-PAC Score  Goals  Short term goals  Time Frame for Short term goals: 14 visits  Short term goal 1: Perform bed mobility and functional transfers independently  Short term goal 2: Ambulate 200ft with RW and supervision  Short term goal 3: Demo Good- dynamic standing balance to decrease risk of falls  Short term goal 4: Ascend/descend 1 step with HR and CGA  Short term goal 5:  Independently perform HEP  Plan    Plan  Times per week: BID  Current Treatment Recommendations: Strengthening, ROM, Balance Training, Functional Mobility Training, Transfer Training, Gait Training, Stair training, Endurance Training, Home Exercise Program, Safety Education & Training, Patient/Caregiver Education & Training  Safety Devices  Type of devices: Nurse notified, Call light within reach, Gait belt, Bed alarm in place, Chair alarm in place, Left in chair  Restraints  Initially in place: No     Therapy Time   Individual Concurrent Group Co-treatment   Time In 1355         Time Out 1420         Minutes [facility], PT

## 2020-10-29 NOTE — PLAN OF CARE
Problem: Skin Integrity:  Goal: Will show no infection signs and symptoms  Description: Will show no infection signs and symptoms  Outcome: Ongoing  Goal: Absence of new skin breakdown  Description: Absence of new skin breakdown  Outcome: Ongoing     Problem: Falls - Risk of:  Goal: Will remain free from falls  Description: Will remain free from falls  Outcome: Ongoing  Goal: Absence of physical injury  Description: Absence of physical injury  Outcome: Ongoing     Problem: Safety:  Goal: Free from accidental physical injury  Description: Free from accidental physical injury  Outcome: Ongoing  Goal: Free from intentional harm  Description: Free from intentional harm  Outcome: Ongoing     Problem: Daily Care:  Goal: Daily care needs are met  Description: Daily care needs are met  Outcome: Ongoing     Problem: Pain:  Goal: Patient's pain/discomfort is manageable  Description: Patient's pain/discomfort is manageable  Outcome: Ongoing  Goal: Pain level will decrease  Description: Pain level will decrease  Outcome: Ongoing  Goal: Control of acute pain  Description: Control of acute pain  Outcome: Ongoing  Goal: Control of chronic pain  Description: Control of chronic pain  Outcome: Ongoing     Problem: Skin Integrity:  Goal: Skin integrity will stabilize  Description: Skin integrity will stabilize  Outcome: Ongoing     Problem: Discharge Planning:  Goal: Patients continuum of care needs are met  Description: Patients continuum of care needs are met  Outcome: Ongoing

## 2020-10-30 VITALS
BODY MASS INDEX: 31.88 KG/M2 | TEMPERATURE: 98.1 F | WEIGHT: 222.66 LBS | HEIGHT: 70 IN | OXYGEN SATURATION: 100 % | SYSTOLIC BLOOD PRESSURE: 118 MMHG | HEART RATE: 87 BPM | DIASTOLIC BLOOD PRESSURE: 54 MMHG | RESPIRATION RATE: 16 BRPM

## 2020-10-30 LAB — SODIUM BLD-SCNC: 130 MMOL/L (ref 135–144)

## 2020-10-30 PROCEDURE — 97530 THERAPEUTIC ACTIVITIES: CPT

## 2020-10-30 PROCEDURE — 97535 SELF CARE MNGMENT TRAINING: CPT

## 2020-10-30 PROCEDURE — 6370000000 HC RX 637 (ALT 250 FOR IP): Performed by: FAMILY MEDICINE

## 2020-10-30 PROCEDURE — 51798 US URINE CAPACITY MEASURE: CPT

## 2020-10-30 PROCEDURE — 99232 SBSQ HOSP IP/OBS MODERATE 35: CPT | Performed by: FAMILY MEDICINE

## 2020-10-30 PROCEDURE — 6370000000 HC RX 637 (ALT 250 FOR IP): Performed by: SPECIALIST

## 2020-10-30 PROCEDURE — 51701 INSERT BLADDER CATHETER: CPT

## 2020-10-30 PROCEDURE — 97116 GAIT TRAINING THERAPY: CPT

## 2020-10-30 PROCEDURE — 84295 ASSAY OF SERUM SODIUM: CPT

## 2020-10-30 PROCEDURE — 6370000000 HC RX 637 (ALT 250 FOR IP): Performed by: ORTHOPAEDIC SURGERY

## 2020-10-30 PROCEDURE — 2580000003 HC RX 258: Performed by: ORTHOPAEDIC SURGERY

## 2020-10-30 PROCEDURE — 36415 COLL VENOUS BLD VENIPUNCTURE: CPT

## 2020-10-30 RX ORDER — TAMSULOSIN HYDROCHLORIDE 0.4 MG/1
0.4 CAPSULE ORAL DAILY
Qty: 30 CAPSULE | Refills: 1 | Status: SHIPPED | OUTPATIENT
Start: 2020-10-31

## 2020-10-30 RX ORDER — LISINOPRIL 40 MG/1
40 TABLET ORAL DAILY
Qty: 30 TABLET | Refills: 3 | Status: SHIPPED | OUTPATIENT
Start: 2020-10-31

## 2020-10-30 RX ADMIN — SODIUM CHLORIDE, PRESERVATIVE FREE 10 ML: 5 INJECTION INTRAVENOUS at 09:33

## 2020-10-30 RX ADMIN — OXYCODONE HYDROCHLORIDE 5 MG: 5 TABLET ORAL at 09:33

## 2020-10-30 RX ADMIN — TAMSULOSIN HYDROCHLORIDE 0.4 MG: 0.4 CAPSULE ORAL at 09:33

## 2020-10-30 RX ADMIN — GABAPENTIN 600 MG: 600 TABLET ORAL at 09:33

## 2020-10-30 RX ADMIN — PHENAZOPYRIDINE HYDROCHLORIDE 200 MG: 100 TABLET ORAL at 09:32

## 2020-10-30 RX ADMIN — PANTOPRAZOLE SODIUM 40 MG: 40 TABLET, DELAYED RELEASE ORAL at 09:32

## 2020-10-30 RX ADMIN — AMLODIPINE BESYLATE 10 MG: 10 TABLET ORAL at 09:33

## 2020-10-30 RX ADMIN — LISINOPRIL 40 MG: 20 TABLET ORAL at 09:32

## 2020-10-30 RX ADMIN — ASPIRIN 81 MG: 81 TABLET, CHEWABLE ORAL at 09:33

## 2020-10-30 ASSESSMENT — PAIN SCALES - GENERAL
PAINLEVEL_OUTOF10: 5
PAINLEVEL_OUTOF10: 5
PAINLEVEL_OUTOF10: 4
PAINLEVEL_OUTOF10: 3
PAINLEVEL_OUTOF10: 5
PAINLEVEL_OUTOF10: 4
PAINLEVEL_OUTOF10: 2

## 2020-10-30 ASSESSMENT — PAIN DESCRIPTION - PROGRESSION

## 2020-10-30 ASSESSMENT — PAIN DESCRIPTION - LOCATION
LOCATION: KNEE
LOCATION: KNEE;FOOT
LOCATION: KNEE
LOCATION: KNEE

## 2020-10-30 ASSESSMENT — PAIN DESCRIPTION - DESCRIPTORS
DESCRIPTORS: SORE;DISCOMFORT
DESCRIPTORS: DISCOMFORT
DESCRIPTORS: SORE

## 2020-10-30 ASSESSMENT — PAIN DESCRIPTION - PAIN TYPE
TYPE: ACUTE PAIN;SURGICAL PAIN
TYPE: SURGICAL PAIN;ACUTE PAIN

## 2020-10-30 ASSESSMENT — PAIN - FUNCTIONAL ASSESSMENT
PAIN_FUNCTIONAL_ASSESSMENT: ACTIVITIES ARE NOT PREVENTED
PAIN_FUNCTIONAL_ASSESSMENT: ACTIVITIES ARE NOT PREVENTED

## 2020-10-30 ASSESSMENT — PAIN DESCRIPTION - ORIENTATION
ORIENTATION: RIGHT

## 2020-10-30 NOTE — PROGRESS NOTES
Occupational Therapy  Facility/Department: [address] ORTHO/MED SURG  Daily Treatment Note  NAME: Jp Maciel  : 1960  MRN: [id number]    Date of Service: 10/30/2020    Discharge Recommendations:  Patient would benefit from continued therapy after discharge       Assessment   Performance deficits / Impairments: Decreased functional mobility ; Decreased endurance;Decreased ADL status; Decreased balance;Decreased high-level IADLs  Prognosis: Good  REQUIRES OT FOLLOW UP: Yes  Activity Tolerance  Activity Tolerance: Patient Tolerated treatment well  Safety Devices  Safety Devices in place: Yes  Type of devices: Call light within reach;Gait belt;Nurse notified; Left in bed;Bed alarm in place  Restraints  Initially in place: No         Patient Diagnosis(es): The primary encounter diagnosis was Post-op pain. A diagnosis of Hyponatremia was also pertinent to this visit. has a past medical history of Acute blood loss anemia, Acute hematogenous osteomyelitis of right foot (HCC)-Involving the sesamoid bones, Alcoholic cirrhosis of liver without ascites (HCC), Chronic midline low back pain without sciatica, Cirrhosis of liver without ascites ([address] Diarrhea, Duodenal ulcer, Elevated alpha fetoprotein, Elevated liver enzymes, Foot infection, Gastric ulcer, GERD (gastroesophageal reflux disease), GI bleed, Hep C w/o coma, chronic (HCC), History of blood transfusion, History of ETOH abuse, History of stress test, Hyperlipidemia, Hypertension, Iron deficiency anemia due to chronic blood loss, Melena, Neuropathy, New onset seizure (HCC), Olecranon bursitis of left elbow, Portal hypertensive gastropathy ([address] Right knee pain, Septic arthritis ([address] Septic arthritis of right foot ([address] Thrombocytopenia ([address] Wears glasses, and Wheelchair dependence. has a past surgical history that includes hernia repair (Left, ); Upper gastrointestinal endoscopy (10/31/2015); Tonsillectomy ();  Upper gastrointestinal endoscopy (03/14/2018); Colonoscopy (03/14/2018); pr colon ca scrn not hi rsk ind (N/A, 3/14/2018); Upper gastrointestinal endoscopy (N/A, 3/14/2018); Upper gastrointestinal endoscopy (N/A, 1/22/2019); Upper gastrointestinal endoscopy (N/A, 2/1/2019); Upper gastrointestinal endoscopy (N/A, 8/1/2019); Vasectomy (2007); incision and drainage (Left, 10/16/2019); SESAMOIDECTOMY FIRST TOE (Right, 5/22/2020); Lumbar spine surgery (2000); Total knee arthroplasty (Right, 10/27/2020); and Total knee arthroplasty (Right, 10/27/2020). Restrictions  Restrictions/Precautions  Restrictions/Precautions: Weight Bearing, Fall Risk  Required Braces or Orthoses?: No  Lower Extremity Weight Bearing Restrictions  Right Lower Extremity Weight Bearing: Weight Bearing As Tolerated  Position Activity Restriction  Other position/activity restrictions: R TKA 10/27     Subjective   General  Patient assessed for rehabilitation services?: Yes  Family / Caregiver Present: No  General Comment  Comments: RN ok'd patient for OT treatment this date. Pt pleasant and cooperative throughout. Pain Assessment  Pain Assessment: 0-10  Pain Level: 4  Pain Type: Surgical pain;Acute pain  Pain Location: Knee  Pain Orientation: Right  Pain Descriptors: Sore  Functional Pain Assessment: Activities are not prevented  Response to Pain Intervention: Patient Satisfied  Pre Treatment Pain Screening  Intervention List: Patient able to continue with treatment  Vital Signs  Patient Currently in Pain: Yes     Objective    ADL  Feeding: Setup; Independent; Increased time to complete(pt set up to eat ice cream at beginning of session while OT set up for bathing/dressing task; noted B hand tremors with feeding task however able to complete independently with increased time)  Grooming: Stand by assistance;Setup(washed face standing sinkside with setup)  UE Bathing: Stand by assistance;Setup(pt washed UB while standing sinkside with SBA for safety)  LE Bathing: Stand by assistance;Setup(pt washed LB and completed bottom hygiene while standing sinkside)  UE Dressing: Stand by assistance;Setup(pt doffed gown standing sinkside and donned t shirt and sweatshirt seated EOB with setup)  LE Dressing: Minimal assistance;Setup; Increased time to complete(Pt donned underwear and sweatpants with Min A for threading over R foot. Pt educated on dressing injured side first and demo good follow through)  Additional Comments: Bathing tasks completed standing sinkside. Pt educated on use of sx soap and pt used appropriately.  Pt completed dressing tasks seated EOB       Balance  Sitting Balance: Stand by assistance(dynamic sitting for dressing tasks ~8 minutes)  Standing Balance: Stand by assistance  Standing Balance  Time: ~20 minutes  Activity: standing sinkside for ADL performance  Functional Mobility  Functional - Mobility Device: Rolling Walker  Activity: To/from bathroom  Assist Level: Stand by assistance  Bed mobility  Supine to Sit: Stand by assistance  Sit to Supine: Stand by assistance  Scooting: Stand by assistance  Comment: use of bed rails  Transfers  Sit to stand: Minimal assistance  Stand to sit: Contact guard assistance  Transfer Comments: pt educated on hand placement during transfers with good return                      Cognition  Overall Cognitive Status: [facility]  Times per week: 5-7x/wk  Current Treatment Recommendations: Strengthening, Patient/Caregiver Education & Training, Equipment Evaluation, Education, & procurement, Balance Training, Functional Mobility Training, Endurance Training, Safety Education & Training, Self-Care / ADL    AM-Located within [facility] Score        AM-Located within [facility] Inpatient Daily Activity Raw Score: 20 (10/30/20 1434)  AM-PAC Inpatient ADL T-Scale Score : 42.03 (10/30/20 1434)  ADL Inpatient CMS 0-100% Score: 38.32 (10/30/20 1434)  ADL Inpatient CMS G-Code Modifier : CJ (10/30/20 1434)    Goals  Short term goals  Time Frame for Short term goals: Patient will, by discharge  Short term goal 1: demo UB ADLs at [address] term goal 2: demo LB ADLs at SBA using AE PRN  Short term goal 3: demo functional transfers/mobility using LRD at [facility] to engage in ADLs safely  Short term goal 4: demo 10+ min of dynamic standing balance at [facility] using LRD to engage in ADLs safely  Short term goal 5: demo 5+ min of [address] activity to increase functional use of B UE for ADL tasks       Therapy Time   Individual Concurrent Group Co-treatment   Time In 0931         Time Out 1010         Minutes 39         Timed Code Treatment Minutes: [place] 21, OTR/L

## 2020-10-30 NOTE — PROGRESS NOTES
Patient instructed on how to straight cath. Patient verbalized understanding. Patient sent home with supplied to self cath- all questions answered.

## 2020-10-30 NOTE — PROGRESS NOTES
Physical Therapy  Facility/Department: [address] ORTHO/MED SURG  Daily Treatment Note  NAME: Olimpia Vilchis  : 1960  MRN: [id number]    Date of Service: 10/30/2020    Discharge Recommendations:  Patient would benefit from continued therapy after discharge   PT Equipment Recommendations  Equipment Needed: No  Other: pt has RW    Assessment   Body structures, Functions, Activity limitations: Decreased functional mobility ; Decreased balance;Decreased ROM; Decreased strength;Decreased endurance  Assessment: Pt ambulated 125 ft with RW x CGA, no buckling noted. Increased tolerance noted with ambulation and functional transfers this date. Pt would benefit from continued PT serives to address functional and ROM deficits. Prognosis: Good  PT Education: Goals;PT Role;Plan of Care;Equipment;General Safety  Patient Education: Reviewed HEP with proper dosage and frequnecy- pt verbalizes no concerns at this time  REQUIRES PT FOLLOW UP: Yes  Activity Tolerance  Activity Tolerance: Patient Tolerated treatment well;Patient limited by endurance; Patient limited by fatigue     Patient Diagnosis(es): The primary encounter diagnosis was Post-op pain. A diagnosis of Hyponatremia was also pertinent to this visit.      has a past medical history of Acute blood loss anemia, Acute hematogenous osteomyelitis of right foot (HCC)-Involving the sesamoid bones, Alcoholic cirrhosis of liver without ascites (HCC), Chronic midline low back pain without sciatica, Cirrhosis of liver without ascites ([address] Diarrhea, Duodenal ulcer, Elevated alpha fetoprotein, Elevated liver enzymes, Foot infection, Gastric ulcer, GERD (gastroesophageal reflux disease), GI bleed, Hep C w/o coma, chronic (HCC), History of blood transfusion, History of ETOH abuse, History of stress test, Hyperlipidemia, Hypertension, Iron deficiency anemia due to chronic blood loss, Melena, Neuropathy, New onset seizure ([address] Olecranon bursitis of left elbow, Portal hypertensive gastropathy ([facility] Right knee pain, Septic arthritis ([facility] Septic arthritis of right foot ([facility] Thrombocytopenia ([facility] Wears glasses, and Wheelchair dependence. has a past surgical history that includes hernia repair (Left, 1975); Upper gastrointestinal endoscopy (10/31/2015); Tonsillectomy (1970); Upper gastrointestinal endoscopy (03/14/2018); Colonoscopy (03/14/2018); pr colon ca scrn not hi rsk ind (N/A, 3/14/2018); Upper gastrointestinal endoscopy (N/A, 3/14/2018); Upper gastrointestinal endoscopy (N/A, 1/22/2019); Upper gastrointestinal endoscopy (N/A, 2/1/2019); Upper gastrointestinal endoscopy (N/A, 8/1/2019); Vasectomy (2007); incision and drainage (Left, 10/16/2019); SESAMOIDECTOMY FIRST TOE (Right, 5/22/2020); Lumbar spine surgery (2000); Total knee arthroplasty (Right, 10/27/2020); and Total knee arthroplasty (Right, 10/27/2020). Restrictions  Restrictions/Precautions  Restrictions/Precautions: Weight Bearing, Fall Risk, Femoral Block  Required Braces or Orthoses?: No  Lower Extremity Weight Bearing Restrictions  Right Lower Extremity Weight Bearing: Weight Bearing As Tolerated  Position Activity Restriction  Other position/activity restrictions: R TKA 10/27  Subjective   General  Response To Previous Treatment: Patient with no complaints from previous session.   Family / Caregiver Present: No  Subjective  Subjective: RN and pt in agreement for PT treatment; pt supine in bed upon PT arrival, pt pleasant and cooperative throughout session  Pain Screening  Patient Currently in Pain: Yes  Pain Assessment  Pain Assessment: 0-10  Pain Level: 2  Pain Type: Acute pain;Surgical pain  Pain Location: Knee  Pain Descriptors: Discomfort  Non-Pharmaceutical Pain Intervention(s): Ambulation/Increased Activity;Repositioned  Response to Pain Intervention: Patient Satisfied  Multiple Pain Sites: No  Vital Signs  Patient Currently in Pain: Yes       Orientation  Orientation  Overall Orientation Status: Within Functional Limits  Cognition   Cognition  Overall Cognitive Status: WFL  Objective   Bed mobility  Supine to Sit: Stand by assistance  Sit to Supine: Stand by assistance  Scooting: Stand by assistance  Comment: HOB flat  Transfers  Sit to Stand: Stand by assistance  Stand to sit: Stand by assistance  Ambulation  Ambulation?: Yes  Ambulation 1  Surface: level tile  Device: Rolling Walker  Assistance: Contact guard assistance  Gait Deviations: Slow Elham; Increased REJI  Distance: 125ft with WBAT RLE  Comments: Pt demo proper sequencing for RW use. Verbal cues to maintain RW on ground with progresison and navigation of turning  Stairs/Curb  Stairs?: No     Balance  Posture: Good  Sitting - Static: Good  Sitting - Dynamic: Good  Standing - Static: Fair;+  Standing - Dynamic: Fair  Comments: standing balance assessed with RW  Exercises  Quad Sets: RLE 15x  Heelslides: RLE 15x  Ankle Pumps: x10   Joint Mobility  ROM RLE: AROM R knee 3-95 degrees, hip and ankle WFL  ROM LLE: AROM WFL  ROM RUE: AROM WFL  ROM LUE: AROM WFL    Goals  Short term goals  Time Frame for Short term goals: 14 visits  Short term goal 1: Perform bed mobility and functional transfers independently  Short term goal 2: Ambulate 200ft with RW and supervision  Short term goal 3: Demo Good- dynamic standing balance to decrease risk of falls  Short term goal 4: Ascend/descend 1 step with HR and CGA  Short term goal 5:  Independently perform HEP    Plan    Plan  Times per week: BID  Current Treatment Recommendations: Strengthening, ROM, Balance Training, Functional Mobility Training, Transfer Training, Gait Training, Endurance Training, Home Exercise Program, Safety Education & Training, Patient/Caregiver Education & Training, Stair training  Safety Devices  Type of devices: Nurse notified, Call light within reach, Bed alarm in place, Gait belt, Left in bed  Restraints  Initially in place: No     Therapy Time   Individual Concurrent Group Co-treatment Time In 1543         Time Out 1605         Minutes 22         Timed Code Treatment Minutes: [facility]       Chika López, PT

## 2020-10-30 NOTE — PROGRESS NOTES
Late entry. PS =3,most of his discomfort related to diabetic neuropathy. Doing well with PT. Maintain  Femoral cath.

## 2020-10-30 NOTE — PROGRESS NOTES
Patient given discharge paperwork and all questions answered. Patient also given straight cath supplies. Patient discharged via wheelchair with all of his belongings.

## 2020-10-30 NOTE — PROGRESS NOTES
[facility]  Office: [address], DO, Tamera Jorgensen, DO, Elenita Dougherty, DO, Santy Jernigan, DO, Paul Mccoy MD, Eliu Nava MD, Anika Moran MD, Gene Leija MD, Chilango Dixon MD, Montserrat Shepard MD, Reyna Hernandez MD, Lynn Fofana MD, Jose Luis Rae MD, Nicol Jacinto DO, Donna Sanchez MD, Tom Hastings MD, Chacho Ortiz, DO, Uriel Hernandez MD,  Jessy Bah DO, Cayetano Madison MD, Edgardo Quiñonez MD, Lakewayalan Gregory, [facility], [facility] Center[facility] Smith, CNP, Ravinder Rendon, [facility], Heather Carbajal, CNS, Alyssa Hunter, CNP, Gopi White, CNP, Stone Sanchez, CNP, Prisca Tobar, CNP, Oren Parents, CNP, Matthew Montes PA-C, Adeline Garcia, JEANIE, Jose Van, CNP, Hugo Phoenix, CNP, Joan Lozano, CNP, Екатерина Espinosa, CNP, Jacklyn Frankel, CNP         [facility]   [address]    Progress Note    10/30/2020    11:28 AM    Name:   Trey Peters  MRN:     [id number]     Acct:      [de-identified]   Room:   4487/0034-74  IP Day:  1  Admit Date:  10/27/2020  6:07 AM    PCP:   REECE Apodaca CNP  Code Status:  Full Code    Subjective:     C/C:  Urinary retention/ BP control/ transamenitis   Interval History Status: improved. Patient seen and examined at bedside, improved, voiding much better  BP better  Afebrile overnight  Had a BM  Na dropped likely to excess fluids in a liver failure patient , responded to fluid restrictions and salt tab X1   Being discharged today   Patient denies any chest pain, shortness of breath, chills, fevers, nausea or vomiting. Patient vitals, labs and all providers notes were reviewed,from overnight shift and morning updates were noted and discussed with the nurse    Brief History:     Per my NP  Trey Peters is a 62 yr old male with a hx of DJD/OA who has progressive symptoms despite conservative treatment. Pt uses a motorized scooter for long distances.     He has a hx of alcoholic cirrhosis, GIB due to gastric ulcer, hepatitis C and elevated liver enzymes. Pt is in the process of being approved for tx of Hep C.  He stopped drinking over 6 mo ago. LFT's are higher compared with Feb '20. Pt also has a hx of hematuria since '17 which has been evaluated by urology without known source. He is on Protonix 40 mg QD prn and takes Norvasc 10 mg QD and Lisinopril 20 mg QD for HTN. There was concern for systolic murmur on today's exam.  He has trace bilat edema. There are no cardiac echos on file. He was treated for a septic arthritis/osteo of the rt foot this spring. Pt was on doxycycline x 30 days and then had tibial sesamoidectomy 5/22/20. Review of Systems:     Constitutional:  negative for chills, fevers, sweats  Respiratory:  negative for cough, dyspnea on exertion, shortness of breath, wheezing  Cardiovascular:  negative for chest pain, chest pressure/discomfort, lower extremity edema, palpitations  Gastrointestinal:  negative for abdominal pain, constipation, diarrhea, nausea, vomiting  Neurological:  negative for dizziness, headache    Medications: Allergies: Allergies   Allergen Reactions    Nsaids Other (See Comments)     PATIENT HAS A HISTORY OF BLEEDING ULCER.     Sulfa Antibiotics Rash       Current Meds:   Scheduled Meds:    phenazopyridine  200 mg Oral TID WC    gabapentin  1,200 mg Oral Nightly    gabapentin  600 mg Oral BID- 8&2    tamsulosin  0.4 mg Oral Daily    polyethylene glycol  17 g Oral BID    acetaminophen  1,000 mg Oral Q8H    lisinopril  40 mg Oral Daily    tranexamic acid (CYCLOKAPRON) IVPB  1,000 mg Intravenous Once    amLODIPine  10 mg Oral Daily    pantoprazole  40 mg Oral Daily    rOPINIRole  1 mg Oral Nightly    sodium chloride flush  10 mL Intravenous 2 times per day    aspirin  81 mg Oral BID    traMADol  50 mg Oral 4 times per day     Continuous Infusions:    bupivacaine 0.125% 500 mL (10/27/20 1234)     PRN Meds: sodium chloride flush, polyethylene glycol, promethazine **OR** ondansetron, oxyCODONE, oxyCODONE    Data:     Past Medical History:   has a past medical history of Acute blood loss anemia, Acute hematogenous osteomyelitis of right foot (HCC)-Involving the sesamoid bones, Alcoholic cirrhosis of liver without ascites (HCC), Chronic midline low back pain without sciatica, Cirrhosis of liver without ascites ([facility] Diarrhea, Duodenal ulcer, Elevated alpha fetoprotein, Elevated liver enzymes, Foot infection, Gastric ulcer, GERD (gastroesophageal reflux disease), GI bleed, Hep C w/o coma, chronic ([facility] History of blood transfusion, History of ETOH abuse, History of stress test, Hyperlipidemia, Hypertension, Iron deficiency anemia due to chronic blood loss, Melena, Neuropathy, New onset seizure (HCC), Olecranon bursitis of left elbow, Portal hypertensive gastropathy ([facility] Right knee pain, Septic arthritis ([facility] Septic arthritis of right foot ([facility] Thrombocytopenia ([facility] Wears glasses, and Wheelchair dependence. Social History:   reports that he quit smoking about 25 years ago. His smoking use included cigarettes. He has a 15.00 pack-year smoking history. He quit smokeless tobacco use about 2 years ago. His smokeless tobacco use included chew. He reports previous alcohol use. He reports that he does not use drugs. Family History:   Family History   Problem Relation Age of Onset    High Blood Pressure Mother     Other Father         neuropathy    Stroke Father     Prostate Cancer Father        Vitals:  /61   Pulse 70   Temp 98.2 °F (36.8 °C) (Oral)   Resp 16   Ht 5' 10\" (1.778 m)   Wt 222 lb 10.6 oz (101 kg)   SpO2 95%   BMI 31.95 kg/m²   Temp (24hrs), Av.3 °F (36.8 °C), Min:98.2 °F (36.8 °C), Max:98.4 °F (36.9 °C)    Recent Labs     10/28/20  2017 10/29/20  0631   POCGLU 112* 108       I/O (24Hr):     Intake/Output Summary (Last 24 hours) at 10/30/2020 1128  Last data filed at 10/30/2020 0033  Gross per 24 hour Intake 510 ml   Output 1777 ml   Net -1267 ml       Labs:  Hematology:  Recent Labs     10/28/20  0555 10/29/20  1230   WBC 15.1* 12.3*   RBC 3.69* 3.24*   HGB 11.4* 10.1*   HCT 35.2* 32.5*   MCV 95.4 100.3   MCH 30.9 31.2   MCHC 32.4 31.1   RDW 15.3* 15.7*   * 113*   MPV 12.0 12.5     Chemistry:  Recent Labs     10/27/20  2130 10/28/20  1421 10/29/20  1230 10/30/20  0828    128* 127* 130*   K 4.7 4.4 5.0  --     97* 98  --    CO2 15* 18* 21  --    GLUCOSE 170* 118* 144*  --    BUN 16 20 15  --    CREATININE 1.15 1.01 0.64*  --    ANIONGAP 20* 13 8*  --    LABGLOM >60 >60 >60  --    GFRAA >60 >60 >60  --    CALCIUM 8.8 8.5* 8.9  --      Recent Labs     10/27/20  2130 10/28/20  2017 10/29/20  0631   PROT 7.0  --   --    LABALBU 3.4*  --   --    *  --   --    ALT 57*  --   --    ALKPHOS 128  --   --    BILITOT 3.30*  --   --    POCGLU  --  112* 108     ABG:  Lab Results   Component Value Date    PH 7.20 02/20/2017    PCO2 33 02/20/2017    PO2 39 02/20/2017    HCO3 12.9 02/20/2017    O2SAT 63 02/20/2017    FIO2 NOT REPORTED 02/20/2017     Lab Results   Component Value Date/Time    SPECIAL NOT REPORTED 10/28/2020 04:09 PM     Lab Results   Component Value Date/Time    CULTURE NO GROWTH 10/28/2020 04:09 PM       Radiology:  Jackell Eye Knee Right (min 4 Views)    Result Date: 10/27/2020  Right total knee arthroplasty in satisfactory alignment with expected early postoperative soft tissue findings.        Physical Examination:        General appearance:  alert, cooperative and no distress  Mental Status:  oriented to person, place and time and normal affect  Lungs:  clear to auscultation bilaterally, normal effort  Heart:  regular rate and rhythm, no murmur  Abdomen:  soft, nontender, nondistended, normal bowel sounds, no masses, hepatomegaly, splenomegaly  Extremities:  no edema, redness, tenderness in the calves  Skin:  no gross lesions, rashes, induration    Assessment:        Hospital Problems Last Modified POA    Elevated liver enzymes 10/27/2020 Yes    Essential hypertension 10/27/2020 Yes    Cirrhosis of liver without ascites ([facility] 10/27/2020 Yes    Thrombocytopenia ([address] 14/60/2992 Yes    Alcoholic cirrhosis of liver without ascites ([address] 10/28/2020 Yes    Hep C w/o coma, chronic ([address] 10/27/2020 Yes    Tenosynovitis of right foot-right Hallux 10/27/2020 Yes    History of total knee arthroplasty, right 10/27/2020 Yes    Hematuria (Chronic) 10/27/2020 Yes    Arthritis of right knee 10/28/2020 Yes    Urinary retention 10/28/2020 No    Constipation 10/28/2020 Yes    Hyponatremia 10/29/2020 No    Status post total knee replacement using cement, right 10/29/2020 Yes          Plan:        Chronic transaminitis and elevated alkaline phosphatase related to his alcoholic liver disease, patient in the process of getting hepatitis C treatment following up with GI as an outpatient.     Urine retention: Continue CIC and bladder scans, start Flomax send urine for analysis ( no infection) , voiding improving, urology consulted    Leukocytosis: Likely reactive, rule out infection, will scan urine get chest x-ray and check pro calcitonin, work up was unremarkable    Hyponatremia, mild  : likely to excess fluids in a liver failure patient , responded to fluid restrictions and salt tab X1    Constipation: Bowel regimen, improved    Right TKA: Management per primary    Discussed with the patient      Ok to DC from medicine standpoint  Continue 1800 fluid restriction  BMP in 1 week to check K and creat given increased dose of Ace    Chanel Vizcarra MD  10/30/2020  11:28 AM

## 2020-10-30 NOTE — PROGRESS NOTES
Orthopedic Progress Note    Patient:  Jose C Bess  YOB: 1960     62 y.o. male    Subjective:  Patient seen and examined  Diabetic neuropathy causing pain with ambulation; otherwise no complaints. Ge removed yesterday, patient had good urinary output. Pain controlled  Denies fever, HA, CP, SOB, N/V, dysuria  +BM/+flatus/+urination  Walked 200 ft with PT.yesterday    Vitals reviewed, afebrile    Objective:   Vitals:    10/29/20 2002   BP: 138/63   Pulse:    Resp:    Temp:    SpO2:      General: NAD, cooperative     Musculoskeletal:  Right lower extremity: Optifoam on: clean, dry and intact. Compartments soft. EHL/FHL/TA/GS complex motor intact. Sural, saphenous, superificial/deep peroneal, and plantar nerve distribution sensation is intact although diminished. Dorsalis pedis/posterior tibial pulses 2+ with BCR. Recent Labs     10/29/20  1230   WBC 12.3*   HGB 10.1*   HCT 32.5*   *   *   K 5.0   BUN 15   CREATININE 0.64*   GLUCOSE 144*      Meds: ASA   See rec for complete list    Impression 62 y.o. male s/p right total knee arthroplasty on 10/27/20, POD#3    Plan  - Post-op antibiotics completed  - WB status: weight bear as tolerated right lower extremity  - Pain control PO/IV Medication. Attempt to Wean IV medications. - Optifoam on; clean, dry and intact. Okay to reinforce per nursing. Page ortho if saturated  - DVT ppx: ASA 81mg BID  - Ice for 20 minutes an hour and keep elevated to reduce swelling and throbbing pain  - Encourage incentive spirometry use  - PT/OT to see and evaluate while in-house  - Consults: Urology on for urinary rentention. IM on for medical management  - Dispo: okay to DC today  - F/u with Dr. Niru Quintana in 10-14 days  - Please page ortho with any questions    Patient seen and examined. Discussed the need for medical equipment to assist with their recovery during the post-operative period.   Based on the patient's current physical

## 2020-10-30 NOTE — CARE COORDINATION
Transitional Planning  Received call from Germán Zaragoza with meds to beds unable to get a hold of pt. His percocet script is for 56 pills= $15.98 co pay  Or if he prefers 28 pills and no co-pay. Asked pt and he prefers the 28 pills with no co-pay. Home Care order in arpit completed and signed  Called [facility] spoke with Fazal Noel  notified of discharge today. Pt has RW and seat in shower at home with walk in shower house is handicap accessible for his wife in a w/c and did not need BSC.           Discharge 1 [facility] Case Management Department  Written by: Jessica Bautista RN    Patient Name: Jose C Bess  Attending Provider: Rufina Tapia DO  Admit Date: 10/27/2020  6:07 AM  MRN: [id number]  Account: [de-identified]                     : 1960  Discharge Date: 10/30/2020      Disposition: home with Heidi Licea RN

## 2020-10-30 NOTE — PROGRESS NOTES
CLINICAL PHARMACY NOTE: MEDS TO [address] Select Patient?: Yes  Total # of Prescriptions Filled: 3   The following medications were delivered to the patient:  · ASA  · PERCOCET  · LISINIPRIL  Total # of Interventions Completed: 0  Time Spent (min): 0    Additional Documentation:

## 2020-10-30 NOTE — PROGRESS NOTES
Physical Therapy  Facility/Department: [address] ORTHO/MED SURG  Daily Treatment Note  NAME: Darren Starkey  : 1960  MRN: [id number]    Date of Service: 10/30/2020    Discharge Recommendations:  Patient would benefit from continued therapy after discharge   PT Equipment Recommendations  Equipment Needed: No  Other: pt has RW    Assessment   Body structures, Functions, Activity limitations: Decreased functional mobility ; Decreased balance;Decreased ROM; Decreased strength;Decreased endurance  Assessment: Pt ambulated 100 ft with RW x CGA, no buckling noted. Pt cont to improve sequencing gait without need for verbal cueing. Cont to provide verbal cues to maintain RW on ground during gt. Pt would benefit from continued PT serives to address functional and ROM deficits. PT Education: Goals;PT Role;Plan of Care;Equipment;General Safety  REQUIRES PT FOLLOW UP: Yes  Activity Tolerance  Activity Tolerance: Patient Tolerated treatment well;Patient limited by endurance; Patient limited by fatigue     Patient Diagnosis(es): The primary encounter diagnosis was Post-op pain. A diagnosis of Hyponatremia was also pertinent to this visit.      has a past medical history of Acute blood loss anemia, Acute hematogenous osteomyelitis of right foot (HCC)-Involving the sesamoid bones, Alcoholic cirrhosis of liver without ascites (HCC), Chronic midline low back pain without sciatica, Cirrhosis of liver without ascites ([address] Diarrhea, Duodenal ulcer, Elevated alpha fetoprotein, Elevated liver enzymes, Foot infection, Gastric ulcer, GERD (gastroesophageal reflux disease), GI bleed, Hep C w/o coma, chronic (HCC), History of blood transfusion, History of ETOH abuse, History of stress test, Hyperlipidemia, Hypertension, Iron deficiency anemia due to chronic blood loss, Melena, Neuropathy, New onset seizure (HCC), Olecranon bursitis of left elbow, Portal hypertensive gastropathy ([address] Right knee pain, Septic arthritis ([address] Septic arthritis of right foot [address] [address] Thrombocytopenia ([facility] [address] Wears glasses, and Wheelchair dependence. has a past surgical history that includes hernia repair (Left, 1975); Upper gastrointestinal endoscopy (10/31/2015); Tonsillectomy (1970); Upper gastrointestinal endoscopy (03/14/2018); Colonoscopy (03/14/2018); pr colon ca scrn not hi rsk ind (N/A, 3/14/2018); Upper gastrointestinal endoscopy (N/A, 3/14/2018); Upper gastrointestinal endoscopy (N/A, 1/22/2019); Upper gastrointestinal endoscopy (N/A, 2/1/2019); Upper gastrointestinal endoscopy (N/A, 8/1/2019); Vasectomy (2007); incision and drainage (Left, 10/16/2019); SESAMOIDECTOMY FIRST TOE (Right, 5/22/2020); Lumbar spine surgery (2000); Total knee arthroplasty (Right, 10/27/2020); and Total knee arthroplasty (Right, 10/27/2020). Restrictions  Restrictions/Precautions  Restrictions/Precautions: Weight Bearing, Fall Risk  Required Braces or Orthoses?: No  Lower Extremity Weight Bearing Restrictions  Right Lower Extremity Weight Bearing: Weight Bearing As Tolerated  Position Activity Restriction  Other position/activity restrictions: R TKA 10/27  Subjective   General  Chart Reviewed: Yes  Response To Previous Treatment: Patient with no complaints from previous session. Family / Caregiver Present: No  Subjective  Subjective: RN and pt in agreement for PT treat, pt supine in bed HOB elevated ~30deg upon arrival, pt pleasant and cooperative throughout  Pain Screening  Patient Currently in Pain: Yes  Pain Assessment  Pain Assessment: 0-10  Pain Level: 4  Pain Type: Surgical pain;Acute pain  Pain Location: Knee; Foot  Pain Orientation: Right  Non-Pharmaceutical Pain Intervention(s): Ambulation/Increased Activity;Repositioned  Vital Signs  Patient Currently in Pain: Yes       Orientation  Orientation  Overall Orientation Status: Within Normal Limits  Cognition   Cognition  Overall Cognitive Status: WFL  Objective   Bed mobility  Supine to Sit: Stand by assistance  Sit to Supine: Stand by assistance  Scooting: Stand by assistance  Comment: HOB elevated ~30 deg, use of bedrails  Transfers  Sit to Stand: Contact guard assistance  Stand to sit: Contact guard assistance  Comment: RW provided for support, cued for hand placement for proper technique of STS tranfers  Ambulation  Ambulation?: Yes  Ambulation 1  Surface: level tile  Device: Rolling Walker  Assistance: Contact guard assistance  Quality of Gait: Good carryover of step to pattern  Gait Deviations: Slow Elham; Increased REJI  Distance: 100 ft with WBAT RLE  Comments: Pt demo proper sequencing for RW use. Verbal cues to maintain RW on ground with progresison. Stairs/Curb  Stairs?: No     Balance  Posture: Good  Sitting - Static: Good  Sitting - Dynamic: Good  Standing - Static: Fair;+  Standing - Dynamic: Fair  Comments: standing balance assessed with RW  Exercises  Quad Sets: RLE 15x  Heelslides: RLE 15x  Comments: exs completed prior measuring R knee AROM   Joint Mobility  ROM RLE: AROM R knee 3-95 degrees, hip and ankle WFL  ROM LLE: AROM WFL  ROM RUE: AROM WFL  ROM LUE: AROM WFL        Goals  Short term goals  Time Frame for Short term goals: 14 visits  Short term goal 1: Perform bed mobility and functional transfers independently  Short term goal 2: Ambulate 200ft with RW and supervision  Short term goal 3: Demo Good- dynamic standing balance to decrease risk of falls  Short term goal 4: Ascend/descend 1 step with HR and CGA  Short term goal 5:  Independently perform HEP    Plan    Plan  Times per week: BID  Current Treatment Recommendations: Strengthening, ROM, Balance Training, Functional Mobility Training, Transfer Training, Gait Training, Endurance Training, Home Exercise Program, Safety Education & Training, Patient/Caregiver Education & Training, Stair training  Safety Devices  Type of devices: Nurse notified, Call light within reach, Bed alarm in place, Gait belt, Left in bed  Restraints  Initially in place: No

## 2020-11-02 ENCOUNTER — TELEPHONE (OUTPATIENT)
Dept: FAMILY MEDICINE CLINIC | Age: 60
End: 2020-11-02

## 2020-11-04 RX ORDER — AMLODIPINE BESYLATE 10 MG/1
TABLET ORAL
Qty: 30 TABLET | Refills: 2 | OUTPATIENT
Start: 2020-11-04

## 2020-11-09 NOTE — DISCHARGE SUMMARY
Orthopedic  Discharge Summary         Patient Identification:  Becky Rashid is a 62 y.o. male. :  1960  MRN: [id number]     Acct: [de-identified]   Admit Date:  10/27/2020  Discharge date and time: 10/30/2020  6:07 PM     Attending Provider: No att. providers found                                     Reason for Admission: Right total knee arthroplasty     Discharge Diagnoses:   Patient Active Problem List   Diagnosis    GI bleed    Gastrointestinal hemorrhage    Iron deficiency anemia due to chronic blood loss    Diarrhea    Melena    New onset seizure ([address]    Elevated liver enzymes    Essential hypertension    Gastric ulcer    Unspecified viral hepatitis C without hepatic coma    Portal hypertensive gastropathy (HCC)    Anemia    Cirrhosis of liver without ascites (HCC)    Duodenal ulcer    Acute blood loss anemia    Thrombocytopenia (HCC)    Alcoholic cirrhosis of liver without ascites (HCC)    Chronic midline low back pain without sciatica    Seizures (HCC)    Hypertension    Hep C w/o coma, chronic (HCC)    Chronic back pain    History of ETOH abuse    Abdominal pain    Elevated alpha fetoprotein    Olecranon bursitis of left elbow    Septic arthritis of IP joint of toe, right (HCC)    Septic arthritis of right foot ([address]    Foot infection    Septic arthritis (HCC)    Acute hematogenous osteomyelitis of right foot (HCC)-Involving the sesamoid bones    Tenosynovitis of right foot-right Hallux    Sesamoiditis    Acute post-operative pain    Right foot pain    History of total knee arthroplasty, right    Hematuria    Arthritis of right knee    Urinary retention    Constipation    Hyponatremia    Status post total knee replacement using cement, right      Consults:  IM    Procedures: Right total knee arthroplasty    Hospital Course:   Becky Rashid is a 62 y.o. male who had an elective right total knee arthroplasty performed.   We discussed DC with patient and he is ok with DC. All questions and concerns were addressed at this time. Laboratory parameters were followed and optimized when possible by ANAT kearns    Time spend on discharge discussion and plannin minutes    Disposition:   Home    Discharged Condition:  Stable    Discharge Medications:       Ramakrishna Banuelos   Home Medication Instructions [id number]    Printed on:20 0745   Medication Information                      Alcohol, USP (ETHYL ALCOHOL) 95 % SOLN  Apply topically 4 times daily as needed             amLODIPine (NORVASC) 10 MG tablet  Take 1 tablet by mouth daily             aspirin EC 81 MG EC tablet  Take 1 tablet by mouth 2 times daily             ferrous sulfate (IRON 325) 325 (65 Fe) MG tablet  TAKE ONE TABLET BY MOUTH DAILY WITH BREAKFAST             gabapentin (NEURONTIN) 600 MG tablet  TAKE ONE TABLET BY MOUTH THREE TIMES A DAY             gabapentin (NEURONTIN) 600 MG tablet  Take by mouth. Indications: 600 in the am, 600 in the afternoon, and 1200 in pm-from neuro              lisinopril (PRINIVIL;ZESTRIL) 40 MG tablet  Take 1 tablet by mouth daily             Misc. Devices [facility]) MISC  1 Device by Does not apply route daily Standard wheelchair  Current body weight: 221 lb (100.2 kg)  Current patient height: 5'10 (177.8 cm)  Diagnosis: right foot pain and unable to ambulate  Length of need: 12 months             Misc. Devices MISC  Right knee lateral  brace. Misc.  Devices MISC  Right knee lateral un- brace             Multiple Vitamins-Minerals (THERAPEUTIC MULTIVITAMIN-MINERALS) tablet  Take 1 tablet by mouth daily             pantoprazole (PROTONIX) 40 MG tablet  Take 1 tablet by mouth every morning (before breakfast)             rOPINIRole (REQUIP) 1 MG tablet  Take 1 tablet by mouth nightly TAKE ONE TABLET BY MOUTH ONCE NIGHTLY             tamsulosin (FLOMAX) 0.4 MG capsule  Take 1 capsule by mouth daily Discharge Instructions: Follow up with REECE Callaway CNP in 3-4 weeks.    Follow up Dr. Sharyle King 10-14d from surgery    Hospital acquired Infections: None      Carlito Rendon DO  Orthopedic Surgery Resident, PGY-2  [address]

## 2023-03-13 NOTE — TELEPHONE ENCOUNTER
C-Reactive Protein Once 04/12/2020               Patient Active Problem List:     GI bleed     Gastrointestinal hemorrhage     Iron deficiency anemia due to chronic blood loss     Diarrhea     Melena     New onset seizure (HCC)     Elevated liver enzymes     Essential hypertension     Gastric ulcer     Unspecified viral hepatitis C without hepatic coma     Portal hypertensive gastropathy (HCC)     Anemia     Cirrhosis of liver without ascites (HCC)     Duodenal ulcer     Acute blood loss anemia     Thrombocytopenia (HCC)     Alcoholic cirrhosis of liver without ascites (HCC)     Chronic midline low back pain without sciatica     Seizures (HCC)     Hypertension     Hep C w/o coma, chronic (HCC)     Chronic back pain     History of ETOH abuse     Abdominal pain     Elevated alpha fetoprotein     Olecranon bursitis of left elbow     Septic arthritis of IP joint of toe, right (HCC)     Septic arthritis of right foot ([address]     Foot infection     Septic arthritis (HCC)     Acute hematogenous osteomyelitis of right foot (HCC)-Involving the sesamoid bones     Tenosynovitis of right foot-right Hallux [facility]

## (undated) DEVICE — TUBING, SUCTION, 1/4" X 12', STRAIGHT: Brand: MEDLINE

## (undated) DEVICE — ORTHO EXT PK

## (undated) DEVICE — OPTIFOAM GENTLE AG,POST-OP STRIP,3.5X14: Brand: MEDLINE

## (undated) DEVICE — GLOVE ORANGE PI 7   MSG9070

## (undated) DEVICE — BNDG,ELSTC,MATRIX,STRL,4"X5YD,LF,HOOK&LP: Brand: MEDLINE

## (undated) DEVICE — GLOVE SURG SZ 75 CRM LTX FREE POLYISOPRENE POLYMER BEAD ANTI

## (undated) DEVICE — GLOVE SURG SZ 65 CRM LTX FREE POLYISOPRENE POLYMER BEAD ANTI

## (undated) DEVICE — BANDAGE COMPR W4INXL5YD WHT BGE POLY COT M E WRP WV HK AND

## (undated) DEVICE — 450 ML BOTTLE OF 0.05% CHLORHEXIDINE GLUCONATE IN 99.95% STERILE WATER FOR IRRIGATION, USP AND APPLICATOR.: Brand: IRRISEPT ANTIMICROBIAL WOUND LAVAGE

## (undated) DEVICE — GLOVE SURG SZ 65 THK91MIL LTX FREE SYN POLYISOPRENE

## (undated) DEVICE — DRESSING,GAUZE,XEROFORM,CURAD,1"X8",ST: Brand: CURAD

## (undated) DEVICE — GOWN,AURORA,NONRNF,XL,30/CS: Brand: MEDLINE

## (undated) DEVICE — MASTISOL ADHESIVE LIQ 2/3ML

## (undated) DEVICE — OSCILLATING TIP SAW CARTRIDGE: Brand: PRECISION FALCON

## (undated) DEVICE — PAD,ABDOMINAL,5"X9",ST,LF,25/BX: Brand: MEDLINE INDUSTRIES, INC.

## (undated) DEVICE — Z DISCONTINUED USE 2424143 ADAPTER O2 SWVL CHRISTMAS TREE GRN

## (undated) DEVICE — YANKAUER,FLEXIBLE HANDLE,REGLR CAPACITY: Brand: MEDLINE INDUSTRIES, INC.

## (undated) DEVICE — FORCEPS BX L240CM JAW DIA2.8MM L CAP W/ NDL MIC MESH TOOTH

## (undated) DEVICE — PRECISION THIN (9.0 X 0.38 X 25.0MM)

## (undated) DEVICE — SUTURE STRATAFIX SPRL SZ 2-0 L9IN ABSRB VLT MH L36MM 1/2 SXPD2B408

## (undated) DEVICE — Z INACTIVE USE 2525529 CONNECTOR TBNG FOR O2

## (undated) DEVICE — GLOVE ORANGE PI 8   MSG9080

## (undated) DEVICE — SKIN PREP TRAY W/CHG: Brand: MEDLINE INDUSTRIES, INC.

## (undated) DEVICE — GLOVE SURG SZ 7 CRM LTX FREE POLYISOPRENE POLYMER BEAD ANTI

## (undated) DEVICE — SUTURE VCRL SZ 0 L27IN ABSRB UD L36MM CT-1 1/2 CIR J260H

## (undated) DEVICE — HYBRID TUBING WITH CO2 CONNECTION FOR OLYMPUS 140/160/180/190 SERIES GI ENDOSCOPES WITH OLYMPUS AFU-100 , OLYMPUS OFP: Brand: ENDOGATOR HYBRID IRRIGATION TUBING

## (undated) DEVICE — DUP USE 275908 BLADE SAW MICRO 25X5.5X 38MM OSCIL SAG

## (undated) DEVICE — SOLUTION IV IRRIG WATER 1000ML POUR BRL 2F7114

## (undated) DEVICE — GLOVE SURG SZ 8 CRM LTX FREE POLYISOPRENE POLYMER BEAD ANTI

## (undated) DEVICE — INTENDED FOR TISSUE SEPARATION, AND OTHER PROCEDURES THAT REQUIRE A SHARP SURGICAL BLADE TO PUNCTURE OR CUT.: Brand: BARD-PARKER ® CARBON RIB-BACK BLADES

## (undated) DEVICE — TOTAL TRAY, 16FR 10ML SIL FOLEY, URN: Brand: MEDLINE

## (undated) DEVICE — Z DUPLICATE USE 2527422 TUBING O2 STD 7 FT EXTN NO CRUSH VISUAL CNTRST LF

## (undated) DEVICE — BANDAGE COBAN 4 IN COMPR W4INXL5YD FOAM COHESIVE QUIK STK SELF ADH SFT

## (undated) DEVICE — CANNULA NSL AD TBNG L7FT PVC STR NONFLARED PRNG O2 DEL W STD

## (undated) DEVICE — 3M™ IOBAN™ 2 ANTIMICROBIAL INCISE DRAPE 6650EZ: Brand: IOBAN™ 2

## (undated) DEVICE — BITEBLOCK 54FR W/ DENT RIM BLOX

## (undated) DEVICE — GOWN,SIRUS,NONRNF,SETINSLV,XL,20/CS: Brand: MEDLINE

## (undated) DEVICE — PADDING,UNDERCAST,COTTON, 4"X4YD STERILE: Brand: MEDLINE

## (undated) DEVICE — STRIP SKIN CLSR W0.25XL4IN WHT SPUNBOUND FBR NYL HI ADH

## (undated) DEVICE — GLOVE ORANGE PI 7 1/2   MSG9075

## (undated) DEVICE — JELLY,LUBE,STERILE,FLIP TOP,TUBE,2-OZ: Brand: MEDLINE

## (undated) DEVICE — TUBE IRRIG HNDPC HI FLO TP INTRPULS W/SUCTION TUBE

## (undated) DEVICE — BASIN EMSIS 700ML GRAPHITE PLAS KID SHP GRAD

## (undated) DEVICE — ADAPTER TBNG LUER STUB 15 GA INTMED

## (undated) DEVICE — ZIPPERED TOGA, 2X LARGE: Brand: FLYTE

## (undated) DEVICE — CHLORAPREP 26ML ORANGE

## (undated) DEVICE — APPLICATOR MEDICATED 26 CC SOLUTION HI LT ORNG CHLORAPREP

## (undated) DEVICE — FORCEPS BX L240CM JAW DIA2.4MM ORNG L CAP W/ NDL DISP RAD

## (undated) DEVICE — SUTURE VCRL SZ 2-0 L27IN ABSRB UD L22MM X-1 1/2 CIR REV CUT J459H

## (undated) DEVICE — PROTECTOR ULN NRV PUR FOAM HK LOOP STRP ANATOMICALLY

## (undated) DEVICE — ZIMMER® STERILE DISPOSABLE TOURNIQUET CUFF WITH PROTECTIVE SLEEVE AND PLC, DUAL PORT, SINGLE BLADDER, 34 IN. (86 CM)

## (undated) DEVICE — TOURNIQUET CUF BLD PRESSURE 4X18 IN 2 PRT SINGLE BLDR RED

## (undated) DEVICE — SUTURE STRATAFIX SPRL SZ 1 L14IN ABSRB VLT L48CM CTX 1/2 SXPD2B405

## (undated) DEVICE — CO2 CANNULA,SUPERSOFT, ADLT,7'O2,7'CO2: Brand: MEDLINE

## (undated) DEVICE — SOLUTION IV IRRIG 500ML 0.9% SODIUM CHL 2F7123

## (undated) DEVICE — SUTURE ETHLN SZ 3-0 L18IN NONABSORBABLE BLK L24MM PS-1 3/8 1663G

## (undated) DEVICE — SUTURE VCRL + SZ 2-0 L27IN ABSRB CLR CT-1 1/2 CIR TAPERCUT VCP259H

## (undated) DEVICE — Device: Brand: DEFENDO VALVE AND CONNECTOR KIT

## (undated) DEVICE — GLOVE ORANGE PI 8 1/2   MSG9085

## (undated) DEVICE — Z INACTIVE USE 2660664 SOLUTION IRRIG 3000ML 0.9% SOD CHL USP UROMATIC PLAS CONT

## (undated) DEVICE — GOWN,SIRUS,NON REINFRCD,LARGE,SET IN SL: Brand: MEDLINE

## (undated) DEVICE — Device

## (undated) DEVICE — HYPODERMIC SAFETY NEEDLE: Brand: MAGELLAN

## (undated) DEVICE — DRESSING FOAM W4XL4IN AG SIL FACE BORD IONIC ANTIMIC ADH

## (undated) DEVICE — SHEET DRAPE FULL 70X100

## (undated) DEVICE — SYRINGE MED 50ML LUERSLIP TIP

## (undated) DEVICE — PRECISION THIN (5.5 X 0.38 X 11.5MM)

## (undated) DEVICE — DRAPE,U/ SHT,SPLIT,PLAS,STERIL: Brand: MEDLINE

## (undated) DEVICE — SUTURE STRATAFIX SPRL SZ 3-0 L5IN ABSRB UD FS L26MM 3/8 CIR SXMP2B411

## (undated) DEVICE — ADHESIVE SKIN CLSR 0.7ML TOP DERMBND ADV

## (undated) DEVICE — SYRINGE MED 10ML LUERLOCK TIP W/O SFTY DISP

## (undated) DEVICE — CYSTO/BLADDER IRRIGATION SET, REGULATING CLAMP

## (undated) DEVICE — CEMENT MIXING SYSTEM WITH FEMORAL BREAKWAY NOZZLE: Brand: REVOLUTION

## (undated) DEVICE — TUBING AMB

## (undated) DEVICE — GOWN,POLY REINFORCED,LG: Brand: MEDLINE

## (undated) DEVICE — SUTURE VCRL SZ 1 L36IN ABSRB UD L36MM CT-1 1/2 CIR J947H

## (undated) DEVICE — GAUZE,SPONGE,4"X4",16PLY,STRL,LF,10/TRAY: Brand: MEDLINE

## (undated) DEVICE — 60 ML SYRINGE LUER-LOCK TIP: Brand: MONOJECT

## (undated) DEVICE — CULTURETTE AERO/ANEROBIC RED/BLUE BUNDLE

## (undated) DEVICE — STOCKINETTE,IMPERVIOUS,12X48,STERILE: Brand: MEDLINE

## (undated) DEVICE — STERILE PATIENT PROTECTIVE PAD FOR IMP® KNEE POSITIONERS & COHESIVE WRAP (10 / CASE): Brand: DE MAYO KNEE POSITIONER®

## (undated) DEVICE — DEFENDO AIR WATER SUCTION AND BIOPSY VALVE KIT FOR  OLYMPUS: Brand: DEFENDO AIR/WATER/SUCTION AND BIOPSY VALVE

## (undated) DEVICE — PACK PROCEDURE SURG SVMMC TOT KNEE

## (undated) DEVICE — BLOCK BITE 60FR RUBBER ADLT DENTAL